# Patient Record
Sex: FEMALE | Race: OTHER | URBAN - METROPOLITAN AREA
[De-identification: names, ages, dates, MRNs, and addresses within clinical notes are randomized per-mention and may not be internally consistent; named-entity substitution may affect disease eponyms.]

---

## 2017-10-17 ENCOUNTER — OUTPATIENT (OUTPATIENT)
Dept: OUTPATIENT SERVICES | Facility: HOSPITAL | Age: 67
LOS: 1 days | Discharge: HOME | End: 2017-10-17

## 2017-10-17 DIAGNOSIS — E55.9 VITAMIN D DEFICIENCY, UNSPECIFIED: ICD-10-CM

## 2017-10-17 DIAGNOSIS — F41.9 ANXIETY DISORDER, UNSPECIFIED: ICD-10-CM

## 2017-10-17 DIAGNOSIS — I10 ESSENTIAL (PRIMARY) HYPERTENSION: ICD-10-CM

## 2017-10-17 DIAGNOSIS — R73.09 OTHER ABNORMAL GLUCOSE: ICD-10-CM

## 2018-08-07 PROBLEM — Z00.00 ENCOUNTER FOR PREVENTIVE HEALTH EXAMINATION: Status: ACTIVE | Noted: 2018-08-07

## 2018-08-22 ENCOUNTER — APPOINTMENT (OUTPATIENT)
Dept: OBGYN | Facility: CLINIC | Age: 68
End: 2018-08-22
Payer: MEDICARE

## 2018-08-22 ENCOUNTER — LABORATORY RESULT (OUTPATIENT)
Age: 68
End: 2018-08-22

## 2018-08-22 ENCOUNTER — OUTPATIENT (OUTPATIENT)
Dept: OUTPATIENT SERVICES | Facility: HOSPITAL | Age: 68
LOS: 1 days | Discharge: HOME | End: 2018-08-22

## 2018-08-22 VITALS — WEIGHT: 114 LBS | BODY MASS INDEX: 20.98 KG/M2 | HEIGHT: 62 IN

## 2018-08-22 DIAGNOSIS — Z01.419 ENCOUNTER FOR GYNECOLOGICAL EXAMINATION (GENERAL) (ROUTINE) W/OUT ABNORMAL FINDINGS: ICD-10-CM

## 2018-08-22 PROCEDURE — 81003 URINALYSIS AUTO W/O SCOPE: CPT | Mod: QW

## 2018-08-22 PROCEDURE — 99387 INIT PM E/M NEW PAT 65+ YRS: CPT

## 2018-08-23 DIAGNOSIS — Z01.419 ENCOUNTER FOR GYNECOLOGICAL EXAMINATION (GENERAL) (ROUTINE) WITHOUT ABNORMAL FINDINGS: ICD-10-CM

## 2018-08-24 LAB
BILIRUB UR QL STRIP: NORMAL
CLARITY UR: CLEAR
GLUCOSE UR-MCNC: NORMAL
HCG UR QL: NORMAL EU/DL
HGB UR QL STRIP.AUTO: NORMAL
KETONES UR-MCNC: NORMAL
LEUKOCYTE ESTERASE UR QL STRIP: 25
NITRITE UR QL STRIP: NORMAL
PH UR STRIP: 6.5
PROT UR STRIP-MCNC: NORMAL
SP GR UR STRIP: 1.01

## 2020-02-04 ENCOUNTER — APPOINTMENT (OUTPATIENT)
Dept: OBGYN | Facility: CLINIC | Age: 70
End: 2020-02-04
Payer: MEDICARE

## 2020-02-04 VITALS — BODY MASS INDEX: 19.69 KG/M2 | WEIGHT: 107 LBS | HEIGHT: 62 IN

## 2020-02-04 DIAGNOSIS — Z78.0 ASYMPTOMATIC MENOPAUSAL STATE: ICD-10-CM

## 2020-02-04 DIAGNOSIS — I10 ESSENTIAL (PRIMARY) HYPERTENSION: ICD-10-CM

## 2020-02-04 LAB
BILIRUB UR QL STRIP: NORMAL
CLARITY UR: CLEAR
GLUCOSE UR-MCNC: NORMAL
HCG UR QL: NORMAL EU/DL
HGB UR QL STRIP.AUTO: NORMAL
KETONES UR-MCNC: NORMAL
LEUKOCYTE ESTERASE UR QL STRIP: NORMAL
NITRITE UR QL STRIP: NORMAL
PH UR STRIP: 5
PROT UR STRIP-MCNC: NORMAL
SP GR UR STRIP: 1

## 2020-02-04 PROCEDURE — 99213 OFFICE O/P EST LOW 20 MIN: CPT

## 2020-02-04 PROCEDURE — 81003 URINALYSIS AUTO W/O SCOPE: CPT | Mod: QW

## 2020-02-07 PROBLEM — I10 HYPERTENSION: Status: ACTIVE | Noted: 2020-02-07

## 2020-02-07 PROBLEM — Z78.0 MENOPAUSE: Status: ACTIVE | Noted: 2018-08-23

## 2020-03-11 ENCOUNTER — TRANSCRIPTION ENCOUNTER (OUTPATIENT)
Age: 70
End: 2020-03-11

## 2020-10-13 ENCOUNTER — APPOINTMENT (OUTPATIENT)
Dept: OBGYN | Facility: CLINIC | Age: 70
End: 2020-10-13

## 2024-01-01 ENCOUNTER — INPATIENT (INPATIENT)
Facility: HOSPITAL | Age: 74
LOS: 23 days | Discharge: NURSING, REHAB OR LONG-TERM CARE | DRG: 192 | End: 2024-05-30
Attending: INTERNAL MEDICINE | Admitting: INTERNAL MEDICINE
Payer: MEDICARE

## 2024-01-01 ENCOUNTER — INPATIENT (INPATIENT)
Facility: HOSPITAL | Age: 74
LOS: 3 days | DRG: 189 | End: 2024-06-06
Attending: HOSPITALIST | Admitting: STUDENT IN AN ORGANIZED HEALTH CARE EDUCATION/TRAINING PROGRAM
Payer: MEDICARE

## 2024-01-01 ENCOUNTER — TRANSCRIPTION ENCOUNTER (OUTPATIENT)
Age: 74
End: 2024-01-01

## 2024-01-01 ENCOUNTER — RESULT REVIEW (OUTPATIENT)
Age: 74
End: 2024-01-01

## 2024-01-01 VITALS
DIASTOLIC BLOOD PRESSURE: 73 MMHG | SYSTOLIC BLOOD PRESSURE: 149 MMHG | OXYGEN SATURATION: 84 % | RESPIRATION RATE: 20 BRPM | TEMPERATURE: 98 F | HEART RATE: 89 BPM

## 2024-01-01 VITALS
HEART RATE: 103 BPM | SYSTOLIC BLOOD PRESSURE: 143 MMHG | RESPIRATION RATE: 28 BRPM | TEMPERATURE: 99 F | OXYGEN SATURATION: 98 % | WEIGHT: 130.07 LBS | DIASTOLIC BLOOD PRESSURE: 80 MMHG

## 2024-01-01 VITALS
TEMPERATURE: 98 F | OXYGEN SATURATION: 95 % | SYSTOLIC BLOOD PRESSURE: 121 MMHG | DIASTOLIC BLOOD PRESSURE: 63 MMHG | RESPIRATION RATE: 18 BRPM | HEART RATE: 97 BPM

## 2024-01-01 VITALS
DIASTOLIC BLOOD PRESSURE: 72 MMHG | HEART RATE: 97 BPM | OXYGEN SATURATION: 97 % | WEIGHT: 119.93 LBS | TEMPERATURE: 99 F | SYSTOLIC BLOOD PRESSURE: 134 MMHG | HEIGHT: 61 IN

## 2024-01-01 DIAGNOSIS — R91.8 OTHER NONSPECIFIC ABNORMAL FINDING OF LUNG FIELD: ICD-10-CM

## 2024-01-01 DIAGNOSIS — R04.2 HEMOPTYSIS: ICD-10-CM

## 2024-01-01 DIAGNOSIS — G92.8 OTHER TOXIC ENCEPHALOPATHY: ICD-10-CM

## 2024-01-01 DIAGNOSIS — J96.21 ACUTE AND CHRONIC RESPIRATORY FAILURE WITH HYPOXIA: ICD-10-CM

## 2024-01-01 DIAGNOSIS — R06.00 DYSPNEA, UNSPECIFIED: ICD-10-CM

## 2024-01-01 DIAGNOSIS — J44.9 CHRONIC OBSTRUCTIVE PULMONARY DISEASE, UNSPECIFIED: ICD-10-CM

## 2024-01-01 DIAGNOSIS — J98.11 ATELECTASIS: ICD-10-CM

## 2024-01-01 DIAGNOSIS — J94.2 HEMOTHORAX: ICD-10-CM

## 2024-01-01 DIAGNOSIS — K64.9 UNSPECIFIED HEMORRHOIDS: ICD-10-CM

## 2024-01-01 DIAGNOSIS — F17.210 NICOTINE DEPENDENCE, CIGARETTES, UNCOMPLICATED: ICD-10-CM

## 2024-01-01 DIAGNOSIS — Z53.29 PROCEDURE AND TREATMENT NOT CARRIED OUT BECAUSE OF PATIENT'S DECISION FOR OTHER REASONS: ICD-10-CM

## 2024-01-01 DIAGNOSIS — Z66 DO NOT RESUSCITATE: ICD-10-CM

## 2024-01-01 DIAGNOSIS — J96.02 ACUTE RESPIRATORY FAILURE WITH HYPERCAPNIA: ICD-10-CM

## 2024-01-01 DIAGNOSIS — J44.0 CHRONIC OBSTRUCTIVE PULMONARY DISEASE WITH (ACUTE) LOWER RESPIRATORY INFECTION: ICD-10-CM

## 2024-01-01 DIAGNOSIS — K59.00 CONSTIPATION, UNSPECIFIED: ICD-10-CM

## 2024-01-01 DIAGNOSIS — Z51.5 ENCOUNTER FOR PALLIATIVE CARE: ICD-10-CM

## 2024-01-01 DIAGNOSIS — J44.1 CHRONIC OBSTRUCTIVE PULMONARY DISEASE WITH (ACUTE) EXACERBATION: ICD-10-CM

## 2024-01-01 DIAGNOSIS — H61.20 IMPACTED CERUMEN, UNSPECIFIED EAR: ICD-10-CM

## 2024-01-01 DIAGNOSIS — J98.09 OTHER DISEASES OF BRONCHUS, NOT ELSEWHERE CLASSIFIED: ICD-10-CM

## 2024-01-01 DIAGNOSIS — H61.21 IMPACTED CERUMEN, RIGHT EAR: ICD-10-CM

## 2024-01-01 DIAGNOSIS — F41.9 ANXIETY DISORDER, UNSPECIFIED: ICD-10-CM

## 2024-01-01 DIAGNOSIS — J90 PLEURAL EFFUSION, NOT ELSEWHERE CLASSIFIED: ICD-10-CM

## 2024-01-01 DIAGNOSIS — J96.22 ACUTE AND CHRONIC RESPIRATORY FAILURE WITH HYPERCAPNIA: ICD-10-CM

## 2024-01-01 DIAGNOSIS — D64.9 ANEMIA, UNSPECIFIED: ICD-10-CM

## 2024-01-01 DIAGNOSIS — J18.8 OTHER PNEUMONIA, UNSPECIFIED ORGANISM: ICD-10-CM

## 2024-01-01 DIAGNOSIS — J96.01 ACUTE RESPIRATORY FAILURE WITH HYPOXIA: ICD-10-CM

## 2024-01-01 DIAGNOSIS — I10 ESSENTIAL (PRIMARY) HYPERTENSION: ICD-10-CM

## 2024-01-01 DIAGNOSIS — H65.91 UNSPECIFIED NONSUPPURATIVE OTITIS MEDIA, RIGHT EAR: ICD-10-CM

## 2024-01-01 DIAGNOSIS — C34.92 MALIGNANT NEOPLASM OF UNSPECIFIED PART OF LEFT BRONCHUS OR LUNG: ICD-10-CM

## 2024-01-01 DIAGNOSIS — E83.42 HYPOMAGNESEMIA: ICD-10-CM

## 2024-01-01 DIAGNOSIS — E43 UNSPECIFIED SEVERE PROTEIN-CALORIE MALNUTRITION: ICD-10-CM

## 2024-01-01 LAB
ALBUMIN SERPL ELPH-MCNC: 2.9 G/DL — LOW (ref 3.5–5.2)
ALBUMIN SERPL ELPH-MCNC: 3 G/DL — LOW (ref 3.5–5.2)
ALBUMIN SERPL ELPH-MCNC: 3.1 G/DL — LOW (ref 3.5–5.2)
ALBUMIN SERPL ELPH-MCNC: 3.2 G/DL — LOW (ref 3.5–5.2)
ALBUMIN SERPL ELPH-MCNC: 3.2 G/DL — LOW (ref 3.5–5.2)
ALBUMIN SERPL ELPH-MCNC: 3.3 G/DL — LOW (ref 3.5–5.2)
ALBUMIN SERPL ELPH-MCNC: 3.4 G/DL — LOW (ref 3.5–5.2)
ALBUMIN SERPL ELPH-MCNC: 3.5 G/DL — SIGNIFICANT CHANGE UP (ref 3.5–5.2)
ALBUMIN SERPL ELPH-MCNC: 3.6 G/DL — SIGNIFICANT CHANGE UP (ref 3.5–5.2)
ALBUMIN SERPL ELPH-MCNC: 3.7 G/DL — SIGNIFICANT CHANGE UP (ref 3.5–5.2)
ALP SERPL-CCNC: 100 U/L — SIGNIFICANT CHANGE UP (ref 30–115)
ALP SERPL-CCNC: 103 U/L — SIGNIFICANT CHANGE UP (ref 30–115)
ALP SERPL-CCNC: 58 U/L — SIGNIFICANT CHANGE UP (ref 30–115)
ALP SERPL-CCNC: 61 U/L — SIGNIFICANT CHANGE UP (ref 30–115)
ALP SERPL-CCNC: 61 U/L — SIGNIFICANT CHANGE UP (ref 30–115)
ALP SERPL-CCNC: 64 U/L — SIGNIFICANT CHANGE UP (ref 30–115)
ALP SERPL-CCNC: 65 U/L — SIGNIFICANT CHANGE UP (ref 30–115)
ALP SERPL-CCNC: 72 U/L — SIGNIFICANT CHANGE UP (ref 30–115)
ALP SERPL-CCNC: 73 U/L — SIGNIFICANT CHANGE UP (ref 30–115)
ALP SERPL-CCNC: 78 U/L — SIGNIFICANT CHANGE UP (ref 30–115)
ALP SERPL-CCNC: 79 U/L — SIGNIFICANT CHANGE UP (ref 30–115)
ALP SERPL-CCNC: 80 U/L — SIGNIFICANT CHANGE UP (ref 30–115)
ALP SERPL-CCNC: 80 U/L — SIGNIFICANT CHANGE UP (ref 30–115)
ALP SERPL-CCNC: 81 U/L — SIGNIFICANT CHANGE UP (ref 30–115)
ALP SERPL-CCNC: 81 U/L — SIGNIFICANT CHANGE UP (ref 30–115)
ALP SERPL-CCNC: 82 U/L — SIGNIFICANT CHANGE UP (ref 30–115)
ALP SERPL-CCNC: 84 U/L — SIGNIFICANT CHANGE UP (ref 30–115)
ALP SERPL-CCNC: 84 U/L — SIGNIFICANT CHANGE UP (ref 30–115)
ALP SERPL-CCNC: 86 U/L — SIGNIFICANT CHANGE UP (ref 30–115)
ALP SERPL-CCNC: 87 U/L — SIGNIFICANT CHANGE UP (ref 30–115)
ALP SERPL-CCNC: 93 U/L — SIGNIFICANT CHANGE UP (ref 30–115)
ALP SERPL-CCNC: 94 U/L — SIGNIFICANT CHANGE UP (ref 30–115)
ALP SERPL-CCNC: 94 U/L — SIGNIFICANT CHANGE UP (ref 30–115)
ALP SERPL-CCNC: 95 U/L — SIGNIFICANT CHANGE UP (ref 30–115)
ALT FLD-CCNC: 109 U/L — HIGH (ref 0–41)
ALT FLD-CCNC: 160 U/L — HIGH (ref 0–41)
ALT FLD-CCNC: 18 U/L — SIGNIFICANT CHANGE UP (ref 0–41)
ALT FLD-CCNC: 19 U/L — SIGNIFICANT CHANGE UP (ref 0–41)
ALT FLD-CCNC: 21 U/L — SIGNIFICANT CHANGE UP (ref 0–41)
ALT FLD-CCNC: 22 U/L — SIGNIFICANT CHANGE UP (ref 0–41)
ALT FLD-CCNC: 25 U/L — SIGNIFICANT CHANGE UP (ref 0–41)
ALT FLD-CCNC: 25 U/L — SIGNIFICANT CHANGE UP (ref 0–41)
ALT FLD-CCNC: 27 U/L — SIGNIFICANT CHANGE UP (ref 0–41)
ALT FLD-CCNC: 29 U/L — SIGNIFICANT CHANGE UP (ref 0–41)
ALT FLD-CCNC: 30 U/L — SIGNIFICANT CHANGE UP (ref 0–41)
ALT FLD-CCNC: 31 U/L — SIGNIFICANT CHANGE UP (ref 0–41)
ALT FLD-CCNC: 35 U/L — SIGNIFICANT CHANGE UP (ref 0–41)
ALT FLD-CCNC: 37 U/L — SIGNIFICANT CHANGE UP (ref 0–41)
ALT FLD-CCNC: 37 U/L — SIGNIFICANT CHANGE UP (ref 0–41)
ALT FLD-CCNC: 38 U/L — SIGNIFICANT CHANGE UP (ref 0–41)
ALT FLD-CCNC: 46 U/L — HIGH (ref 0–41)
ALT FLD-CCNC: 47 U/L — HIGH (ref 0–41)
ALT FLD-CCNC: 48 U/L — HIGH (ref 0–41)
ALT FLD-CCNC: 49 U/L — HIGH (ref 0–41)
ALT FLD-CCNC: 55 U/L — HIGH (ref 0–41)
ALT FLD-CCNC: 65 U/L — HIGH (ref 0–41)
ALT FLD-CCNC: 75 U/L — HIGH (ref 0–41)
ALT FLD-CCNC: 84 U/L — HIGH (ref 0–41)
ANION GAP SERPL CALC-SCNC: -3 MMOL/L — LOW (ref 7–14)
ANION GAP SERPL CALC-SCNC: 0 MMOL/L — LOW (ref 7–14)
ANION GAP SERPL CALC-SCNC: 1 MMOL/L — LOW (ref 7–14)
ANION GAP SERPL CALC-SCNC: 1 MMOL/L — LOW (ref 7–14)
ANION GAP SERPL CALC-SCNC: 2 MMOL/L — LOW (ref 7–14)
ANION GAP SERPL CALC-SCNC: 3 MMOL/L — LOW (ref 7–14)
ANION GAP SERPL CALC-SCNC: 4 MMOL/L — LOW (ref 7–14)
ANION GAP SERPL CALC-SCNC: 5 MMOL/L — LOW (ref 7–14)
ANION GAP SERPL CALC-SCNC: 6 MMOL/L — LOW (ref 7–14)
ANION GAP SERPL CALC-SCNC: 7 MMOL/L — SIGNIFICANT CHANGE UP (ref 7–14)
ANION GAP SERPL CALC-SCNC: 8 MMOL/L — SIGNIFICANT CHANGE UP (ref 7–14)
ANION GAP SERPL CALC-SCNC: 9 MMOL/L — SIGNIFICANT CHANGE UP (ref 7–14)
APTT BLD: 25.3 SEC — LOW (ref 27–39.2)
APTT BLD: 28.5 SEC — SIGNIFICANT CHANGE UP (ref 27–39.2)
AST SERPL-CCNC: 12 U/L — SIGNIFICANT CHANGE UP (ref 0–41)
AST SERPL-CCNC: 13 U/L — SIGNIFICANT CHANGE UP (ref 0–41)
AST SERPL-CCNC: 15 U/L — SIGNIFICANT CHANGE UP (ref 0–41)
AST SERPL-CCNC: 15 U/L — SIGNIFICANT CHANGE UP (ref 0–41)
AST SERPL-CCNC: 16 U/L — SIGNIFICANT CHANGE UP (ref 0–41)
AST SERPL-CCNC: 18 U/L — SIGNIFICANT CHANGE UP (ref 0–41)
AST SERPL-CCNC: 19 U/L — SIGNIFICANT CHANGE UP (ref 0–41)
AST SERPL-CCNC: 23 U/L — SIGNIFICANT CHANGE UP (ref 0–41)
AST SERPL-CCNC: 24 U/L — SIGNIFICANT CHANGE UP (ref 0–41)
AST SERPL-CCNC: 42 U/L — HIGH (ref 0–41)
AST SERPL-CCNC: 5 U/L — SIGNIFICANT CHANGE UP (ref 0–41)
AST SERPL-CCNC: 6 U/L — SIGNIFICANT CHANGE UP (ref 0–41)
AST SERPL-CCNC: 6 U/L — SIGNIFICANT CHANGE UP (ref 0–41)
AST SERPL-CCNC: 7 U/L — SIGNIFICANT CHANGE UP (ref 0–41)
AST SERPL-CCNC: 8 U/L — SIGNIFICANT CHANGE UP (ref 0–41)
AST SERPL-CCNC: 9 U/L — SIGNIFICANT CHANGE UP (ref 0–41)
BASE EXCESS BLDA CALC-SCNC: 16.2 MMOL/L — HIGH (ref -2–3)
BASE EXCESS BLDA CALC-SCNC: 18.5 MMOL/L — HIGH (ref -2–3)
BASE EXCESS BLDV CALC-SCNC: 22 MMOL/L — HIGH (ref -2–3)
BASE EXCESS BLDV CALC-SCNC: 25.3 MMOL/L — HIGH (ref -2–3)
BASE EXCESS BLDV CALC-SCNC: 6.6 MMOL/L — HIGH (ref -2–3)
BASOPHILS # BLD AUTO: 0.01 K/UL — SIGNIFICANT CHANGE UP (ref 0–0.2)
BASOPHILS # BLD AUTO: 0.02 K/UL — SIGNIFICANT CHANGE UP (ref 0–0.2)
BASOPHILS # BLD AUTO: 0.03 K/UL — SIGNIFICANT CHANGE UP (ref 0–0.2)
BASOPHILS # BLD AUTO: 0.03 K/UL — SIGNIFICANT CHANGE UP (ref 0–0.2)
BASOPHILS # BLD AUTO: 0.04 K/UL — SIGNIFICANT CHANGE UP (ref 0–0.2)
BASOPHILS # BLD AUTO: 0.05 K/UL — SIGNIFICANT CHANGE UP (ref 0–0.2)
BASOPHILS # BLD AUTO: 0.06 K/UL — SIGNIFICANT CHANGE UP (ref 0–0.2)
BASOPHILS # BLD AUTO: 0.07 K/UL — SIGNIFICANT CHANGE UP (ref 0–0.2)
BASOPHILS # BLD AUTO: 0.07 K/UL — SIGNIFICANT CHANGE UP (ref 0–0.2)
BASOPHILS NFR BLD AUTO: 0.1 % — SIGNIFICANT CHANGE UP (ref 0–1)
BASOPHILS NFR BLD AUTO: 0.2 % — SIGNIFICANT CHANGE UP (ref 0–1)
BASOPHILS NFR BLD AUTO: 0.3 % — SIGNIFICANT CHANGE UP (ref 0–1)
BASOPHILS NFR BLD AUTO: 0.3 % — SIGNIFICANT CHANGE UP (ref 0–1)
BASOPHILS NFR BLD AUTO: 0.4 % — SIGNIFICANT CHANGE UP (ref 0–1)
BASOPHILS NFR BLD AUTO: 0.4 % — SIGNIFICANT CHANGE UP (ref 0–1)
BASOPHILS NFR BLD AUTO: 0.5 % — SIGNIFICANT CHANGE UP (ref 0–1)
BASOPHILS NFR BLD AUTO: 0.6 % — SIGNIFICANT CHANGE UP (ref 0–1)
BASOPHILS NFR BLD AUTO: 0.6 % — SIGNIFICANT CHANGE UP (ref 0–1)
BASOPHILS NFR BLD AUTO: 0.7 % — SIGNIFICANT CHANGE UP (ref 0–1)
BASOPHILS NFR BLD AUTO: 0.8 % — SIGNIFICANT CHANGE UP (ref 0–1)
BILIRUB SERPL-MCNC: 0.2 MG/DL — SIGNIFICANT CHANGE UP (ref 0.2–1.2)
BILIRUB SERPL-MCNC: 0.3 MG/DL — SIGNIFICANT CHANGE UP (ref 0.2–1.2)
BILIRUB SERPL-MCNC: 0.4 MG/DL — SIGNIFICANT CHANGE UP (ref 0.2–1.2)
BILIRUB SERPL-MCNC: 0.5 MG/DL — SIGNIFICANT CHANGE UP (ref 0.2–1.2)
BILIRUB SERPL-MCNC: 0.5 MG/DL — SIGNIFICANT CHANGE UP (ref 0.2–1.2)
BILIRUB SERPL-MCNC: <0.2 MG/DL — SIGNIFICANT CHANGE UP (ref 0.2–1.2)
BLD GP AB SCN SERPL QL: SIGNIFICANT CHANGE UP
BUN SERPL-MCNC: 10 MG/DL — SIGNIFICANT CHANGE UP (ref 10–20)
BUN SERPL-MCNC: 12 MG/DL — SIGNIFICANT CHANGE UP (ref 10–20)
BUN SERPL-MCNC: 13 MG/DL — SIGNIFICANT CHANGE UP (ref 10–20)
BUN SERPL-MCNC: 14 MG/DL — SIGNIFICANT CHANGE UP (ref 10–20)
BUN SERPL-MCNC: 16 MG/DL — SIGNIFICANT CHANGE UP (ref 10–20)
BUN SERPL-MCNC: 16 MG/DL — SIGNIFICANT CHANGE UP (ref 10–20)
BUN SERPL-MCNC: 17 MG/DL — SIGNIFICANT CHANGE UP (ref 10–20)
BUN SERPL-MCNC: 17 MG/DL — SIGNIFICANT CHANGE UP (ref 10–20)
BUN SERPL-MCNC: 18 MG/DL — SIGNIFICANT CHANGE UP (ref 10–20)
BUN SERPL-MCNC: 19 MG/DL — SIGNIFICANT CHANGE UP (ref 10–20)
BUN SERPL-MCNC: 20 MG/DL — SIGNIFICANT CHANGE UP (ref 10–20)
BUN SERPL-MCNC: 21 MG/DL — HIGH (ref 10–20)
BUN SERPL-MCNC: 21 MG/DL — HIGH (ref 10–20)
BUN SERPL-MCNC: 22 MG/DL — HIGH (ref 10–20)
BUN SERPL-MCNC: 24 MG/DL — HIGH (ref 10–20)
BUN SERPL-MCNC: 25 MG/DL — HIGH (ref 10–20)
BUN SERPL-MCNC: 27 MG/DL — HIGH (ref 10–20)
BUN SERPL-MCNC: 27 MG/DL — HIGH (ref 10–20)
CA-I SERPL-SCNC: 1.16 MMOL/L — SIGNIFICANT CHANGE UP (ref 1.15–1.33)
CA-I SERPL-SCNC: 1.21 MMOL/L — SIGNIFICANT CHANGE UP (ref 1.15–1.33)
CA-I SERPL-SCNC: 1.25 MMOL/L — SIGNIFICANT CHANGE UP (ref 1.15–1.33)
CALCIUM SERPL-MCNC: 8.1 MG/DL — LOW (ref 8.4–10.4)
CALCIUM SERPL-MCNC: 8.2 MG/DL — LOW (ref 8.4–10.4)
CALCIUM SERPL-MCNC: 8.2 MG/DL — LOW (ref 8.4–10.5)
CALCIUM SERPL-MCNC: 8.2 MG/DL — LOW (ref 8.4–10.5)
CALCIUM SERPL-MCNC: 8.3 MG/DL — LOW (ref 8.4–10.4)
CALCIUM SERPL-MCNC: 8.3 MG/DL — LOW (ref 8.4–10.5)
CALCIUM SERPL-MCNC: 8.4 MG/DL — SIGNIFICANT CHANGE UP (ref 8.4–10.4)
CALCIUM SERPL-MCNC: 8.4 MG/DL — SIGNIFICANT CHANGE UP (ref 8.4–10.5)
CALCIUM SERPL-MCNC: 8.5 MG/DL — SIGNIFICANT CHANGE UP (ref 8.4–10.4)
CALCIUM SERPL-MCNC: 8.5 MG/DL — SIGNIFICANT CHANGE UP (ref 8.4–10.5)
CALCIUM SERPL-MCNC: 8.6 MG/DL — SIGNIFICANT CHANGE UP (ref 8.4–10.5)
CALCIUM SERPL-MCNC: 8.7 MG/DL — SIGNIFICANT CHANGE UP (ref 8.4–10.5)
CALCIUM SERPL-MCNC: 8.8 MG/DL — SIGNIFICANT CHANGE UP (ref 8.4–10.5)
CALCIUM SERPL-MCNC: 8.9 MG/DL — SIGNIFICANT CHANGE UP (ref 8.4–10.5)
CALCIUM SERPL-MCNC: 9 MG/DL — SIGNIFICANT CHANGE UP (ref 8.4–10.5)
CALCIUM SERPL-MCNC: 9.1 MG/DL — SIGNIFICANT CHANGE UP (ref 8.4–10.4)
CALCIUM SERPL-MCNC: 9.1 MG/DL — SIGNIFICANT CHANGE UP (ref 8.4–10.4)
CALCIUM SERPL-MCNC: 9.2 MG/DL — SIGNIFICANT CHANGE UP (ref 8.4–10.4)
CALCIUM SERPL-MCNC: 9.2 MG/DL — SIGNIFICANT CHANGE UP (ref 8.4–10.5)
CHLORIDE SERPL-SCNC: 100 MMOL/L — SIGNIFICANT CHANGE UP (ref 98–110)
CHLORIDE SERPL-SCNC: 100 MMOL/L — SIGNIFICANT CHANGE UP (ref 98–110)
CHLORIDE SERPL-SCNC: 101 MMOL/L — SIGNIFICANT CHANGE UP (ref 98–110)
CHLORIDE SERPL-SCNC: 90 MMOL/L — LOW (ref 98–110)
CHLORIDE SERPL-SCNC: 91 MMOL/L — LOW (ref 98–110)
CHLORIDE SERPL-SCNC: 92 MMOL/L — LOW (ref 98–110)
CHLORIDE SERPL-SCNC: 93 MMOL/L — LOW (ref 98–110)
CHLORIDE SERPL-SCNC: 94 MMOL/L — LOW (ref 98–110)
CHLORIDE SERPL-SCNC: 95 MMOL/L — LOW (ref 98–110)
CHLORIDE SERPL-SCNC: 96 MMOL/L — LOW (ref 98–110)
CHLORIDE SERPL-SCNC: 97 MMOL/L — LOW (ref 98–110)
CHLORIDE SERPL-SCNC: 97 MMOL/L — LOW (ref 98–110)
CHLORIDE SERPL-SCNC: 98 MMOL/L — SIGNIFICANT CHANGE UP (ref 98–110)
CHLORIDE SERPL-SCNC: 99 MMOL/L — SIGNIFICANT CHANGE UP (ref 98–110)
CO2 SERPL-SCNC: 32 MMOL/L — SIGNIFICANT CHANGE UP (ref 17–32)
CO2 SERPL-SCNC: 33 MMOL/L — HIGH (ref 17–32)
CO2 SERPL-SCNC: 34 MMOL/L — HIGH (ref 17–32)
CO2 SERPL-SCNC: 34 MMOL/L — HIGH (ref 17–32)
CO2 SERPL-SCNC: 35 MMOL/L — HIGH (ref 17–32)
CO2 SERPL-SCNC: 36 MMOL/L — HIGH (ref 17–32)
CO2 SERPL-SCNC: 37 MMOL/L — HIGH (ref 17–32)
CO2 SERPL-SCNC: 39 MMOL/L — HIGH (ref 17–32)
CO2 SERPL-SCNC: 40 MMOL/L — HIGH (ref 17–32)
CO2 SERPL-SCNC: 42 MMOL/L — CRITICAL HIGH (ref 17–32)
CO2 SERPL-SCNC: 43 MMOL/L — CRITICAL HIGH (ref 17–32)
CO2 SERPL-SCNC: 43 MMOL/L — CRITICAL HIGH (ref 17–32)
CO2 SERPL-SCNC: 44 MMOL/L — CRITICAL HIGH (ref 17–32)
CO2 SERPL-SCNC: 45 MMOL/L — CRITICAL HIGH (ref 17–32)
CO2 SERPL-SCNC: 46 MMOL/L — CRITICAL HIGH (ref 17–32)
CO2 SERPL-SCNC: 46 MMOL/L — CRITICAL HIGH (ref 17–32)
CO2 SERPL-SCNC: 49 MMOL/L — CRITICAL HIGH (ref 17–32)
CREAT SERPL-MCNC: 0.5 MG/DL — LOW (ref 0.7–1.5)
CREAT SERPL-MCNC: 0.6 MG/DL — LOW (ref 0.7–1.5)
CREAT SERPL-MCNC: <0.5 MG/DL — LOW (ref 0.7–1.5)
CULTURE RESULTS: SIGNIFICANT CHANGE UP
D DIMER BLD IA.RAPID-MCNC: 1459 NG/ML DDU — HIGH
D DIMER BLD IA.RAPID-MCNC: 402 NG/ML DDU — HIGH
D DIMER BLD IA.RAPID-MCNC: 765 NG/ML DDU — HIGH
EGFR: 100 ML/MIN/1.73M2 — SIGNIFICANT CHANGE UP
EGFR: 105 ML/MIN/1.73M2 — SIGNIFICANT CHANGE UP
EGFR: 113 ML/MIN/1.73M2 — SIGNIFICANT CHANGE UP
EGFR: 124 ML/MIN/1.73M2 — SIGNIFICANT CHANGE UP
EGFR: 95 ML/MIN/1.73M2 — SIGNIFICANT CHANGE UP
EOSINOPHIL # BLD AUTO: 0 K/UL — SIGNIFICANT CHANGE UP (ref 0–0.7)
EOSINOPHIL # BLD AUTO: 0.02 K/UL — SIGNIFICANT CHANGE UP (ref 0–0.7)
EOSINOPHIL # BLD AUTO: 0.03 K/UL — SIGNIFICANT CHANGE UP (ref 0–0.7)
EOSINOPHIL # BLD AUTO: 0.05 K/UL — SIGNIFICANT CHANGE UP (ref 0–0.7)
EOSINOPHIL # BLD AUTO: 0.1 K/UL — SIGNIFICANT CHANGE UP (ref 0–0.7)
EOSINOPHIL # BLD AUTO: 0.17 K/UL — SIGNIFICANT CHANGE UP (ref 0–0.7)
EOSINOPHIL # BLD AUTO: 0.18 K/UL — SIGNIFICANT CHANGE UP (ref 0–0.7)
EOSINOPHIL # BLD AUTO: 0.19 K/UL — SIGNIFICANT CHANGE UP (ref 0–0.7)
EOSINOPHIL # BLD AUTO: 0.19 K/UL — SIGNIFICANT CHANGE UP (ref 0–0.7)
EOSINOPHIL # BLD AUTO: 0.2 K/UL — SIGNIFICANT CHANGE UP (ref 0–0.7)
EOSINOPHIL # BLD AUTO: 0.2 K/UL — SIGNIFICANT CHANGE UP (ref 0–0.7)
EOSINOPHIL # BLD AUTO: 0.21 K/UL — SIGNIFICANT CHANGE UP (ref 0–0.7)
EOSINOPHIL # BLD AUTO: 0.21 K/UL — SIGNIFICANT CHANGE UP (ref 0–0.7)
EOSINOPHIL # BLD AUTO: 0.24 K/UL — SIGNIFICANT CHANGE UP (ref 0–0.7)
EOSINOPHIL # BLD AUTO: 0.27 K/UL — SIGNIFICANT CHANGE UP (ref 0–0.7)
EOSINOPHIL # BLD AUTO: 0.28 K/UL — SIGNIFICANT CHANGE UP (ref 0–0.7)
EOSINOPHIL # BLD AUTO: 0.31 K/UL — SIGNIFICANT CHANGE UP (ref 0–0.7)
EOSINOPHIL # BLD AUTO: 0.33 K/UL — SIGNIFICANT CHANGE UP (ref 0–0.7)
EOSINOPHIL # BLD AUTO: 0.33 K/UL — SIGNIFICANT CHANGE UP (ref 0–0.7)
EOSINOPHIL # BLD AUTO: 0.35 K/UL — SIGNIFICANT CHANGE UP (ref 0–0.7)
EOSINOPHIL # BLD AUTO: 0.35 K/UL — SIGNIFICANT CHANGE UP (ref 0–0.7)
EOSINOPHIL # BLD AUTO: 0.4 K/UL — SIGNIFICANT CHANGE UP (ref 0–0.7)
EOSINOPHIL # BLD AUTO: 0.42 K/UL — SIGNIFICANT CHANGE UP (ref 0–0.7)
EOSINOPHIL # BLD AUTO: 0.43 K/UL — SIGNIFICANT CHANGE UP (ref 0–0.7)
EOSINOPHIL # BLD AUTO: 0.49 K/UL — SIGNIFICANT CHANGE UP (ref 0–0.7)
EOSINOPHIL # BLD AUTO: 0.5 K/UL — SIGNIFICANT CHANGE UP (ref 0–0.7)
EOSINOPHIL # BLD AUTO: 0.57 K/UL — SIGNIFICANT CHANGE UP (ref 0–0.7)
EOSINOPHIL NFR BLD AUTO: 0 % — SIGNIFICANT CHANGE UP (ref 0–8)
EOSINOPHIL NFR BLD AUTO: 0.1 % — SIGNIFICANT CHANGE UP (ref 0–8)
EOSINOPHIL NFR BLD AUTO: 0.2 % — SIGNIFICANT CHANGE UP (ref 0–8)
EOSINOPHIL NFR BLD AUTO: 0.3 % — SIGNIFICANT CHANGE UP (ref 0–8)
EOSINOPHIL NFR BLD AUTO: 1.3 % — SIGNIFICANT CHANGE UP (ref 0–8)
EOSINOPHIL NFR BLD AUTO: 1.3 % — SIGNIFICANT CHANGE UP (ref 0–8)
EOSINOPHIL NFR BLD AUTO: 1.4 % — SIGNIFICANT CHANGE UP (ref 0–8)
EOSINOPHIL NFR BLD AUTO: 1.5 % — SIGNIFICANT CHANGE UP (ref 0–8)
EOSINOPHIL NFR BLD AUTO: 1.5 % — SIGNIFICANT CHANGE UP (ref 0–8)
EOSINOPHIL NFR BLD AUTO: 1.6 % — SIGNIFICANT CHANGE UP (ref 0–8)
EOSINOPHIL NFR BLD AUTO: 1.7 % — SIGNIFICANT CHANGE UP (ref 0–8)
EOSINOPHIL NFR BLD AUTO: 1.7 % — SIGNIFICANT CHANGE UP (ref 0–8)
EOSINOPHIL NFR BLD AUTO: 2.1 % — SIGNIFICANT CHANGE UP (ref 0–8)
EOSINOPHIL NFR BLD AUTO: 2.1 % — SIGNIFICANT CHANGE UP (ref 0–8)
EOSINOPHIL NFR BLD AUTO: 2.3 % — SIGNIFICANT CHANGE UP (ref 0–8)
EOSINOPHIL NFR BLD AUTO: 2.5 % — SIGNIFICANT CHANGE UP (ref 0–8)
EOSINOPHIL NFR BLD AUTO: 2.5 % — SIGNIFICANT CHANGE UP (ref 0–8)
EOSINOPHIL NFR BLD AUTO: 2.7 % — SIGNIFICANT CHANGE UP (ref 0–8)
EOSINOPHIL NFR BLD AUTO: 2.7 % — SIGNIFICANT CHANGE UP (ref 0–8)
EOSINOPHIL NFR BLD AUTO: 2.8 % — SIGNIFICANT CHANGE UP (ref 0–8)
EOSINOPHIL NFR BLD AUTO: 2.8 % — SIGNIFICANT CHANGE UP (ref 0–8)
EOSINOPHIL NFR BLD AUTO: 2.9 % — SIGNIFICANT CHANGE UP (ref 0–8)
EOSINOPHIL NFR BLD AUTO: 3.1 % — SIGNIFICANT CHANGE UP (ref 0–8)
EOSINOPHIL NFR BLD AUTO: 3.1 % — SIGNIFICANT CHANGE UP (ref 0–8)
EOSINOPHIL NFR BLD AUTO: 3.3 % — SIGNIFICANT CHANGE UP (ref 0–8)
EOSINOPHIL NFR BLD AUTO: 3.3 % — SIGNIFICANT CHANGE UP (ref 0–8)
EOSINOPHIL NFR BLD AUTO: 3.4 % — SIGNIFICANT CHANGE UP (ref 0–8)
FERRITIN SERPL-MCNC: 216 NG/ML — SIGNIFICANT CHANGE UP (ref 13–330)
FLUAV AG NPH QL: SIGNIFICANT CHANGE UP
FLUBV AG NPH QL: SIGNIFICANT CHANGE UP
FUNGITELL: 34 PG/ML — SIGNIFICANT CHANGE UP
GAS PNL BLDA: SIGNIFICANT CHANGE UP
GAS PNL BLDV: 136 MMOL/L — SIGNIFICANT CHANGE UP (ref 136–145)
GAS PNL BLDV: 138 MMOL/L — SIGNIFICANT CHANGE UP (ref 136–145)
GAS PNL BLDV: 138 MMOL/L — SIGNIFICANT CHANGE UP (ref 136–145)
GAS PNL BLDV: SIGNIFICANT CHANGE UP
GLUCOSE BLDC GLUCOMTR-MCNC: 101 MG/DL — HIGH (ref 70–99)
GLUCOSE BLDC GLUCOMTR-MCNC: 102 MG/DL — HIGH (ref 70–99)
GLUCOSE BLDC GLUCOMTR-MCNC: 103 MG/DL — HIGH (ref 70–99)
GLUCOSE BLDC GLUCOMTR-MCNC: 106 MG/DL — HIGH (ref 70–99)
GLUCOSE BLDC GLUCOMTR-MCNC: 113 MG/DL — HIGH (ref 70–99)
GLUCOSE BLDC GLUCOMTR-MCNC: 118 MG/DL — HIGH (ref 70–99)
GLUCOSE BLDC GLUCOMTR-MCNC: 118 MG/DL — HIGH (ref 70–99)
GLUCOSE BLDC GLUCOMTR-MCNC: 126 MG/DL — HIGH (ref 70–99)
GLUCOSE BLDC GLUCOMTR-MCNC: 129 MG/DL — HIGH (ref 70–99)
GLUCOSE BLDC GLUCOMTR-MCNC: 132 MG/DL — HIGH (ref 70–99)
GLUCOSE BLDC GLUCOMTR-MCNC: 144 MG/DL — HIGH (ref 70–99)
GLUCOSE BLDC GLUCOMTR-MCNC: 167 MG/DL — HIGH (ref 70–99)
GLUCOSE BLDC GLUCOMTR-MCNC: 169 MG/DL — HIGH (ref 70–99)
GLUCOSE SERPL-MCNC: 100 MG/DL — HIGH (ref 70–99)
GLUCOSE SERPL-MCNC: 105 MG/DL — HIGH (ref 70–99)
GLUCOSE SERPL-MCNC: 107 MG/DL — HIGH (ref 70–99)
GLUCOSE SERPL-MCNC: 108 MG/DL — HIGH (ref 70–99)
GLUCOSE SERPL-MCNC: 109 MG/DL — HIGH (ref 70–99)
GLUCOSE SERPL-MCNC: 111 MG/DL — HIGH (ref 70–99)
GLUCOSE SERPL-MCNC: 111 MG/DL — HIGH (ref 70–99)
GLUCOSE SERPL-MCNC: 112 MG/DL — HIGH (ref 70–99)
GLUCOSE SERPL-MCNC: 114 MG/DL — HIGH (ref 70–99)
GLUCOSE SERPL-MCNC: 116 MG/DL — HIGH (ref 70–99)
GLUCOSE SERPL-MCNC: 124 MG/DL — HIGH (ref 70–99)
GLUCOSE SERPL-MCNC: 125 MG/DL — HIGH (ref 70–99)
GLUCOSE SERPL-MCNC: 125 MG/DL — HIGH (ref 70–99)
GLUCOSE SERPL-MCNC: 126 MG/DL — HIGH (ref 70–99)
GLUCOSE SERPL-MCNC: 136 MG/DL — HIGH (ref 70–99)
GLUCOSE SERPL-MCNC: 151 MG/DL — HIGH (ref 70–99)
GLUCOSE SERPL-MCNC: 87 MG/DL — SIGNIFICANT CHANGE UP (ref 70–99)
GLUCOSE SERPL-MCNC: 88 MG/DL — SIGNIFICANT CHANGE UP (ref 70–99)
GLUCOSE SERPL-MCNC: 91 MG/DL — SIGNIFICANT CHANGE UP (ref 70–99)
GLUCOSE SERPL-MCNC: 94 MG/DL — SIGNIFICANT CHANGE UP (ref 70–99)
GLUCOSE SERPL-MCNC: 98 MG/DL — SIGNIFICANT CHANGE UP (ref 70–99)
GLUCOSE SERPL-MCNC: 99 MG/DL — SIGNIFICANT CHANGE UP (ref 70–99)
GRAM STN FLD: SIGNIFICANT CHANGE UP
HCO3 BLDA-SCNC: 43 MMOL/L — HIGH (ref 21–28)
HCO3 BLDA-SCNC: 48 MMOL/L — CRITICAL HIGH (ref 21–28)
HCO3 BLDV-SCNC: 38 MMOL/L — HIGH (ref 22–29)
HCO3 BLDV-SCNC: 52 MMOL/L — CRITICAL HIGH (ref 22–29)
HCO3 BLDV-SCNC: 55 MMOL/L — CRITICAL HIGH (ref 22–29)
HCT VFR BLD CALC: 26.1 % — LOW (ref 37–47)
HCT VFR BLD CALC: 26.7 % — LOW (ref 37–47)
HCT VFR BLD CALC: 26.9 % — LOW (ref 37–47)
HCT VFR BLD CALC: 27 % — LOW (ref 37–47)
HCT VFR BLD CALC: 27.3 % — LOW (ref 37–47)
HCT VFR BLD CALC: 27.4 % — LOW (ref 37–47)
HCT VFR BLD CALC: 27.5 % — LOW (ref 37–47)
HCT VFR BLD CALC: 28.2 % — LOW (ref 37–47)
HCT VFR BLD CALC: 28.8 % — LOW (ref 37–47)
HCT VFR BLD CALC: 29.1 % — LOW (ref 37–47)
HCT VFR BLD CALC: 30.6 % — LOW (ref 37–47)
HCT VFR BLD CALC: 31 % — LOW (ref 37–47)
HCT VFR BLD CALC: 32 % — LOW (ref 37–47)
HCT VFR BLD CALC: 32 % — LOW (ref 37–47)
HCT VFR BLD CALC: 33.4 % — LOW (ref 37–47)
HCT VFR BLD CALC: 33.9 % — LOW (ref 37–47)
HCT VFR BLD CALC: 34.9 % — LOW (ref 37–47)
HCT VFR BLD CALC: 35.1 % — LOW (ref 37–47)
HCT VFR BLD CALC: 35.4 % — LOW (ref 37–47)
HCT VFR BLD CALC: 35.6 % — LOW (ref 37–47)
HCT VFR BLD CALC: 35.8 % — LOW (ref 37–47)
HCT VFR BLD CALC: 36.7 % — LOW (ref 37–47)
HCT VFR BLD CALC: 36.9 % — LOW (ref 37–47)
HCT VFR BLD CALC: 37.5 % — SIGNIFICANT CHANGE UP (ref 37–47)
HCT VFR BLD CALC: 39.3 % — SIGNIFICANT CHANGE UP (ref 37–47)
HCT VFR BLDA CALC: 26 % — LOW (ref 34.5–46.5)
HCT VFR BLDA CALC: 28 % — LOW (ref 34.5–46.5)
HCT VFR BLDA CALC: 35 % — SIGNIFICANT CHANGE UP (ref 34.5–46.5)
HGB BLD CALC-MCNC: 11.7 G/DL — SIGNIFICANT CHANGE UP (ref 11.7–16.1)
HGB BLD CALC-MCNC: 8.8 G/DL — LOW (ref 11.7–16.1)
HGB BLD CALC-MCNC: 9.4 G/DL — LOW (ref 11.7–16.1)
HGB BLD-MCNC: 10.2 G/DL — LOW (ref 12–16)
HGB BLD-MCNC: 10.2 G/DL — LOW (ref 12–16)
HGB BLD-MCNC: 10.6 G/DL — LOW (ref 12–16)
HGB BLD-MCNC: 10.7 G/DL — LOW (ref 12–16)
HGB BLD-MCNC: 10.8 G/DL — LOW (ref 12–16)
HGB BLD-MCNC: 10.9 G/DL — LOW (ref 12–16)
HGB BLD-MCNC: 11 G/DL — LOW (ref 12–16)
HGB BLD-MCNC: 11.1 G/DL — LOW (ref 12–16)
HGB BLD-MCNC: 11.5 G/DL — LOW (ref 12–16)
HGB BLD-MCNC: 7.5 G/DL — LOW (ref 12–16)
HGB BLD-MCNC: 7.6 G/DL — LOW (ref 12–16)
HGB BLD-MCNC: 7.7 G/DL — LOW (ref 12–16)
HGB BLD-MCNC: 7.8 G/DL — LOW (ref 12–16)
HGB BLD-MCNC: 7.9 G/DL — LOW (ref 12–16)
HGB BLD-MCNC: 8.2 G/DL — LOW (ref 12–16)
HGB BLD-MCNC: 8.3 G/DL — LOW (ref 12–16)
HGB BLD-MCNC: 8.5 G/DL — LOW (ref 12–16)
HGB BLD-MCNC: 8.5 G/DL — LOW (ref 12–16)
HGB BLD-MCNC: 8.6 G/DL — LOW (ref 12–16)
HGB BLD-MCNC: 8.6 G/DL — LOW (ref 12–16)
HGB BLD-MCNC: 8.7 G/DL — LOW (ref 12–16)
HGB BLD-MCNC: 8.8 G/DL — LOW (ref 12–16)
HGB BLD-MCNC: 8.8 G/DL — LOW (ref 12–16)
HGB BLD-MCNC: 9.1 G/DL — LOW (ref 12–16)
HGB BLD-MCNC: 9.5 G/DL — LOW (ref 12–16)
HGB BLD-MCNC: 9.5 G/DL — LOW (ref 12–16)
HGB BLD-MCNC: 9.7 G/DL — LOW (ref 12–16)
HGB BLD-MCNC: 9.8 G/DL — LOW (ref 12–16)
HOROWITZ INDEX BLDA+IHG-RTO: 30 — SIGNIFICANT CHANGE UP
IMM GRANULOCYTES NFR BLD AUTO: 0.3 % — SIGNIFICANT CHANGE UP (ref 0.1–0.3)
IMM GRANULOCYTES NFR BLD AUTO: 0.4 % — HIGH (ref 0.1–0.3)
IMM GRANULOCYTES NFR BLD AUTO: 0.4 % — HIGH (ref 0.1–0.3)
IMM GRANULOCYTES NFR BLD AUTO: 0.5 % — HIGH (ref 0.1–0.3)
IMM GRANULOCYTES NFR BLD AUTO: 0.6 % — HIGH (ref 0.1–0.3)
IMM GRANULOCYTES NFR BLD AUTO: 0.7 % — HIGH (ref 0.1–0.3)
IMM GRANULOCYTES NFR BLD AUTO: 0.8 % — HIGH (ref 0.1–0.3)
IMM GRANULOCYTES NFR BLD AUTO: 0.9 % — HIGH (ref 0.1–0.3)
IMM GRANULOCYTES NFR BLD AUTO: 1.1 % — HIGH (ref 0.1–0.3)
INR BLD: 0.96 RATIO — SIGNIFICANT CHANGE UP (ref 0.65–1.3)
INR BLD: 0.99 RATIO — SIGNIFICANT CHANGE UP (ref 0.65–1.3)
IRON SATN MFR SERPL: 16 UG/DL — LOW (ref 35–150)
IRON SATN MFR SERPL: 7 % — LOW (ref 15–50)
LACTATE BLDV-MCNC: 0.8 MMOL/L — SIGNIFICANT CHANGE UP (ref 0.5–2)
LACTATE BLDV-MCNC: 1.6 MMOL/L — SIGNIFICANT CHANGE UP (ref 0.5–2)
LACTATE BLDV-MCNC: <0.4 MMOL/L — LOW (ref 0.5–2)
LACTATE SERPL-SCNC: 2.3 MMOL/L — HIGH (ref 0.7–2)
LYMPHOCYTES # BLD AUTO: 0.37 K/UL — LOW (ref 1.2–3.4)
LYMPHOCYTES # BLD AUTO: 0.5 K/UL — LOW (ref 1.2–3.4)
LYMPHOCYTES # BLD AUTO: 0.54 K/UL — LOW (ref 1.2–3.4)
LYMPHOCYTES # BLD AUTO: 0.6 K/UL — LOW (ref 1.2–3.4)
LYMPHOCYTES # BLD AUTO: 0.66 K/UL — LOW (ref 1.2–3.4)
LYMPHOCYTES # BLD AUTO: 0.68 K/UL — LOW (ref 1.2–3.4)
LYMPHOCYTES # BLD AUTO: 0.71 K/UL — LOW (ref 1.2–3.4)
LYMPHOCYTES # BLD AUTO: 0.72 K/UL — LOW (ref 1.2–3.4)
LYMPHOCYTES # BLD AUTO: 0.72 K/UL — LOW (ref 1.2–3.4)
LYMPHOCYTES # BLD AUTO: 0.74 K/UL — LOW (ref 1.2–3.4)
LYMPHOCYTES # BLD AUTO: 0.77 K/UL — LOW (ref 1.2–3.4)
LYMPHOCYTES # BLD AUTO: 0.77 K/UL — LOW (ref 1.2–3.4)
LYMPHOCYTES # BLD AUTO: 0.82 K/UL — LOW (ref 1.2–3.4)
LYMPHOCYTES # BLD AUTO: 0.82 K/UL — LOW (ref 1.2–3.4)
LYMPHOCYTES # BLD AUTO: 0.83 K/UL — LOW (ref 1.2–3.4)
LYMPHOCYTES # BLD AUTO: 0.83 K/UL — LOW (ref 1.2–3.4)
LYMPHOCYTES # BLD AUTO: 0.86 K/UL — LOW (ref 1.2–3.4)
LYMPHOCYTES # BLD AUTO: 0.87 K/UL — LOW (ref 1.2–3.4)
LYMPHOCYTES # BLD AUTO: 0.88 K/UL — LOW (ref 1.2–3.4)
LYMPHOCYTES # BLD AUTO: 0.88 K/UL — LOW (ref 1.2–3.4)
LYMPHOCYTES # BLD AUTO: 0.92 K/UL — LOW (ref 1.2–3.4)
LYMPHOCYTES # BLD AUTO: 0.98 K/UL — LOW (ref 1.2–3.4)
LYMPHOCYTES # BLD AUTO: 1.1 K/UL — LOW (ref 1.2–3.4)
LYMPHOCYTES # BLD AUTO: 1.13 K/UL — LOW (ref 1.2–3.4)
LYMPHOCYTES # BLD AUTO: 1.13 K/UL — LOW (ref 1.2–3.4)
LYMPHOCYTES # BLD AUTO: 1.16 K/UL — LOW (ref 1.2–3.4)
LYMPHOCYTES # BLD AUTO: 1.24 K/UL — SIGNIFICANT CHANGE UP (ref 1.2–3.4)
LYMPHOCYTES # BLD AUTO: 1.29 K/UL — SIGNIFICANT CHANGE UP (ref 1.2–3.4)
LYMPHOCYTES # BLD AUTO: 1.32 K/UL — SIGNIFICANT CHANGE UP (ref 1.2–3.4)
LYMPHOCYTES # BLD AUTO: 1.58 K/UL — SIGNIFICANT CHANGE UP (ref 1.2–3.4)
LYMPHOCYTES # BLD AUTO: 10 % — LOW (ref 20.5–51.1)
LYMPHOCYTES # BLD AUTO: 10.1 % — LOW (ref 20.5–51.1)
LYMPHOCYTES # BLD AUTO: 10.2 % — LOW (ref 20.5–51.1)
LYMPHOCYTES # BLD AUTO: 2.6 % — LOW (ref 20.5–51.1)
LYMPHOCYTES # BLD AUTO: 2.9 % — LOW (ref 20.5–51.1)
LYMPHOCYTES # BLD AUTO: 3.1 % — LOW (ref 20.5–51.1)
LYMPHOCYTES # BLD AUTO: 3.8 % — LOW (ref 20.5–51.1)
LYMPHOCYTES # BLD AUTO: 4.2 % — LOW (ref 20.5–51.1)
LYMPHOCYTES # BLD AUTO: 4.4 % — LOW (ref 20.5–51.1)
LYMPHOCYTES # BLD AUTO: 4.5 % — LOW (ref 20.5–51.1)
LYMPHOCYTES # BLD AUTO: 4.9 % — LOW (ref 20.5–51.1)
LYMPHOCYTES # BLD AUTO: 5.4 % — LOW (ref 20.5–51.1)
LYMPHOCYTES # BLD AUTO: 6.3 % — LOW (ref 20.5–51.1)
LYMPHOCYTES # BLD AUTO: 6.4 % — LOW (ref 20.5–51.1)
LYMPHOCYTES # BLD AUTO: 6.5 % — LOW (ref 20.5–51.1)
LYMPHOCYTES # BLD AUTO: 6.6 % — LOW (ref 20.5–51.1)
LYMPHOCYTES # BLD AUTO: 6.8 % — LOW (ref 20.5–51.1)
LYMPHOCYTES # BLD AUTO: 6.9 % — LOW (ref 20.5–51.1)
LYMPHOCYTES # BLD AUTO: 7.2 % — LOW (ref 20.5–51.1)
LYMPHOCYTES # BLD AUTO: 7.2 % — LOW (ref 20.5–51.1)
LYMPHOCYTES # BLD AUTO: 7.7 % — LOW (ref 20.5–51.1)
LYMPHOCYTES # BLD AUTO: 7.9 % — LOW (ref 20.5–51.1)
LYMPHOCYTES # BLD AUTO: 7.9 % — LOW (ref 20.5–51.1)
LYMPHOCYTES # BLD AUTO: 8.2 % — LOW (ref 20.5–51.1)
LYMPHOCYTES # BLD AUTO: 8.7 % — LOW (ref 20.5–51.1)
LYMPHOCYTES # BLD AUTO: 8.8 % — LOW (ref 20.5–51.1)
LYMPHOCYTES # BLD AUTO: 8.8 % — LOW (ref 20.5–51.1)
LYMPHOCYTES # BLD AUTO: 9 % — LOW (ref 20.5–51.1)
LYMPHOCYTES # BLD AUTO: 9.5 % — LOW (ref 20.5–51.1)
LYMPHOCYTES # BLD AUTO: 9.6 % — LOW (ref 20.5–51.1)
MAGNESIUM SERPL-MCNC: 1.7 MG/DL — LOW (ref 1.8–2.4)
MAGNESIUM SERPL-MCNC: 1.8 MG/DL — SIGNIFICANT CHANGE UP (ref 1.8–2.4)
MAGNESIUM SERPL-MCNC: 1.9 MG/DL — SIGNIFICANT CHANGE UP (ref 1.8–2.4)
MAGNESIUM SERPL-MCNC: 2 MG/DL — SIGNIFICANT CHANGE UP (ref 1.8–2.4)
MAGNESIUM SERPL-MCNC: 2.1 MG/DL — SIGNIFICANT CHANGE UP (ref 1.8–2.4)
MAGNESIUM SERPL-MCNC: 2.1 MG/DL — SIGNIFICANT CHANGE UP (ref 1.8–2.4)
MAGNESIUM SERPL-MCNC: 2.2 MG/DL — SIGNIFICANT CHANGE UP (ref 1.8–2.4)
MAGNESIUM SERPL-MCNC: 2.5 MG/DL — HIGH (ref 1.8–2.4)
MCHC RBC-ENTMCNC: 27.2 PG — SIGNIFICANT CHANGE UP (ref 27–31)
MCHC RBC-ENTMCNC: 27.5 PG — SIGNIFICANT CHANGE UP (ref 27–31)
MCHC RBC-ENTMCNC: 27.6 PG — SIGNIFICANT CHANGE UP (ref 27–31)
MCHC RBC-ENTMCNC: 27.7 PG — SIGNIFICANT CHANGE UP (ref 27–31)
MCHC RBC-ENTMCNC: 27.7 PG — SIGNIFICANT CHANGE UP (ref 27–31)
MCHC RBC-ENTMCNC: 27.8 PG — SIGNIFICANT CHANGE UP (ref 27–31)
MCHC RBC-ENTMCNC: 27.9 PG — SIGNIFICANT CHANGE UP (ref 27–31)
MCHC RBC-ENTMCNC: 28 PG — SIGNIFICANT CHANGE UP (ref 27–31)
MCHC RBC-ENTMCNC: 28.1 G/DL — LOW (ref 32–37)
MCHC RBC-ENTMCNC: 28.1 PG — SIGNIFICANT CHANGE UP (ref 27–31)
MCHC RBC-ENTMCNC: 28.2 PG — SIGNIFICANT CHANGE UP (ref 27–31)
MCHC RBC-ENTMCNC: 28.2 PG — SIGNIFICANT CHANGE UP (ref 27–31)
MCHC RBC-ENTMCNC: 28.3 PG — SIGNIFICANT CHANGE UP (ref 27–31)
MCHC RBC-ENTMCNC: 28.4 G/DL — LOW (ref 32–37)
MCHC RBC-ENTMCNC: 28.4 PG — SIGNIFICANT CHANGE UP (ref 27–31)
MCHC RBC-ENTMCNC: 28.5 PG — SIGNIFICANT CHANGE UP (ref 27–31)
MCHC RBC-ENTMCNC: 28.7 G/DL — LOW (ref 32–37)
MCHC RBC-ENTMCNC: 28.8 G/DL — LOW (ref 32–37)
MCHC RBC-ENTMCNC: 29.1 G/DL — LOW (ref 32–37)
MCHC RBC-ENTMCNC: 29.1 G/DL — LOW (ref 32–37)
MCHC RBC-ENTMCNC: 29.2 G/DL — LOW (ref 32–37)
MCHC RBC-ENTMCNC: 29.3 G/DL — LOW (ref 32–37)
MCHC RBC-ENTMCNC: 29.3 G/DL — LOW (ref 32–37)
MCHC RBC-ENTMCNC: 29.4 G/DL — LOW (ref 32–37)
MCHC RBC-ENTMCNC: 29.5 G/DL — LOW (ref 32–37)
MCHC RBC-ENTMCNC: 29.6 G/DL — LOW (ref 32–37)
MCHC RBC-ENTMCNC: 29.7 G/DL — LOW (ref 32–37)
MCHC RBC-ENTMCNC: 29.8 G/DL — LOW (ref 32–37)
MCHC RBC-ENTMCNC: 29.9 G/DL — LOW (ref 32–37)
MCHC RBC-ENTMCNC: 30.1 G/DL — LOW (ref 32–37)
MCHC RBC-ENTMCNC: 30.1 G/DL — LOW (ref 32–37)
MCHC RBC-ENTMCNC: 30.2 G/DL — LOW (ref 32–37)
MCHC RBC-ENTMCNC: 30.2 G/DL — LOW (ref 32–37)
MCHC RBC-ENTMCNC: 30.3 G/DL — LOW (ref 32–37)
MCHC RBC-ENTMCNC: 30.3 G/DL — LOW (ref 32–37)
MCHC RBC-ENTMCNC: 30.4 G/DL — LOW (ref 32–37)
MCHC RBC-ENTMCNC: 30.9 G/DL — LOW (ref 32–37)
MCHC RBC-ENTMCNC: 31.1 G/DL — LOW (ref 32–37)
MCHC RBC-ENTMCNC: 31.4 G/DL — LOW (ref 32–37)
MCHC RBC-ENTMCNC: 32.1 G/DL — SIGNIFICANT CHANGE UP (ref 32–37)
MCHC RBC-ENTMCNC: 32.2 G/DL — SIGNIFICANT CHANGE UP (ref 32–37)
MCHC RBC-ENTMCNC: 32.3 G/DL — SIGNIFICANT CHANGE UP (ref 32–37)
MCV RBC AUTO: 87 FL — SIGNIFICANT CHANGE UP (ref 81–99)
MCV RBC AUTO: 87.3 FL — SIGNIFICANT CHANGE UP (ref 81–99)
MCV RBC AUTO: 87.9 FL — SIGNIFICANT CHANGE UP (ref 81–99)
MCV RBC AUTO: 88.1 FL — SIGNIFICANT CHANGE UP (ref 81–99)
MCV RBC AUTO: 89 FL — SIGNIFICANT CHANGE UP (ref 81–99)
MCV RBC AUTO: 89.2 FL — SIGNIFICANT CHANGE UP (ref 81–99)
MCV RBC AUTO: 91.4 FL — SIGNIFICANT CHANGE UP (ref 81–99)
MCV RBC AUTO: 92 FL — SIGNIFICANT CHANGE UP (ref 81–99)
MCV RBC AUTO: 92 FL — SIGNIFICANT CHANGE UP (ref 81–99)
MCV RBC AUTO: 92.2 FL — SIGNIFICANT CHANGE UP (ref 81–99)
MCV RBC AUTO: 92.6 FL — SIGNIFICANT CHANGE UP (ref 81–99)
MCV RBC AUTO: 92.9 FL — SIGNIFICANT CHANGE UP (ref 81–99)
MCV RBC AUTO: 93.7 FL — SIGNIFICANT CHANGE UP (ref 81–99)
MCV RBC AUTO: 94.2 FL — SIGNIFICANT CHANGE UP (ref 81–99)
MCV RBC AUTO: 94.4 FL — SIGNIFICANT CHANGE UP (ref 81–99)
MCV RBC AUTO: 94.4 FL — SIGNIFICANT CHANGE UP (ref 81–99)
MCV RBC AUTO: 94.6 FL — SIGNIFICANT CHANGE UP (ref 81–99)
MCV RBC AUTO: 94.6 FL — SIGNIFICANT CHANGE UP (ref 81–99)
MCV RBC AUTO: 95 FL — SIGNIFICANT CHANGE UP (ref 81–99)
MCV RBC AUTO: 95.4 FL — SIGNIFICANT CHANGE UP (ref 81–99)
MCV RBC AUTO: 95.4 FL — SIGNIFICANT CHANGE UP (ref 81–99)
MCV RBC AUTO: 95.7 FL — SIGNIFICANT CHANGE UP (ref 81–99)
MCV RBC AUTO: 95.8 FL — SIGNIFICANT CHANGE UP (ref 81–99)
MCV RBC AUTO: 96 FL — SIGNIFICANT CHANGE UP (ref 81–99)
MCV RBC AUTO: 96.1 FL — SIGNIFICANT CHANGE UP (ref 81–99)
MCV RBC AUTO: 96.7 FL — SIGNIFICANT CHANGE UP (ref 81–99)
MCV RBC AUTO: 96.7 FL — SIGNIFICANT CHANGE UP (ref 81–99)
MCV RBC AUTO: 96.8 FL — SIGNIFICANT CHANGE UP (ref 81–99)
MCV RBC AUTO: 97.9 FL — SIGNIFICANT CHANGE UP (ref 81–99)
MCV RBC AUTO: 98.9 FL — SIGNIFICANT CHANGE UP (ref 81–99)
MONOCYTES # BLD AUTO: 0.27 K/UL — SIGNIFICANT CHANGE UP (ref 0.1–0.6)
MONOCYTES # BLD AUTO: 0.42 K/UL — SIGNIFICANT CHANGE UP (ref 0.1–0.6)
MONOCYTES # BLD AUTO: 0.51 K/UL — SIGNIFICANT CHANGE UP (ref 0.1–0.6)
MONOCYTES # BLD AUTO: 0.59 K/UL — SIGNIFICANT CHANGE UP (ref 0.1–0.6)
MONOCYTES # BLD AUTO: 0.59 K/UL — SIGNIFICANT CHANGE UP (ref 0.1–0.6)
MONOCYTES # BLD AUTO: 0.67 K/UL — HIGH (ref 0.1–0.6)
MONOCYTES # BLD AUTO: 0.83 K/UL — HIGH (ref 0.1–0.6)
MONOCYTES # BLD AUTO: 0.87 K/UL — HIGH (ref 0.1–0.6)
MONOCYTES # BLD AUTO: 0.88 K/UL — HIGH (ref 0.1–0.6)
MONOCYTES # BLD AUTO: 0.9 K/UL — HIGH (ref 0.1–0.6)
MONOCYTES # BLD AUTO: 0.91 K/UL — HIGH (ref 0.1–0.6)
MONOCYTES # BLD AUTO: 0.94 K/UL — HIGH (ref 0.1–0.6)
MONOCYTES # BLD AUTO: 1.12 K/UL — HIGH (ref 0.1–0.6)
MONOCYTES # BLD AUTO: 1.14 K/UL — HIGH (ref 0.1–0.6)
MONOCYTES # BLD AUTO: 1.14 K/UL — HIGH (ref 0.1–0.6)
MONOCYTES # BLD AUTO: 1.18 K/UL — HIGH (ref 0.1–0.6)
MONOCYTES # BLD AUTO: 1.21 K/UL — HIGH (ref 0.1–0.6)
MONOCYTES # BLD AUTO: 1.24 K/UL — HIGH (ref 0.1–0.6)
MONOCYTES # BLD AUTO: 1.26 K/UL — HIGH (ref 0.1–0.6)
MONOCYTES # BLD AUTO: 1.26 K/UL — HIGH (ref 0.1–0.6)
MONOCYTES # BLD AUTO: 1.38 K/UL — HIGH (ref 0.1–0.6)
MONOCYTES # BLD AUTO: 1.43 K/UL — HIGH (ref 0.1–0.6)
MONOCYTES # BLD AUTO: 1.5 K/UL — HIGH (ref 0.1–0.6)
MONOCYTES # BLD AUTO: 1.53 K/UL — HIGH (ref 0.1–0.6)
MONOCYTES # BLD AUTO: 1.53 K/UL — HIGH (ref 0.1–0.6)
MONOCYTES # BLD AUTO: 1.66 K/UL — HIGH (ref 0.1–0.6)
MONOCYTES # BLD AUTO: 1.93 K/UL — HIGH (ref 0.1–0.6)
MONOCYTES # BLD AUTO: 1.95 K/UL — HIGH (ref 0.1–0.6)
MONOCYTES # BLD AUTO: 1.96 K/UL — HIGH (ref 0.1–0.6)
MONOCYTES # BLD AUTO: 2.19 K/UL — HIGH (ref 0.1–0.6)
MONOCYTES NFR BLD AUTO: 10 % — HIGH (ref 1.7–9.3)
MONOCYTES NFR BLD AUTO: 10.8 % — HIGH (ref 1.7–9.3)
MONOCYTES NFR BLD AUTO: 11.2 % — HIGH (ref 1.7–9.3)
MONOCYTES NFR BLD AUTO: 11.3 % — HIGH (ref 1.7–9.3)
MONOCYTES NFR BLD AUTO: 11.3 % — HIGH (ref 1.7–9.3)
MONOCYTES NFR BLD AUTO: 11.7 % — HIGH (ref 1.7–9.3)
MONOCYTES NFR BLD AUTO: 12.4 % — HIGH (ref 1.7–9.3)
MONOCYTES NFR BLD AUTO: 12.7 % — HIGH (ref 1.7–9.3)
MONOCYTES NFR BLD AUTO: 14.9 % — HIGH (ref 1.7–9.3)
MONOCYTES NFR BLD AUTO: 2.8 % — SIGNIFICANT CHANGE UP (ref 1.7–9.3)
MONOCYTES NFR BLD AUTO: 2.9 % — SIGNIFICANT CHANGE UP (ref 1.7–9.3)
MONOCYTES NFR BLD AUTO: 3.4 % — SIGNIFICANT CHANGE UP (ref 1.7–9.3)
MONOCYTES NFR BLD AUTO: 5.1 % — SIGNIFICANT CHANGE UP (ref 1.7–9.3)
MONOCYTES NFR BLD AUTO: 5.6 % — SIGNIFICANT CHANGE UP (ref 1.7–9.3)
MONOCYTES NFR BLD AUTO: 6 % — SIGNIFICANT CHANGE UP (ref 1.7–9.3)
MONOCYTES NFR BLD AUTO: 6.7 % — SIGNIFICANT CHANGE UP (ref 1.7–9.3)
MONOCYTES NFR BLD AUTO: 6.9 % — SIGNIFICANT CHANGE UP (ref 1.7–9.3)
MONOCYTES NFR BLD AUTO: 7.5 % — SIGNIFICANT CHANGE UP (ref 1.7–9.3)
MONOCYTES NFR BLD AUTO: 7.5 % — SIGNIFICANT CHANGE UP (ref 1.7–9.3)
MONOCYTES NFR BLD AUTO: 7.9 % — SIGNIFICANT CHANGE UP (ref 1.7–9.3)
MONOCYTES NFR BLD AUTO: 8.3 % — SIGNIFICANT CHANGE UP (ref 1.7–9.3)
MONOCYTES NFR BLD AUTO: 8.7 % — SIGNIFICANT CHANGE UP (ref 1.7–9.3)
MONOCYTES NFR BLD AUTO: 8.7 % — SIGNIFICANT CHANGE UP (ref 1.7–9.3)
MONOCYTES NFR BLD AUTO: 8.8 % — SIGNIFICANT CHANGE UP (ref 1.7–9.3)
MONOCYTES NFR BLD AUTO: 9 % — SIGNIFICANT CHANGE UP (ref 1.7–9.3)
MONOCYTES NFR BLD AUTO: 9.1 % — SIGNIFICANT CHANGE UP (ref 1.7–9.3)
MONOCYTES NFR BLD AUTO: 9.2 % — SIGNIFICANT CHANGE UP (ref 1.7–9.3)
MONOCYTES NFR BLD AUTO: 9.4 % — HIGH (ref 1.7–9.3)
MONOCYTES NFR BLD AUTO: 9.7 % — HIGH (ref 1.7–9.3)
MONOCYTES NFR BLD AUTO: 9.9 % — HIGH (ref 1.7–9.3)
MRSA PCR RESULT.: NEGATIVE — SIGNIFICANT CHANGE UP
NEUTROPHILS # BLD AUTO: 10.02 K/UL — HIGH (ref 1.4–6.5)
NEUTROPHILS # BLD AUTO: 10.1 K/UL — HIGH (ref 1.4–6.5)
NEUTROPHILS # BLD AUTO: 10.27 K/UL — HIGH (ref 1.4–6.5)
NEUTROPHILS # BLD AUTO: 10.3 K/UL — HIGH (ref 1.4–6.5)
NEUTROPHILS # BLD AUTO: 10.43 K/UL — HIGH (ref 1.4–6.5)
NEUTROPHILS # BLD AUTO: 10.79 K/UL — HIGH (ref 1.4–6.5)
NEUTROPHILS # BLD AUTO: 10.96 K/UL — HIGH (ref 1.4–6.5)
NEUTROPHILS # BLD AUTO: 11.49 K/UL — HIGH (ref 1.4–6.5)
NEUTROPHILS # BLD AUTO: 11.56 K/UL — HIGH (ref 1.4–6.5)
NEUTROPHILS # BLD AUTO: 11.59 K/UL — HIGH (ref 1.4–6.5)
NEUTROPHILS # BLD AUTO: 13.18 K/UL — HIGH (ref 1.4–6.5)
NEUTROPHILS # BLD AUTO: 13.45 K/UL — HIGH (ref 1.4–6.5)
NEUTROPHILS # BLD AUTO: 13.67 K/UL — HIGH (ref 1.4–6.5)
NEUTROPHILS # BLD AUTO: 14.21 K/UL — HIGH (ref 1.4–6.5)
NEUTROPHILS # BLD AUTO: 16 K/UL — HIGH (ref 1.4–6.5)
NEUTROPHILS # BLD AUTO: 16.87 K/UL — HIGH (ref 1.4–6.5)
NEUTROPHILS # BLD AUTO: 17.68 K/UL — HIGH (ref 1.4–6.5)
NEUTROPHILS # BLD AUTO: 19.72 K/UL — HIGH (ref 1.4–6.5)
NEUTROPHILS # BLD AUTO: 6.22 K/UL — SIGNIFICANT CHANGE UP (ref 1.4–6.5)
NEUTROPHILS # BLD AUTO: 6.69 K/UL — HIGH (ref 1.4–6.5)
NEUTROPHILS # BLD AUTO: 7.45 K/UL — HIGH (ref 1.4–6.5)
NEUTROPHILS # BLD AUTO: 8 K/UL — HIGH (ref 1.4–6.5)
NEUTROPHILS # BLD AUTO: 8.24 K/UL — HIGH (ref 1.4–6.5)
NEUTROPHILS # BLD AUTO: 8.25 K/UL — HIGH (ref 1.4–6.5)
NEUTROPHILS # BLD AUTO: 8.4 K/UL — HIGH (ref 1.4–6.5)
NEUTROPHILS # BLD AUTO: 8.49 K/UL — HIGH (ref 1.4–6.5)
NEUTROPHILS # BLD AUTO: 9.65 K/UL — HIGH (ref 1.4–6.5)
NEUTROPHILS # BLD AUTO: 9.74 K/UL — HIGH (ref 1.4–6.5)
NEUTROPHILS # BLD AUTO: 9.8 K/UL — HIGH (ref 1.4–6.5)
NEUTROPHILS # BLD AUTO: 9.82 K/UL — HIGH (ref 1.4–6.5)
NEUTROPHILS NFR BLD AUTO: 73.7 % — SIGNIFICANT CHANGE UP (ref 42.2–75.2)
NEUTROPHILS NFR BLD AUTO: 73.9 % — SIGNIFICANT CHANGE UP (ref 42.2–75.2)
NEUTROPHILS NFR BLD AUTO: 74.5 % — SIGNIFICANT CHANGE UP (ref 42.2–75.2)
NEUTROPHILS NFR BLD AUTO: 76.4 % — HIGH (ref 42.2–75.2)
NEUTROPHILS NFR BLD AUTO: 76.9 % — HIGH (ref 42.2–75.2)
NEUTROPHILS NFR BLD AUTO: 77.3 % — HIGH (ref 42.2–75.2)
NEUTROPHILS NFR BLD AUTO: 77.6 % — HIGH (ref 42.2–75.2)
NEUTROPHILS NFR BLD AUTO: 78.3 % — HIGH (ref 42.2–75.2)
NEUTROPHILS NFR BLD AUTO: 78.4 % — HIGH (ref 42.2–75.2)
NEUTROPHILS NFR BLD AUTO: 78.8 % — HIGH (ref 42.2–75.2)
NEUTROPHILS NFR BLD AUTO: 79.4 % — HIGH (ref 42.2–75.2)
NEUTROPHILS NFR BLD AUTO: 80.1 % — HIGH (ref 42.2–75.2)
NEUTROPHILS NFR BLD AUTO: 80.3 % — HIGH (ref 42.2–75.2)
NEUTROPHILS NFR BLD AUTO: 80.5 % — HIGH (ref 42.2–75.2)
NEUTROPHILS NFR BLD AUTO: 80.5 % — HIGH (ref 42.2–75.2)
NEUTROPHILS NFR BLD AUTO: 81.3 % — HIGH (ref 42.2–75.2)
NEUTROPHILS NFR BLD AUTO: 82.1 % — HIGH (ref 42.2–75.2)
NEUTROPHILS NFR BLD AUTO: 83 % — HIGH (ref 42.2–75.2)
NEUTROPHILS NFR BLD AUTO: 83.1 % — HIGH (ref 42.2–75.2)
NEUTROPHILS NFR BLD AUTO: 83.1 % — HIGH (ref 42.2–75.2)
NEUTROPHILS NFR BLD AUTO: 84.4 % — HIGH (ref 42.2–75.2)
NEUTROPHILS NFR BLD AUTO: 84.5 % — HIGH (ref 42.2–75.2)
NEUTROPHILS NFR BLD AUTO: 84.5 % — HIGH (ref 42.2–75.2)
NEUTROPHILS NFR BLD AUTO: 85.4 % — HIGH (ref 42.2–75.2)
NEUTROPHILS NFR BLD AUTO: 87 % — HIGH (ref 42.2–75.2)
NEUTROPHILS NFR BLD AUTO: 87.1 % — HIGH (ref 42.2–75.2)
NEUTROPHILS NFR BLD AUTO: 87.2 % — HIGH (ref 42.2–75.2)
NEUTROPHILS NFR BLD AUTO: 88 % — HIGH (ref 42.2–75.2)
NEUTROPHILS NFR BLD AUTO: 92.7 % — HIGH (ref 42.2–75.2)
NEUTROPHILS NFR BLD AUTO: 93.6 % — HIGH (ref 42.2–75.2)
NIGHT BLUE STAIN TISS: SIGNIFICANT CHANGE UP
NRBC # BLD: 0 /100 WBCS — SIGNIFICANT CHANGE UP (ref 0–0)
NT-PROBNP SERPL-SCNC: 2044 PG/ML — HIGH (ref 0–300)
NT-PROBNP SERPL-SCNC: 240 PG/ML — SIGNIFICANT CHANGE UP (ref 0–300)
NT-PROBNP SERPL-SCNC: 450 PG/ML — HIGH (ref 0–300)
PCO2 BLDA: 59 MMHG — HIGH (ref 32–45)
PCO2 BLDA: 77 MMHG — CRITICAL HIGH (ref 32–45)
PCO2 BLDV: 100 MMHG — CRITICAL HIGH (ref 39–42)
PCO2 BLDV: 102 MMHG — HIGH (ref 39–42)
PCO2 BLDV: 105 MMHG — CRITICAL HIGH (ref 39–42)
PH BLDA: 7.4 — SIGNIFICANT CHANGE UP (ref 7.35–7.45)
PH BLDA: 7.47 — HIGH (ref 7.35–7.45)
PH BLDV: 7.18 — CRITICAL LOW (ref 7.32–7.43)
PH BLDV: 7.32 — SIGNIFICANT CHANGE UP (ref 7.32–7.43)
PH BLDV: 7.33 — SIGNIFICANT CHANGE UP (ref 7.32–7.43)
PHOSPHATE SERPL-MCNC: 2.6 MG/DL — SIGNIFICANT CHANGE UP (ref 2.1–4.9)
PHOSPHATE SERPL-MCNC: 2.7 MG/DL — SIGNIFICANT CHANGE UP (ref 2.1–4.9)
PHOSPHATE SERPL-MCNC: 3.1 MG/DL — SIGNIFICANT CHANGE UP (ref 2.1–4.9)
PHOSPHATE SERPL-MCNC: 3.3 MG/DL — SIGNIFICANT CHANGE UP (ref 2.1–4.9)
PHOSPHATE SERPL-MCNC: 3.4 MG/DL — SIGNIFICANT CHANGE UP (ref 2.1–4.9)
PHOSPHATE SERPL-MCNC: 3.5 MG/DL — SIGNIFICANT CHANGE UP (ref 2.1–4.9)
PLATELET # BLD AUTO: 191 K/UL — SIGNIFICANT CHANGE UP (ref 130–400)
PLATELET # BLD AUTO: 195 K/UL — SIGNIFICANT CHANGE UP (ref 130–400)
PLATELET # BLD AUTO: 201 K/UL — SIGNIFICANT CHANGE UP (ref 130–400)
PLATELET # BLD AUTO: 221 K/UL — SIGNIFICANT CHANGE UP (ref 130–400)
PLATELET # BLD AUTO: 223 K/UL — SIGNIFICANT CHANGE UP (ref 130–400)
PLATELET # BLD AUTO: 230 K/UL — SIGNIFICANT CHANGE UP (ref 130–400)
PLATELET # BLD AUTO: 232 K/UL — SIGNIFICANT CHANGE UP (ref 130–400)
PLATELET # BLD AUTO: 237 K/UL — SIGNIFICANT CHANGE UP (ref 130–400)
PLATELET # BLD AUTO: 237 K/UL — SIGNIFICANT CHANGE UP (ref 130–400)
PLATELET # BLD AUTO: 244 K/UL — SIGNIFICANT CHANGE UP (ref 130–400)
PLATELET # BLD AUTO: 244 K/UL — SIGNIFICANT CHANGE UP (ref 130–400)
PLATELET # BLD AUTO: 247 K/UL — SIGNIFICANT CHANGE UP (ref 130–400)
PLATELET # BLD AUTO: 248 K/UL — SIGNIFICANT CHANGE UP (ref 130–400)
PLATELET # BLD AUTO: 253 K/UL — SIGNIFICANT CHANGE UP (ref 130–400)
PLATELET # BLD AUTO: 253 K/UL — SIGNIFICANT CHANGE UP (ref 130–400)
PLATELET # BLD AUTO: 255 K/UL — SIGNIFICANT CHANGE UP (ref 130–400)
PLATELET # BLD AUTO: 259 K/UL — SIGNIFICANT CHANGE UP (ref 130–400)
PLATELET # BLD AUTO: 287 K/UL — SIGNIFICANT CHANGE UP (ref 130–400)
PLATELET # BLD AUTO: 306 K/UL — SIGNIFICANT CHANGE UP (ref 130–400)
PLATELET # BLD AUTO: 355 K/UL — SIGNIFICANT CHANGE UP (ref 130–400)
PLATELET # BLD AUTO: 385 K/UL — SIGNIFICANT CHANGE UP (ref 130–400)
PLATELET # BLD AUTO: 394 K/UL — SIGNIFICANT CHANGE UP (ref 130–400)
PLATELET # BLD AUTO: 421 K/UL — HIGH (ref 130–400)
PLATELET # BLD AUTO: 426 K/UL — HIGH (ref 130–400)
PLATELET # BLD AUTO: 454 K/UL — HIGH (ref 130–400)
PLATELET # BLD AUTO: 465 K/UL — HIGH (ref 130–400)
PLATELET # BLD AUTO: 497 K/UL — HIGH (ref 130–400)
PLATELET # BLD AUTO: 498 K/UL — HIGH (ref 130–400)
PLATELET # BLD AUTO: 507 K/UL — HIGH (ref 130–400)
PLATELET # BLD AUTO: 524 K/UL — HIGH (ref 130–400)
PMV BLD: 10 FL — SIGNIFICANT CHANGE UP (ref 7.4–10.4)
PMV BLD: 10.1 FL — SIGNIFICANT CHANGE UP (ref 7.4–10.4)
PMV BLD: 10.2 FL — SIGNIFICANT CHANGE UP (ref 7.4–10.4)
PMV BLD: 10.3 FL — SIGNIFICANT CHANGE UP (ref 7.4–10.4)
PMV BLD: 10.4 FL — SIGNIFICANT CHANGE UP (ref 7.4–10.4)
PMV BLD: 10.5 FL — HIGH (ref 7.4–10.4)
PMV BLD: 10.5 FL — HIGH (ref 7.4–10.4)
PMV BLD: 10.6 FL — HIGH (ref 7.4–10.4)
PMV BLD: 8.8 FL — SIGNIFICANT CHANGE UP (ref 7.4–10.4)
PMV BLD: 9.1 FL — SIGNIFICANT CHANGE UP (ref 7.4–10.4)
PMV BLD: 9.1 FL — SIGNIFICANT CHANGE UP (ref 7.4–10.4)
PMV BLD: 9.3 FL — SIGNIFICANT CHANGE UP (ref 7.4–10.4)
PMV BLD: 9.3 FL — SIGNIFICANT CHANGE UP (ref 7.4–10.4)
PMV BLD: 9.5 FL — SIGNIFICANT CHANGE UP (ref 7.4–10.4)
PMV BLD: 9.5 FL — SIGNIFICANT CHANGE UP (ref 7.4–10.4)
PMV BLD: 9.6 FL — SIGNIFICANT CHANGE UP (ref 7.4–10.4)
PMV BLD: 9.7 FL — SIGNIFICANT CHANGE UP (ref 7.4–10.4)
PMV BLD: 9.8 FL — SIGNIFICANT CHANGE UP (ref 7.4–10.4)
PO2 BLDA: 51 MMHG — LOW (ref 83–108)
PO2 BLDA: 70 MMHG — LOW (ref 83–108)
PO2 BLDV: 31 MMHG — SIGNIFICANT CHANGE UP (ref 25–45)
PO2 BLDV: 35 MMHG — SIGNIFICANT CHANGE UP (ref 25–45)
PO2 BLDV: 66 MMHG — HIGH (ref 25–45)
POTASSIUM BLDV-SCNC: 4.3 MMOL/L — SIGNIFICANT CHANGE UP (ref 3.5–5.1)
POTASSIUM BLDV-SCNC: 4.8 MMOL/L — SIGNIFICANT CHANGE UP (ref 3.5–5.1)
POTASSIUM BLDV-SCNC: 5.2 MMOL/L — HIGH (ref 3.5–5.1)
POTASSIUM SERPL-MCNC: 3.4 MMOL/L — LOW (ref 3.5–5)
POTASSIUM SERPL-MCNC: 3.5 MMOL/L — SIGNIFICANT CHANGE UP (ref 3.5–5)
POTASSIUM SERPL-MCNC: 3.5 MMOL/L — SIGNIFICANT CHANGE UP (ref 3.5–5)
POTASSIUM SERPL-MCNC: 3.7 MMOL/L — SIGNIFICANT CHANGE UP (ref 3.5–5)
POTASSIUM SERPL-MCNC: 3.9 MMOL/L — SIGNIFICANT CHANGE UP (ref 3.5–5)
POTASSIUM SERPL-MCNC: 3.9 MMOL/L — SIGNIFICANT CHANGE UP (ref 3.5–5)
POTASSIUM SERPL-MCNC: 4 MMOL/L — SIGNIFICANT CHANGE UP (ref 3.5–5)
POTASSIUM SERPL-MCNC: 4.4 MMOL/L — SIGNIFICANT CHANGE UP (ref 3.5–5)
POTASSIUM SERPL-MCNC: 4.5 MMOL/L — SIGNIFICANT CHANGE UP (ref 3.5–5)
POTASSIUM SERPL-MCNC: 4.6 MMOL/L — SIGNIFICANT CHANGE UP (ref 3.5–5)
POTASSIUM SERPL-MCNC: 4.7 MMOL/L — SIGNIFICANT CHANGE UP (ref 3.5–5)
POTASSIUM SERPL-MCNC: 4.8 MMOL/L — SIGNIFICANT CHANGE UP (ref 3.5–5)
POTASSIUM SERPL-MCNC: 4.9 MMOL/L — SIGNIFICANT CHANGE UP (ref 3.5–5)
POTASSIUM SERPL-MCNC: 5 MMOL/L — SIGNIFICANT CHANGE UP (ref 3.5–5)
POTASSIUM SERPL-MCNC: 5.1 MMOL/L — HIGH (ref 3.5–5)
POTASSIUM SERPL-MCNC: 5.3 MMOL/L — HIGH (ref 3.5–5)
POTASSIUM SERPL-MCNC: 5.4 MMOL/L — HIGH (ref 3.5–5)
POTASSIUM SERPL-MCNC: 5.7 MMOL/L — HIGH (ref 3.5–5)
POTASSIUM SERPL-SCNC: 3.4 MMOL/L — LOW (ref 3.5–5)
POTASSIUM SERPL-SCNC: 3.5 MMOL/L — SIGNIFICANT CHANGE UP (ref 3.5–5)
POTASSIUM SERPL-SCNC: 3.5 MMOL/L — SIGNIFICANT CHANGE UP (ref 3.5–5)
POTASSIUM SERPL-SCNC: 3.7 MMOL/L — SIGNIFICANT CHANGE UP (ref 3.5–5)
POTASSIUM SERPL-SCNC: 3.9 MMOL/L — SIGNIFICANT CHANGE UP (ref 3.5–5)
POTASSIUM SERPL-SCNC: 3.9 MMOL/L — SIGNIFICANT CHANGE UP (ref 3.5–5)
POTASSIUM SERPL-SCNC: 4 MMOL/L — SIGNIFICANT CHANGE UP (ref 3.5–5)
POTASSIUM SERPL-SCNC: 4.4 MMOL/L — SIGNIFICANT CHANGE UP (ref 3.5–5)
POTASSIUM SERPL-SCNC: 4.5 MMOL/L — SIGNIFICANT CHANGE UP (ref 3.5–5)
POTASSIUM SERPL-SCNC: 4.6 MMOL/L — SIGNIFICANT CHANGE UP (ref 3.5–5)
POTASSIUM SERPL-SCNC: 4.7 MMOL/L — SIGNIFICANT CHANGE UP (ref 3.5–5)
POTASSIUM SERPL-SCNC: 4.8 MMOL/L — SIGNIFICANT CHANGE UP (ref 3.5–5)
POTASSIUM SERPL-SCNC: 4.9 MMOL/L — SIGNIFICANT CHANGE UP (ref 3.5–5)
POTASSIUM SERPL-SCNC: 5 MMOL/L — SIGNIFICANT CHANGE UP (ref 3.5–5)
POTASSIUM SERPL-SCNC: 5.1 MMOL/L — HIGH (ref 3.5–5)
POTASSIUM SERPL-SCNC: 5.3 MMOL/L — HIGH (ref 3.5–5)
POTASSIUM SERPL-SCNC: 5.4 MMOL/L — HIGH (ref 3.5–5)
POTASSIUM SERPL-SCNC: 5.7 MMOL/L — HIGH (ref 3.5–5)
PROCALCITONIN SERPL-MCNC: 0.08 NG/ML — SIGNIFICANT CHANGE UP (ref 0.02–0.1)
PROCALCITONIN SERPL-MCNC: 0.09 NG/ML — SIGNIFICANT CHANGE UP (ref 0.02–0.1)
PROCALCITONIN SERPL-MCNC: 0.09 NG/ML — SIGNIFICANT CHANGE UP (ref 0.02–0.1)
PROCALCITONIN SERPL-MCNC: 0.1 NG/ML — SIGNIFICANT CHANGE UP (ref 0.02–0.1)
PROCALCITONIN SERPL-MCNC: 0.1 NG/ML — SIGNIFICANT CHANGE UP (ref 0.02–0.1)
PROCALCITONIN SERPL-MCNC: 0.11 NG/ML — HIGH (ref 0.02–0.1)
PROT SERPL-MCNC: 4.5 G/DL — LOW (ref 6–8)
PROT SERPL-MCNC: 4.6 G/DL — LOW (ref 6–8)
PROT SERPL-MCNC: 4.7 G/DL — LOW (ref 6–8)
PROT SERPL-MCNC: 4.9 G/DL — LOW (ref 6–8)
PROT SERPL-MCNC: 5 G/DL — LOW (ref 6–8)
PROT SERPL-MCNC: 5.1 G/DL — LOW (ref 6–8)
PROT SERPL-MCNC: 5.2 G/DL — LOW (ref 6–8)
PROT SERPL-MCNC: 5.3 G/DL — LOW (ref 6–8)
PROT SERPL-MCNC: 5.5 G/DL — LOW (ref 6–8)
PROT SERPL-MCNC: 5.6 G/DL — LOW (ref 6–8)
PROT SERPL-MCNC: 5.6 G/DL — LOW (ref 6–8)
PROT SERPL-MCNC: 5.7 G/DL — LOW (ref 6–8)
PROT SERPL-MCNC: 6.1 G/DL — SIGNIFICANT CHANGE UP (ref 6–8)
PROTHROM AB SERPL-ACNC: 10.9 SEC — SIGNIFICANT CHANGE UP (ref 9.95–12.87)
PROTHROM AB SERPL-ACNC: 11.3 SEC — SIGNIFICANT CHANGE UP (ref 9.95–12.87)
RBC # BLD: 2.7 M/UL — LOW (ref 4.2–5.4)
RBC # BLD: 2.7 M/UL — LOW (ref 4.2–5.4)
RBC # BLD: 2.72 M/UL — LOW (ref 4.2–5.4)
RBC # BLD: 2.81 M/UL — LOW (ref 4.2–5.4)
RBC # BLD: 2.81 M/UL — LOW (ref 4.2–5.4)
RBC # BLD: 2.96 M/UL — LOW (ref 4.2–5.4)
RBC # BLD: 2.98 M/UL — LOW (ref 4.2–5.4)
RBC # BLD: 3.06 M/UL — LOW (ref 4.2–5.4)
RBC # BLD: 3.07 M/UL — LOW (ref 4.2–5.4)
RBC # BLD: 3.08 M/UL — LOW (ref 4.2–5.4)
RBC # BLD: 3.08 M/UL — LOW (ref 4.2–5.4)
RBC # BLD: 3.09 M/UL — LOW (ref 4.2–5.4)
RBC # BLD: 3.12 M/UL — LOW (ref 4.2–5.4)
RBC # BLD: 3.15 M/UL — LOW (ref 4.2–5.4)
RBC # BLD: 3.15 M/UL — LOW (ref 4.2–5.4)
RBC # BLD: 3.16 M/UL — LOW (ref 4.2–5.4)
RBC # BLD: 3.25 M/UL — LOW (ref 4.2–5.4)
RBC # BLD: 3.39 M/UL — LOW (ref 4.2–5.4)
RBC # BLD: 3.41 M/UL — LOW (ref 4.2–5.4)
RBC # BLD: 3.47 M/UL — LOW (ref 4.2–5.4)
RBC # BLD: 3.53 M/UL — LOW (ref 4.2–5.4)
RBC # BLD: 3.59 M/UL — LOW (ref 4.2–5.4)
RBC # BLD: 3.71 M/UL — LOW (ref 4.2–5.4)
RBC # BLD: 3.72 M/UL — LOW (ref 4.2–5.4)
RBC # BLD: 3.78 M/UL — LOW (ref 4.2–5.4)
RBC # BLD: 3.83 M/UL — LOW (ref 4.2–5.4)
RBC # BLD: 3.89 M/UL — LOW (ref 4.2–5.4)
RBC # BLD: 3.93 M/UL — LOW (ref 4.2–5.4)
RBC # BLD: 4.01 M/UL — LOW (ref 4.2–5.4)
RBC # BLD: 4.09 M/UL — LOW (ref 4.2–5.4)
RBC # FLD: 13.9 % — SIGNIFICANT CHANGE UP (ref 11.5–14.5)
RBC # FLD: 14 % — SIGNIFICANT CHANGE UP (ref 11.5–14.5)
RBC # FLD: 14.1 % — SIGNIFICANT CHANGE UP (ref 11.5–14.5)
RBC # FLD: 14.2 % — SIGNIFICANT CHANGE UP (ref 11.5–14.5)
RBC # FLD: 14.2 % — SIGNIFICANT CHANGE UP (ref 11.5–14.5)
RBC # FLD: 14.3 % — SIGNIFICANT CHANGE UP (ref 11.5–14.5)
RBC # FLD: 14.3 % — SIGNIFICANT CHANGE UP (ref 11.5–14.5)
RBC # FLD: 14.4 % — SIGNIFICANT CHANGE UP (ref 11.5–14.5)
RBC # FLD: 14.6 % — HIGH (ref 11.5–14.5)
RBC # FLD: 15 % — HIGH (ref 11.5–14.5)
RBC # FLD: 15.3 % — HIGH (ref 11.5–14.5)
RBC # FLD: 15.4 % — HIGH (ref 11.5–14.5)
RBC # FLD: 15.5 % — HIGH (ref 11.5–14.5)
RBC # FLD: 15.6 % — HIGH (ref 11.5–14.5)
RBC # FLD: 15.6 % — HIGH (ref 11.5–14.5)
RBC # FLD: 15.7 % — HIGH (ref 11.5–14.5)
RBC # FLD: 15.7 % — HIGH (ref 11.5–14.5)
RBC # FLD: 15.8 % — HIGH (ref 11.5–14.5)
RBC # FLD: 15.8 % — HIGH (ref 11.5–14.5)
RBC # FLD: 15.9 % — HIGH (ref 11.5–14.5)
RBC # FLD: 15.9 % — HIGH (ref 11.5–14.5)
RSV RNA NPH QL NAA+NON-PROBE: SIGNIFICANT CHANGE UP
SAO2 % BLDA: 85.4 % — LOW (ref 94–98)
SAO2 % BLDA: 94.9 % — SIGNIFICANT CHANGE UP (ref 94–98)
SAO2 % BLDV: 46.4 % — LOW (ref 67–88)
SAO2 % BLDV: 56.9 % — LOW (ref 67–88)
SAO2 % BLDV: 91.7 % — HIGH (ref 67–88)
SARS-COV-2 RNA SPEC QL NAA+PROBE: SIGNIFICANT CHANGE UP
SODIUM SERPL-SCNC: 130 MMOL/L — LOW (ref 135–146)
SODIUM SERPL-SCNC: 134 MMOL/L — LOW (ref 135–146)
SODIUM SERPL-SCNC: 134 MMOL/L — LOW (ref 135–146)
SODIUM SERPL-SCNC: 135 MMOL/L — SIGNIFICANT CHANGE UP (ref 135–146)
SODIUM SERPL-SCNC: 135 MMOL/L — SIGNIFICANT CHANGE UP (ref 135–146)
SODIUM SERPL-SCNC: 136 MMOL/L — SIGNIFICANT CHANGE UP (ref 135–146)
SODIUM SERPL-SCNC: 136 MMOL/L — SIGNIFICANT CHANGE UP (ref 135–146)
SODIUM SERPL-SCNC: 137 MMOL/L — SIGNIFICANT CHANGE UP (ref 135–146)
SODIUM SERPL-SCNC: 138 MMOL/L — SIGNIFICANT CHANGE UP (ref 135–146)
SODIUM SERPL-SCNC: 139 MMOL/L — SIGNIFICANT CHANGE UP (ref 135–146)
SODIUM SERPL-SCNC: 139 MMOL/L — SIGNIFICANT CHANGE UP (ref 135–146)
SODIUM SERPL-SCNC: 140 MMOL/L — SIGNIFICANT CHANGE UP (ref 135–146)
SODIUM SERPL-SCNC: 141 MMOL/L — SIGNIFICANT CHANGE UP (ref 135–146)
SODIUM SERPL-SCNC: 142 MMOL/L — SIGNIFICANT CHANGE UP (ref 135–146)
SODIUM SERPL-SCNC: 143 MMOL/L — SIGNIFICANT CHANGE UP (ref 135–146)
SODIUM SERPL-SCNC: 144 MMOL/L — SIGNIFICANT CHANGE UP (ref 135–146)
SODIUM SERPL-SCNC: 144 MMOL/L — SIGNIFICANT CHANGE UP (ref 135–146)
SPECIMEN SOURCE: SIGNIFICANT CHANGE UP
TIBC SERPL-MCNC: 218 UG/DL — LOW (ref 220–430)
TROPONIN T, HIGH SENSITIVITY RESULT: 11 NG/L — SIGNIFICANT CHANGE UP (ref 6–13)
TROPONIN T, HIGH SENSITIVITY RESULT: 13 NG/L — SIGNIFICANT CHANGE UP (ref 6–13)
TROPONIN T, HIGH SENSITIVITY RESULT: 26 NG/L — HIGH (ref 6–13)
TROPONIN T, HIGH SENSITIVITY RESULT: 31 NG/L — HIGH (ref 6–13)
TROPONIN T, HIGH SENSITIVITY RESULT: 32 NG/L — HIGH (ref 6–13)
UIBC SERPL-MCNC: 202 UG/DL — SIGNIFICANT CHANGE UP (ref 110–370)
WBC # BLD: 10.04 K/UL — SIGNIFICANT CHANGE UP (ref 4.8–10.8)
WBC # BLD: 10.46 K/UL — SIGNIFICANT CHANGE UP (ref 4.8–10.8)
WBC # BLD: 11.97 K/UL — HIGH (ref 4.8–10.8)
WBC # BLD: 12.17 K/UL — HIGH (ref 4.8–10.8)
WBC # BLD: 12.52 K/UL — HIGH (ref 4.8–10.8)
WBC # BLD: 12.59 K/UL — HIGH (ref 4.8–10.8)
WBC # BLD: 12.7 K/UL — HIGH (ref 4.8–10.8)
WBC # BLD: 12.82 K/UL — HIGH (ref 4.8–10.8)
WBC # BLD: 13.06 K/UL — HIGH (ref 4.8–10.8)
WBC # BLD: 13.12 K/UL — HIGH (ref 4.8–10.8)
WBC # BLD: 13.17 K/UL — HIGH (ref 4.8–10.8)
WBC # BLD: 13.41 K/UL — HIGH (ref 4.8–10.8)
WBC # BLD: 13.69 K/UL — HIGH (ref 4.8–10.8)
WBC # BLD: 14.28 K/UL — HIGH (ref 4.8–10.8)
WBC # BLD: 14.66 K/UL — HIGH (ref 4.8–10.8)
WBC # BLD: 15.15 K/UL — HIGH (ref 4.8–10.8)
WBC # BLD: 15.55 K/UL — HIGH (ref 4.8–10.8)
WBC # BLD: 16.16 K/UL — HIGH (ref 4.8–10.8)
WBC # BLD: 17.26 K/UL — HIGH (ref 4.8–10.8)
WBC # BLD: 17.33 K/UL — HIGH (ref 4.8–10.8)
WBC # BLD: 17.46 K/UL — HIGH (ref 4.8–10.8)
WBC # BLD: 19.35 K/UL — HIGH (ref 4.8–10.8)
WBC # BLD: 20.1 K/UL — HIGH (ref 4.8–10.8)
WBC # BLD: 21.07 K/UL — HIGH (ref 4.8–10.8)
WBC # BLD: 7.49 K/UL — SIGNIFICANT CHANGE UP (ref 4.8–10.8)
WBC # BLD: 8.76 K/UL — SIGNIFICANT CHANGE UP (ref 4.8–10.8)
WBC # BLD: 9.19 K/UL — SIGNIFICANT CHANGE UP (ref 4.8–10.8)
WBC # BLD: 9.53 K/UL — SIGNIFICANT CHANGE UP (ref 4.8–10.8)
WBC # BLD: 9.94 K/UL — SIGNIFICANT CHANGE UP (ref 4.8–10.8)
WBC # BLD: 9.95 K/UL — SIGNIFICANT CHANGE UP (ref 4.8–10.8)
WBC # FLD AUTO: 10.04 K/UL — SIGNIFICANT CHANGE UP (ref 4.8–10.8)
WBC # FLD AUTO: 10.46 K/UL — SIGNIFICANT CHANGE UP (ref 4.8–10.8)
WBC # FLD AUTO: 11.97 K/UL — HIGH (ref 4.8–10.8)
WBC # FLD AUTO: 12.17 K/UL — HIGH (ref 4.8–10.8)
WBC # FLD AUTO: 12.52 K/UL — HIGH (ref 4.8–10.8)
WBC # FLD AUTO: 12.59 K/UL — HIGH (ref 4.8–10.8)
WBC # FLD AUTO: 12.7 K/UL — HIGH (ref 4.8–10.8)
WBC # FLD AUTO: 12.82 K/UL — HIGH (ref 4.8–10.8)
WBC # FLD AUTO: 13.06 K/UL — HIGH (ref 4.8–10.8)
WBC # FLD AUTO: 13.12 K/UL — HIGH (ref 4.8–10.8)
WBC # FLD AUTO: 13.17 K/UL — HIGH (ref 4.8–10.8)
WBC # FLD AUTO: 13.41 K/UL — HIGH (ref 4.8–10.8)
WBC # FLD AUTO: 13.69 K/UL — HIGH (ref 4.8–10.8)
WBC # FLD AUTO: 14.28 K/UL — HIGH (ref 4.8–10.8)
WBC # FLD AUTO: 14.66 K/UL — HIGH (ref 4.8–10.8)
WBC # FLD AUTO: 15.15 K/UL — HIGH (ref 4.8–10.8)
WBC # FLD AUTO: 15.55 K/UL — HIGH (ref 4.8–10.8)
WBC # FLD AUTO: 16.16 K/UL — HIGH (ref 4.8–10.8)
WBC # FLD AUTO: 17.26 K/UL — HIGH (ref 4.8–10.8)
WBC # FLD AUTO: 17.33 K/UL — HIGH (ref 4.8–10.8)
WBC # FLD AUTO: 17.46 K/UL — HIGH (ref 4.8–10.8)
WBC # FLD AUTO: 19.35 K/UL — HIGH (ref 4.8–10.8)
WBC # FLD AUTO: 20.1 K/UL — HIGH (ref 4.8–10.8)
WBC # FLD AUTO: 21.07 K/UL — HIGH (ref 4.8–10.8)
WBC # FLD AUTO: 7.49 K/UL — SIGNIFICANT CHANGE UP (ref 4.8–10.8)
WBC # FLD AUTO: 8.76 K/UL — SIGNIFICANT CHANGE UP (ref 4.8–10.8)
WBC # FLD AUTO: 9.19 K/UL — SIGNIFICANT CHANGE UP (ref 4.8–10.8)
WBC # FLD AUTO: 9.53 K/UL — SIGNIFICANT CHANGE UP (ref 4.8–10.8)
WBC # FLD AUTO: 9.94 K/UL — SIGNIFICANT CHANGE UP (ref 4.8–10.8)
WBC # FLD AUTO: 9.95 K/UL — SIGNIFICANT CHANGE UP (ref 4.8–10.8)

## 2024-01-01 PROCEDURE — 74177 CT ABD & PELVIS W/CONTRAST: CPT | Mod: 26,MC

## 2024-01-01 PROCEDURE — 99233 SBSQ HOSP IP/OBS HIGH 50: CPT

## 2024-01-01 PROCEDURE — 88112 CYTOPATH CELL ENHANCE TECH: CPT | Mod: 26

## 2024-01-01 PROCEDURE — 93970 EXTREMITY STUDY: CPT

## 2024-01-01 PROCEDURE — 99291 CRITICAL CARE FIRST HOUR: CPT | Mod: GC

## 2024-01-01 PROCEDURE — 94660 CPAP INITIATION&MGMT: CPT

## 2024-01-01 PROCEDURE — 71045 X-RAY EXAM CHEST 1 VIEW: CPT | Mod: 26

## 2024-01-01 PROCEDURE — 82330 ASSAY OF CALCIUM: CPT

## 2024-01-01 PROCEDURE — 92610 EVALUATE SWALLOWING FUNCTION: CPT | Mod: GN

## 2024-01-01 PROCEDURE — 31624 DX BRONCHOSCOPE/LAVAGE: CPT

## 2024-01-01 PROCEDURE — 99291 CRITICAL CARE FIRST HOUR: CPT

## 2024-01-01 PROCEDURE — 74018 RADEX ABDOMEN 1 VIEW: CPT | Mod: 26

## 2024-01-01 PROCEDURE — 71275 CT ANGIOGRAPHY CHEST: CPT | Mod: MC

## 2024-01-01 PROCEDURE — 97535 SELF CARE MNGMENT TRAINING: CPT | Mod: GO

## 2024-01-01 PROCEDURE — 99233 SBSQ HOSP IP/OBS HIGH 50: CPT | Mod: GC

## 2024-01-01 PROCEDURE — 99233 SBSQ HOSP IP/OBS HIGH 50: CPT | Mod: 25

## 2024-01-01 PROCEDURE — 94003 VENT MGMT INPAT SUBQ DAY: CPT

## 2024-01-01 PROCEDURE — 74018 RADEX ABDOMEN 1 VIEW: CPT

## 2024-01-01 PROCEDURE — 88112 CYTOPATH CELL ENHANCE TECH: CPT

## 2024-01-01 PROCEDURE — 99222 1ST HOSP IP/OBS MODERATE 55: CPT

## 2024-01-01 PROCEDURE — 97162 PT EVAL MOD COMPLEX 30 MIN: CPT | Mod: GP

## 2024-01-01 PROCEDURE — 71045 X-RAY EXAM CHEST 1 VIEW: CPT | Mod: 26,77

## 2024-01-01 PROCEDURE — 85014 HEMATOCRIT: CPT

## 2024-01-01 PROCEDURE — 93970 EXTREMITY STUDY: CPT | Mod: 26

## 2024-01-01 PROCEDURE — 85610 PROTHROMBIN TIME: CPT

## 2024-01-01 PROCEDURE — 87641 MR-STAPH DNA AMP PROBE: CPT

## 2024-01-01 PROCEDURE — 99222 1ST HOSP IP/OBS MODERATE 55: CPT | Mod: 25

## 2024-01-01 PROCEDURE — 99232 SBSQ HOSP IP/OBS MODERATE 35: CPT

## 2024-01-01 PROCEDURE — 80053 COMPREHEN METABOLIC PANEL: CPT

## 2024-01-01 PROCEDURE — 99497 ADVNCD CARE PLAN 30 MIN: CPT | Mod: 25

## 2024-01-01 PROCEDURE — 80048 BASIC METABOLIC PNL TOTAL CA: CPT

## 2024-01-01 PROCEDURE — 93306 TTE W/DOPPLER COMPLETE: CPT

## 2024-01-01 PROCEDURE — 86901 BLOOD TYPING SEROLOGIC RH(D): CPT

## 2024-01-01 PROCEDURE — 84484 ASSAY OF TROPONIN QUANT: CPT

## 2024-01-01 PROCEDURE — 97110 THERAPEUTIC EXERCISES: CPT | Mod: GO

## 2024-01-01 PROCEDURE — 93005 ELECTROCARDIOGRAM TRACING: CPT

## 2024-01-01 PROCEDURE — 36415 COLL VENOUS BLD VENIPUNCTURE: CPT

## 2024-01-01 PROCEDURE — 84132 ASSAY OF SERUM POTASSIUM: CPT

## 2024-01-01 PROCEDURE — 86900 BLOOD TYPING SEROLOGIC ABO: CPT

## 2024-01-01 PROCEDURE — 87449 NOS EACH ORGANISM AG IA: CPT

## 2024-01-01 PROCEDURE — 85018 HEMOGLOBIN: CPT

## 2024-01-01 PROCEDURE — 85025 COMPLETE CBC W/AUTO DIFF WBC: CPT

## 2024-01-01 PROCEDURE — 87640 STAPH A DNA AMP PROBE: CPT

## 2024-01-01 PROCEDURE — 86850 RBC ANTIBODY SCREEN: CPT

## 2024-01-01 PROCEDURE — 85730 THROMBOPLASTIN TIME PARTIAL: CPT

## 2024-01-01 PROCEDURE — 99239 HOSP IP/OBS DSCHRG MGMT >30: CPT

## 2024-01-01 PROCEDURE — 71045 X-RAY EXAM CHEST 1 VIEW: CPT

## 2024-01-01 PROCEDURE — 99497 ADVNCD CARE PLAN 30 MIN: CPT

## 2024-01-01 PROCEDURE — 83735 ASSAY OF MAGNESIUM: CPT

## 2024-01-01 PROCEDURE — 85379 FIBRIN DEGRADATION QUANT: CPT

## 2024-01-01 PROCEDURE — 93010 ELECTROCARDIOGRAM REPORT: CPT

## 2024-01-01 PROCEDURE — 82962 GLUCOSE BLOOD TEST: CPT

## 2024-01-01 PROCEDURE — 82803 BLOOD GASES ANY COMBINATION: CPT

## 2024-01-01 PROCEDURE — 94640 AIRWAY INHALATION TREATMENT: CPT

## 2024-01-01 PROCEDURE — 70450 CT HEAD/BRAIN W/O DYE: CPT | Mod: 26,MC

## 2024-01-01 PROCEDURE — 87206 SMEAR FLUORESCENT/ACID STAI: CPT

## 2024-01-01 PROCEDURE — 82728 ASSAY OF FERRITIN: CPT

## 2024-01-01 PROCEDURE — 99223 1ST HOSP IP/OBS HIGH 75: CPT

## 2024-01-01 PROCEDURE — 84100 ASSAY OF PHOSPHORUS: CPT

## 2024-01-01 PROCEDURE — 87015 SPECIMEN INFECT AGNT CONCNTJ: CPT

## 2024-01-01 PROCEDURE — 93306 TTE W/DOPPLER COMPLETE: CPT | Mod: 26

## 2024-01-01 PROCEDURE — 83605 ASSAY OF LACTIC ACID: CPT

## 2024-01-01 PROCEDURE — 71275 CT ANGIOGRAPHY CHEST: CPT | Mod: 26,MC

## 2024-01-01 PROCEDURE — 84295 ASSAY OF SERUM SODIUM: CPT

## 2024-01-01 PROCEDURE — 83540 ASSAY OF IRON: CPT

## 2024-01-01 PROCEDURE — 83550 IRON BINDING TEST: CPT

## 2024-01-01 PROCEDURE — 94010 BREATHING CAPACITY TEST: CPT

## 2024-01-01 PROCEDURE — 97530 THERAPEUTIC ACTIVITIES: CPT | Mod: GO

## 2024-01-01 PROCEDURE — 83880 ASSAY OF NATRIURETIC PEPTIDE: CPT

## 2024-01-01 PROCEDURE — 84145 PROCALCITONIN (PCT): CPT

## 2024-01-01 PROCEDURE — 94002 VENT MGMT INPAT INIT DAY: CPT

## 2024-01-01 PROCEDURE — 99232 SBSQ HOSP IP/OBS MODERATE 35: CPT | Mod: GC

## 2024-01-01 PROCEDURE — 99291 CRITICAL CARE FIRST HOUR: CPT | Mod: 25

## 2024-01-01 PROCEDURE — 87070 CULTURE OTHR SPECIMN AEROBIC: CPT

## 2024-01-01 PROCEDURE — 71275 CT ANGIOGRAPHY CHEST: CPT | Mod: 26

## 2024-01-01 PROCEDURE — 87102 FUNGUS ISOLATION CULTURE: CPT

## 2024-01-01 PROCEDURE — ZZZZZ: CPT

## 2024-01-01 PROCEDURE — 97167 OT EVAL HIGH COMPLEX 60 MIN: CPT | Mod: GO

## 2024-01-01 PROCEDURE — 87116 MYCOBACTERIA CULTURE: CPT

## 2024-01-01 PROCEDURE — 97116 GAIT TRAINING THERAPY: CPT | Mod: GP

## 2024-01-01 PROCEDURE — 87040 BLOOD CULTURE FOR BACTERIA: CPT

## 2024-01-01 RX ORDER — IPRATROPIUM BROMIDE 0.2 MG/ML
1 SOLUTION, NON-ORAL INHALATION EVERY 6 HOURS
Refills: 0 | Status: DISCONTINUED | OUTPATIENT
Start: 2024-01-01 | End: 2024-01-01

## 2024-01-01 RX ORDER — HYDROMORPHONE HYDROCHLORIDE 2 MG/ML
2.5 INJECTION INTRAMUSCULAR; INTRAVENOUS; SUBCUTANEOUS EVERY 4 HOURS
Refills: 0 | Status: DISCONTINUED | OUTPATIENT
Start: 2024-01-01 | End: 2024-01-01

## 2024-01-01 RX ORDER — SIMETHICONE 80 MG/1
80 TABLET, CHEWABLE ORAL ONCE
Refills: 0 | Status: COMPLETED | OUTPATIENT
Start: 2024-01-01 | End: 2024-01-01

## 2024-01-01 RX ORDER — IPRATROPIUM/ALBUTEROL SULFATE 18-103MCG
3 AEROSOL WITH ADAPTER (GRAM) INHALATION EVERY 6 HOURS
Refills: 0 | Status: DISCONTINUED | OUTPATIENT
Start: 2024-01-01 | End: 2024-01-01

## 2024-01-01 RX ORDER — IPRATROPIUM/ALBUTEROL SULFATE 18-103MCG
3 AEROSOL WITH ADAPTER (GRAM) INHALATION EVERY 4 HOURS
Refills: 0 | Status: DISCONTINUED | OUTPATIENT
Start: 2024-01-01 | End: 2024-01-01

## 2024-01-01 RX ORDER — ENOXAPARIN SODIUM 100 MG/ML
40 INJECTION SUBCUTANEOUS EVERY 24 HOURS
Refills: 0 | Status: DISCONTINUED | OUTPATIENT
Start: 2024-01-01 | End: 2024-01-01

## 2024-01-01 RX ORDER — ACETAMINOPHEN 500 MG
650 TABLET ORAL ONCE
Refills: 0 | Status: COMPLETED | OUTPATIENT
Start: 2024-01-01 | End: 2024-01-01

## 2024-01-01 RX ORDER — ALBUTEROL 90 UG/1
2 AEROSOL, METERED ORAL EVERY 6 HOURS
Refills: 0 | Status: DISCONTINUED | OUTPATIENT
Start: 2024-01-01 | End: 2024-01-01

## 2024-01-01 RX ORDER — SACCHAROMYCES BOULARDII 250 MG
250 POWDER IN PACKET (EA) ORAL
Refills: 0 | Status: DISCONTINUED | OUTPATIENT
Start: 2024-01-01 | End: 2024-01-01

## 2024-01-01 RX ORDER — TIOTROPIUM BROMIDE 18 UG/1
2 CAPSULE ORAL; RESPIRATORY (INHALATION) DAILY
Refills: 0 | Status: DISCONTINUED | OUTPATIENT
Start: 2024-01-01 | End: 2024-01-01

## 2024-01-01 RX ORDER — NOREPINEPHRINE BITARTRATE/D5W 8 MG/250ML
0.05 PLASTIC BAG, INJECTION (ML) INTRAVENOUS
Qty: 8 | Refills: 0 | Status: DISCONTINUED | OUTPATIENT
Start: 2024-01-01 | End: 2024-01-01

## 2024-01-01 RX ORDER — CEFTRIAXONE 500 MG/1
1000 INJECTION, POWDER, FOR SOLUTION INTRAMUSCULAR; INTRAVENOUS ONCE
Refills: 0 | Status: COMPLETED | OUTPATIENT
Start: 2024-01-01 | End: 2024-01-01

## 2024-01-01 RX ORDER — PROPOFOL 10 MG/ML
10 INJECTION, EMULSION INTRAVENOUS
Qty: 1000 | Refills: 0 | Status: DISCONTINUED | OUTPATIENT
Start: 2024-01-01 | End: 2024-01-01

## 2024-01-01 RX ORDER — HYDROXYZINE HCL 10 MG
25 TABLET ORAL EVERY 12 HOURS
Refills: 0 | Status: DISCONTINUED | OUTPATIENT
Start: 2024-01-01 | End: 2024-01-01

## 2024-01-01 RX ORDER — FERROUS SULFATE 325(65) MG
325 TABLET ORAL DAILY
Refills: 0 | Status: DISCONTINUED | OUTPATIENT
Start: 2024-01-01 | End: 2024-01-01

## 2024-01-01 RX ORDER — MAGNESIUM SULFATE 500 MG/ML
2 VIAL (ML) INJECTION ONCE
Refills: 0 | Status: COMPLETED | OUTPATIENT
Start: 2024-01-01 | End: 2024-01-01

## 2024-01-01 RX ORDER — INSULIN HUMAN 100 [IU]/ML
5 INJECTION, SOLUTION SUBCUTANEOUS ONCE
Refills: 0 | Status: COMPLETED | OUTPATIENT
Start: 2024-01-01 | End: 2024-01-01

## 2024-01-01 RX ORDER — PIPERACILLIN AND TAZOBACTAM 4; .5 G/20ML; G/20ML
3.38 INJECTION, POWDER, LYOPHILIZED, FOR SOLUTION INTRAVENOUS ONCE
Refills: 0 | Status: COMPLETED | OUTPATIENT
Start: 2024-01-01 | End: 2024-01-01

## 2024-01-01 RX ORDER — ALPRAZOLAM 0.25 MG
0.5 TABLET ORAL
Refills: 0 | Status: DISCONTINUED | OUTPATIENT
Start: 2024-01-01 | End: 2024-01-01

## 2024-01-01 RX ORDER — POLYETHYLENE GLYCOL 3350 17 G/17G
17 POWDER, FOR SOLUTION ORAL EVERY 12 HOURS
Refills: 0 | Status: DISCONTINUED | OUTPATIENT
Start: 2024-01-01 | End: 2024-01-01

## 2024-01-01 RX ORDER — HYDROXYZINE HCL 10 MG
25 TABLET ORAL
Refills: 0 | Status: DISCONTINUED | OUTPATIENT
Start: 2024-01-01 | End: 2024-01-01

## 2024-01-01 RX ORDER — PIPERACILLIN AND TAZOBACTAM 4; .5 G/20ML; G/20ML
3.38 INJECTION, POWDER, LYOPHILIZED, FOR SOLUTION INTRAVENOUS EVERY 8 HOURS
Refills: 0 | Status: DISCONTINUED | OUTPATIENT
Start: 2024-01-01 | End: 2024-01-01

## 2024-01-01 RX ORDER — CHLORHEXIDINE GLUCONATE 213 G/1000ML
15 SOLUTION TOPICAL EVERY 12 HOURS
Refills: 0 | Status: DISCONTINUED | OUTPATIENT
Start: 2024-01-01 | End: 2024-01-01

## 2024-01-01 RX ORDER — CHLORHEXIDINE GLUCONATE 213 G/1000ML
1 SOLUTION TOPICAL
Refills: 0 | Status: DISCONTINUED | OUTPATIENT
Start: 2024-01-01 | End: 2024-01-01

## 2024-01-01 RX ORDER — CARBAMIDE PEROXIDE 81.86 MG/ML
1 SOLUTION/ DROPS AURICULAR (OTIC) EVERY 12 HOURS
Refills: 0 | Status: DISCONTINUED | OUTPATIENT
Start: 2024-01-01 | End: 2024-01-01

## 2024-01-01 RX ORDER — PANTOPRAZOLE SODIUM 20 MG/1
40 TABLET, DELAYED RELEASE ORAL
Refills: 0 | Status: DISCONTINUED | OUTPATIENT
Start: 2024-01-01 | End: 2024-01-01

## 2024-01-01 RX ORDER — POLYETHYLENE GLYCOL 3350 17 G/17G
17 POWDER, FOR SOLUTION ORAL
Qty: 0 | Refills: 0 | DISCHARGE
Start: 2024-01-01

## 2024-01-01 RX ORDER — ROBINUL 0.2 MG/ML
0.4 INJECTION INTRAMUSCULAR; INTRAVENOUS ONCE
Refills: 0 | Status: DISCONTINUED | OUTPATIENT
Start: 2024-01-01 | End: 2024-01-01

## 2024-01-01 RX ORDER — POTASSIUM CHLORIDE 20 MEQ
20 PACKET (EA) ORAL ONCE
Refills: 0 | Status: DISCONTINUED | OUTPATIENT
Start: 2024-01-01 | End: 2024-01-01

## 2024-01-01 RX ORDER — ALBUTEROL 90 UG/1
2 AEROSOL, METERED ORAL
Refills: 0 | DISCHARGE

## 2024-01-01 RX ORDER — FERROUS SULFATE 325(65) MG
1 TABLET ORAL
Qty: 0 | Refills: 0 | DISCHARGE
Start: 2024-01-01

## 2024-01-01 RX ORDER — SODIUM CHLORIDE 9 MG/ML
4 INJECTION INTRAMUSCULAR; INTRAVENOUS; SUBCUTANEOUS EVERY 4 HOURS
Refills: 0 | Status: DISCONTINUED | OUTPATIENT
Start: 2024-01-01 | End: 2024-01-01

## 2024-01-01 RX ORDER — MAGNESIUM SULFATE 500 MG/ML
1 VIAL (ML) INJECTION ONCE
Refills: 0 | Status: COMPLETED | OUTPATIENT
Start: 2024-01-01 | End: 2024-01-01

## 2024-01-01 RX ORDER — ONDANSETRON 8 MG/1
4 TABLET, FILM COATED ORAL ONCE
Refills: 0 | Status: DISCONTINUED | OUTPATIENT
Start: 2024-01-01 | End: 2024-01-01

## 2024-01-01 RX ORDER — SENNA PLUS 8.6 MG/1
2 TABLET ORAL AT BEDTIME
Refills: 0 | Status: DISCONTINUED | OUTPATIENT
Start: 2024-01-01 | End: 2024-01-01

## 2024-01-01 RX ORDER — DEXMEDETOMIDINE HYDROCHLORIDE IN 0.9% SODIUM CHLORIDE 4 UG/ML
0.2 INJECTION INTRAVENOUS
Qty: 200 | Refills: 0 | Status: DISCONTINUED | OUTPATIENT
Start: 2024-01-01 | End: 2024-01-01

## 2024-01-01 RX ORDER — ENOXAPARIN SODIUM 100 MG/ML
54 INJECTION SUBCUTANEOUS EVERY 12 HOURS
Refills: 0 | Status: DISCONTINUED | OUTPATIENT
Start: 2024-01-01 | End: 2024-01-01

## 2024-01-01 RX ORDER — NICOTINE POLACRILEX 2 MG
1 GUM BUCCAL DAILY
Refills: 0 | Status: DISCONTINUED | OUTPATIENT
Start: 2024-01-01 | End: 2024-01-01

## 2024-01-01 RX ORDER — CEFTRIAXONE 500 MG/1
1000 INJECTION, POWDER, FOR SOLUTION INTRAMUSCULAR; INTRAVENOUS EVERY 24 HOURS
Refills: 0 | Status: DISCONTINUED | OUTPATIENT
Start: 2024-01-01 | End: 2024-01-01

## 2024-01-01 RX ORDER — LACTULOSE 10 G/15ML
15 SOLUTION ORAL
Qty: 0 | Refills: 0 | DISCHARGE
Start: 2024-01-01

## 2024-01-01 RX ORDER — SODIUM CHLORIDE 9 MG/ML
500 INJECTION, SOLUTION INTRAVENOUS ONCE
Refills: 0 | Status: COMPLETED | OUTPATIENT
Start: 2024-01-01 | End: 2024-01-01

## 2024-01-01 RX ORDER — AMLODIPINE BESYLATE 2.5 MG/1
5 TABLET ORAL DAILY
Refills: 0 | Status: DISCONTINUED | OUTPATIENT
Start: 2024-01-01 | End: 2024-01-01

## 2024-01-01 RX ORDER — IPRATROPIUM/ALBUTEROL SULFATE 18-103MCG
3 AEROSOL WITH ADAPTER (GRAM) INHALATION
Refills: 0 | Status: COMPLETED | OUTPATIENT
Start: 2024-01-01 | End: 2024-01-01

## 2024-01-01 RX ORDER — CARBAMIDE PEROXIDE 81.86 MG/ML
1 SOLUTION/ DROPS AURICULAR (OTIC)
Refills: 0 | Status: COMPLETED | OUTPATIENT
Start: 2024-01-01 | End: 2024-01-01

## 2024-01-01 RX ORDER — SODIUM CHLORIDE 9 MG/ML
4 INJECTION INTRAMUSCULAR; INTRAVENOUS; SUBCUTANEOUS EVERY 12 HOURS
Refills: 0 | Status: DISCONTINUED | OUTPATIENT
Start: 2024-01-01 | End: 2024-01-01

## 2024-01-01 RX ORDER — POLYETHYLENE GLYCOL 3350 17 G/17G
17 POWDER, FOR SOLUTION ORAL
Refills: 0 | Status: DISCONTINUED | OUTPATIENT
Start: 2024-01-01 | End: 2024-01-01

## 2024-01-01 RX ORDER — LACTULOSE 10 G/15ML
10 SOLUTION ORAL EVERY 8 HOURS
Refills: 0 | Status: COMPLETED | OUTPATIENT
Start: 2024-01-01 | End: 2024-01-01

## 2024-01-01 RX ORDER — IPRATROPIUM/ALBUTEROL SULFATE 18-103MCG
0 AEROSOL WITH ADAPTER (GRAM) INHALATION
Refills: 0 | DISCHARGE

## 2024-01-01 RX ORDER — SODIUM CHLORIDE 0.65 %
1 AEROSOL, SPRAY (ML) NASAL THREE TIMES A DAY
Refills: 0 | Status: DISCONTINUED | OUTPATIENT
Start: 2024-01-01 | End: 2024-01-01

## 2024-01-01 RX ORDER — ACETYLCYSTEINE 200 MG/ML
4 VIAL (ML) MISCELLANEOUS DAILY
Refills: 0 | Status: DISCONTINUED | OUTPATIENT
Start: 2024-01-01 | End: 2024-01-01

## 2024-01-01 RX ORDER — POTASSIUM CHLORIDE 20 MEQ
20 PACKET (EA) ORAL ONCE
Refills: 0 | Status: COMPLETED | OUTPATIENT
Start: 2024-01-01 | End: 2024-01-01

## 2024-01-01 RX ORDER — HYDROXYZINE HCL 10 MG
1 TABLET ORAL
Qty: 0 | Refills: 0 | DISCHARGE
Start: 2024-01-01

## 2024-01-01 RX ORDER — AZITHROMYCIN 500 MG/1
500 TABLET, FILM COATED ORAL ONCE
Refills: 0 | Status: COMPLETED | OUTPATIENT
Start: 2024-01-01 | End: 2024-01-01

## 2024-01-01 RX ORDER — CEFEPIME 1 G/1
2000 INJECTION, POWDER, FOR SOLUTION INTRAMUSCULAR; INTRAVENOUS EVERY 8 HOURS
Refills: 0 | Status: DISCONTINUED | OUTPATIENT
Start: 2024-01-01 | End: 2024-01-01

## 2024-01-01 RX ORDER — SODIUM CHLORIDE 9 MG/ML
4 INJECTION INTRAMUSCULAR; INTRAVENOUS; SUBCUTANEOUS EVERY 6 HOURS
Refills: 0 | Status: DISCONTINUED | OUTPATIENT
Start: 2024-01-01 | End: 2024-01-01

## 2024-01-01 RX ORDER — LACTULOSE 10 G/15ML
10 SOLUTION ORAL EVERY 6 HOURS
Refills: 0 | Status: DISCONTINUED | OUTPATIENT
Start: 2024-01-01 | End: 2024-01-01

## 2024-01-01 RX ORDER — SODIUM ZIRCONIUM CYCLOSILICATE 10 G/10G
10 POWDER, FOR SUSPENSION ORAL ONCE
Refills: 0 | Status: COMPLETED | OUTPATIENT
Start: 2024-01-01 | End: 2024-01-01

## 2024-01-01 RX ORDER — HYDROXYZINE HCL 10 MG
25 TABLET ORAL DAILY
Refills: 0 | Status: DISCONTINUED | OUTPATIENT
Start: 2024-01-01 | End: 2024-01-01

## 2024-01-01 RX ORDER — CARBAMIDE PEROXIDE 81.86 MG/ML
5 SOLUTION/ DROPS AURICULAR (OTIC) EVERY 12 HOURS
Refills: 0 | Status: DISCONTINUED | OUTPATIENT
Start: 2024-01-01 | End: 2024-01-01

## 2024-01-01 RX ORDER — PIPERACILLIN AND TAZOBACTAM 4; .5 G/20ML; G/20ML
3.38 INJECTION, POWDER, LYOPHILIZED, FOR SOLUTION INTRAVENOUS EVERY 8 HOURS
Refills: 0 | Status: COMPLETED | OUTPATIENT
Start: 2024-01-01 | End: 2024-01-01

## 2024-01-01 RX ORDER — HYDROMORPHONE HYDROCHLORIDE 2 MG/ML
0.25 INJECTION INTRAMUSCULAR; INTRAVENOUS; SUBCUTANEOUS EVERY 4 HOURS
Refills: 0 | Status: DISCONTINUED | OUTPATIENT
Start: 2024-01-01 | End: 2024-01-01

## 2024-01-01 RX ORDER — SENNA PLUS 8.6 MG/1
2 TABLET ORAL
Qty: 0 | Refills: 0 | DISCHARGE
Start: 2024-01-01

## 2024-01-01 RX ORDER — FENTANYL CITRATE 50 UG/ML
0.5 INJECTION INTRAVENOUS
Qty: 2500 | Refills: 0 | Status: DISCONTINUED | OUTPATIENT
Start: 2024-01-01 | End: 2024-01-01

## 2024-01-01 RX ORDER — AMLODIPINE BESYLATE 2.5 MG/1
1 TABLET ORAL
Refills: 0 | DISCHARGE

## 2024-01-01 RX ORDER — ALBUTEROL 90 UG/1
2 AEROSOL, METERED ORAL EVERY 4 HOURS
Refills: 0 | Status: DISCONTINUED | OUTPATIENT
Start: 2024-01-01 | End: 2024-01-01

## 2024-01-01 RX ORDER — PANTOPRAZOLE SODIUM 20 MG/1
40 TABLET, DELAYED RELEASE ORAL EVERY 24 HOURS
Refills: 0 | Status: DISCONTINUED | OUTPATIENT
Start: 2024-01-01 | End: 2024-01-01

## 2024-01-01 RX ORDER — POLYETHYLENE GLYCOL 3350 17 G/17G
17 POWDER, FOR SOLUTION ORAL DAILY
Refills: 0 | Status: DISCONTINUED | OUTPATIENT
Start: 2024-01-01 | End: 2024-01-01

## 2024-01-01 RX ORDER — SACCHAROMYCES BOULARDII 250 MG
1 POWDER IN PACKET (EA) ORAL
Qty: 0 | Refills: 0 | DISCHARGE
Start: 2024-01-01

## 2024-01-01 RX ORDER — MORPHINE SULFATE 50 MG/1
2 CAPSULE, EXTENDED RELEASE ORAL
Refills: 0 | Status: DISCONTINUED | OUTPATIENT
Start: 2024-01-01 | End: 2024-01-01

## 2024-01-01 RX ORDER — NOREPINEPHRINE BITARTRATE/D5W 8 MG/250ML
0.05 PLASTIC BAG, INJECTION (ML) INTRAVENOUS
Qty: 16 | Refills: 0 | Status: DISCONTINUED | OUTPATIENT
Start: 2024-01-01 | End: 2024-01-01

## 2024-01-01 RX ORDER — FLUTICASONE PROPIONATE 50 MCG
1 SPRAY, SUSPENSION NASAL
Refills: 0 | Status: DISCONTINUED | OUTPATIENT
Start: 2024-01-01 | End: 2024-01-01

## 2024-01-01 RX ORDER — HYDROMORPHONE HYDROCHLORIDE 2 MG/ML
0.5 INJECTION INTRAMUSCULAR; INTRAVENOUS; SUBCUTANEOUS
Refills: 0 | Status: DISCONTINUED | OUTPATIENT
Start: 2024-01-01 | End: 2024-01-01

## 2024-01-01 RX ORDER — ONDANSETRON 8 MG/1
4 TABLET, FILM COATED ORAL ONCE
Refills: 0 | Status: COMPLETED | OUTPATIENT
Start: 2024-01-01 | End: 2024-01-01

## 2024-01-01 RX ORDER — DEXTROSE 50 % IN WATER 50 %
50 SYRINGE (ML) INTRAVENOUS ONCE
Refills: 0 | Status: COMPLETED | OUTPATIENT
Start: 2024-01-01 | End: 2024-01-01

## 2024-01-01 RX ADMIN — SODIUM CHLORIDE 500 MILLILITER(S): 9 INJECTION, SOLUTION INTRAVENOUS at 11:50

## 2024-01-01 RX ADMIN — Medication 3 MILLILITER(S): at 08:12

## 2024-01-01 RX ADMIN — SENNA PLUS 2 TABLET(S): 8.6 TABLET ORAL at 22:30

## 2024-01-01 RX ADMIN — CEFTRIAXONE 100 MILLIGRAM(S): 500 INJECTION, POWDER, FOR SOLUTION INTRAMUSCULAR; INTRAVENOUS at 21:34

## 2024-01-01 RX ADMIN — SODIUM CHLORIDE 4 MILLILITER(S): 9 INJECTION INTRAMUSCULAR; INTRAVENOUS; SUBCUTANEOUS at 08:04

## 2024-01-01 RX ADMIN — Medication 3 MILLILITER(S): at 19:53

## 2024-01-01 RX ADMIN — TIOTROPIUM BROMIDE 2 PUFF(S): 18 CAPSULE ORAL; RESPIRATORY (INHALATION) at 08:43

## 2024-01-01 RX ADMIN — Medication 60 MILLIGRAM(S): at 05:25

## 2024-01-01 RX ADMIN — Medication 25 MILLIGRAM(S): at 12:10

## 2024-01-01 RX ADMIN — ENOXAPARIN SODIUM 40 MILLIGRAM(S): 100 INJECTION SUBCUTANEOUS at 17:53

## 2024-01-01 RX ADMIN — DEXMEDETOMIDINE HYDROCHLORIDE IN 0.9% SODIUM CHLORIDE 2.53 MICROGRAM(S)/KG/HR: 4 INJECTION INTRAVENOUS at 10:00

## 2024-01-01 RX ADMIN — Medication 40 MILLIGRAM(S): at 06:28

## 2024-01-01 RX ADMIN — CEFEPIME 100 MILLIGRAM(S): 1 INJECTION, POWDER, FOR SOLUTION INTRAMUSCULAR; INTRAVENOUS at 14:32

## 2024-01-01 RX ADMIN — Medication 40 MILLIGRAM(S): at 05:23

## 2024-01-01 RX ADMIN — ALBUTEROL 2 PUFF(S): 90 AEROSOL, METERED ORAL at 08:44

## 2024-01-01 RX ADMIN — Medication 60 MILLIGRAM(S): at 17:30

## 2024-01-01 RX ADMIN — PIPERACILLIN AND TAZOBACTAM 25 GRAM(S): 4; .5 INJECTION, POWDER, LYOPHILIZED, FOR SOLUTION INTRAVENOUS at 05:20

## 2024-01-01 RX ADMIN — CHLORHEXIDINE GLUCONATE 1 APPLICATION(S): 213 SOLUTION TOPICAL at 05:06

## 2024-01-01 RX ADMIN — Medication 3 MILLILITER(S): at 19:35

## 2024-01-01 RX ADMIN — Medication 3 MILLILITER(S): at 15:09

## 2024-01-01 RX ADMIN — Medication 250 MILLIGRAM(S): at 05:31

## 2024-01-01 RX ADMIN — CEFEPIME 100 MILLIGRAM(S): 1 INJECTION, POWDER, FOR SOLUTION INTRAMUSCULAR; INTRAVENOUS at 05:31

## 2024-01-01 RX ADMIN — Medication 3 MILLILITER(S): at 16:22

## 2024-01-01 RX ADMIN — PIPERACILLIN AND TAZOBACTAM 25 GRAM(S): 4; .5 INJECTION, POWDER, LYOPHILIZED, FOR SOLUTION INTRAVENOUS at 16:12

## 2024-01-01 RX ADMIN — Medication 3 MILLILITER(S): at 09:00

## 2024-01-01 RX ADMIN — Medication 3 MILLILITER(S): at 16:32

## 2024-01-01 RX ADMIN — Medication 1 SPRAY(S): at 05:23

## 2024-01-01 RX ADMIN — SENNA PLUS 2 TABLET(S): 8.6 TABLET ORAL at 21:34

## 2024-01-01 RX ADMIN — ALBUTEROL 2 PUFF(S): 90 AEROSOL, METERED ORAL at 01:27

## 2024-01-01 RX ADMIN — CEFEPIME 100 MILLIGRAM(S): 1 INJECTION, POWDER, FOR SOLUTION INTRAMUSCULAR; INTRAVENOUS at 05:27

## 2024-01-01 RX ADMIN — Medication 3 MILLILITER(S): at 04:37

## 2024-01-01 RX ADMIN — Medication 10 MILLIGRAM(S): at 05:25

## 2024-01-01 RX ADMIN — CHLORHEXIDINE GLUCONATE 15 MILLILITER(S): 213 SOLUTION TOPICAL at 05:32

## 2024-01-01 RX ADMIN — SENNA PLUS 2 TABLET(S): 8.6 TABLET ORAL at 21:28

## 2024-01-01 RX ADMIN — PIPERACILLIN AND TAZOBACTAM 25 GRAM(S): 4; .5 INJECTION, POWDER, LYOPHILIZED, FOR SOLUTION INTRAVENOUS at 22:00

## 2024-01-01 RX ADMIN — ALBUTEROL 2 PUFF(S): 90 AEROSOL, METERED ORAL at 11:26

## 2024-01-01 RX ADMIN — CHLORHEXIDINE GLUCONATE 15 MILLILITER(S): 213 SOLUTION TOPICAL at 05:23

## 2024-01-01 RX ADMIN — Medication 325 MILLIGRAM(S): at 12:06

## 2024-01-01 RX ADMIN — POLYETHYLENE GLYCOL 3350 17 GRAM(S): 17 POWDER, FOR SOLUTION ORAL at 12:25

## 2024-01-01 RX ADMIN — CHLORHEXIDINE GLUCONATE 15 MILLILITER(S): 213 SOLUTION TOPICAL at 17:45

## 2024-01-01 RX ADMIN — PIPERACILLIN AND TAZOBACTAM 25 GRAM(S): 4; .5 INJECTION, POWDER, LYOPHILIZED, FOR SOLUTION INTRAVENOUS at 05:18

## 2024-01-01 RX ADMIN — Medication 25 MILLIGRAM(S): at 17:12

## 2024-01-01 RX ADMIN — PIPERACILLIN AND TAZOBACTAM 25 GRAM(S): 4; .5 INJECTION, POWDER, LYOPHILIZED, FOR SOLUTION INTRAVENOUS at 14:31

## 2024-01-01 RX ADMIN — Medication 1 PUFF(S): at 20:59

## 2024-01-01 RX ADMIN — CHLORHEXIDINE GLUCONATE 1 APPLICATION(S): 213 SOLUTION TOPICAL at 06:01

## 2024-01-01 RX ADMIN — CHLORHEXIDINE GLUCONATE 1 APPLICATION(S): 213 SOLUTION TOPICAL at 05:41

## 2024-01-01 RX ADMIN — CHLORHEXIDINE GLUCONATE 1 APPLICATION(S): 213 SOLUTION TOPICAL at 05:24

## 2024-01-01 RX ADMIN — Medication 40 MILLIGRAM(S): at 17:25

## 2024-01-01 RX ADMIN — CHLORHEXIDINE GLUCONATE 1 APPLICATION(S): 213 SOLUTION TOPICAL at 05:18

## 2024-01-01 RX ADMIN — Medication 250 MILLIGRAM(S): at 19:11

## 2024-01-01 RX ADMIN — ENOXAPARIN SODIUM 40 MILLIGRAM(S): 100 INJECTION SUBCUTANEOUS at 09:10

## 2024-01-01 RX ADMIN — Medication 3 MILLILITER(S): at 20:05

## 2024-01-01 RX ADMIN — Medication 250 MILLIGRAM(S): at 05:50

## 2024-01-01 RX ADMIN — Medication 1 PUFF(S): at 19:22

## 2024-01-01 RX ADMIN — CARBAMIDE PEROXIDE 5 DROP(S): 81.86 SOLUTION/ DROPS AURICULAR (OTIC) at 17:25

## 2024-01-01 RX ADMIN — Medication 10 MILLIGRAM(S): at 05:40

## 2024-01-01 RX ADMIN — Medication 3 MILLILITER(S): at 19:20

## 2024-01-01 RX ADMIN — ALBUTEROL 2 PUFF(S): 90 AEROSOL, METERED ORAL at 02:15

## 2024-01-01 RX ADMIN — ALBUTEROL 2 PUFF(S): 90 AEROSOL, METERED ORAL at 07:34

## 2024-01-01 RX ADMIN — Medication 125 MILLIGRAM(S): at 19:30

## 2024-01-01 RX ADMIN — TIOTROPIUM BROMIDE 2 PUFF(S): 18 CAPSULE ORAL; RESPIRATORY (INHALATION) at 08:20

## 2024-01-01 RX ADMIN — ENOXAPARIN SODIUM 40 MILLIGRAM(S): 100 INJECTION SUBCUTANEOUS at 13:05

## 2024-01-01 RX ADMIN — Medication 1 PATCH: at 08:31

## 2024-01-01 RX ADMIN — Medication 3 MILLILITER(S): at 19:40

## 2024-01-01 RX ADMIN — CHLORHEXIDINE GLUCONATE 1 APPLICATION(S): 213 SOLUTION TOPICAL at 05:15

## 2024-01-01 RX ADMIN — Medication 3 MILLILITER(S): at 14:45

## 2024-01-01 RX ADMIN — Medication 3 MILLILITER(S): at 20:16

## 2024-01-01 RX ADMIN — PIPERACILLIN AND TAZOBACTAM 25 GRAM(S): 4; .5 INJECTION, POWDER, LYOPHILIZED, FOR SOLUTION INTRAVENOUS at 22:25

## 2024-01-01 RX ADMIN — Medication 3 MILLILITER(S): at 04:08

## 2024-01-01 RX ADMIN — Medication 325 MILLIGRAM(S): at 12:47

## 2024-01-01 RX ADMIN — ENOXAPARIN SODIUM 40 MILLIGRAM(S): 100 INJECTION SUBCUTANEOUS at 09:02

## 2024-01-01 RX ADMIN — Medication 3 MILLILITER(S): at 13:39

## 2024-01-01 RX ADMIN — ALBUTEROL 2 PUFF(S): 90 AEROSOL, METERED ORAL at 19:44

## 2024-01-01 RX ADMIN — ENOXAPARIN SODIUM 40 MILLIGRAM(S): 100 INJECTION SUBCUTANEOUS at 17:52

## 2024-01-01 RX ADMIN — SENNA PLUS 2 TABLET(S): 8.6 TABLET ORAL at 21:40

## 2024-01-01 RX ADMIN — Medication 3 MILLILITER(S): at 14:30

## 2024-01-01 RX ADMIN — SODIUM CHLORIDE 4 MILLILITER(S): 9 INJECTION INTRAMUSCULAR; INTRAVENOUS; SUBCUTANEOUS at 20:06

## 2024-01-01 RX ADMIN — Medication 250 MILLIGRAM(S): at 17:44

## 2024-01-01 RX ADMIN — CARBAMIDE PEROXIDE 5 DROP(S): 81.86 SOLUTION/ DROPS AURICULAR (OTIC) at 17:24

## 2024-01-01 RX ADMIN — AMLODIPINE BESYLATE 5 MILLIGRAM(S): 2.5 TABLET ORAL at 05:16

## 2024-01-01 RX ADMIN — ENOXAPARIN SODIUM 40 MILLIGRAM(S): 100 INJECTION SUBCUTANEOUS at 17:20

## 2024-01-01 RX ADMIN — Medication 250 MILLIGRAM(S): at 05:54

## 2024-01-01 RX ADMIN — AZITHROMYCIN 255 MILLIGRAM(S): 500 TABLET, FILM COATED ORAL at 22:52

## 2024-01-01 RX ADMIN — Medication 1 PATCH: at 12:49

## 2024-01-01 RX ADMIN — Medication 60 MILLIGRAM(S): at 06:15

## 2024-01-01 RX ADMIN — POLYETHYLENE GLYCOL 3350 17 GRAM(S): 17 POWDER, FOR SOLUTION ORAL at 17:47

## 2024-01-01 RX ADMIN — Medication 250 MILLIGRAM(S): at 17:47

## 2024-01-01 RX ADMIN — HYDROMORPHONE HYDROCHLORIDE 0.25 MILLIGRAM(S): 2 INJECTION INTRAMUSCULAR; INTRAVENOUS; SUBCUTANEOUS at 09:36

## 2024-01-01 RX ADMIN — CEFTRIAXONE 100 MILLIGRAM(S): 500 INJECTION, POWDER, FOR SOLUTION INTRAMUSCULAR; INTRAVENOUS at 21:28

## 2024-01-01 RX ADMIN — ENOXAPARIN SODIUM 40 MILLIGRAM(S): 100 INJECTION SUBCUTANEOUS at 11:53

## 2024-01-01 RX ADMIN — AMLODIPINE BESYLATE 5 MILLIGRAM(S): 2.5 TABLET ORAL at 06:00

## 2024-01-01 RX ADMIN — ENOXAPARIN SODIUM 40 MILLIGRAM(S): 100 INJECTION SUBCUTANEOUS at 17:42

## 2024-01-01 RX ADMIN — AMLODIPINE BESYLATE 5 MILLIGRAM(S): 2.5 TABLET ORAL at 05:17

## 2024-01-01 RX ADMIN — Medication 3 MILLILITER(S): at 08:05

## 2024-01-01 RX ADMIN — Medication 3 MILLILITER(S): at 17:27

## 2024-01-01 RX ADMIN — SODIUM CHLORIDE 4 MILLILITER(S): 9 INJECTION INTRAMUSCULAR; INTRAVENOUS; SUBCUTANEOUS at 13:26

## 2024-01-01 RX ADMIN — Medication 3 MILLILITER(S): at 09:20

## 2024-01-01 RX ADMIN — Medication 250 MILLIGRAM(S): at 05:21

## 2024-01-01 RX ADMIN — POLYETHYLENE GLYCOL 3350 17 GRAM(S): 17 POWDER, FOR SOLUTION ORAL at 06:28

## 2024-01-01 RX ADMIN — Medication 60 MILLIGRAM(S): at 21:05

## 2024-01-01 RX ADMIN — SODIUM CHLORIDE 4 MILLILITER(S): 9 INJECTION INTRAMUSCULAR; INTRAVENOUS; SUBCUTANEOUS at 19:55

## 2024-01-01 RX ADMIN — Medication 3 MILLILITER(S): at 08:30

## 2024-01-01 RX ADMIN — Medication 2.37 MICROGRAM(S)/KG/MIN: at 11:30

## 2024-01-01 RX ADMIN — POLYETHYLENE GLYCOL 3350 17 GRAM(S): 17 POWDER, FOR SOLUTION ORAL at 12:10

## 2024-01-01 RX ADMIN — SENNA PLUS 2 TABLET(S): 8.6 TABLET ORAL at 21:25

## 2024-01-01 RX ADMIN — Medication 3 MILLILITER(S): at 08:13

## 2024-01-01 RX ADMIN — POLYETHYLENE GLYCOL 3350 17 GRAM(S): 17 POWDER, FOR SOLUTION ORAL at 18:08

## 2024-01-01 RX ADMIN — Medication 25 MILLIGRAM(S): at 12:33

## 2024-01-01 RX ADMIN — Medication 60 MILLIGRAM(S): at 05:27

## 2024-01-01 RX ADMIN — PANTOPRAZOLE SODIUM 40 MILLIGRAM(S): 20 TABLET, DELAYED RELEASE ORAL at 05:18

## 2024-01-01 RX ADMIN — SENNA PLUS 2 TABLET(S): 8.6 TABLET ORAL at 21:27

## 2024-01-01 RX ADMIN — ALBUTEROL 2 PUFF(S): 90 AEROSOL, METERED ORAL at 19:40

## 2024-01-01 RX ADMIN — Medication 40 MILLIGRAM(S): at 05:21

## 2024-01-01 RX ADMIN — CHLORHEXIDINE GLUCONATE 1 APPLICATION(S): 213 SOLUTION TOPICAL at 06:15

## 2024-01-01 RX ADMIN — Medication 3 MILLILITER(S): at 15:37

## 2024-01-01 RX ADMIN — Medication 250 MILLIGRAM(S): at 05:16

## 2024-01-01 RX ADMIN — Medication 3 MILLILITER(S): at 08:34

## 2024-01-01 RX ADMIN — Medication 60 MILLIGRAM(S): at 13:29

## 2024-01-01 RX ADMIN — POLYETHYLENE GLYCOL 3350 17 GRAM(S): 17 POWDER, FOR SOLUTION ORAL at 12:42

## 2024-01-01 RX ADMIN — SODIUM CHLORIDE 4 MILLILITER(S): 9 INJECTION INTRAMUSCULAR; INTRAVENOUS; SUBCUTANEOUS at 19:53

## 2024-01-01 RX ADMIN — CHLORHEXIDINE GLUCONATE 1 APPLICATION(S): 213 SOLUTION TOPICAL at 05:33

## 2024-01-01 RX ADMIN — PIPERACILLIN AND TAZOBACTAM 25 GRAM(S): 4; .5 INJECTION, POWDER, LYOPHILIZED, FOR SOLUTION INTRAVENOUS at 14:35

## 2024-01-01 RX ADMIN — Medication 3 MILLILITER(S): at 08:11

## 2024-01-01 RX ADMIN — AMLODIPINE BESYLATE 5 MILLIGRAM(S): 2.5 TABLET ORAL at 05:30

## 2024-01-01 RX ADMIN — POLYETHYLENE GLYCOL 3350 17 GRAM(S): 17 POWDER, FOR SOLUTION ORAL at 06:01

## 2024-01-01 RX ADMIN — POLYETHYLENE GLYCOL 3350 17 GRAM(S): 17 POWDER, FOR SOLUTION ORAL at 17:24

## 2024-01-01 RX ADMIN — PIPERACILLIN AND TAZOBACTAM 25 GRAM(S): 4; .5 INJECTION, POWDER, LYOPHILIZED, FOR SOLUTION INTRAVENOUS at 21:27

## 2024-01-01 RX ADMIN — CEFEPIME 100 MILLIGRAM(S): 1 INJECTION, POWDER, FOR SOLUTION INTRAMUSCULAR; INTRAVENOUS at 06:03

## 2024-01-01 RX ADMIN — CEFEPIME 100 MILLIGRAM(S): 1 INJECTION, POWDER, FOR SOLUTION INTRAMUSCULAR; INTRAVENOUS at 21:20

## 2024-01-01 RX ADMIN — CHLORHEXIDINE GLUCONATE 1 APPLICATION(S): 213 SOLUTION TOPICAL at 07:02

## 2024-01-01 RX ADMIN — Medication 250 MILLIGRAM(S): at 17:11

## 2024-01-01 RX ADMIN — Medication 50 MILLILITER(S): at 08:59

## 2024-01-01 RX ADMIN — ENOXAPARIN SODIUM 40 MILLIGRAM(S): 100 INJECTION SUBCUTANEOUS at 09:25

## 2024-01-01 RX ADMIN — Medication 60 MILLIGRAM(S): at 17:52

## 2024-01-01 RX ADMIN — PANTOPRAZOLE SODIUM 40 MILLIGRAM(S): 20 TABLET, DELAYED RELEASE ORAL at 05:30

## 2024-01-01 RX ADMIN — Medication 650 MILLIGRAM(S): at 05:17

## 2024-01-01 RX ADMIN — Medication 3 MILLILITER(S): at 20:30

## 2024-01-01 RX ADMIN — SODIUM CHLORIDE 500 MILLILITER(S): 9 INJECTION, SOLUTION INTRAVENOUS at 13:12

## 2024-01-01 RX ADMIN — AMLODIPINE BESYLATE 5 MILLIGRAM(S): 2.5 TABLET ORAL at 05:27

## 2024-01-01 RX ADMIN — SODIUM CHLORIDE 4 MILLILITER(S): 9 INJECTION INTRAMUSCULAR; INTRAVENOUS; SUBCUTANEOUS at 14:02

## 2024-01-01 RX ADMIN — Medication 20 MILLIGRAM(S): at 06:01

## 2024-01-01 RX ADMIN — POLYETHYLENE GLYCOL 3350 17 GRAM(S): 17 POWDER, FOR SOLUTION ORAL at 18:50

## 2024-01-01 RX ADMIN — Medication 650 MILLIGRAM(S): at 06:00

## 2024-01-01 RX ADMIN — Medication 1 PATCH: at 09:38

## 2024-01-01 RX ADMIN — ENOXAPARIN SODIUM 40 MILLIGRAM(S): 100 INJECTION SUBCUTANEOUS at 17:12

## 2024-01-01 RX ADMIN — ENOXAPARIN SODIUM 40 MILLIGRAM(S): 100 INJECTION SUBCUTANEOUS at 12:12

## 2024-01-01 RX ADMIN — SODIUM CHLORIDE 4 MILLILITER(S): 9 INJECTION INTRAMUSCULAR; INTRAVENOUS; SUBCUTANEOUS at 07:35

## 2024-01-01 RX ADMIN — Medication 250 MILLIGRAM(S): at 18:02

## 2024-01-01 RX ADMIN — ENOXAPARIN SODIUM 40 MILLIGRAM(S): 100 INJECTION SUBCUTANEOUS at 18:23

## 2024-01-01 RX ADMIN — Medication 1 PUFF(S): at 07:37

## 2024-01-01 RX ADMIN — SODIUM CHLORIDE 4 MILLILITER(S): 9 INJECTION INTRAMUSCULAR; INTRAVENOUS; SUBCUTANEOUS at 09:49

## 2024-01-01 RX ADMIN — Medication 40 MILLIGRAM(S): at 05:20

## 2024-01-01 RX ADMIN — FENTANYL CITRATE 2.53 MICROGRAM(S)/KG/HR: 50 INJECTION INTRAVENOUS at 09:25

## 2024-01-01 RX ADMIN — Medication 1 PATCH: at 12:42

## 2024-01-01 RX ADMIN — AMLODIPINE BESYLATE 5 MILLIGRAM(S): 2.5 TABLET ORAL at 05:25

## 2024-01-01 RX ADMIN — Medication 250 MILLIGRAM(S): at 18:14

## 2024-01-01 RX ADMIN — CEFEPIME 100 MILLIGRAM(S): 1 INJECTION, POWDER, FOR SOLUTION INTRAMUSCULAR; INTRAVENOUS at 22:03

## 2024-01-01 RX ADMIN — PIPERACILLIN AND TAZOBACTAM 25 GRAM(S): 4; .5 INJECTION, POWDER, LYOPHILIZED, FOR SOLUTION INTRAVENOUS at 22:28

## 2024-01-01 RX ADMIN — SIMETHICONE 80 MILLIGRAM(S): 80 TABLET, CHEWABLE ORAL at 11:54

## 2024-01-01 RX ADMIN — PIPERACILLIN AND TAZOBACTAM 25 GRAM(S): 4; .5 INJECTION, POWDER, LYOPHILIZED, FOR SOLUTION INTRAVENOUS at 13:49

## 2024-01-01 RX ADMIN — CHLORHEXIDINE GLUCONATE 15 MILLILITER(S): 213 SOLUTION TOPICAL at 17:26

## 2024-01-01 RX ADMIN — Medication 10 MILLIGRAM(S): at 12:50

## 2024-01-01 RX ADMIN — Medication 3 MILLILITER(S): at 19:30

## 2024-01-01 RX ADMIN — CHLORHEXIDINE GLUCONATE 15 MILLILITER(S): 213 SOLUTION TOPICAL at 22:26

## 2024-01-01 RX ADMIN — Medication 3 MILLILITER(S): at 17:24

## 2024-01-01 RX ADMIN — CHLORHEXIDINE GLUCONATE 1 APPLICATION(S): 213 SOLUTION TOPICAL at 11:22

## 2024-01-01 RX ADMIN — AMLODIPINE BESYLATE 5 MILLIGRAM(S): 2.5 TABLET ORAL at 06:02

## 2024-01-01 RX ADMIN — CEFEPIME 100 MILLIGRAM(S): 1 INJECTION, POWDER, FOR SOLUTION INTRAMUSCULAR; INTRAVENOUS at 05:17

## 2024-01-01 RX ADMIN — ALBUTEROL 2 PUFF(S): 90 AEROSOL, METERED ORAL at 15:07

## 2024-01-01 RX ADMIN — Medication 1 PATCH: at 12:15

## 2024-01-01 RX ADMIN — CARBAMIDE PEROXIDE 5 DROP(S): 81.86 SOLUTION/ DROPS AURICULAR (OTIC) at 06:28

## 2024-01-01 RX ADMIN — Medication 3 MILLILITER(S): at 12:59

## 2024-01-01 RX ADMIN — Medication 40 MILLIGRAM(S): at 05:32

## 2024-01-01 RX ADMIN — POLYETHYLENE GLYCOL 3350 17 GRAM(S): 17 POWDER, FOR SOLUTION ORAL at 12:37

## 2024-01-01 RX ADMIN — PIPERACILLIN AND TAZOBACTAM 25 GRAM(S): 4; .5 INJECTION, POWDER, LYOPHILIZED, FOR SOLUTION INTRAVENOUS at 14:13

## 2024-01-01 RX ADMIN — CHLORHEXIDINE GLUCONATE 1 APPLICATION(S): 213 SOLUTION TOPICAL at 05:34

## 2024-01-01 RX ADMIN — Medication 4 MILLILITER(S): at 11:44

## 2024-01-01 RX ADMIN — AMLODIPINE BESYLATE 5 MILLIGRAM(S): 2.5 TABLET ORAL at 06:15

## 2024-01-01 RX ADMIN — PANTOPRAZOLE SODIUM 40 MILLIGRAM(S): 20 TABLET, DELAYED RELEASE ORAL at 05:21

## 2024-01-01 RX ADMIN — Medication 250 MILLIGRAM(S): at 05:18

## 2024-01-01 RX ADMIN — PIPERACILLIN AND TAZOBACTAM 25 GRAM(S): 4; .5 INJECTION, POWDER, LYOPHILIZED, FOR SOLUTION INTRAVENOUS at 14:21

## 2024-01-01 RX ADMIN — PIPERACILLIN AND TAZOBACTAM 25 GRAM(S): 4; .5 INJECTION, POWDER, LYOPHILIZED, FOR SOLUTION INTRAVENOUS at 06:01

## 2024-01-01 RX ADMIN — Medication 10 MILLIGRAM(S): at 05:21

## 2024-01-01 RX ADMIN — ENOXAPARIN SODIUM 40 MILLIGRAM(S): 100 INJECTION SUBCUTANEOUS at 17:30

## 2024-01-01 RX ADMIN — ENOXAPARIN SODIUM 40 MILLIGRAM(S): 100 INJECTION SUBCUTANEOUS at 17:09

## 2024-01-01 RX ADMIN — Medication 3 MILLILITER(S): at 07:59

## 2024-01-01 RX ADMIN — Medication 1 PUFF(S): at 08:38

## 2024-01-01 RX ADMIN — POLYETHYLENE GLYCOL 3350 17 GRAM(S): 17 POWDER, FOR SOLUTION ORAL at 05:23

## 2024-01-01 RX ADMIN — LACTULOSE 10 GRAM(S): 10 SOLUTION ORAL at 21:25

## 2024-01-01 RX ADMIN — PANTOPRAZOLE SODIUM 40 MILLIGRAM(S): 20 TABLET, DELAYED RELEASE ORAL at 06:01

## 2024-01-01 RX ADMIN — TIOTROPIUM BROMIDE 2 PUFF(S): 18 CAPSULE ORAL; RESPIRATORY (INHALATION) at 09:08

## 2024-01-01 RX ADMIN — PIPERACILLIN AND TAZOBACTAM 25 GRAM(S): 4; .5 INJECTION, POWDER, LYOPHILIZED, FOR SOLUTION INTRAVENOUS at 05:17

## 2024-01-01 RX ADMIN — HYDROMORPHONE HYDROCHLORIDE 0.5 MILLIGRAM(S): 2 INJECTION INTRAMUSCULAR; INTRAVENOUS; SUBCUTANEOUS at 11:29

## 2024-01-01 RX ADMIN — PIPERACILLIN AND TAZOBACTAM 25 GRAM(S): 4; .5 INJECTION, POWDER, LYOPHILIZED, FOR SOLUTION INTRAVENOUS at 06:28

## 2024-01-01 RX ADMIN — PIPERACILLIN AND TAZOBACTAM 25 GRAM(S): 4; .5 INJECTION, POWDER, LYOPHILIZED, FOR SOLUTION INTRAVENOUS at 05:23

## 2024-01-01 RX ADMIN — AMLODIPINE BESYLATE 5 MILLIGRAM(S): 2.5 TABLET ORAL at 06:28

## 2024-01-01 RX ADMIN — POLYETHYLENE GLYCOL 3350 17 GRAM(S): 17 POWDER, FOR SOLUTION ORAL at 05:30

## 2024-01-01 RX ADMIN — PIPERACILLIN AND TAZOBACTAM 25 GRAM(S): 4; .5 INJECTION, POWDER, LYOPHILIZED, FOR SOLUTION INTRAVENOUS at 21:35

## 2024-01-01 RX ADMIN — PIPERACILLIN AND TAZOBACTAM 25 GRAM(S): 4; .5 INJECTION, POWDER, LYOPHILIZED, FOR SOLUTION INTRAVENOUS at 14:55

## 2024-01-01 RX ADMIN — Medication 3 MILLILITER(S): at 00:23

## 2024-01-01 RX ADMIN — Medication 250 MILLIGRAM(S): at 17:30

## 2024-01-01 RX ADMIN — POLYETHYLENE GLYCOL 3350 17 GRAM(S): 17 POWDER, FOR SOLUTION ORAL at 05:50

## 2024-01-01 RX ADMIN — AMLODIPINE BESYLATE 5 MILLIGRAM(S): 2.5 TABLET ORAL at 08:28

## 2024-01-01 RX ADMIN — CEFEPIME 100 MILLIGRAM(S): 1 INJECTION, POWDER, FOR SOLUTION INTRAMUSCULAR; INTRAVENOUS at 13:22

## 2024-01-01 RX ADMIN — Medication 3 MILLILITER(S): at 08:06

## 2024-01-01 RX ADMIN — Medication 1 PATCH: at 21:00

## 2024-01-01 RX ADMIN — Medication 60 MILLIGRAM(S): at 05:29

## 2024-01-01 RX ADMIN — Medication 1 PUFF(S): at 02:31

## 2024-01-01 RX ADMIN — FENTANYL CITRATE 2.53 MICROGRAM(S)/KG/HR: 50 INJECTION INTRAVENOUS at 10:00

## 2024-01-01 RX ADMIN — AMLODIPINE BESYLATE 5 MILLIGRAM(S): 2.5 TABLET ORAL at 05:18

## 2024-01-01 RX ADMIN — Medication 250 MILLIGRAM(S): at 18:24

## 2024-01-01 RX ADMIN — POLYETHYLENE GLYCOL 3350 17 GRAM(S): 17 POWDER, FOR SOLUTION ORAL at 05:20

## 2024-01-01 RX ADMIN — Medication 3 MILLILITER(S): at 14:49

## 2024-01-01 RX ADMIN — CARBAMIDE PEROXIDE 5 DROP(S): 81.86 SOLUTION/ DROPS AURICULAR (OTIC) at 05:19

## 2024-01-01 RX ADMIN — Medication 3 MILLILITER(S): at 20:06

## 2024-01-01 RX ADMIN — AMLODIPINE BESYLATE 5 MILLIGRAM(S): 2.5 TABLET ORAL at 05:50

## 2024-01-01 RX ADMIN — Medication 20 MILLIEQUIVALENT(S): at 07:04

## 2024-01-01 RX ADMIN — CEFEPIME 100 MILLIGRAM(S): 1 INJECTION, POWDER, FOR SOLUTION INTRAMUSCULAR; INTRAVENOUS at 14:44

## 2024-01-01 RX ADMIN — CHLORHEXIDINE GLUCONATE 1 APPLICATION(S): 213 SOLUTION TOPICAL at 05:32

## 2024-01-01 RX ADMIN — SENNA PLUS 2 TABLET(S): 8.6 TABLET ORAL at 21:19

## 2024-01-01 RX ADMIN — PANTOPRAZOLE SODIUM 40 MILLIGRAM(S): 20 TABLET, DELAYED RELEASE ORAL at 08:31

## 2024-01-01 RX ADMIN — SODIUM CHLORIDE 4 MILLILITER(S): 9 INJECTION INTRAMUSCULAR; INTRAVENOUS; SUBCUTANEOUS at 21:14

## 2024-01-01 RX ADMIN — Medication 1 PATCH: at 06:14

## 2024-01-01 RX ADMIN — SENNA PLUS 2 TABLET(S): 8.6 TABLET ORAL at 21:36

## 2024-01-01 RX ADMIN — Medication 10 MILLIGRAM(S): at 05:35

## 2024-01-01 RX ADMIN — ALBUTEROL 2 PUFF(S): 90 AEROSOL, METERED ORAL at 02:31

## 2024-01-01 RX ADMIN — PIPERACILLIN AND TAZOBACTAM 25 GRAM(S): 4; .5 INJECTION, POWDER, LYOPHILIZED, FOR SOLUTION INTRAVENOUS at 15:47

## 2024-01-01 RX ADMIN — Medication 3 MILLILITER(S): at 11:45

## 2024-01-01 RX ADMIN — Medication 60 MILLIGRAM(S): at 05:17

## 2024-01-01 RX ADMIN — Medication 1 PUFF(S): at 13:18

## 2024-01-01 RX ADMIN — ALBUTEROL 2 PUFF(S): 90 AEROSOL, METERED ORAL at 07:37

## 2024-01-01 RX ADMIN — Medication 40 MILLIGRAM(S): at 05:17

## 2024-01-01 RX ADMIN — CHLORHEXIDINE GLUCONATE 1 APPLICATION(S): 213 SOLUTION TOPICAL at 05:25

## 2024-01-01 RX ADMIN — Medication 3 MILLILITER(S): at 09:12

## 2024-01-01 RX ADMIN — SENNA PLUS 2 TABLET(S): 8.6 TABLET ORAL at 21:38

## 2024-01-01 RX ADMIN — Medication 40 MILLIGRAM(S): at 06:41

## 2024-01-01 RX ADMIN — Medication 3 MILLILITER(S): at 09:45

## 2024-01-01 RX ADMIN — SENNA PLUS 2 TABLET(S): 8.6 TABLET ORAL at 21:14

## 2024-01-01 RX ADMIN — ENOXAPARIN SODIUM 40 MILLIGRAM(S): 100 INJECTION SUBCUTANEOUS at 12:53

## 2024-01-01 RX ADMIN — CHLORHEXIDINE GLUCONATE 15 MILLILITER(S): 213 SOLUTION TOPICAL at 05:18

## 2024-01-01 RX ADMIN — Medication 3 MILLILITER(S): at 23:09

## 2024-01-01 RX ADMIN — Medication 3 MILLILITER(S): at 23:34

## 2024-01-01 RX ADMIN — Medication 60 MILLIGRAM(S): at 14:22

## 2024-01-01 RX ADMIN — Medication 250 MILLIGRAM(S): at 17:26

## 2024-01-01 RX ADMIN — Medication 60 MILLIGRAM(S): at 17:11

## 2024-01-01 RX ADMIN — CEFEPIME 100 MILLIGRAM(S): 1 INJECTION, POWDER, FOR SOLUTION INTRAMUSCULAR; INTRAVENOUS at 22:28

## 2024-01-01 RX ADMIN — ENOXAPARIN SODIUM 40 MILLIGRAM(S): 100 INJECTION SUBCUTANEOUS at 13:26

## 2024-01-01 RX ADMIN — LACTULOSE 10 GRAM(S): 10 SOLUTION ORAL at 11:54

## 2024-01-01 RX ADMIN — Medication 40 MILLIGRAM(S): at 19:15

## 2024-01-01 RX ADMIN — Medication 250 MILLIGRAM(S): at 18:08

## 2024-01-01 RX ADMIN — PIPERACILLIN AND TAZOBACTAM 200 GRAM(S): 4; .5 INJECTION, POWDER, LYOPHILIZED, FOR SOLUTION INTRAVENOUS at 09:10

## 2024-01-01 RX ADMIN — ENOXAPARIN SODIUM 40 MILLIGRAM(S): 100 INJECTION SUBCUTANEOUS at 12:14

## 2024-01-01 RX ADMIN — ENOXAPARIN SODIUM 54 MILLIGRAM(S): 100 INJECTION SUBCUTANEOUS at 13:49

## 2024-01-01 RX ADMIN — Medication 1 PATCH: at 19:55

## 2024-01-01 RX ADMIN — Medication 250 MILLIGRAM(S): at 17:27

## 2024-01-01 RX ADMIN — Medication 1 PUFF(S): at 02:15

## 2024-01-01 RX ADMIN — Medication 1 PATCH: at 12:43

## 2024-01-01 RX ADMIN — SIMETHICONE 80 MILLIGRAM(S): 80 TABLET, CHEWABLE ORAL at 02:43

## 2024-01-01 RX ADMIN — Medication 250 MILLIGRAM(S): at 05:22

## 2024-01-01 RX ADMIN — Medication 250 MILLIGRAM(S): at 06:00

## 2024-01-01 RX ADMIN — ENOXAPARIN SODIUM 40 MILLIGRAM(S): 100 INJECTION SUBCUTANEOUS at 17:24

## 2024-01-01 RX ADMIN — SODIUM ZIRCONIUM CYCLOSILICATE 10 GRAM(S): 10 POWDER, FOR SUSPENSION ORAL at 18:28

## 2024-01-01 RX ADMIN — PANTOPRAZOLE SODIUM 40 MILLIGRAM(S): 20 TABLET, DELAYED RELEASE ORAL at 06:02

## 2024-01-01 RX ADMIN — Medication 1 SPRAY(S): at 05:19

## 2024-01-01 RX ADMIN — CEFTRIAXONE 100 MILLIGRAM(S): 500 INJECTION, POWDER, FOR SOLUTION INTRAMUSCULAR; INTRAVENOUS at 22:52

## 2024-01-01 RX ADMIN — Medication 40 MILLIGRAM(S): at 05:15

## 2024-01-01 RX ADMIN — Medication 3 MILLILITER(S): at 07:29

## 2024-01-01 RX ADMIN — ALBUTEROL 2 PUFF(S): 90 AEROSOL, METERED ORAL at 08:38

## 2024-01-01 RX ADMIN — PANTOPRAZOLE SODIUM 40 MILLIGRAM(S): 20 TABLET, DELAYED RELEASE ORAL at 06:12

## 2024-01-01 RX ADMIN — POLYETHYLENE GLYCOL 3350 17 GRAM(S): 17 POWDER, FOR SOLUTION ORAL at 13:11

## 2024-01-01 RX ADMIN — SODIUM CHLORIDE 4 MILLILITER(S): 9 INJECTION INTRAMUSCULAR; INTRAVENOUS; SUBCUTANEOUS at 19:54

## 2024-01-01 RX ADMIN — Medication 3 MILLILITER(S): at 08:53

## 2024-01-01 RX ADMIN — Medication 40 MILLIGRAM(S): at 05:30

## 2024-01-01 RX ADMIN — ALBUTEROL 2 PUFF(S): 90 AEROSOL, METERED ORAL at 13:18

## 2024-01-01 RX ADMIN — SODIUM ZIRCONIUM CYCLOSILICATE 10 GRAM(S): 10 POWDER, FOR SUSPENSION ORAL at 12:58

## 2024-01-01 RX ADMIN — ENOXAPARIN SODIUM 40 MILLIGRAM(S): 100 INJECTION SUBCUTANEOUS at 12:47

## 2024-01-01 RX ADMIN — Medication 250 MILLIGRAM(S): at 06:03

## 2024-01-01 RX ADMIN — Medication 1 PUFF(S): at 08:43

## 2024-01-01 RX ADMIN — Medication 250 MILLIGRAM(S): at 05:05

## 2024-01-01 RX ADMIN — Medication 1 MILLIGRAM(S): at 15:29

## 2024-01-01 RX ADMIN — CHLORHEXIDINE GLUCONATE 1 APPLICATION(S): 213 SOLUTION TOPICAL at 05:30

## 2024-01-01 RX ADMIN — Medication 1 SPRAY(S): at 17:25

## 2024-01-01 RX ADMIN — Medication 60 MILLIGRAM(S): at 06:00

## 2024-01-01 RX ADMIN — ALBUTEROL 2 PUFF(S): 90 AEROSOL, METERED ORAL at 13:27

## 2024-01-01 RX ADMIN — ENOXAPARIN SODIUM 40 MILLIGRAM(S): 100 INJECTION SUBCUTANEOUS at 13:11

## 2024-01-01 RX ADMIN — ALBUTEROL 2 PUFF(S): 90 AEROSOL, METERED ORAL at 08:43

## 2024-01-01 RX ADMIN — SODIUM CHLORIDE 4 MILLILITER(S): 9 INJECTION INTRAMUSCULAR; INTRAVENOUS; SUBCUTANEOUS at 04:37

## 2024-01-01 RX ADMIN — Medication 20 MILLIGRAM(S): at 05:18

## 2024-01-01 RX ADMIN — Medication 3 MILLILITER(S): at 12:39

## 2024-01-01 RX ADMIN — Medication 3 MILLILITER(S): at 22:11

## 2024-01-01 RX ADMIN — CEFEPIME 100 MILLIGRAM(S): 1 INJECTION, POWDER, FOR SOLUTION INTRAMUSCULAR; INTRAVENOUS at 05:15

## 2024-01-01 RX ADMIN — POLYETHYLENE GLYCOL 3350 17 GRAM(S): 17 POWDER, FOR SOLUTION ORAL at 14:54

## 2024-01-01 RX ADMIN — ALBUTEROL 2 PUFF(S): 90 AEROSOL, METERED ORAL at 19:22

## 2024-01-01 RX ADMIN — Medication 3 MILLILITER(S): at 07:34

## 2024-01-01 RX ADMIN — Medication 100 GRAM(S): at 09:05

## 2024-01-01 RX ADMIN — Medication 3 MILLILITER(S): at 20:02

## 2024-01-01 RX ADMIN — CEFEPIME 100 MILLIGRAM(S): 1 INJECTION, POWDER, FOR SOLUTION INTRAMUSCULAR; INTRAVENOUS at 21:32

## 2024-01-01 RX ADMIN — POLYETHYLENE GLYCOL 3350 17 GRAM(S): 17 POWDER, FOR SOLUTION ORAL at 05:19

## 2024-01-01 RX ADMIN — Medication 60 MILLIGRAM(S): at 06:02

## 2024-01-01 RX ADMIN — SODIUM CHLORIDE 4 MILLILITER(S): 9 INJECTION INTRAMUSCULAR; INTRAVENOUS; SUBCUTANEOUS at 09:45

## 2024-01-01 RX ADMIN — Medication 3 MILLILITER(S): at 19:52

## 2024-01-01 RX ADMIN — Medication 10 MILLIGRAM(S): at 05:34

## 2024-01-01 RX ADMIN — Medication 60 MILLIGRAM(S): at 21:28

## 2024-01-01 RX ADMIN — SENNA PLUS 2 TABLET(S): 8.6 TABLET ORAL at 22:13

## 2024-01-01 RX ADMIN — Medication 250 MILLIGRAM(S): at 05:41

## 2024-01-01 RX ADMIN — Medication 250 MILLIGRAM(S): at 18:55

## 2024-01-01 RX ADMIN — PANTOPRAZOLE SODIUM 40 MILLIGRAM(S): 20 TABLET, DELAYED RELEASE ORAL at 05:05

## 2024-01-01 RX ADMIN — Medication 250 MILLIGRAM(S): at 18:51

## 2024-01-01 RX ADMIN — SODIUM CHLORIDE 4 MILLILITER(S): 9 INJECTION INTRAMUSCULAR; INTRAVENOUS; SUBCUTANEOUS at 14:48

## 2024-01-01 RX ADMIN — Medication 3 MILLILITER(S): at 14:38

## 2024-01-01 RX ADMIN — CHLORHEXIDINE GLUCONATE 1 APPLICATION(S): 213 SOLUTION TOPICAL at 05:38

## 2024-01-01 RX ADMIN — Medication 1 PATCH: at 18:51

## 2024-01-01 RX ADMIN — CHLORHEXIDINE GLUCONATE 1 APPLICATION(S): 213 SOLUTION TOPICAL at 05:27

## 2024-01-01 RX ADMIN — Medication 250 MILLIGRAM(S): at 05:35

## 2024-01-01 RX ADMIN — Medication 1 PUFF(S): at 15:08

## 2024-01-01 RX ADMIN — Medication 3 MILLILITER(S): at 07:27

## 2024-01-01 RX ADMIN — Medication 3 MILLILITER(S): at 20:20

## 2024-01-01 RX ADMIN — Medication 1 PUFF(S): at 19:46

## 2024-01-01 RX ADMIN — Medication 40 MILLIGRAM(S): at 06:00

## 2024-01-01 RX ADMIN — CEFEPIME 100 MILLIGRAM(S): 1 INJECTION, POWDER, FOR SOLUTION INTRAMUSCULAR; INTRAVENOUS at 16:18

## 2024-01-01 RX ADMIN — PANTOPRAZOLE SODIUM 40 MILLIGRAM(S): 20 TABLET, DELAYED RELEASE ORAL at 06:03

## 2024-01-01 RX ADMIN — PANTOPRAZOLE SODIUM 40 MILLIGRAM(S): 20 TABLET, DELAYED RELEASE ORAL at 05:32

## 2024-01-01 RX ADMIN — Medication 3 MILLILITER(S): at 11:57

## 2024-01-01 RX ADMIN — Medication 325 MILLIGRAM(S): at 11:22

## 2024-01-01 RX ADMIN — PANTOPRAZOLE SODIUM 40 MILLIGRAM(S): 20 TABLET, DELAYED RELEASE ORAL at 06:25

## 2024-01-01 RX ADMIN — ENOXAPARIN SODIUM 40 MILLIGRAM(S): 100 INJECTION SUBCUTANEOUS at 12:54

## 2024-01-01 RX ADMIN — Medication 250 MILLIGRAM(S): at 05:30

## 2024-01-01 RX ADMIN — Medication 3 MILLILITER(S): at 19:44

## 2024-01-01 RX ADMIN — PANTOPRAZOLE SODIUM 40 MILLIGRAM(S): 20 TABLET, DELAYED RELEASE ORAL at 05:16

## 2024-01-01 RX ADMIN — POLYETHYLENE GLYCOL 3350 17 GRAM(S): 17 POWDER, FOR SOLUTION ORAL at 18:40

## 2024-01-01 RX ADMIN — CEFEPIME 100 MILLIGRAM(S): 1 INJECTION, POWDER, FOR SOLUTION INTRAMUSCULAR; INTRAVENOUS at 21:30

## 2024-01-01 RX ADMIN — CHLORHEXIDINE GLUCONATE 1 APPLICATION(S): 213 SOLUTION TOPICAL at 06:31

## 2024-01-01 RX ADMIN — Medication 250 MILLIGRAM(S): at 20:18

## 2024-01-01 RX ADMIN — Medication 10 MILLIGRAM(S): at 12:29

## 2024-01-01 RX ADMIN — ALBUTEROL 2 PUFF(S): 90 AEROSOL, METERED ORAL at 19:42

## 2024-01-01 RX ADMIN — Medication 60 MILLIGRAM(S): at 17:08

## 2024-01-01 RX ADMIN — FENTANYL CITRATE 2.53 MICROGRAM(S)/KG/HR: 50 INJECTION INTRAVENOUS at 17:55

## 2024-01-01 RX ADMIN — Medication 1 PATCH: at 12:00

## 2024-01-01 RX ADMIN — Medication 3 MILLILITER(S): at 19:55

## 2024-01-01 RX ADMIN — Medication 0.5 MILLIGRAM(S): at 16:52

## 2024-01-01 RX ADMIN — PROPOFOL 3.03 MICROGRAM(S)/KG/MIN: 10 INJECTION, EMULSION INTRAVENOUS at 09:25

## 2024-01-01 RX ADMIN — PIPERACILLIN AND TAZOBACTAM 25 GRAM(S): 4; .5 INJECTION, POWDER, LYOPHILIZED, FOR SOLUTION INTRAVENOUS at 21:34

## 2024-01-01 RX ADMIN — POLYETHYLENE GLYCOL 3350 17 GRAM(S): 17 POWDER, FOR SOLUTION ORAL at 05:40

## 2024-01-01 RX ADMIN — CHLORHEXIDINE GLUCONATE 1 APPLICATION(S): 213 SOLUTION TOPICAL at 06:03

## 2024-01-01 RX ADMIN — ALBUTEROL 2 PUFF(S): 90 AEROSOL, METERED ORAL at 07:32

## 2024-01-01 RX ADMIN — CHLORHEXIDINE GLUCONATE 1 APPLICATION(S): 213 SOLUTION TOPICAL at 05:20

## 2024-01-01 RX ADMIN — ALBUTEROL 2 PUFF(S): 90 AEROSOL, METERED ORAL at 20:59

## 2024-01-01 RX ADMIN — Medication 1 SPRAY(S): at 06:28

## 2024-01-01 RX ADMIN — Medication 325 MILLIGRAM(S): at 12:14

## 2024-01-01 RX ADMIN — Medication 40 MILLIGRAM(S): at 05:18

## 2024-01-01 RX ADMIN — CHLORHEXIDINE GLUCONATE 1 APPLICATION(S): 213 SOLUTION TOPICAL at 05:58

## 2024-01-01 RX ADMIN — Medication 250 MILLIGRAM(S): at 17:12

## 2024-01-01 RX ADMIN — PIPERACILLIN AND TAZOBACTAM 25 GRAM(S): 4; .5 INJECTION, POWDER, LYOPHILIZED, FOR SOLUTION INTRAVENOUS at 05:32

## 2024-01-01 RX ADMIN — Medication 250 MILLIGRAM(S): at 17:53

## 2024-01-01 RX ADMIN — Medication 250 MILLIGRAM(S): at 17:21

## 2024-01-01 RX ADMIN — HYDROMORPHONE HYDROCHLORIDE 0.5 MILLIGRAM(S): 2 INJECTION INTRAMUSCULAR; INTRAVENOUS; SUBCUTANEOUS at 10:41

## 2024-01-01 RX ADMIN — SODIUM CHLORIDE 4 MILLILITER(S): 9 INJECTION INTRAMUSCULAR; INTRAVENOUS; SUBCUTANEOUS at 08:12

## 2024-01-01 RX ADMIN — Medication 20 MILLIGRAM(S): at 05:05

## 2024-01-01 RX ADMIN — ENOXAPARIN SODIUM 40 MILLIGRAM(S): 100 INJECTION SUBCUTANEOUS at 08:41

## 2024-01-01 RX ADMIN — CARBAMIDE PEROXIDE 5 DROP(S): 81.86 SOLUTION/ DROPS AURICULAR (OTIC) at 05:23

## 2024-01-01 RX ADMIN — SENNA PLUS 2 TABLET(S): 8.6 TABLET ORAL at 21:32

## 2024-01-01 RX ADMIN — Medication 3 MILLILITER(S): at 14:35

## 2024-01-01 RX ADMIN — AMLODIPINE BESYLATE 5 MILLIGRAM(S): 2.5 TABLET ORAL at 05:21

## 2024-01-01 RX ADMIN — Medication 3 MILLILITER(S): at 16:48

## 2024-01-01 RX ADMIN — LACTULOSE 10 GRAM(S): 10 SOLUTION ORAL at 15:47

## 2024-01-01 RX ADMIN — Medication 150 GRAM(S): at 19:30

## 2024-01-01 RX ADMIN — PIPERACILLIN AND TAZOBACTAM 25 GRAM(S): 4; .5 INJECTION, POWDER, LYOPHILIZED, FOR SOLUTION INTRAVENOUS at 22:13

## 2024-01-01 RX ADMIN — Medication 3 MILLILITER(S): at 08:01

## 2024-01-01 RX ADMIN — Medication 3 MILLILITER(S): at 01:31

## 2024-01-01 RX ADMIN — CHLORHEXIDINE GLUCONATE 1 APPLICATION(S): 213 SOLUTION TOPICAL at 06:19

## 2024-01-01 RX ADMIN — FENTANYL CITRATE 2.53 MICROGRAM(S)/KG/HR: 50 INJECTION INTRAVENOUS at 03:57

## 2024-01-01 RX ADMIN — AMLODIPINE BESYLATE 5 MILLIGRAM(S): 2.5 TABLET ORAL at 05:29

## 2024-01-01 RX ADMIN — Medication 250 MILLIGRAM(S): at 05:32

## 2024-01-01 RX ADMIN — ENOXAPARIN SODIUM 54 MILLIGRAM(S): 100 INJECTION SUBCUTANEOUS at 05:19

## 2024-01-01 RX ADMIN — Medication 250 MILLIGRAM(S): at 17:35

## 2024-01-01 RX ADMIN — CEFTRIAXONE 100 MILLIGRAM(S): 500 INJECTION, POWDER, FOR SOLUTION INTRAMUSCULAR; INTRAVENOUS at 21:05

## 2024-01-01 RX ADMIN — CHLORHEXIDINE GLUCONATE 1 APPLICATION(S): 213 SOLUTION TOPICAL at 05:16

## 2024-01-01 RX ADMIN — SENNA PLUS 2 TABLET(S): 8.6 TABLET ORAL at 21:20

## 2024-01-01 RX ADMIN — Medication 250 MILLIGRAM(S): at 06:28

## 2024-01-01 RX ADMIN — POLYETHYLENE GLYCOL 3350 17 GRAM(S): 17 POWDER, FOR SOLUTION ORAL at 05:35

## 2024-01-01 RX ADMIN — PIPERACILLIN AND TAZOBACTAM 25 GRAM(S): 4; .5 INJECTION, POWDER, LYOPHILIZED, FOR SOLUTION INTRAVENOUS at 21:25

## 2024-01-01 RX ADMIN — Medication 250 MILLIGRAM(S): at 05:26

## 2024-01-01 RX ADMIN — ENOXAPARIN SODIUM 40 MILLIGRAM(S): 100 INJECTION SUBCUTANEOUS at 13:13

## 2024-01-01 RX ADMIN — CHLORHEXIDINE GLUCONATE 1 APPLICATION(S): 213 SOLUTION TOPICAL at 05:22

## 2024-05-06 NOTE — ED PROVIDER NOTE - NSICDXPASTMEDICALHX_GEN_ALL_CORE_FT
PAST MEDICAL HISTORY:  Chronic obstructive pulmonary disease, unspecified COPD type     Hypertension

## 2024-05-06 NOTE — ED PROVIDER NOTE - CLINICAL SUMMARY MEDICAL DECISION MAKING FREE TEXT BOX
Patient presented with acute onset of cough and dyspnea and wheezing x 1 week.  Patient with history of COPD and states that this feels the same as prior.  No baseline O2 requirement.  Per EMS, patient was found to be saturating in the 70s and tachypneic which improved with CPAP in the field.  On arrival to ED, patient in acute respiratory distress requiring my immediate attention.  Seen on arrival at which point wheezing was noted bilaterally and therefore patient placed on BiPAP with significant improvement of respiratory status.  Patient was also given nebulizers after the BiPAP and IV steroids.  EKG obtained and nonspecific, but no evidence of STEMI.  Labs obtained and remarkable for profound respiratory acidosis which improved on subsequent VBG as well as an elevated troponin 26 and a proBNP of around 2000.  No leukocytosis or any other significant abnormalities.  Chest x-ray showed a left pleural effusion versus possible opacity which is also likely contributing to patient's hypoxic respiratory failure.  Patient remained stable on BiPAP and status posttreatment, however given the above, patient will require admission to ICU for further management.  Patient agreeable with plan.  Hemodynamically stable at time of admission.

## 2024-05-06 NOTE — ED PROVIDER NOTE - INPATIENT RESIDENT/ACP NOTIFIED
Previous colostomy revision. Dr Maya Lobo in to see patient referred patient back to his surgeon in [place] for a recheck of the area. Detail Level: [facility] [place]

## 2024-05-06 NOTE — H&P ADULT - HISTORY OF PRESENT ILLNESS
73-year-old female with past medical history of COPD not on home oxygen, chronic smoker >60ppy, hypertension who presents for shortness of breath x few weeks.  Pt reports worsening productive cough and shortness of breath with wheezing for the past 1 week.  Increased work of breathing today, was satting 70% on room air.  Was placed on nonrebreather by EMS.  As per son at the bedside pt was confused in the morning, wasn't aware of the surrounding.  She also admits to hemoptysis  along with weight loss over the past few months.  Admits to fever and chills, and denies chest pain, palpitations, nausea, vomiting, diarrhea, abdominal pain, urinary symptoms, weakness, numbness, falls, recent travel, recent surgeries.  Not on anticoagulation.  Denies substance use and alcohol use.    In ED  VT bp 143/80, , RR 28, T 99.4 F, SpO2 98% on NRB 15 L  Labs showed Lac 2.3, trop 26, BNP 2044, VBG  pH 7.18 PCo2 102 HCO3 38, O2 sat 46%    s/p IV solumedrol, IV Mag, Nebs and Albuterol in ED.  Pt was placed on BIPAP and admitted to MICU for further managements.

## 2024-05-06 NOTE — ED PROVIDER NOTE - CARE PLAN
Principal Discharge DX:	COPD exacerbation  Secondary Diagnosis:	Acute hypoxic respiratory failure  Secondary Diagnosis:	Pleural effusion   1

## 2024-05-06 NOTE — ED PROVIDER NOTE - OBJECTIVE STATEMENT
73-year-old female with past medical history of COPD not on home oxygen, 60 years tobacco use, hypertension who presents for shortness of breath x 1 week.  Reports worsening productive cough and shortness of breath with wheezing for the past 1 week.  Increased work of breathing today, was satting 70% on room air.  Was placed on nonrebreather by EMS.  Family at bedside, patient has been confused all morning.  Did not recall that her dog ran away and did not know what was happening.  He states she has had bouts of hemoptysis over the past few months, along with 20 pound weight loss.  Patient states she noticed left ankle swelling earlier this week.  Denies fevers, chills, chest pain, palpitations, nausea, vomiting, diarrhea, abdominal pain, urinary symptoms, weakness, numbness, falls, recent travel, recent surgeries.  Not on anticoagulation.  Denies substance use and alcohol use.

## 2024-05-06 NOTE — ED PROVIDER NOTE - CONSIDERATION OF ADMISSION OBSERVATION
Consideration of Admission/Observation Patient with acute respiratory failure 2/2 COPD exacerbation requiring bipap - will require admission

## 2024-05-06 NOTE — H&P ADULT - NSHPPHYSICALEXAM_GEN_ALL_CORE
GENERAL: NAD, Resting in bed  HEENT: NCAT  CHEST/LUNG: decreased BS b/l, +wheezing  HEART: Regular rate and rhythm; No murmurs, rubs, or gallops  ABDOMEN: Bowel sounds present; Soft, Nontender, Nondistended.   EXTREMITIES:  No clubbing, cyanosis, or edema  NERVOUS SYSTEM:  Alert & Oriented X3  MSK: FROM all 4 extremities, full and equal strength  SKIN: No rashes or lesions

## 2024-05-06 NOTE — ED PROVIDER NOTE - PHYSICAL EXAMINATION
VITAL SIGNS: I have reviewed nursing notes and confirm.  CONSTITUTIONAL: ill-appearing, non-toxic, tachypneic  SKIN: Warm dry, normal skin turgor, no acute rash, no bruising  HEAD: NCAT  EYES: EOMI, PERRLA, no scleral icterus, normal conjunctiva  ENT: Dry mucous membranes, OR clear. Normal pharynx.  NECK: Supple; non tender. Full ROM. No cervical LAD  CARD: tachycardic, RR, no murmurs, rubs or gallops  RESP: Tachypneic, decreased lung movement bilaterally with end expiratory wheezing present.   ABD: soft, + BS, non-tender, non-distended, no rebound or guarding. No CVA tenderness  EXT: Full ROM, no bony tenderness, no pedal edema, no calf tenderness  NEURO: normal motor. normal sensory.   PSYCH: Cooperative, appropriate. AxOx3.

## 2024-05-06 NOTE — H&P ADULT - NSHPLABSRESULTS_GEN_ALL_CORE
11.5   9.95  )-----------( 287      ( 06 May 2024 19:05 )             39.3       05-06    140  |  99  |  19  ----------------------------<  136<H>  5.0   |  33<H>  |  0.6<L>    Ca    8.2<L>      06 May 2024 19:05    TPro  6.1  /  Alb  3.6  /  TBili  <0.2  /  DBili  x   /  AST  18  /  ALT  25  /  AlkPhos  95  05-06          ABG - ( 06 May 2024 21:39 )  pH, Arterial: 7.23  pH, Blood: x     /  pCO2: 89    /  pO2: 80    / HCO3: 37    / Base Excess: 6.9   /  SaO2: 95.0                Urinalysis Basic - ( 06 May 2024 19:05 )    Color: x / Appearance: x / SG: x / pH: x  Gluc: 136 mg/dL / Ketone: x  / Bili: x / Urobili: x   Blood: x / Protein: x / Nitrite: x   Leuk Esterase: x / RBC: x / WBC x   Sq Epi: x / Non Sq Epi: x / Bacteria: x            Lactate Trend  05-06 @ 19:05 Lactate:2.3             CAPILLARY BLOOD GLUCOSE

## 2024-05-06 NOTE — H&P ADULT - ASSESSMENT
IMPRESSION:    Acute on Chronic Hypercapnic and Hypoxemic Respiratory Failure   Toxic Metabolic Encephalopathy likely secondary to CO2 Narcosis-Improved  Non-life threatening Hemoptysis  Suspected Left Lung Malignancy  COPD in active exacerbation  Active Smoker, 60 pack years  HO HTN       PLAN:    CNS: avoid sedation    HEENT: Oral care    PULMONARY:  HOB @ 45 degrees.  Aspiration precautions. continue BIPAP for now repeat ABG in an hour, Nebs Q6H and PRN, CT Chest with IV contrast, Solumedrol 60mg BID    CARDIOVASCULAR: pro-BNP noted, 2D-Echo, repeat CE    GI: GI prophylaxis.  NPO for now .  Bowel regimen     RENAL:  Follow up lytes.  Correct as needed    INFECTIOUS DISEASE: Follow up cultures, Ceftraxone and Levaquin for now, check procalcitonin, full RVP, urine legionella and strep Ag    HEMATOLOGICAL:  DVT prophylaxis. maintain active type and screen, check D-dimer    ENDOCRINE:  Follow up FS.  Insulin protocol if needed.    MUSCULOSKELETAL: bedrest for now      FULL CODE-  Discussed with patient and daughter different GoC options and their impact on QoL and duration of life. Discussed possible need for central line, CPR, and intubation. Discussed treatments' impact on QoL and life duration. Patient wished to continue to be FULL Code.     MICU monitoring    MED rec completed as per patient at the bedside.

## 2024-05-06 NOTE — CHART NOTE - NSCHARTNOTEFT_GEN_A_CORE
IMPRESSION:    Acute on Chronic Hypercapnic and Hypoxemic Respiratory Failure   Toxic Metabolic Encephalopathy likely secondary to CO2 Narcosis   Non-life threatening Hemoptysis  Suspected Left Lung Malignancy  COPD in active exacerbation  Active Smoker, 60 pack years  HO HTN       PLAN:    CNS: avoid sedation    HEENT: Oral care    PULMONARY:  HOB @ 45 degrees.  Aspiration precautions. continue BIPAP for now repeat ABG in an hour, Nebs Q6H and PRN, CT Chest with IV contrast     CARDIOVASCULAR: pro-BNP noted, 2D-Echo, repeat CE    GI: GI prophylaxis.  NPO for now .  Bowel regimen     RENAL:  Follow up lytes.  Correct as needed    INFECTIOUS DISEASE: Follow up cultures, Ceftraxone and Levaquin for now, check procalcitonin, full RVP, urine legionella and strep Ag    HEMATOLOGICAL:  DVT prophylaxis. maintain active type and screen, check D-dimer    ENDOCRINE:  Follow up FS.  Insulin protocol if needed.    MUSCULOSKELETAL: bedrest for now      Goals of care    MICU monitoring IMPRESSION:    Acute on Chronic Hypercapnic and Hypoxemic Respiratory Failure   Toxic Metabolic Encephalopathy likely secondary to CO2 Narcosis   Non-life threatening Hemoptysis  Suspected Left Lung Malignancy  COPD in active exacerbation  Active Smoker, 60 pack years  HO HTN       PLAN:    CNS: avoid sedation    HEENT: Oral care    PULMONARY:  HOB @ 45 degrees.  Aspiration precautions. continue BIPAP for now repeat ABG in an hour, Nebs Q6H and PRN, CT Chest with IV contrast, Solumedrol 60mg BID    CARDIOVASCULAR: pro-BNP noted, 2D-Echo, repeat CE    GI: GI prophylaxis.  NPO for now .  Bowel regimen     RENAL:  Follow up lytes.  Correct as needed    INFECTIOUS DISEASE: Follow up cultures, Ceftraxone and Levaquin for now, check procalcitonin, full RVP, urine legionella and strep Ag    HEMATOLOGICAL:  DVT prophylaxis. maintain active type and screen, check D-dimer    ENDOCRINE:  Follow up FS.  Insulin protocol if needed.    MUSCULOSKELETAL: bedrest for now      Goals of care    MICU monitoring

## 2024-05-07 NOTE — PROGRESS NOTE ADULT - SUBJECTIVE AND OBJECTIVE BOX
24H events:    Patient is a 73y old Female who presents with a chief complaint of COPD exacerbation (07 May 2024 07:41)    Primary diagnosis of COPD exacerbation      Today is hospital day 1d. This morning patient was seen and examined at bedside, resting comfortably in bed.    No acute or major events overnight.    Code Status: Full Code    PAST MEDICAL & SURGICAL HISTORY  COPD, HTN, CHronic Smoker    SOCIAL HISTORY:  Social History: Chronic Smoker    ALLERGIES:  No Known Allergies    MEDICATIONS:  STANDING MEDICATIONS  albuterol/ipratropium for Nebulization 3 milliLiter(s) Nebulizer every 4 hours  amLODIPine   Tablet 5 milliGRAM(s) Oral daily  cefTRIAXone   IVPB 1000 milliGRAM(s) IV Intermittent every 24 hours  chlorhexidine 2% Cloths 1 Application(s) Topical <User Schedule>  enoxaparin Injectable 40 milliGRAM(s) SubCutaneous every 24 hours  levoFLOXacin IVPB      levoFLOXacin IVPB 750 milliGRAM(s) IV Intermittent every 24 hours  levoFLOXacin IVPB 750 milliGRAM(s) IV Intermittent once  methylPREDNISolone sodium succinate Injectable 60 milliGRAM(s) IV Push every 8 hours  pantoprazole  Injectable 40 milliGRAM(s) IV Push every 24 hours    PRN MEDICATIONS  albuterol    90 MICROgram(s) HFA Inhaler 2 Puff(s) Inhalation every 6 hours PRN      ROS:   no headaches, no dizziness, no fevers, no chills, no CP/SOB, no abdominal pain, no n/v/d, no hematochezia, no burning with urination, no hematuria, no weakness or tingling    I&O:  I&O's Summary    06 May 2024 07:01  -  07 May 2024 07:00  --------------------------------------------------------  IN: 0 mL / OUT: 0 mL / NET: 0 mL    VITALS:   ICU Vital Signs Last 24 Hrs  T(C): 37 (07 May 2024 08:00), Max: 37.4 (06 May 2024 18:32)  T(F): 98.6 (07 May 2024 08:00), Max: 99.4 (06 May 2024 18:32)  HR: 89 (07 May 2024 08:00) (77 - 103)  BP: 134/78 (07 May 2024 08:00) (123/74 - 143/80)  BP(mean): 102 (07 May 2024 08:00) (93 - 102)  ABP: --  ABP(mean): --  RR: 40 (07 May 2024 08:00) (20 - 40)  SpO2: 94% (07 May 2024 08:00) (88% - 98%)    O2 Parameters below as of 07 May 2024 08:00  Patient On (Oxygen Delivery Method): nasal cannula  O2 Flow (L/min): 5    PHYSICAL EXAM:  GENERAL: lying in bed comfortably  HEAD: normal  HEART: RRR  LUNGS: CTA b/l, slight wheeze  ABDOMEN: soft nontender nondistended  EXTREMITIES: 1+ edema  NERVOUS SYSTEM:  A&OX3    Lines: none    LABS:                        11.0   9.19  )-----------( 247      ( 07 May 2024 04:19 )             37.5     05-07    134<L>  |  95<L>  |  20  ----------------------------<  109<H>  5.0   |  32  |  0.5<L>    Ca    8.4      07 May 2024 04:19  Mg     2.5     05-07    TPro  6.1  /  Alb  3.6  /  TBili  <0.2  /  DBili  x   /  AST  18  /  ALT  25  /  AlkPhos  95  05-06      Urinalysis Basic - ( 07 May 2024 04:19 )    Color: x / Appearance: x / SG: x / pH: x  Gluc: 109 mg/dL / Ketone: x  / Bili: x / Urobili: x   Blood: x / Protein: x / Nitrite: x   Leuk Esterase: x / RBC: x / WBC x   Sq Epi: x / Non Sq Epi: x / Bacteria: x      ABG - ( 07 May 2024 07:40 )  pH, Arterial: 7.34  pH, Blood: x     /  pCO2: 70    /  pO2: 66    / HCO3: 38    / Base Excess: 9.2   /  SaO2: 95.3              Lactate, Blood: 2.3 mmol/L *H* (05-06-24 @ 19:05)          RADIOLOGY:    < from: CT Angio Chest PE Protocol w/ IV Cont (05.07.24 @ 00:27) >  IMPRESSION:    No pulmonary embolism.    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 Findings highly suspicious for malignancy. Further   oncological care is recommended.    Likely superimposed consolidative and groundglass opacities in the left   lung is well probably reflecting superimposed pneumonia. Correlate with   clinical symptoms.    Approximately 2 cm right lower lobe spiculated nodule concerning for   malignancy as well.    Additional 8 mm solid nodule in the right apex. Indeterminate   approximately 4 cm groundglass density in the right upper lobe. Findings   are indeterminate however neoplastic processes on the differential.   Attention on follow-up is recommended.    Partially imaged 1.6 cm right hepatic lobe hypodense lesion may reflect a   cyst.    < end of copied text >

## 2024-05-07 NOTE — PROGRESS NOTE ADULT - ASSESSMENT
72 y/o female with PMHx COPD, HTN, Chronic Smoker P/W SOB x weeks, cough, wheezing, O2 sat 70% at home placed on NRB by EMS, confused. Admitted to MICU.     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 Solumedrol 60mg Q8H. Duoneb Q4/PRN. BiPAP 2 on 2 off and QHS. RVP negative. Send Ur Strep/Legionella & Nasal MRSA. Procal. TTE. Pt will require mass biopsy, likely bronchoscopic, for further management once stable.  - Pending: TTE, Procal, MRSA, Strep/Legionella  - C/w Ceftriaxone and Levaquin  - C/w Solumedrol 60mg Q8H, Duoneb Q4/PRN, BiPAP 2/2 & QHS    #HTN  - C/w Amlodipine 5mg QD    #MISC  - DVT: Lovenox  - GI: Protonix  - Diet: DASH when off BiPAP

## 2024-05-07 NOTE — PATIENT PROFILE ADULT - FALL HARM RISK - HARM RISK INTERVENTIONS

## 2024-05-08 NOTE — CHART NOTE - NSCHARTNOTEFT_GEN_A_CORE
TRANSFER NOTE    Transfer From: CCU  Transfer To: SDU    HPI:   72 y/o female PMHx COPD, HTN, CHronic Smoker P/W SOB x weeks, cough, wheezing, O2 sat 70% at home placed on NRB by EMS, confused.     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 Solumedrol 60mg Q8H. Duoneb Q4/PRN. BiPAP 2 on 2 off and QHS. RVP negative. Send Ur Strep/Legionella & Nasal MRSA. Procal. TTE. Pt will require mass biopsy, likely bronchoscopic, for further management once stable.  - 5/8: TTE with EF 70-75%, Mild MR, Trivial pericardial effusion. Procal 0.09. Pt refused BiPAP overnight. AM ABG with pH 7.25, CO2 88. Plan to continue ABx, BiPAP 4/4 & QHS. Stable for DG to SDU. Spoke with family, pt has been having fevers, weight loss and hemoptysis for ~1y but refused to visit doctor. They are aware of masses in lungs and ultimate need for biopsy if patient is agreeable.    Pending:   - MRA, Strep, Legionella  - Monitor Resp Status  - Will need further discussion regarding biopsy once stable      Subjective/ROS: Denies HA, CP, SOB, n/v, changes in bowel/urinary habits.  12pt ROS otherwise negative.    VITALS  Vital Signs Last 24 Hrs  T(C): 36.6 (08 May 2024 08:00), Max: 37.8 (07 May 2024 16:00)  T(F): 97.9 (08 May 2024 08:00), Max: 100 (07 May 2024 16:00)  HR: 84 (08 May 2024 08:00) (81 - 102)  BP: 120/76 (08 May 2024 08:00) (101/59 - 141/61)  BP(mean): 93 (08 May 2024 08:00) (74 - 95)  RR: 20 (08 May 2024 08:00) (19 - 51)  SpO2: 96% (08 May 2024 08:00) (86% - 97%)    Parameters below as of 08 May 2024 08:00  Patient On (Oxygen Delivery Method): BiPAP/CPAP        CAPILLARY BLOOD GLUCOSE    PHYSICAL EXAM  GENERAL: lying in bed comfortably  HEAD: normal  HEART: RRR  LUNGS: CTA b/l, slight wheeze  ABDOMEN: soft nontender nondistended  EXTREMITIES: 1+ edema  NERVOUS SYSTEM:  A&OX3    MEDICATIONS  (STANDING):  albuterol/ipratropium for Nebulization 3 milliLiter(s) Nebulizer every 4 hours  amLODIPine   Tablet 5 milliGRAM(s) Oral daily  cefTRIAXone   IVPB 1000 milliGRAM(s) IV Intermittent every 24 hours  chlorhexidine 2% Cloths 1 Application(s) Topical <User Schedule>  enoxaparin Injectable 40 milliGRAM(s) SubCutaneous every 24 hours  levoFLOXacin IVPB 750 milliGRAM(s) IV Intermittent every 24 hours  levoFLOXacin IVPB 750 milliGRAM(s) IV Intermittent once  levoFLOXacin IVPB      methylPREDNISolone sodium succinate Injectable 60 milliGRAM(s) IV Push every 8 hours  pantoprazole  Injectable 40 milliGRAM(s) IV Push every 24 hours    MEDICATIONS  (PRN):  albuterol    90 MICROgram(s) HFA Inhaler 2 Puff(s) Inhalation every 6 hours PRN for shortness of breath and/or wheezing      No Known Allergies      LABS                        10.2   17.26 )-----------( 244      ( 08 May 2024 04:30 )             33.9     05-08    138  |  99  |  27<H>  ----------------------------<  151<H>  5.1<H>   |  35<H>  |  0.5<L>    Ca    8.7      08 May 2024 04:30  Mg     2.2     05-08    TPro  5.6<L>  /  Alb  3.5  /  TBili  <0.2  /  DBili  x   /  AST  9   /  ALT  18  /  AlkPhos  80  05-08      Urinalysis Basic - ( 08 May 2024 04:30 )    Color: x / Appearance: x / SG: x / pH: x  Gluc: 151 mg/dL / Ketone: x  / Bili: x / Urobili: x   Blood: x / Protein: x / Nitrite: x   Leuk Esterase: x / RBC: x / WBC x   Sq Epi: x / Non Sq Epi: x / Bacteria: x      ASSESSMENT AND PLAN:    72 y/o female with PMHx COPD, HTN, Chronic Smoker P/W SOB x weeks, cough, wheezing, O2 sat 70% at home placed on NRB by EMS, confused. Admitted to MICU.     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 Solumedrol 60mg Q8H. Duoneb Q4/PRN. BiPAP 2 on 2 off and QHS. RVP negative. Send Ur Strep/Legionella & Nasal MRSA. Procal. TTE. Pt will require mass biopsy, likely bronchoscopic, for further management once stable.  - 5/8: TTE with EF 70-75%, Mild MR, Trivial pericardial effusion. Procal 0.09. Pt refused BiPAP overnight. AM ABG with pH 7.25, CO2 88. Plan to continue ABx, BiPAP 4/4 & QHS. Stable for DG to SDU. Spoke with family, pt has been having fevers, weight loss and hemoptysis for ~1y but refused to visit doctor. They are aware of masses in lungs and ultimate need for biopsy if patient is agreeable.  - Pending: Procal, MRSA, Strep/Legionella  - C/w Ceftriaxone and Levaquin  - C/w Solumedrol 60mg Q8H, Duoneb Q4/PRN, BiPAP 2/2 & QHS    #HTN  - C/w Amlodipine 5mg QD    #MISC  - DVT: Lovenox  - GI: Protonix  - Diet: DASH when off BiPAP

## 2024-05-08 NOTE — PROGRESS NOTE ADULT - SUBJECTIVE AND OBJECTIVE BOX
Patient is a 73y old  Female who presents with a chief complaint of COPD exacerbation (07 May 2024 09:06)      Over Night Events:  Patient seen and examined.   refused NIV over night   co2 increase now agree for NIV     ROS:  See HPI    PHYSICAL EXAM    ICU Vital Signs Last 24 Hrs  T(C): 37.2 (08 May 2024 04:00), Max: 37.8 (07 May 2024 16:00)  T(F): 99 (08 May 2024 04:00), Max: 100 (07 May 2024 16:00)  HR: 91 (08 May 2024 06:15) (81 - 102)  BP: 126/70 (08 May 2024 07:00) (101/59 - 141/61)  BP(mean): 93 (08 May 2024 07:00) (74 - 102)  ABP: --  ABP(mean): --  RR: 20 (08 May 2024 07:00) (19 - 51)  SpO2: 93% (08 May 2024 07:00) (86% - 97%)    O2 Parameters below as of 08 May 2024 07:00  Patient On (Oxygen Delivery Method): nasal cannula  O2 Flow (L/min): 4          General:awake   HEENT:    decrease left lower             Lymph Nodes: NO cervical LN   Lungs: Bilateral BS  Cardiovascular: Regular   Abdomen: Soft, Positive BS  Extremities: No clubbing   Skin: warm   Neurological: no focal   Musculoskeletal: move all ext     I&O's Detail    07 May 2024 07:01  -  08 May 2024 07:00  --------------------------------------------------------  IN:    IV PiggyBack: 200 mL    Oral Fluid: 510 mL  Total IN: 710 mL    OUT:    Voided (mL): 750 mL  Total OUT: 750 mL    Total NET: -40 mL          LABS:                          10.2   17.26 )-----------( 244      ( 08 May 2024 04:30 )             33.9         08 May 2024 04:30    138    |  99     |  27     ----------------------------<  151    5.1     |  35     |  0.5      Ca    8.7        08 May 2024 04:30  Mg     2.2       08 May 2024 04:30    TPro  5.6    /  Alb  3.5    /  TBili  <0.2   /  DBili  x      /  AST  9      /  ALT  18     /  AlkPhos  80     08 May 2024 04:30  Amylase x     lipase x                                                                                        Urinalysis Basic - ( 08 May 2024 04:30 )    Color: x / Appearance: x / SG: x / pH: x  Gluc: 151 mg/dL / Ketone: x  / Bili: x / Urobili: x   Blood: x / Protein: x / Nitrite: x   Leuk Esterase: x / RBC: x / WBC x   Sq Epi: x / Non Sq Epi: x / Bacteria: x        Lactate, Blood: 2.3 mmol/L (05-06-24 @ 19:05)                                                                                                                                           ABG - ( 08 May 2024 04:30 )  pH, Arterial: 7.25  pH, Blood: x     /  pCO2: 88    /  pO2: 83    / HCO3: 39    / Base Excess: 8.5   /  SaO2: 96.2                MEDICATIONS  (STANDING):  albuterol/ipratropium for Nebulization 3 milliLiter(s) Nebulizer every 4 hours  amLODIPine   Tablet 5 milliGRAM(s) Oral daily  cefTRIAXone   IVPB 1000 milliGRAM(s) IV Intermittent every 24 hours  chlorhexidine 2% Cloths 1 Application(s) Topical <User Schedule>  enoxaparin Injectable 40 milliGRAM(s) SubCutaneous every 24 hours  levoFLOXacin IVPB 750 milliGRAM(s) IV Intermittent once  levoFLOXacin IVPB 750 milliGRAM(s) IV Intermittent every 24 hours  levoFLOXacin IVPB      methylPREDNISolone sodium succinate Injectable 60 milliGRAM(s) IV Push every 8 hours  pantoprazole  Injectable 40 milliGRAM(s) IV Push every 24 hours    MEDICATIONS  (PRN):  albuterol    90 MICROgram(s) HFA Inhaler 2 Puff(s) Inhalation every 6 hours PRN for shortness of breath and/or wheezing          Xrays:  TLC:  OG:  ET tube:                                                                                    left lower opacity    ECHO:  [facility] ICU:

## 2024-05-08 NOTE — PROGRESS NOTE ADULT - ASSESSMENT
IMPRESSION:  acute resp failure hypercapnia and hypoxia   copd exacerbation   lung mass r/o malignancy   possible postobstructive PNA       PLAN:    CNS: no sedation     HEENT: oral care     PULMONARY: keep pox > 92%   NIv 4 hrs on and 4  hrs   at night and as needed if patient agree   albuterol Q4 hrs and prn   solumedrol 60 mg Q 8 hrs   will need biopsy / bronch when stable   bed side US has left effusion   patient refusing to hear or get any information from doctor   want us to talk to the family   refusing NIV most of the time   will discus with family regarding the plan for thoracentesis or bronch if they agree and patient agree   as per now the patient refusing to listen scruggs snot want to know about her diagnosis and the images finding     CARDIOVASCULAR: echo   keep is = os     GI: GI prophylaxis.  Feeding speech and swallow     RENAL: follow lytes K     INFECTIOUS DISEASE: continue abx   nasal mrsa   procal   urine legionella strept    HEMATOLOGICAL:  DVT prophylaxis.    ENDOCRINE:  Follow up FS.  Insulin protocol if needed.  downgrade to SDU    patient awake follow command not on distress       CRITICAL CARE TIME SPENT: ***

## 2024-05-09 NOTE — PROGRESS NOTE ADULT - SUBJECTIVE AND OBJECTIVE BOX
72 y/o female PMHx COPD, HTN, CHronic Smoker P/W SOB x weeks, cough, wheezing, O2 sat 70% at home placed on NRB by EMS, confused.  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 Solumedrol 60mg Q8H. Duoneb Q4/PRN. BiPAP 2 on 2 off and QHS. RVP negative. Send Ur Strep/Legionella & Nasal MRSA. Procal. TTE. Pt will require mass biopsy, likely bronchoscopic, for further management once stable.  5/8: TTE with EF 70-75%, Mild MR, Trivial pericardial effusion. Procal 0.09. Pt refused BiPAP overnight. AM ABG with pH 7.25, CO2 88. Plan to continue ABx, BiPAP 4/4 & QHS. Stable for DG to SDU. Spoke with family, pt has been having fevers, weight loss and hemoptysis for ~1y but refused to visit doctor. They are aware of masses in lungs and ultimate need for biopsy if patient is agreeable.  Pt was downgraded to SDU, on 5/9 underwent diagnostic thoracentesis at the bedside, will follow up cytology.   Today pt is very anxious, family at the bedside, she c/w significant weight loss and hemoptysis ( one and off for a year), actively smoking.       Vital Signs Last 24 Hrs  T(C): 36.8 (09 May 2024 16:00), Max: 37.7 (08 May 2024 20:00)  T(F): 98.2 (09 May 2024 16:00), Max: 99.8 (08 May 2024 20:00)  HR: 97 (09 May 2024 16:00) (84 - 106)  BP: 125/69 (09 May 2024 16:00) (116/65 - 137/72)  BP(mean): 97 (09 May 2024 11:47) (85 - 97)  RR: 20 (09 May 2024 16:00) (20 - 39)  SpO2: 90% (09 May 2024 16:00) (88% - 94%)    Parameters below as of 09 May 2024 11:47  Patient On (Oxygen Delivery Method): nasal cannula      PHYSICAL EXAM:  GENERAL: anxious, cachectic female with temporal muscle waisting   HEAD:  Atraumatic, Normocephalic  NECK: Supple, No JVD, Normal thyroid  NERVOUS SYSTEM:  Alert & Oriented X3, Good concentration; Motor Strength 5/5 B/L upper and lower extremities; DTRs 2+ intact and symmetric  CHEST/LUNG: decreased BS b/l, prolonged inspiratory phase   HEART: S1, S2   ABDOMEN: Soft, Nontender, Nondistended; Bowel sounds present  EXTREMITIES:  2+ Peripheral Pulses, No clubbing, cyanosis, or edema  LYMPH: No lymphadenopathy noted  SKIN: No rashes or lesions      LABS:                        10.6   21.07 )-----------( 255      ( 09 May 2024 05:41 )             35.6     05-09    140  |  100  |  25<H>  ----------------------------<  126<H>  5.3<H>   |  34<H>  |  0.5<L>    Ca    8.8      09 May 2024 05:41  Mg     2.1     05-09    TPro  5.7<L>  /  Alb  3.5  /  TBili  <0.2  /  DBili  x   /  AST  18  /  ALT  30  /  AlkPhos  81  05-09      Urinalysis Basic - ( 09 May 2024 05:41 )    Color: x / Appearance: x / SG: x / pH: x  Gluc: 126 mg/dL / Ketone: x  / Bili: x / Urobili: x   Blood: x / Protein: x / Nitrite: x   Leuk Esterase: x / RBC: x / WBC x   Sq Epi: x / Non Sq Epi: x / Bacteria: x        Culture - Blood (collected 08 May 2024 00:23)  Source: .Blood None  Preliminary Report (09 May 2024 09:02):    No growth at 24 hours      RADIOLOGY & ADDITIONAL TESTS:    < from: Xray Chest 1 View- PORTABLE-Urgent (Xray Chest 1 View- PORTABLE-Urgent .) (05.09.24 @ 12:23) >  Impression:    Left lung masslike consolidation. Decreased left effusion. No   pneumothorax.    Improving right basilar nodular opacities.    < from: CT Angio Chest PE Protocol w/ IV Cont (05.07.24 @ 00:27) >  IMPRESSION:    No pulmonary embolism.    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 Findings highly suspicious for malignancy. Further   oncological care is recommended.    Likely superimposed consolidative and groundglass opacities in the left   lung is well probably reflecting superimposed pneumonia. Correlate with   clinical symptoms.    Approximately 2 cm right lower lobe spiculated nodule concerning for   malignancy as well.    Additional 8 mm solid nodule in the right apex. Indeterminate   approximately 4 cm groundglass density in the right upper lobe. Findings   are indeterminate however neoplastic processes on the differential.   Attention on follow-up is recommended.    Partially imaged 1.6 cm right hepatic lobe hypodense lesion may reflect a   cyst.    < end of copied text >  < from: TTE Echo Complete w/o Contrast w/ Doppler (05.07.24 @ 09:58) >  Summary:   1. Hyperdynamic global left ventricular systolic function with a   visually estimated EF of 70-75%. No regional wall motion abnormalities.   2. Normal right ventricular size with mildly reduced RV systolic   function.   3. Normal left atrial size.   4. Sclerotic aortic valve with normal opening.   5. Mild tricuspid regurgitation.   6. No echocardiographic evidence of pulmonary hypertension. The IVC is   normal in size with <50% respiratory variability indicating mildly   increased right atrial pressure.   7. Trivial pericardial effusion.   8. No prior TTE is available for comparison.    MEDICATIONS  (STANDING):  albuterol/ipratropium for Nebulization 3 milliLiter(s) Nebulizer every 4 hours  amLODIPine   Tablet 5 milliGRAM(s) Oral daily  cefTRIAXone   IVPB 1000 milliGRAM(s) IV Intermittent every 24 hours  chlorhexidine 2% Cloths 1 Application(s) Topical <User Schedule>  enoxaparin Injectable 40 milliGRAM(s) SubCutaneous every 24 hours  levoFLOXacin IVPB 750 milliGRAM(s) IV Intermittent every 24 hours  levoFLOXacin IVPB      levoFLOXacin IVPB 750 milliGRAM(s) IV Intermittent once  methylPREDNISolone sodium succinate Injectable 60 milliGRAM(s) IV Push every 12 hours  pantoprazole    Tablet 40 milliGRAM(s) Oral before breakfast  polyethylene glycol 3350 17 Gram(s) Oral daily  senna 2 Tablet(s) Oral at bedtime    MEDICATIONS  (PRN):  albuterol    90 MICROgram(s) HFA Inhaler 2 Puff(s) Inhalation every 6 hours PRN for shortness of breath and/or wheezing

## 2024-05-09 NOTE — PROGRESS NOTE ADULT - ASSESSMENT
74 y/o female with PMHx COPD, HTN, Chronic Smoker P/W SOB x weeks, cough, wheezing, O2 sat 70% at home placed on NRB by EMS, confused. Admitted to MICU.     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 Solumedrol 60mg Q8H. Duoneb Q4/PRN. BiPAP 2 on 2 off and QHS. RVP negative. Send Ur Strep/Legionella & Nasal MRSA. Procal. TTE. Pt will require mass biopsy, likely bronchoscopic, for further management once stable.  - 5/8: TTE with EF 70-75%, Mild MR, Trivial pericardial effusion. Procal 0.09. Pt refused BiPAP overnight. AM ABG with pH 7.25, CO2 88. Plan to continue ABx, BiPAP 4/4 & QHS. Stable for DG to SDU. Spoke with family, pt has been having fevers, weight loss and hemoptysis for ~1y but refused to visit doctor. They are aware of masses in lungs and ultimate need for biopsy if patient is agreeable.  - C/w Ceftriaxone and Levaquin - ID consulted, f/u recs  - C/w Solumedrol 60mg Q8H, Duoneb Q4/PRN, BiPAP 2/2 & QHS  - S/p diagnostic thoracentesis 5/9, f/u cytopathology  - currently on 4L NC - wean as tolerated   - PT   - bipap at night     #HTN  - C/w Amlodipine 5mg QD    #MISC  - DVT: Lovenox  - GI: Protonix  - Diet: DASH when off BiPAP  - Dispo: SDU    72 y/o female with PMHx COPD, HTN, Chronic Smoker P/W SOB x weeks, cough, wheezing, O2 sat 70% at home placed on NRB by EMS, confused. Placed on BIPAP for hypercapnia and admitted to MICU, now downgraded to SDU.     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 Solumedrol 60mg Q8H. Duoneb Q4/PRN. BiPAP 2 on 2 off and QHS. RVP negative. Send Ur Strep/Legionella & Nasal MRSA. Procal. TTE. Pt will require mass biopsy, likely bronchoscopic, for further management once stable.  - 5/8: TTE with EF 70-75%, Mild MR, Trivial pericardial effusion. Procal 0.09. Pt refused BiPAP overnight. AM ABG with pH 7.25, CO2 88. Plan to continue ABx, BiPAP 4/4 & QHS. Stable for DG to SDU. Spoke with family, pt has been having fevers, weight loss and hemoptysis for ~1y but refused to visit doctor. They are aware of masses in lungs and ultimate need for biopsy if patient is agreeable.  - C/w Ceftriaxone and Levaquin - ID consulted, f/u recs  - C/w Solumedrol 60mg Q8H, Duoneb Q4/PRN, BiPAP 2/2 & QHS  - S/p diagnostic thoracentesis 5/9, f/u cytopathology  - currently on 4L NC - wean as tolerated   - PT   - bipap at night     #HTN  - C/w Amlodipine 5mg QD    #MISC  - DVT: Lovenox  - GI: Protonix  - Diet: DASH when off BiPAP  - Dispo: SDU    74 y/o female with PMHx COPD, HTN, Chronic Smoker P/W SOB x weeks, cough, wheezing, O2 sat 70% at home placed on NRB by EMS, confused. Placed on BIPAP for hypercapnia and admitted to MICU, now downgraded to SDU.     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 Solumedrol 60mg Q8H. Duoneb Q4/PRN. BiPAP 2 on 2 off and QHS. RVP negative. Send Ur Strep/Legionella & Nasal MRSA. Procal. TTE. Pt will require mass biopsy, likely bronchoscopic, for further management once stable.  - 5/8: TTE with EF 70-75%, Mild MR, Trivial pericardial effusion. Procal 0.09. Pt refused BiPAP overnight. AM ABG with pH 7.25, CO2 88. Plan to continue ABx, BiPAP 4/4 & QHS. Stable for DG to SDU. Spoke with family, pt has been having fevers, weight loss and hemoptysis for ~1y but refused to visit doctor. They are aware of masses in lungs and ultimate need for biopsy if patient is agreeable.  - C/w Ceftriaxone and Levaquin - ID consulted, f/u recs  - C/w Solumedrol 60mg Q8H, Duoneb Q4/PRN, BiPAP 2/2 & QHS  - S/p diagnostic thoracentesis 5/9, f/u cytology  - currently on 4L NC - wean as tolerated   - PT   - bipap at night     #HTN  - C/w Amlodipine 5mg QD    #MISC  - DVT: Lovenox  - GI: Protonix  - Diet: DASH when off BiPAP  - Dispo: SDU

## 2024-05-09 NOTE — PROGRESS NOTE ADULT - ASSESSMENT
74 y/o female with PMHx COPD, HTN, Chronic Smoker P/W SOB x weeks, cough, wheezing, O2 sat 70% at home placed on NRB by EMS, confused. Admitted to MICU for ARF, was found to have lungs mass and postobstructive PNA.   Pt clinically improved and was downgraded to SDU.     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  - c/w Solumedrol 60 mg Q 12 hours  - on Levofloxacin and Ceftriaxone   - nebs Q 6 hours standing  - pulmonary toilet   - pt underwent diagnostic  thoracentesis today, will follow up fluid cytology ( mass is highly suspicious for advance malignancy)   - pulmonary is following, recommendations noted    - aspiration precautions, chest PT  - Mucinex standing    - BIPAP PRN and QHS   - start Nicotine patch     #HTN   - DASH diet   - C/w Amlodipine 5mg QD    # constipation   - high fiber diet   - c/w bowel regimen     # Weight loss/ mild malnutrition  - consult dietitian   - high calories diet     #MISC  - DVT: Lovenox  - GI: Protonix  - Diet: DASH     #Progress Note Handoff  Pending (specify): supportive care,  start Nicotine patch , c/w same dose of steroids, Abx, f/u fluid cytology, consult dietitian,  PT/rehab eval, discharge planning with close medical oncology follow up ( will try to make an appointment with oncology team prior discharge)   Family discussion: I spoke with pt and multiple family members at the bedside today, they agreed with a plan of care   Disposition: SDU

## 2024-05-09 NOTE — SWALLOW BEDSIDE ASSESSMENT ADULT - SLP PERTINENT HISTORY OF CURRENT PROBLEM
72 y/o female with PMHx COPD, HTN, Chronic Smoker P/W SOB x weeks, cough, wheezing, O2 sat 70% at home placed on NRB by EMS, confused. Admitted to MICU.

## 2024-05-09 NOTE — SWALLOW BEDSIDE ASSESSMENT ADULT - COMMENTS
#Acute Hypercapnic/Hypoxemic Respiratory Failure 2/2 COPD Exacerbation  #Possible Post-Obstructive PNA  #Lung Mass R/O Malignancy

## 2024-05-09 NOTE — PROGRESS NOTE ADULT - SUBJECTIVE AND OBJECTIVE BOX
Over Night Events: events noted, on NC, transferred from CCU    PHYSICAL EXAM    ICU Vital Signs Last 24 Hrs  T(C): 37 (09 May 2024 04:00), Max: 37.7 (08 May 2024 20:00)  T(F): 98.6 (09 May 2024 04:00), Max: 99.8 (08 May 2024 20:00)  HR: 84 (09 May 2024 04:00) (82 - 106)  BP: 124/74 (09 May 2024 04:00) (119/67 - 169/73)  BP(mean): 95 (09 May 2024 04:00) (85 - 105)  RR: 20 (09 May 2024 04:00) (20 - 47)  SpO2: 92% (09 May 2024 04:00) (88% - 96%)    O2 Parameters below as of 09 May 2024 00:00  Patient On (Oxygen Delivery Method): nasal cannula  O2 Flow (L/min): 5          General: ILL looking  Lungs: l side rhonchi  Cardiovascular: Regular   Abdomen: Soft, Positive BS  Extremities: No clubbing   Skin: Warm  Neurological: Non focal       05-07-24 @ 07:01  -  05-08-24 @ 07:00  --------------------------------------------------------  IN:    IV PiggyBack: 200 mL    Oral Fluid: 510 mL  Total IN: 710 mL    OUT:    Voided (mL): 750 mL  Total OUT: 750 mL    Total NET: -40 mL      05-08-24 @ 07:01  -  05-09-24 @ 06:59  --------------------------------------------------------  IN:    Oral Fluid: 360 mL  Total IN: 360 mL    OUT:    Voided (mL): 650 mL  Total OUT: 650 mL    Total NET: -290 mL          LABS:                          10.6   21.07 )-----------( 255      ( 09 May 2024 05:41 )             35.6                                               05-08    138  |  99  |  27<H>  ----------------------------<  151<H>  5.1<H>   |  35<H>  |  0.5<L>    Ca    8.7      08 May 2024 04:30  Mg     2.2     05-08    TPro  5.6<L>  /  Alb  3.5  /  TBili  <0.2  /  DBili  x   /  AST  9   /  ALT  18  /  AlkPhos  80  05-08                                             Urinalysis Basic - ( 08 May 2024 04:30 )    Color: x / Appearance: x / SG: x / pH: x  Gluc: 151 mg/dL / Ketone: x  / Bili: x / Urobili: x   Blood: x / Protein: x / Nitrite: x   Leuk Esterase: x / RBC: x / WBC x   Sq Epi: x / Non Sq Epi: x / Bacteria: x                                                  LIVER FUNCTIONS - ( 08 May 2024 04:30 )  Alb: 3.5 g/dL / Pro: 5.6 g/dL / ALK PHOS: 80 U/L / ALT: 18 U/L / AST: 9 U/L / GGT: x                                                                                                                                   ABG - ( 08 May 2024 04:30 )  pH, Arterial: 7.25  pH, Blood: x     /  pCO2: 88    /  pO2: 83    / HCO3: 39    / Base Excess: 8.5   /  SaO2: 96.2                MEDICATIONS  (STANDING):  albuterol/ipratropium for Nebulization 3 milliLiter(s) Nebulizer every 4 hours  amLODIPine   Tablet 5 milliGRAM(s) Oral daily  cefTRIAXone   IVPB 1000 milliGRAM(s) IV Intermittent every 24 hours  chlorhexidine 2% Cloths 1 Application(s) Topical <User Schedule>  enoxaparin Injectable 40 milliGRAM(s) SubCutaneous every 24 hours  levoFLOXacin IVPB 750 milliGRAM(s) IV Intermittent every 24 hours  levoFLOXacin IVPB 750 milliGRAM(s) IV Intermittent once  levoFLOXacin IVPB      methylPREDNISolone sodium succinate Injectable 60 milliGRAM(s) IV Push every 8 hours  pantoprazole  Injectable 40 milliGRAM(s) IV Push every 24 hours  polyethylene glycol 3350 17 Gram(s) Oral daily  senna 2 Tablet(s) Oral at bedtime    MEDICATIONS  (PRN):  albuterol    90 MICROgram(s) HFA Inhaler 2 Puff(s) Inhalation every 6 hours PRN for shortness of breath and/or wheezing    CXR noted

## 2024-05-09 NOTE — SWALLOW BEDSIDE ASSESSMENT ADULT - SWALLOW EVAL: FUNCTIONAL LEVEL AT TIME OF EVAL
Pt received awake, alert. Family at bedside. Pt on 4L NC. Saturating in the high 80s before PO intake. RN is aware.

## 2024-05-09 NOTE — PROGRESS NOTE ADULT - ASSESSMENT
IMPRESSION:    Acute hypoxemic resp failute  Acute on chronic hypercapnic resp failure  COPD exacerbation   lung mass r/o malignancy   Superimposed pneumonia      PLAN:    CNS: Avoid CNS depressant    HEENT: oral care     PULMONARY: keep pox 88 to 92%M, NIv 4 hrs on and 4  hrs , albuterol Q4 hrs and prn , solumedrol 60 mg Q 12 hrs   will need biopsy / bronch when stable   bed side US has left effusion     CARDIOVASCULAR: echo   keep is = os     GI: GI prophylaxis.  Feeding speech and swallow     RENAL: follow lytes     INFECTIOUS DISEASE: continue abx Procal noted    HEMATOLOGICAL:  DVT prophylaxis.    ENDOCRINE:  Follow up FS.  Insulin protocol if needed.    SDU  GOC

## 2024-05-09 NOTE — PROGRESS NOTE ADULT - SUBJECTIVE AND OBJECTIVE BOX
24H events:    Patient is a 73y old Female who presents with a chief complaint of COPD exacerbation (09 May 2024 06:59)    Primary diagnosis of COPD exacerbation      Day 1:  Day 2:  Day 3:     Today is hospital day 3d. This morning patient was seen and examined at bedside, resting comfortably in bed.    Patient downgraded from CCU. Did not wear bipap overnight. Now on 4L NC .     Code Status:    Family communication:  Contact date:  Name of person contacted:  Relationship to patient:  Communication details:  What matters most:    PAST MEDICAL & SURGICAL HISTORY    SOCIAL HISTORY:  Social History:      ALLERGIES:  No Known Allergies    MEDICATIONS:  STANDING MEDICATIONS  albuterol/ipratropium for Nebulization 3 milliLiter(s) Nebulizer every 4 hours  amLODIPine   Tablet 5 milliGRAM(s) Oral daily  cefTRIAXone   IVPB 1000 milliGRAM(s) IV Intermittent every 24 hours  chlorhexidine 2% Cloths 1 Application(s) Topical <User Schedule>  enoxaparin Injectable 40 milliGRAM(s) SubCutaneous every 24 hours  levoFLOXacin IVPB 750 milliGRAM(s) IV Intermittent every 24 hours  levoFLOXacin IVPB      levoFLOXacin IVPB 750 milliGRAM(s) IV Intermittent once  methylPREDNISolone sodium succinate Injectable 60 milliGRAM(s) IV Push every 12 hours  pantoprazole  Injectable 40 milliGRAM(s) IV Push every 24 hours  polyethylene glycol 3350 17 Gram(s) Oral daily  senna 2 Tablet(s) Oral at bedtime    PRN MEDICATIONS  albuterol    90 MICROgram(s) HFA Inhaler 2 Puff(s) Inhalation every 6 hours PRN    VITALS:   T(F): 98.1  HR: 105  BP: 137/72  RR: 20  SpO2: 89%    PHYSICAL EXAM:  GENERAL:   ( x ) NAD although anxious     (  ) obtunded     (  ) lethargic     (  ) somnolent    HEAD:   (x  ) Atraumatic     (  ) hematoma     (  ) laceration (specify location:       )     NECK:  ( x ) Supple     (  ) neck stiffness     (  ) nuchal rigidity     (  )  no JVD     (  ) JVD present ( -- cm)    HEART:  Rate -->     (x  ) normal rate     (  ) bradycardic     (  ) tachycardic  Rhythm -->     (x  ) regular     (  ) regularly irregular     (  ) irregularly irregular  Murmurs -->     (  ) normal s1s2     (  ) systolic murmur     (  ) diastolic murmur     (  ) continuous murmur      (  ) S3 present     (  ) S4 present    LUNGS:   (x  )Unlabored respirations     (  ) tachypnea  ( x ) B/L air entry     (  ) decreased breath sounds in:  (location     )    (  ) no adventitious sound     (  ) crackles     (  ) wheezing      (  ) rhonchi      (specify location:       )  (  ) chest wall tenderness (specify location:       )    ABDOMEN:   ( x ) Soft     (  ) tense   |   ( x ) nondistended     (  ) distended   |   (  ) +BS     (  ) hypoactive bowel sounds     (  ) hyperactive bowel sounds  ( x ) nontender     (  ) RUQ tenderness     (  ) RLQ tenderness     (  ) LLQ tenderness     (  ) epigastric tenderness     (  ) diffuse tenderness  (  ) Splenomegaly      (  ) Hepatomegaly      (  ) Jaundice     (  ) ecchymosis     EXTREMITIES:  (x  ) Normal     (  ) Rash     (  ) ecchymosis     (  ) varicose veins      (  ) pitting edema     (  ) non-pitting edema   (  ) ulceration     (  ) gangrene:     (location:     )    NERVOUS SYSTEM:    ( x ) A&Ox3     (  ) confused     (  ) lethargic  CN II-XII:     (  ) Intact     (  ) deficits found     (Specify:     )   Upper extremities:     (  ) no sensorimotor deficits     (  ) weakness     (  ) loss of proprioception/vibration     (  ) loss of touch/temperature (specify:    )  Lower extremities:     (  ) no sensorimotor deficits     (  ) weakness     (  ) loss of proprioception/vibration     (  ) loss of touch/temperature (specify:    )    SKIN:   ( x ) No rashes or lesions     (  ) maculopapular rash     (  ) pustules     (  ) vesicles     (  ) ulcer     (  ) ecchymosis     (specify location:     )    AMPAC score:    (  ) Indwelling Paulino Catheter:   Date insterted:    Reason (  ) Critical illness     (  ) urinary retention    (  ) Accurate Ins/Outs Monitoring     (  ) CMO patient    (  ) Central Line:   Date inserted:  Location: (  ) Right IJ     (  ) Left IJ     (  ) Right Fem     (  ) Left Fem    (  ) SPC        (  ) pigtail       (  ) PEG tube       (  ) colostomy       (  ) jejunostomy  (  ) U-Dall    LABS:                        10.6   21.07 )-----------( 255      ( 09 May 2024 05:41 )             35.6     05-09    140  |  100  |  25<H>  ----------------------------<  126<H>  5.3<H>   |  34<H>  |  0.5<L>    Ca    8.8      09 May 2024 05:41  Mg     2.1     05-09    TPro  5.7<L>  /  Alb  3.5  /  TBili  <0.2  /  DBili  x   /  AST  18  /  ALT  30  /  AlkPhos  81  05-09      Urinalysis Basic - ( 09 May 2024 05:41 )    Color: x / Appearance: x / SG: x / pH: x  Gluc: 126 mg/dL / Ketone: x  / Bili: x / Urobili: x   Blood: x / Protein: x / Nitrite: x   Leuk Esterase: x / RBC: x / WBC x   Sq Epi: x / Non Sq Epi: x / Bacteria: x      ABG - ( 08 May 2024 04:30 )  pH, Arterial: 7.25  pH, Blood: x     /  pCO2: 88    /  pO2: 83    / HCO3: 39    / Base Excess: 8.5   /  SaO2: 96.2                  Culture - Blood (collected 08 May 2024 00:23)  Source: .Blood None  Preliminary Report (09 May 2024 09:02):    No growth at 24 hours          RADIOLOGY:

## 2024-05-09 NOTE — PROCEDURE NOTE - NSPATIENTPOSTION_GEN_A_CORE
For information on Fall & Injury Prevention, visit: [email or web address] OR  [email or web address] OR  https://www.cdc.gov/steadi/patient.html
(semi) fowlers

## 2024-05-10 NOTE — PROGRESS NOTE ADULT - SUBJECTIVE AND OBJECTIVE BOX
74 y/o female PMHx COPD, HTN, CHronic Smoker P/W SOB x weeks, cough, wheezing, O2 sat 70% at home placed on NRB by EMS, confused.  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 Solumedrol 60mg Q8H. Duoneb Q4/PRN. BiPAP 2 on 2 off and QHS. RVP negative. Send Ur Strep/Legionella & Nasal MRSA. Procal. TTE. Pt will require mass biopsy, likely bronchoscopic, for further management once stable.  5/8: TTE with EF 70-75%, Mild MR, Trivial pericardial effusion. Procal 0.09. Pt refused BiPAP overnight. AM ABG with pH 7.25, CO2 88. Plan to continue ABx, BiPAP 4/4 & QHS. Stable for DG to SDU. Spoke with family, pt has been having fevers, weight loss and hemoptysis for ~1y but refused to visit doctor. They are aware of masses in lungs and ultimate need for biopsy if patient is agreeable.  Pt was downgraded to SDU, on 5/9 underwent diagnostic thoracentesis at the bedside,  cytology is pending, family requested palliative care consult. ID is following, Abx were changed on 5/10 ( pt developed worsening leukocytosis, has nonobstructive PNA)   Today pt gets anxious at times, denies chest pain, SOB at rest, asking for her family to participate in conversation.       Vital Signs Last 24 Hrs  T(C): 36.6 (10 May 2024 11:25), Max: 37 (10 May 2024 07:12)  T(F): 97.9 (10 May 2024 11:25), Max: 98.6 (10 May 2024 07:12)  HR: 97 (10 May 2024 11:25) (81 - 98)  BP: 129/64 (10 May 2024 11:25) (111/62 - 132/74)  BP(mean): 90 (10 May 2024 11:25) (87 - 90)  RR: 20 (10 May 2024 11:25) (18 - 20)  SpO2: 85% (10 May 2024 11:25) (85% - 99%)    Parameters below as of 10 May 2024 11:25  Patient On (Oxygen Delivery Method): nasal cannula      PHYSICAL EXAM:  GENERAL: anxious, cachectic female with temporal muscle waisting   HEAD:  Atraumatic, Normocephalic  NECK: Supple, No JVD, Normal thyroid  NERVOUS SYSTEM:  Alert & Oriented X3, Good concentration; Motor Strength 5/5 B/L upper and lower extremities; DTRs 2+ intact and symmetric  CHEST/LUNG: decreased BS b/l, prolonged inspiratory phase   HEART: S1, S2   ABDOMEN: Soft, Nontender, Nondistended; Bowel sounds present  EXTREMITIES:  2+ Peripheral Pulses, No clubbing, cyanosis, or edema  LYMPH: No lymphadenopathy noted  SKIN: No rashes or lesions      LABS:                                   10.6   21.07 )-----------( 255      ( 09 May 2024 05:41 )             35.6   05-09    140  |  100  |  25<H>  ----------------------------<  126<H>  5.3<H>   |  34<H>  |  0.5<L>    Ca    8.8      09 May 2024 05:41  Mg     2.1     05-09    TPro  5.7<L>  /  Alb  3.5  /  TBili  <0.2  /  DBili  x   /  AST  18  /  ALT  30  /  AlkPhos  81  05-09    c    Urinalysis Basic - ( 09 May 2024 05:41 )    Color: x / Appearance: x / SG: x / pH: x  Gluc: 126 mg/dL / Ketone: x  / Bili: x / Urobili: x   Blood: x / Protein: x / Nitrite: x   Leuk Esterase: x / RBC: x / WBC x   Sq Epi: x / Non Sq Epi: x / Bacteria: x        Culture - Blood (collected 08 May 2024 00:23)  Source: .Blood None  Preliminary Report (09 May 2024 09:02):    No growth at 24 hours      RADIOLOGY & ADDITIONAL TESTS:    < from: Xray Chest 1 View- PORTABLE-Urgent (Xray Chest 1 View- PORTABLE-Urgent .) (05.09.24 @ 12:23) >  Impression:    Left lung masslike consolidation. Decreased left effusion. No   pneumothorax.    Improving right basilar nodular opacities.    < from: CT Angio Chest PE Protocol w/ IV Cont (05.07.24 @ 00:27) >  IMPRESSION:    No pulmonary embolism.    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 Findings highly suspicious for malignancy. Further   oncological care is recommended.    Likely superimposed consolidative and groundglass opacities in the left   lung is well probably reflecting superimposed pneumonia. Correlate with   clinical symptoms.    Approximately 2 cm right lower lobe spiculated nodule concerning for   malignancy as well.    Additional 8 mm solid nodule in the right apex. Indeterminate   approximately 4 cm groundglass density in the right upper lobe. Findings   are indeterminate however neoplastic processes on the differential.   Attention on follow-up is recommended.    Partially imaged 1.6 cm right hepatic lobe hypodense lesion may reflect a   cyst.    < end of copied text >  < from: TTE Echo Complete w/o Contrast w/ Doppler (05.07.24 @ 09:58) >  Summary:   1. Hyperdynamic global left ventricular systolic function with a   visually estimated EF of 70-75%. No regional wall motion abnormalities.   2. Normal right ventricular size with mildly reduced RV systolic   function.   3. Normal left atrial size.   4. Sclerotic aortic valve with normal opening.   5. Mild tricuspid regurgitation.   6. No echocardiographic evidence of pulmonary hypertension. The IVC is   normal in size with <50% respiratory variability indicating mildly   increased right atrial pressure.   7. Trivial pericardial effusion.   8. No prior TTE is available for comparison.    MEDICATIONS  (STANDING):  albuterol/ipratropium for Nebulization 3 milliLiter(s) Nebulizer every 4 hours  amLODIPine   Tablet 5 milliGRAM(s) Oral daily  bisacodyl Suppository 10 milliGRAM(s) Rectal daily  cefepime   IVPB 2000 milliGRAM(s) IV Intermittent every 8 hours  chlorhexidine 2% Cloths 1 Application(s) Topical <User Schedule>  enoxaparin Injectable 40 milliGRAM(s) SubCutaneous every 24 hours  levoFLOXacin IVPB 750 milliGRAM(s) IV Intermittent once  levoFLOXacin IVPB 750 milliGRAM(s) IV Intermittent every 24 hours  levoFLOXacin IVPB      methylPREDNISolone sodium succinate Injectable 60 milliGRAM(s) IV Push every 12 hours  nicotine -   7 mG/24Hr(s) Patch 1 Patch Transdermal daily  pantoprazole    Tablet 40 milliGRAM(s) Oral before breakfast  polyethylene glycol 3350 17 Gram(s) Oral daily  senna 2 Tablet(s) Oral at bedtime    MEDICATIONS  (PRN):  albuterol    90 MICROgram(s) HFA Inhaler 2 Puff(s) Inhalation every 6 hours PRN for shortness of breath and/or wheezing

## 2024-05-10 NOTE — PHYSICAL THERAPY INITIAL EVALUATION ADULT - PERTINENT HX OF CURRENT PROBLEM, REHAB EVAL
73-year-old female with past medical history of COPD not on home oxygen, chronic smoker >60ppy, hypertension who presents for shortness of breath x few weeks.  Pt reports worsening productive cough and shortness of breath with wheezing for the past 1 week.  Increased work of breathing today, was satting 70% on room air.  Was placed on nonrebreather by EMS.  As per son at the bedside pt was confused in the morning, wasn't aware of the surrounding.  She also admits to hemoptysis  along with weight loss over the past few months.

## 2024-05-10 NOTE — CHART NOTE - NSCHARTNOTEFT_GEN_A_CORE
I was notified by the patient's RN that the patient is refusing BIPAP despite counseling. I presented to bedside and spoke with the patient as well. Explained to her the necessity of it, explaining that her XO2 level is too high. I explained to her the risks of not taking BIPAP including worsening respiratory function, possibly leading to organ dysfunction including the risk of intubation and death. She was very apprehensive and finally said "come back and ask me later".  I presented after 30 minutes and asked the patient, she said I am not yet decided. She told me that she understands what I am telling her and that she does not want to fall sick but does not want BIPAP either.  I have requested RN to attempt to encourage patient to take BIPAP in some time again.

## 2024-05-10 NOTE — CHART NOTE - NSCHARTNOTEFT_GEN_A_CORE
I was notified by the patient's RN that the patient has removed her BIPAP and is desaturating to 80% oxygen. I presented to bedside. 2 RNs were also present. Despite extensive counseling, the patient would not allow proper placement, despite seeming to understand the situation. She was explained the high risk of her heart stopping and needed CPR and intubation if this continues. She said if her heart stops she wants CPR and intubation but hopes that it won't happen. She is explained that if her oxygen remains this low, it will almost certainly happen.    Patient's son August was called and given an update about this and was explained the severity of the situation. He spoke with patient over speakerphone and the patient said she will try. For now BIPAP is on and patient's saturation is ~94%. The patient does not wish to receive any sedative, including Benadryl or Atarax. The patient's son, August, however, agrees that if the patient is unable to tolerate it, it is better to give her mild sedation so she can tolerate it. For now no sedatives have been administered.

## 2024-05-10 NOTE — PROGRESS NOTE ADULT - ASSESSMENT
74 y/o female with PMHx COPD, HTN, Chronic Smoker P/W SOB x weeks, cough, wheezing, O2 sat 70% at home placed on NRB by EMS, confused. Placed on BIPAP for hypercapnia and admitted to MICU, now downgraded to SDU.     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 Solumedrol 60mg Q8H. Duoneb Q4/PRN. BiPAP 2 on 2 off and QHS. RVP negative. Send Ur Strep/Legionella & Nasal MRSA. Procal. TTE. Pt will require mass biopsy, likely bronchoscopic, for further management once stable.  - 5/8: TTE with EF 70-75%, Mild MR, Trivial pericardial effusion. Procal 0.09. Pt refused BiPAP overnight. AM ABG with pH 7.25, CO2 88. Plan to continue ABx, BiPAP 4/4 & QHS. Stable for DG to SDU. Spoke with family, pt has been having fevers, weight loss and hemoptysis for ~1y but refused to visit doctor. They are aware of masses in lungs and ultimate need for biopsy if patient is agreeable.  - C/w Ceftriaxone and Levaquin - ID consulted, f/u recs  - C/w Solumedrol 60mg Q8H, Duoneb Q4/PRN, BiPAP 2/2 & QHS  - S/p diagnostic thoracentesis 5/9, f/u cytology  - currently on 4L NC - wean as tolerated   - PT   - encourage bipap at night     #HTN  - C/w Amlodipine 5mg QD    #MISC  - DVT: Lovenox  - GI: Protonix  - Diet: DASH when off BiPAP  - Dispo: SDU        74 y/o female with PMHx COPD, HTN, Chronic Smoker P/W SOB x weeks, cough, wheezing, O2 sat 70% at home placed on NRB by EMS, confused. Placed on BIPAP for hypercapnia and admitted to MICU, now downgraded to SDU.     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 Solumedrol 60mg Q8H. Duoneb Q4/PRN. BiPAP 2 on 2 off and QHS. RVP negative. Send Ur Strep/Legionella & Nasal MRSA. Procal. TTE. Pt will require mass biopsy, likely bronchoscopic, for further management once stable.  - 5/8: TTE with EF 70-75%, Mild MR, Trivial pericardial effusion. Procal 0.09. Pt refused BiPAP overnight. AM ABG with pH 7.25, CO2 88. Plan to continue ABx, BiPAP 4/4 & QHS. Stable for DG to SDU. Spoke with family, pt has been having fevers, weight loss and hemoptysis for ~1y but refused to visit doctor. They are aware of masses in lungs and ultimate need for biopsy if patient is agreeable.  - C/w Ceftriaxone and Levaquin - ID consulted, f/u recs  - C/w Solumedrol 60mg Q8H, Duoneb Q4/PRN, BiPAP 2/2 & QHS  - S/p diagnostic thoracentesis 5/9, f/u cytology  - currently on 4L NC - wean as tolerated   - PT   - encourage bipap at night     #HTN  - C/w Amlodipine 5mg QD    #rectal bleeding  #hemorrhoids   -5/9 nurse reported few drops of blood on floor after patient had BM  -pt has rectal hemorrhoid  -Hb 11.0 > 10.2 > 10.6  -monitor CBC     #MISC  - DVT: Lovenox  - GI: Protonix  - Diet: DASH when off BiPAP  - Dispo: SDU        72 y/o female with PMHx COPD, HTN, Chronic Smoker P/W SOB x weeks, cough, wheezing, O2 sat 70% at home placed on NRB by EMS, confused. Placed on BIPAP for hypercapnia and admitted to MICU, now downgraded to SDU.     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 Solumedrol 60mg Q8H. Duoneb Q4/PRN. BiPAP 2 on 2 off and QHS. RVP negative. Send Ur Strep/Legionella & Nasal MRSA. Procal. TTE. Pt will require mass biopsy, likely bronchoscopic, for further management once stable.  - 5/8: TTE with EF 70-75%, Mild MR, Trivial pericardial effusion. Procal 0.09. Pt refused BiPAP overnight. AM ABG with pH 7.25, CO2 88. Plan to continue ABx, BiPAP 4/4 & QHS. Stable for DG to SDU. Spoke with family, pt has been having fevers, weight loss and hemoptysis for ~1y but refused to visit doctor. They are aware of masses in lungs and ultimate need for biopsy if patient is agreeable.  - C/w Ceftriaxone and Levaquin - ID consulted, f/u recs  - C/w Solumedrol 60mg Q8H, Duoneb Q4/PRN, BiPAP 2/2 & QHS  - S/p diagnostic thoracentesis 5/9, f/u cytology  - currently on 4L NC - wean as tolerated   - PT   - encourage bipap at night     #HTN  - C/w Amlodipine 5mg QD    #rectal bleeding  #hemorrhoids   -5/9 nurse reported few drops of blood on floor after patient had BM  -pt has rectal hemorrhoid  -Hb 11.0 > 10.2 > 10.6  -bowel regimen  -monitor CBC     #MISC  - DVT: Lovenox  - GI: Protonix  - Diet: DASH when off BiPAP  - Dispo: SDU

## 2024-05-10 NOTE — CONSULT NOTE ADULT - NS ATTEST RISK PROBLEM GEN_ALL_CORE FT
Acute illness posing a threat to life or organ function if not treated: PNA.   Intensive monitoring of current therapeutic drugs for toxicity: MATI with potential nephrotoxic agent  I Independently reviewed prior hospitalization records and I Independently interpreted available microbiological testing and CXR.  I discussed the details of management of this case with the primary team and pertinent specialists:

## 2024-05-10 NOTE — CONSULT NOTE ADULT - PROBLEM SELECTOR RECOMMENDATION 2
HIgh concern for malignancy  -follow fluid cytology from thoracentesis done 5/9  -[facility]

## 2024-05-10 NOTE — CONSULT NOTE ADULT - CONVERSATION DETAILS
Spoke with patient at bedside. She asked that I not speak to her about her health and only speak to her children. Spoke to daughter Brenda outside. Palliative care introduced.  SHe was able to provide a medical history and hospital course. She noted the patient had not been going to see doctors and had been losing weight with hemoptysis for some time.  She noted it will be difficult to make decisions for the patient because she is alert and oriented, but does not want make decisions for herself.  We discussed that the team is awaiting cytology, so it is unknown if it is cancer, what type of cancer it is (if cancer at all), and what options would be. We discussed that the heme onc team would provide information about prognosis and treatment options, and we could discuss further about GOC and options depending on the prognosis. Discussed DNR/DNI and hospice, and noted that we could discuss more after diagnosis established.

## 2024-05-10 NOTE — PROGRESS NOTE ADULT - SUBJECTIVE AND OBJECTIVE BOX
Over Night Events: events noted, sp diagnostic thora, declined NIV, CXR improving afebrile    PHYSICAL EXAM    ICU Vital Signs Last 24 Hrs  T(C): 36.8 (10 May 2024 04:00), Max: 36.9 (09 May 2024 08:03)  T(F): 98.3 (10 May 2024 04:00), Max: 98.4 (09 May 2024 08:03)  HR: 81 (10 May 2024 04:00) (81 - 105)  BP: 121/63 (10 May 2024 04:00) (111/62 - 137/72)  BP(mean): 87 (10 May 2024 04:00) (85 - 97)  RR: 20 (10 May 2024 04:00) (18 - 20)  SpO2: 99% (10 May 2024 04:00) (88% - 99%)    O2 Parameters below as of 10 May 2024 04:00  Patient On (Oxygen Delivery Method): nasal cannula  O2 Flow (L/min): 6          General: ill looking  ETT  Lungs: dec bs both bases  Cardiovascular: QUINTIN 2.6  Abdomen: Soft, Positive BS  Extremities: No clubbing   Neurological: Non focal         LABS:                          10.6   21.07 )-----------( 255      ( 09 May 2024 05:41 )             35.6                                               05-09    140  |  100  |  25<H>  ----------------------------<  126<H>  5.3<H>   |  34<H>  |  0.5<L>    Ca    8.8      09 May 2024 05:41  Mg     2.1     05-09    TPro  5.7<L>  /  Alb  3.5  /  TBili  <0.2  /  DBili  x   /  AST  18  /  ALT  30  /  AlkPhos  81  05-09                                             Urinalysis Basic - ( 09 May 2024 05:41 )    Color: x / Appearance: x / SG: x / pH: x  Gluc: 126 mg/dL / Ketone: x  / Bili: x / Urobili: x   Blood: x / Protein: x / Nitrite: x   Leuk Esterase: x / RBC: x / WBC x   Sq Epi: x / Non Sq Epi: x / Bacteria: x                                                  LIVER FUNCTIONS - ( 09 May 2024 05:41 )  Alb: 3.5 g/dL / Pro: 5.7 g/dL / ALK PHOS: 81 U/L / ALT: 30 U/L / AST: 18 U/L / GGT: x                                                  Culture - Blood (collected 08 May 2024 00:23)  Source: .Blood None  Preliminary Report (09 May 2024 09:02):    No growth at 24 hours                                                                                           MEDICATIONS  (STANDING):  albuterol/ipratropium for Nebulization 3 milliLiter(s) Nebulizer every 4 hours  amLODIPine   Tablet 5 milliGRAM(s) Oral daily  cefTRIAXone   IVPB 1000 milliGRAM(s) IV Intermittent every 24 hours  chlorhexidine 2% Cloths 1 Application(s) Topical <User Schedule>  enoxaparin Injectable 40 milliGRAM(s) SubCutaneous every 24 hours  levoFLOXacin IVPB 750 milliGRAM(s) IV Intermittent once  levoFLOXacin IVPB 750 milliGRAM(s) IV Intermittent every 24 hours  levoFLOXacin IVPB      methylPREDNISolone sodium succinate Injectable 60 milliGRAM(s) IV Push every 12 hours  pantoprazole    Tablet 40 milliGRAM(s) Oral before breakfast  polyethylene glycol 3350 17 Gram(s) Oral daily  senna 2 Tablet(s) Oral at bedtime    MEDICATIONS  (PRN):  albuterol    90 MICROgram(s) HFA Inhaler 2 Puff(s) Inhalation every 6 hours PRN for shortness of breath and/or wheezing

## 2024-05-10 NOTE — PROGRESS NOTE ADULT - SUBJECTIVE AND OBJECTIVE BOX
24H events:    Patient is a 73y old Female who presents with a chief complaint of COPD exacerbation (10 May 2024 07:13)    Primary diagnosis of COPD exacerbation      Day 1:  Day 2:  Day 3:     Today is hospital day 4d. This morning patient was seen and examined at bedside, resting comfortably in bed.    No acute or major events overnight.  Patient did not wear bipap overnight despite extensive counseling. She states that the pressure from the mask is uncomfortable. Discussed again this morning with patient and family - she will try to wear it for a few hours today.     Code Status:    Family communication:  Contact date:  Name of person contacted:  Relationship to patient:  Communication details:  What matters most:    PAST MEDICAL & SURGICAL HISTORY    SOCIAL HISTORY:  Social History:      ALLERGIES:  No Known Allergies    MEDICATIONS:  STANDING MEDICATIONS  albuterol/ipratropium for Nebulization 3 milliLiter(s) Nebulizer every 4 hours  amLODIPine   Tablet 5 milliGRAM(s) Oral daily  cefTRIAXone   IVPB 1000 milliGRAM(s) IV Intermittent every 24 hours  chlorhexidine 2% Cloths 1 Application(s) Topical <User Schedule>  enoxaparin Injectable 40 milliGRAM(s) SubCutaneous every 24 hours  levoFLOXacin IVPB 750 milliGRAM(s) IV Intermittent once  levoFLOXacin IVPB 750 milliGRAM(s) IV Intermittent every 24 hours  levoFLOXacin IVPB      methylPREDNISolone sodium succinate Injectable 60 milliGRAM(s) IV Push every 12 hours  pantoprazole    Tablet 40 milliGRAM(s) Oral before breakfast  polyethylene glycol 3350 17 Gram(s) Oral daily  senna 2 Tablet(s) Oral at bedtime    PRN MEDICATIONS  albuterol    90 MICROgram(s) HFA Inhaler 2 Puff(s) Inhalation every 6 hours PRN    VITALS:   T(F): 98.6  HR: 94  BP: 118/66  RR: 20  SpO2: 98%    PHYSICAL EXAM:  GENERAL:   ( x ) NAD, lying in bed comfortably     (  ) obtunded     (  ) lethargic     (  ) somnolent    HEAD:   ( x ) Atraumatic     (  ) hematoma     (  ) laceration (specify location:       )     NECK:  ( x ) Supple     (  ) neck stiffness     (  ) nuchal rigidity     (  )  no JVD     (  ) JVD present ( -- cm)    HEART:  Rate -->     ( x ) normal rate     (  ) bradycardic     (  ) tachycardic  Rhythm -->     (x  ) regular     (  ) regularly irregular     (  ) irregularly irregular  Murmurs -->     (  ) normal s1s2     (  ) systolic murmur     (  ) diastolic murmur     (  ) continuous murmur      (  ) S3 present     (  ) S4 present    LUNGS:   ( x )Unlabored respirations     (  ) tachypnea  ( x ) B/L air entry     (  ) decreased breath sounds in:  (location     )    (  ) no adventitious sound     (  ) crackles     (  ) wheezing      (  ) rhonchi      (specify location:       )  (  ) chest wall tenderness (specify location:       )    ABDOMEN:   ( x ) Soft     (  ) tense   |   ( x ) nondistended     (  ) distended   |   (  ) +BS     (  ) hypoactive bowel sounds     (  ) hyperactive bowel sounds  (x  ) nontender     (  ) RUQ tenderness     (  ) RLQ tenderness     (  ) LLQ tenderness     (  ) epigastric tenderness     (  ) diffuse tenderness  (  ) Splenomegaly      (  ) Hepatomegaly      (  ) Jaundice     (  ) ecchymosis     EXTREMITIES:  ( x ) Normal     (  ) Rash     (  ) ecchymosis     (  ) varicose veins      (  ) pitting edema     (  ) non-pitting edema   (  ) ulceration     (  ) gangrene:     (location:     )    NERVOUS SYSTEM:    ( x ) A&Ox3     (  ) confused     (  ) lethargic  CN II-XII:     (  ) Intact     (  ) deficits found     (Specify:     )   Upper extremities:     (  ) no sensorimotor deficits     (  ) weakness     (  ) loss of proprioception/vibration     (  ) loss of touch/temperature (specify:    )  Lower extremities:     (  ) no sensorimotor deficits     (  ) weakness     (  ) loss of proprioception/vibration     (  ) loss of touch/temperature (specify:    )    SKIN:   ( x ) No rashes or lesions     (  ) maculopapular rash     (  ) pustules     (  ) vesicles     (  ) ulcer     (  ) ecchymosis     (specify location:     )    AMPAC score:    (  ) Indwelling Paulino Catheter:   Date insterted:    Reason (  ) Critical illness     (  ) urinary retention    (  ) Accurate Ins/Outs Monitoring     (  ) CMO patient    (  ) Central Line:   Date inserted:  Location: (  ) Right IJ     (  ) Left IJ     (  ) Right Fem     (  ) Left Fem    (  ) SPC        (  ) pigtail       (  ) PEG tube       (  ) colostomy       (  ) jejunostomy  (  ) U-Dall    LABS:                        10.6   21.07 )-----------( 255      ( 09 May 2024 05:41 )             35.6     05-09    140  |  100  |  25<H>  ----------------------------<  126<H>  5.3<H>   |  34<H>  |  0.5<L>    Ca    8.8      09 May 2024 05:41  Mg     2.1     05-09    TPro  5.7<L>  /  Alb  3.5  /  TBili  <0.2  /  DBili  x   /  AST  18  /  ALT  30  /  AlkPhos  81  05-09      Urinalysis Basic - ( 09 May 2024 05:41 )    Color: x / Appearance: x / SG: x / pH: x  Gluc: 126 mg/dL / Ketone: x  / Bili: x / Urobili: x   Blood: x / Protein: x / Nitrite: x   Leuk Esterase: x / RBC: x / WBC x   Sq Epi: x / Non Sq Epi: x / Bacteria: x            Culture - Blood (collected 08 May 2024 00:23)  Source: .Blood None  Preliminary Report (10 May 2024 09:01):    No growth at 48 Hours          RADIOLOGY:

## 2024-05-10 NOTE — PROGRESS NOTE ADULT - ASSESSMENT
72 y/o female with PMHx COPD, HTN, Chronic Smoker P/W SOB x weeks, cough, wheezing, O2 sat 70% at home placed on NRB by EMS, confused. Admitted to MICU for ARF, was found to have lungs mass and postobstructive PNA.   Pt clinically improved and was downgraded to SDU.     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  - c/w Solumedrol 60 mg Q 12 hours  - pt was consulted by ID today, recommendations noted:  - f/u pleural fluid cultures  -nares ORSA  -Cefepime 2 gm iv q8 and Levaquin 750 mg iv q24h  - d/c  Ceftriaxone   - nebs Q 6 hours standing  - pulmonary toilet   - pt underwent diagnostic  thoracentesis on 5/9, will follow up fluid cytology ( mass is highly suspicious for advance malignancy)   - pulmonary is following, recommendations noted    - aspiration precautions, chest PT  - Mucinex standing    - BIPAP PRN and QHS   - start Nicotine patch   - consult palliative care, possibility of hospice discussed with family in case if pt'll decline cancer directed therapy     #HTN   - DASH diet   - C/w Amlodipine 5mg QD    # constipation   - high fiber diet   - c/w bowel regimen     # Weight loss/ mild malnutrition  - consult dietitian   - high calories diet     #MISC  - DVT: Lovenox  - GI: Protonix  - Diet: DASH     Overall prognosis is poor.    #Progress Note Handoff  Pending (specify): supportive care,  start Nicotine patch , c/w same dose of steroids, d/c Ceftriaxone, start cefepime, c/w Levofloxacin, , f/u fluid cytology, consult dietitian,  PT/rehab eval, consult palliative care   Family discussion: I spoke with pt and multiple family members at the bedside today, they agreed with a plan of care   Disposition: SDU

## 2024-05-10 NOTE — PHYSICAL THERAPY INITIAL EVALUATION ADULT - ADDITIONAL COMMENTS
Pt resides alone in 1st floor studio. no stairs to negotiate. Pt used to be independent with overall BADLs, able to ambulate using no Ad at baseline, + home attendant 10 hrs X 2 days

## 2024-05-10 NOTE — PROGRESS NOTE ADULT - ASSESSMENT
IMPRESSION:    Acute hypoxemic resp failure on NC slowly improving  Acute on chronic hypercapnic resp failure  COPD exacerbation   lung mass r/o malignancy   Superimposed pneumonia      PLAN:    CNS: Avoid CNS depressant    HEENT: oral care     PULMONARY: keep pox 88 to 92%M, NIv 4 hrs on and 4  hrs , albuterol Q4 hrs and prn , solumedrol 40 mg Q 12 hrs     will need biopsy / bronch when stable   SP thora    CARDIOVASCULAR: echo   keep is = os     GI: GI prophylaxis.  Feeding speech and swallow     RENAL: follow lytes     INFECTIOUS DISEASE: continue abx Procal noted    HEMATOLOGICAL:  DVT prophylaxis.    ENDOCRINE:  Follow up FS.  Insulin protocol if needed.    SDU  GOC

## 2024-05-11 NOTE — PROGRESS NOTE ADULT - ASSESSMENT
74 y/o female with PMHx COPD, HTN, Chronic Smoker P/W SOB x weeks, cough, wheezing, O2 sat 70% at home placed on NRB by EMS, confused. Admitted to MICU for ARF, was found to have lungs mass and postobstructive PNA.   Pt clinically improved and was downgraded to SDU.     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  - c/w Solumedrol 60 mg Q 12 hours  - pt was consulted by ID , recommendations noted:  - f/u pleural fluid cultures  - nares ORSA  - Cefepime 2 gm iv q8 and Levaquin 750 mg iv q24h  - nebs Q 6 hours standing  - pulmonary toilet   - pt underwent diagnostic  thoracentesis on 5/9, will follow up fluid cytology ( mass is highly suspicious for advance malignancy)   - pulmonary is following, recommendations noted    - aspiration precautions, chest PT  - Mucinex standing    - BIPAP PRN and QHS   - start Nicotine patch   - consult palliative care, possibility of hospice discussed with family in case if pt'll decline cancer directed therapy     #HTN   - DASH diet   - C/w Amlodipine 5mg QD    # constipation   - high fiber diet   - c/w bowel regimen     # Weight loss/ mild malnutrition  - consult dietitian   - high calories diet     #MISC  - DVT: Lovenox  - GI: Protonix  - Diet: DASH     Overall prognosis is poor.    #Progress Note Handoff  Pending (specify): supportive care,  repeat blood work today,  c/w same dose of steroids,  cefepime, c/w Levofloxacin, , f/u fluid cytology, consult dietitian,  PT/rehab eval, consult palliative care   Family discussion: I spoke with pt and multiple family members at the bedside today, they agreed with a plan of care   Disposition: SDU

## 2024-05-11 NOTE — DIETITIAN INITIAL EVALUATION ADULT - NUTRITIONGOAL OUTCOME1
Pt to demonstrate tolerance to diet order, with >50% po intake maintained over next 3 days.    Pt at high nutrition risk; RD to follow-up in 3 days.    Monitor: Skin, labs, BM, diet order, po tolerance.

## 2024-05-11 NOTE — PROGRESS NOTE ADULT - SUBJECTIVE AND OBJECTIVE BOX
Patient is a 73y old  Female who presents with a chief complaint of Chronic obstructive pulmonary disease     (11 May 2024 01:59)        Over Night Events:  On 2L nasal cannula       ROS:     All ROS are negative except HPI         PHYSICAL EXAM    ICU Vital Signs Last 24 Hrs  T(C): 36.3 (11 May 2024 08:05), Max: 36.6 (10 May 2024 11:25)  T(F): 97.4 (11 May 2024 08:05), Max: 97.9 (10 May 2024 11:25)  HR: 94 (11 May 2024 08:05) (91 - 97)  BP: 133/69 (11 May 2024 08:05) (128/60 - 155/77)  BP(mean): 92 (11 May 2024 08:05) (90 - 108)  ABP: --  ABP(mean): --  RR: 18 (11 May 2024 08:05) (18 - 20)  SpO2: 98% (11 May 2024 08:05) (85% - 98%)    O2 Parameters below as of 11 May 2024 08:05  Patient On (Oxygen Delivery Method): nasal cannula            CONSTITUTIONAL:  frail female in NAD    ENT:   Airway patent,   Mouth with normal mucosa.     EYES:   Pupils equal,   Round and reactive to light.    CARDIAC:   Normal rate,   Regular rhythm.    No edema    RESPIRATORY:   Diffuse wheezing  Bilateral BS  Normal chest expansion  Not tachypneic,  No use of accessory muscles    GASTROINTESTINAL:  Abdomen soft,   Non-tender,   No guarding,   + BS    MUSCULOSKELETAL:   Range of motion is not limited,  No clubbing, cyanosis    NEUROLOGICAL:   Alert and oriented   No motor  deficits.    SKIN:   Skin normal color for race,   Warm and dry and intact.   No evidence of rash.      05-10-24 @ 07:01  -  05-11-24 @ 07:00  --------------------------------------------------------  IN:  Total IN: 0 mL    OUT:    Voided (mL): 950 mL  Total OUT: 950 mL    Total NET: -950 mL          LABS:                                                                                                                                                                                                                                                                             MEDICATIONS  (STANDING):  albuterol/ipratropium for Nebulization 3 milliLiter(s) Nebulizer every 4 hours  amLODIPine   Tablet 5 milliGRAM(s) Oral daily  bisacodyl Suppository 10 milliGRAM(s) Rectal daily  cefepime   IVPB 2000 milliGRAM(s) IV Intermittent every 8 hours  chlorhexidine 2% Cloths 1 Application(s) Topical <User Schedule>  enoxaparin Injectable 40 milliGRAM(s) SubCutaneous every 24 hours  levoFLOXacin IVPB 750 milliGRAM(s) IV Intermittent once  levoFLOXacin IVPB 750 milliGRAM(s) IV Intermittent every 24 hours  levoFLOXacin IVPB      methylPREDNISolone sodium succinate Injectable 60 milliGRAM(s) IV Push every 12 hours  nicotine -   7 mG/24Hr(s) Patch 1 Patch Transdermal daily  pantoprazole    Tablet 40 milliGRAM(s) Oral before breakfast  polyethylene glycol 3350 17 Gram(s) Oral daily  senna 2 Tablet(s) Oral at bedtime    MEDICATIONS  (PRN):  albuterol    90 MICROgram(s) HFA Inhaler 2 Puff(s) Inhalation every 6 hours PRN for shortness of breath and/or wheezing      New X-rays reviewed:                                                                                  ECHO

## 2024-05-11 NOTE — PROGRESS NOTE ADULT - ATTENDING COMMENTS
Ms. temple was seen and examined at bedside today, the patient has a history of COPD and presented with an acute exacerbation, and early this morning is continuing to have wheezing.  On my review of the chest CT, there is a very dense, almost masslike consolidation throughout the left lower lobe.  I am concerned that this may represent a postobstructive pneumonia.  Continue with broad-spectrum antibiotics and steroids for the exacerbation.  A biopsy of this lesion will likely be required once the patient is more stable.  I agree with the fellow note, with the exceptions listed in my attestation above.  The remainder of impression and plan per fellow note.        SDU Ms. Yang was seen and examined at bedside today, the patient has a history of COPD and presented with an acute exacerbation, and early this morning is continuing to have wheezing.  On my review of the chest CT, there is a very dense, almost masslike consolidation throughout the left lower lobe.  I am concerned that this may represent a postobstructive pneumonia.  Continue with broad-spectrum antibiotics and steroids for the exacerbation.  A biopsy of this lesion will likely be required once the patient is more stable.  I agree with the fellow note, with the exceptions listed in my attestation above.  The remainder of impression and plan per fellow note.        SDU

## 2024-05-11 NOTE — DIETITIAN INITIAL EVALUATION ADULT - OTHER INFO
Pertinent Medical Information: Presented w/ SOB x weeks, cough, wheezing, O2 sat 70% at home placed on NRB by EMS, confused. Placed on BIPAP for hypercapnia and admitted to MICU, now downgraded to SDU. Acute Hypercapnic/Hypoxemic Respiratory Failure 2/2 COPD Exacerbation. Post obstructive PNA noted.    PMH includes COPD, HTN, Chronic Smoker.

## 2024-05-11 NOTE — PROGRESS NOTE ADULT - SUBJECTIVE AND OBJECTIVE BOX
74 y/o female PMHx COPD, HTN, CHronic Smoker P/W SOB x weeks, cough, wheezing, O2 sat 70% at home placed on NRB by EMS, confused.  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 Solumedrol 60mg Q8H. Duoneb Q4/PRN. BiPAP 2 on 2 off and QHS. RVP negative. Send Ur Strep/Legionella & Nasal MRSA. Procal. TTE. Pt will require mass biopsy, likely bronchoscopic, for further management once stable.  5/8: TTE with EF 70-75%, Mild MR, Trivial pericardial effusion. Procal 0.09. Pt refused BiPAP overnight. AM ABG with pH 7.25, CO2 88. Plan to continue ABx, BiPAP 4/4 & QHS. Stable for DG to SDU. Spoke with family, pt has been having fevers, weight loss and hemoptysis for ~1y but refused to visit doctor. They are aware of masses in lungs and ultimate need for biopsy if patient is agreeable.  Pt was downgraded to SDU, on 5/9 underwent diagnostic thoracentesis at the bedside,  cytology is pending, family requested palliative care consult. ID is following, Abx were changed on 5/10 ( pt developed worsening leukocytosis, has nonobstructive PNA)   Today pt was anxious, weak, denies any specific complaints, wants to go home when stable, not agreeable for SNF, refusing blood work.       Vital Signs Last 24 Hrs  T(C): 36.2 (11 May 2024 12:04), Max: 36.5 (10 May 2024 16:25)  T(F): 97.2 (11 May 2024 12:04), Max: 97.7 (10 May 2024 16:25)  HR: 81 (11 May 2024 12:04) (81 - 94)  BP: 115/55 (11 May 2024 12:04) (115/55 - 155/77)  BP(mean): 79 (11 May 2024 12:04) (79 - 108)  RR: 18 (11 May 2024 12:04) (18 - 18)  SpO2: 90% (11 May 2024 12:04) (89% - 98%)    Parameters below as of 11 May 2024 12:04  Patient On (Oxygen Delivery Method): nasal cannula          PHYSICAL EXAM:  GENERAL: anxious, cachectic female with temporal muscle waisting   HEAD:  Atraumatic, Normocephalic  NECK: Supple, No JVD, Normal thyroid  NERVOUS SYSTEM:  Alert & Oriented X3, Good concentration; Motor Strength 5/5 B/L upper and lower extremities; DTRs 2+ intact and symmetric  CHEST/LUNG: decreased BS b/l, prolonged inspiratory phase   HEART: S1, S2   ABDOMEN: Soft, Nontender, Nondistended; Bowel sounds present  EXTREMITIES:  2+ Peripheral Pulses, No clubbing, cyanosis, or edema  LYMPH: No lymphadenopathy noted  SKIN: No rashes or lesions      LABS:                     no new labs               10.6   21.07 )-----------( 255      ( 09 May 2024 05:41 )             35.6   05-09    140  |  100  |  25<H>  ----------------------------<  126<H>  5.3<H>   |  34<H>  |  0.5<L>    Ca    8.8      09 May 2024 05:41  Mg     2.1     05-09    TPro  5.7<L>  /  Alb  3.5  /  TBili  <0.2  /  DBili  x   /  AST  18  /  ALT  30  /  AlkPhos  81  05-09    c    Urinalysis Basic - ( 09 May 2024 05:41 )    Color: x / Appearance: x / SG: x / pH: x  Gluc: 126 mg/dL / Ketone: x  / Bili: x / Urobili: x   Blood: x / Protein: x / Nitrite: x   Leuk Esterase: x / RBC: x / WBC x   Sq Epi: x / Non Sq Epi: x / Bacteria: x        Culture - Blood (collected 08 May 2024 00:23)  Source: .Blood None  Preliminary Report (09 May 2024 09:02):    No growth at 24 hours      RADIOLOGY & ADDITIONAL TESTS:    < from: Xray Chest 1 View- PORTABLE-Urgent (Xray Chest 1 View- PORTABLE-Urgent .) (05.09.24 @ 12:23) >  Impression:    Left lung masslike consolidation. Decreased left effusion. No   pneumothorax.    Improving right basilar nodular opacities.    < from: CT Angio Chest PE Protocol w/ IV Cont (05.07.24 @ 00:27) >  IMPRESSION:    No pulmonary embolism.    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 Findings highly suspicious for malignancy. Further   oncological care is recommended.    Likely superimposed consolidative and groundglass opacities in the left   lung is well probably reflecting superimposed pneumonia. Correlate with   clinical symptoms.    Approximately 2 cm right lower lobe spiculated nodule concerning for   malignancy as well.    Additional 8 mm solid nodule in the right apex. Indeterminate   approximately 4 cm groundglass density in the right upper lobe. Findings   are indeterminate however neoplastic processes on the differential.   Attention on follow-up is recommended.    Partially imaged 1.6 cm right hepatic lobe hypodense lesion may reflect a   cyst.    < end of copied text >  < from: TTE Echo Complete w/o Contrast w/ Doppler (05.07.24 @ 09:58) >  Summary:   1. Hyperdynamic global left ventricular systolic function with a   visually estimated EF of 70-75%. No regional wall motion abnormalities.   2. Normal right ventricular size with mildly reduced RV systolic   function.   3. Normal left atrial size.   4. Sclerotic aortic valve with normal opening.   5. Mild tricuspid regurgitation.   6. No echocardiographic evidence of pulmonary hypertension. The IVC is   normal in size with <50% respiratory variability indicating mildly   increased right atrial pressure.   7. Trivial pericardial effusion.   8. No prior TTE is available for comparison.        MEDICATIONS  (STANDING):  albuterol/ipratropium for Nebulization 3 milliLiter(s) Nebulizer every 4 hours  amLODIPine   Tablet 5 milliGRAM(s) Oral daily  bisacodyl Suppository 10 milliGRAM(s) Rectal daily  cefepime   IVPB 2000 milliGRAM(s) IV Intermittent every 8 hours  chlorhexidine 2% Cloths 1 Application(s) Topical <User Schedule>  enoxaparin Injectable 40 milliGRAM(s) SubCutaneous every 24 hours  levoFLOXacin IVPB 750 milliGRAM(s) IV Intermittent once  levoFLOXacin IVPB 750 milliGRAM(s) IV Intermittent every 24 hours  levoFLOXacin IVPB      methylPREDNISolone sodium succinate Injectable 60 milliGRAM(s) IV Push every 12 hours  nicotine -   7 mG/24Hr(s) Patch 1 Patch Transdermal daily  pantoprazole    Tablet 40 milliGRAM(s) Oral before breakfast  polyethylene glycol 3350 17 Gram(s) Oral daily  saccharomyces boulardii 250 milliGRAM(s) Oral two times a day  senna 2 Tablet(s) Oral at bedtime  sodium zirconium cyclosilicate 10 Gram(s) Oral once    MEDICATIONS  (PRN):  albuterol    90 MICROgram(s) HFA Inhaler 2 Puff(s) Inhalation every 6 hours PRN for shortness of breath and/or wheezing

## 2024-05-11 NOTE — PROGRESS NOTE ADULT - ASSESSMENT
IMPRESSION:    Acute hypoxemic resp failure on NC slowly improving  Acute on chronic hypercapnic resp failure  COPD exacerbation   lung mass r/o malignancy   Superimposed pneumonia      PLAN:    CNS: Avoid CNS depressant    HEENT: oral care     PULMONARY: keep pox 88 to 92%M, NIv 4 hrs on and 4 hrs and QHS. Now using NIV QHS. albuterol Q4 hrs and prn , solumedrol 40 mg Q 12 hrs   will need biopsy / bronch when stable.  SP junea. Follow up cytology.     CARDIOVASCULAR: TTE noted with 70-75%. Keep is = os     GI: GI prophylaxis.  Feeding per speech and swallow     RENAL: follow lytes     INFECTIOUS DISEASE: continue abx Procal noted    HEMATOLOGICAL:  DVT prophylaxis.    ENDOCRINE:  Follow up FS.  Insulin protocol if needed.    SDU  GOC

## 2024-05-11 NOTE — DIETITIAN INITIAL EVALUATION ADULT - NS FNS DIET ORDER
DASH/TLC diet. Followed by SLP services this admit; 5/9 SLP eval notes "+ toleration observed without overt symptoms of penetration/aspiration for Regular/thins" Recommends Regular/thins. Reportedly w/ poor appetite initially this admit in setting of SOB; reportedly improving at this time. Consuming 50-75% of meals at this time.

## 2024-05-11 NOTE — DIETITIAN INITIAL EVALUATION ADULT - ORAL INTAKE PTA/DIET HISTORY
Reportedly w/ reduced appetite & po intake over past few months in setting of worsening SOB. Reportedly consuming <75% of estimated energy needs >3 months reported. Consumes regular diet with no therapeutic diet restrictions reported. NKFA. No supplements reported. UBW unknown however suspects unintentional wt loss over past few months. No food preferences reported. No dietary restrictions related to culture/Baptism per report.

## 2024-05-11 NOTE — DIETITIAN INITIAL EVALUATION ADULT - PERTINENT MEDS FT
MEDICATIONS  (STANDING):  albuterol/ipratropium for Nebulization 3 milliLiter(s) Nebulizer every 4 hours  amLODIPine   Tablet 5 milliGRAM(s) Oral daily  bisacodyl Suppository 10 milliGRAM(s) Rectal daily  cefepime   IVPB 2000 milliGRAM(s) IV Intermittent every 8 hours  chlorhexidine 2% Cloths 1 Application(s) Topical <User Schedule>  enoxaparin Injectable 40 milliGRAM(s) SubCutaneous every 24 hours  levoFLOXacin IVPB 750 milliGRAM(s) IV Intermittent once  levoFLOXacin IVPB 750 milliGRAM(s) IV Intermittent every 24 hours  levoFLOXacin IVPB      methylPREDNISolone sodium succinate Injectable 60 milliGRAM(s) IV Push every 12 hours  nicotine -   7 mG/24Hr(s) Patch 1 Patch Transdermal daily  pantoprazole    Tablet 40 milliGRAM(s) Oral before breakfast  polyethylene glycol 3350 17 Gram(s) Oral daily  senna 2 Tablet(s) Oral at bedtime    MEDICATIONS  (PRN):  albuterol    90 MICROgram(s) HFA Inhaler 2 Puff(s) Inhalation every 6 hours PRN for shortness of breath and/or wheezing

## 2024-05-11 NOTE — DIETITIAN INITIAL EVALUATION ADULT - PERTINENT LABORATORY DATA
05-09    140  |  100  |  25<H>  ----------------------------<  126<H>  5.3<H>   |  34<H>  |  0.5<L>    Ca    8.8      09 May 2024 05:41  Mg     2.1     05-09    TPro  5.7<L>  /  Alb  3.5  /  TBili  <0.2  /  DBili  x   /  AST  18  /  ALT  30  /  AlkPhos  81  05-09

## 2024-05-12 NOTE — CHART NOTE - NSCHARTNOTEFT_GEN_A_CORE
Transfer from: [facility]  Transfer to: [facility]    HPI:   74 y/o female PMHx COPD, HTN, CHronic Smoker P/W SOB x weeks, cough, wheezing, O2 sat 70% at home placed on NRB by EMS, confused.     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 Solumedrol 60mg Q8H. Duoneb Q4/PRN. BiPAP 2 on 2 off and QHS. RVP negative. Send Ur Strep/Legionella & Nasal MRSA. Procal. TTE. Pt will require mass biopsy, likely bronchoscopic, for further management once stable.  - 5/8: TTE with EF 70-75%, Mild MR, Trivial pericardial effusion. Procal 0.09. Pt refused BiPAP overnight. AM ABG with pH 7.25, CO2 88. Plan to continue ABx, BiPAP 4/4 & QHS. Stable for DG to SDU. Spoke with family, pt has been having fevers, weight loss and hemoptysis for ~1y but refused to visit doctor. They are aware of masses in lungs and ultimate need for biopsy if patient is agreeable.    CEU Course:  -Patient stable on NC. BiPAP 4 on 4 off and QHS. Patient often refuses BiPAP despite extensive counseling and encouragement (gets anxious, refuses antianxiety or pain medication), however no changes in mental status. Solumedrol 60mg q12h.   -Diagnostic thoracentesis done on 5/10, f/u cytology. Once cytology results have heme/onc see her.   -ID following, on cefepime and levaquin for post-obstructive pneumonia.   -Patient defers conversations regarding possible cancer diagnosis to her children. They state there is a possibility she may decline cancer treatment and opt for hospice. F/u palliative.     Patient is stable for downgrade to floor.     A/P:  74 y/o female with PMHx COPD, HTN, Chronic Smoker P/W SOB x weeks, cough, wheezing, O2 sat 70% at home placed on NRB by EMS, confused. Placed on BIPAP for hypercapnia and admitted to MICU, now downgraded to SDU.     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 Solumedrol 60mg Q8H. Duoneb Q4/PRN. BiPAP 2 on 2 off and QHS. RVP negative. Send Ur Strep/Legionella & Nasal MRSA. Procal. TTE. Pt will require mass biopsy, likely bronchoscopic, for further management once stable.  - 5/8: TTE with EF 70-75%, Mild MR, Trivial pericardial effusion. Procal 0.09. Pt refused BiPAP overnight. AM ABG with pH 7.25, CO2 88. Plan to continue ABx, BiPAP 4/4 & QHS. Stable for DG to SDU. Spoke with family, pt has been having fevers, weight loss and hemoptysis for ~1y but refused to visit doctor. They are aware of masses in lungs and ultimate need for biopsy if patient is agreeable.  - C/w Ceftriaxone and Levaquin - ID consulted, f/u recs  - C/w Solumedrol 60mg q12h, Duoneb Q4/PRN, BiPAP 2/2 & QHS  - S/p diagnostic thoracentesis 5/9, f/u cytology  - currently on 2-3L NC - wean as tolerated   - PT   - consult heme/onc once cytology results     #HTN  - C/w Amlodipine 5mg QD    #rectal bleeding  #hemorrhoids   -5/9 nurse reported few drops of blood on floor after patient had BM  -pt has rectal hemorrhoid  -Hb 11.0 > 10.2 > 10.6  -bowel regimen  -monitor CBC     #MISC  - DVT: Lovenox  - GI: Protonix  - Diet: DASH when off BiPAP  - Dispo: DG to F

## 2024-05-12 NOTE — PROGRESS NOTE ADULT - SUBJECTIVE AND OBJECTIVE BOX
Patient is a 73y old  Female who presents with a chief complaint of COPD exacerbation (11 May 2024 15:54)      overnight events: used NIV overnight. currently on 3 liter NC. off pressor. no complaints                ROS: as in HPI; All other systems reviewed are negative        PHYSICAL EXAM  Vital Signs Last 24 Hrs  T(C): 36.8 (12 May 2024 08:41), Max: 37.4 (11 May 2024 16:00)  T(F): 98.3 (12 May 2024 08:41), Max: 99.4 (11 May 2024 16:00)  HR: 119 (12 May 2024 08:41) (81 - 119)  BP: 147/69 (12 May 2024 08:41) (115/55 - 162/78)  BP(mean): 98 (12 May 2024 08:41) (79 - 112)  RR: 20 (12 May 2024 08:41) (18 - 20)  SpO2: 93% (12 May 2024 08:41) (89% - 97%)    Parameters below as of 12 May 2024 08:41  Patient On (Oxygen Delivery Method): nasal cannula, high flow          CONSTITUTIONAL:  NAD    ENT:   Airway patent,     EYES:   Clear bilaterally,   pupils equal,   round and reactive to light.    CARDIAC:   Normal rate,   regular rhythm.    no edema    RESPIRATORY:   mild wheezing   Normal chest expansion  Not tachypneic,    GASTROINTESTINAL:  Abdomen soft, non-tender,   No guarding,   Positive BS    MUSCULOSKELETAL:   Range of motion is not limited,    NEUROLOGICAL:   Alert and oriented   No motor deficits.    SKIN:   Skin normal color for race,   No evidence of rash.                I&O's Detail    11 May 2024 07:01  -  12 May 2024 07:00  --------------------------------------------------------  IN:    IV PiggyBack: 100 mL    Oral Fluid: 240 mL  Total IN: 340 mL    OUT:  Total OUT: 0 mL    Total NET: 340 mL            LABS:                        11.1   20.10 )-----------( 259      ( 12 May 2024 05:38 )             36.9     12 May 2024 05:38    140    |  94     |  20     ----------------------------<  87     5.1     |  39     |  <0.5     Ca    8.9        12 May 2024 05:38  Mg     2.0       12 May 2024 05:38    TPro  5.5    /  Alb  3.7    /  TBili  0.3    /  DBili  x      /  AST  15     /  ALT  46     /  AlkPhos  65     12 May 2024 05:38  Amylase x     lipase x              CAPILLARY BLOOD GLUCOSE          Urinalysis Basic - ( 12 May 2024 05:38 )    Color: x / Appearance: x / SG: x / pH: x  Gluc: 87 mg/dL / Ketone: x  / Bili: x / Urobili: x   Blood: x / Protein: x / Nitrite: x   Leuk Esterase: x / RBC: x / WBC x   Sq Epi: x / Non Sq Epi: x / Bacteria: x      Culture        MEDICATIONS  (STANDING):  albuterol/ipratropium for Nebulization 3 milliLiter(s) Nebulizer every 4 hours  amLODIPine   Tablet 5 milliGRAM(s) Oral daily  bisacodyl Suppository 10 milliGRAM(s) Rectal daily  cefepime   IVPB 2000 milliGRAM(s) IV Intermittent every 8 hours  chlorhexidine 2% Cloths 1 Application(s) Topical <User Schedule>  enoxaparin Injectable 40 milliGRAM(s) SubCutaneous every 24 hours  levoFLOXacin IVPB 750 milliGRAM(s) IV Intermittent once  levoFLOXacin IVPB 750 milliGRAM(s) IV Intermittent every 24 hours  levoFLOXacin IVPB      methylPREDNISolone sodium succinate Injectable 60 milliGRAM(s) IV Push every 12 hours  nicotine -   7 mG/24Hr(s) Patch 1 Patch Transdermal daily  pantoprazole    Tablet 40 milliGRAM(s) Oral before breakfast  polyethylene glycol 3350 17 Gram(s) Oral daily  saccharomyces boulardii 250 milliGRAM(s) Oral two times a day  senna 2 Tablet(s) Oral at bedtime    MEDICATIONS  (PRN):  albuterol    90 MICROgram(s) HFA Inhaler 2 Puff(s) Inhalation every 6 hours PRN for shortness of breath and/or wheezing  sodium chloride 0.65% Nasal 1 Spray(s) Both Nostrils three times a day PRN Nasal Congestion

## 2024-05-12 NOTE — PROGRESS NOTE ADULT - ATTENDING COMMENTS
Ms. Yang was seen and examined at bedside today, patient was admitted with pneumonia/acute exacerbation of COPD with CT scan findings concerning for possible bronchogenic carcinoma.  Patient's respiratory status continues to improve on antibiotics, and she is stable for transfer to the general medical floor.  She should be reassessed prior to discharge for possible endobronchial biopsy, and follow-up with pulmonary.  I agree with the fellow note, with the exceptions listed in my attestation above.  The remainder of impression and plan per fellow note.        GMF

## 2024-05-12 NOTE — PROGRESS NOTE ADULT - ASSESSMENT
IMPRESSION:    Acute hypoxemic resp failure on NC slowly improving  Acute on chronic hypercapnic resp failure  COPD exacerbation   lung mass r/o malignancy   Superimposed pneumonia      PLAN:    CNS: Avoid CNS depressant    HEENT: oral care     PULMONARY: keep pox 88 to 92%M, NIV 4 hrs on and 4 hrs and QHS. Now using NIV QHS. albuterol Q4 hrs and prn , solumedrol 40 mg Q 12 hrs. will taper in am if stable.  will need biopsy / bronch when stable.  SP thora. Follow up cytology.     CARDIOVASCULAR: TTE noted with 70-75%. Keep is = os     GI: GI prophylaxis.  Feeding per speech and swallow     RENAL: follow lytes     INFECTIOUS DISEASE: continue abx Procal noted    HEMATOLOGICAL:  DVT prophylaxis.    ENDOCRINE:  Follow up FS.  Insulin protocol if needed.    DGTF    [facility]

## 2024-05-12 NOTE — PROGRESS NOTE ADULT - ASSESSMENT
74 y/o female with PMHx COPD, HTN, Chronic Smoker P/W SOB x weeks, cough, wheezing, O2 sat 70% at home placed on NRB by EMS, confused. Admitted to MICU for ARF, was found to have lungs mass and postobstructive PNA.   Pt clinically improved and was downgraded to SDU.     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  - decrease  Solumedrol  to 40 mg Q 12 hours  - pt was consulted by ID , recommendations noted:  - f/u pleural fluid cultures and cytology   - nares ORSA  - Cefepime 2 gm iv q8 and Levaquin 750 mg iv q24h  - nebs Q 6 hours standing  - pulmonary toilet   - pt underwent diagnostic  thoracentesis on 5/9, will follow up fluid cytology ( mass is highly suspicious for advance malignancy)   - pulmonary is following, recommendations noted    - aspiration precautions, chest PT  - Mucinex standing    - BIPAP PRN and QHS   - start Nicotine patch   - consult palliative care, possibility of hospice discussed with family in case if pt'll decline cancer directed therapy     #HTN   - DASH diet   - C/w Amlodipine 5mg QD    # constipation   - high fiber diet   - c/w bowel regimen     # Weight loss/ mild malnutrition  - consult dietitian   - high calories diet     #MISC  - DVT: Lovenox  - GI: Protonix  - Diet: DASH     Overall prognosis is poor.    #Progress Note Handoff  Pending (specify): supportive care, taper steroids, c/w   cefepime, c/w Levofloxacin, , f/u fluid cytology, consult dietitian,  PT/rehab eval, consult palliative care on Monday   Family discussion: I spoke with pt and multiple family members at the bedside today, they agreed with a plan of care   Disposition: SDU

## 2024-05-12 NOTE — PROGRESS NOTE ADULT - SUBJECTIVE AND OBJECTIVE BOX
74 y/o female PMHx COPD, HTN, CHronic Smoker P/W SOB x weeks, cough, wheezing, O2 sat 70% at home placed on NRB by EMS, confused.  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 Solumedrol 60mg Q8H. Duoneb Q4/PRN. BiPAP 2 on 2 off and QHS. RVP negative. Send Ur Strep/Legionella & Nasal MRSA. Procal. TTE. Pt will require mass biopsy, likely bronchoscopic, for further management once stable.  5/8: TTE with EF 70-75%, Mild MR, Trivial pericardial effusion. Procal 0.09. Pt refused BiPAP overnight. AM ABG with pH 7.25, CO2 88. Plan to continue ABx, BiPAP 4/4 & QHS. Stable for DG to SDU. Spoke with family, pt has been having fevers, weight loss and hemoptysis for ~1y but refused to visit doctor. They are aware of masses in lungs and ultimate need for biopsy if patient is agreeable.  Pt was downgraded to SDU, on 5/9 underwent diagnostic thoracentesis at the bedside,  cytology is pending, family requested palliative care consult. ID is following, Abx were changed on 5/10 ( pt developed worsening leukocytosis, has nonobstructive PNA)   Today pt was anxious at times, denies any specific complaints, weak.      Vital Signs Last 24 Hrs  T(C): 36.6 (12 May 2024 12:45), Max: 37.4 (11 May 2024 16:00)  T(F): 97.9 (12 May 2024 12:45), Max: 99.4 (11 May 2024 16:00)  HR: 93 (12 May 2024 12:45) (84 - 119)  BP: 152/72 (12 May 2024 12:45) (131/63 - 162/78)  BP(mean): 105 (12 May 2024 12:45) (90 - 112)  RR: 20 (12 May 2024 12:45) (18 - 20)  SpO2: 88% (12 May 2024 12:45) (88% - 97%)    Parameters below as of 12 May 2024 12:45  Patient On (Oxygen Delivery Method): nasal cannula        PHYSICAL EXAM:  GENERAL: anxious, cachectic female with temporal muscle waisting   HEAD:  Atraumatic, Normocephalic  NECK: Supple, No JVD, Normal thyroid  NERVOUS SYSTEM:  Alert & Oriented X3, Good concentration; Motor Strength 5/5 B/L upper and lower extremities; DTRs 2+ intact and symmetric  CHEST/LUNG: decreased BS b/l, prolonged inspiratory phase   HEART: S1, S2   ABDOMEN: Soft, Nontender, Nondistended; Bowel sounds present  EXTREMITIES:  2+ Peripheral Pulses, No clubbing, cyanosis, or edema  LYMPH: No lymphadenopathy noted  SKIN: No rashes or lesions      LABS:                          11.1   20.10 )-----------( 259      ( 12 May 2024 05:38 )             36.9   05-12    140  |  94<L>  |  20  ----------------------------<  87  5.1<H>   |  39<H>  |  <0.5<L>    Ca    8.9      12 May 2024 05:38  Mg     2.0     05-12    TPro  5.5<L>  /  Alb  3.7  /  TBili  0.3  /  DBili  x   /  AST  15  /  ALT  46<H>  /  AlkPhos  65  05-12      Urinalysis Basic - ( 09 May 2024 05:41 )    Color: x / Appearance: x / SG: x / pH: x  Gluc: 126 mg/dL / Ketone: x  / Bili: x / Urobili: x   Blood: x / Protein: x / Nitrite: x   Leuk Esterase: x / RBC: x / WBC x   Sq Epi: x / Non Sq Epi: x / Bacteria: x    Culture - Blood (collected 08 May 2024 00:23)  Source: .Blood None  Preliminary Report (09 May 2024 09:02):    No growth at 24 hours      RADIOLOGY & ADDITIONAL TESTS:    < from: Xray Chest 1 View- PORTABLE-Urgent (Xray Chest 1 View- PORTABLE-Urgent .) (05.09.24 @ 12:23) >  Impression:    Left lung masslike consolidation. Decreased left effusion. No   pneumothorax.    Improving right basilar nodular opacities.    < from: CT Angio Chest PE Protocol w/ IV Cont (05.07.24 @ 00:27) >  IMPRESSION:    No pulmonary embolism.    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 Findings highly suspicious for malignancy. Further   oncological care is recommended.    Likely superimposed consolidative and groundglass opacities in the left   lung is well probably reflecting superimposed pneumonia. Correlate with   clinical symptoms.    Approximately 2 cm right lower lobe spiculated nodule concerning for   malignancy as well.    Additional 8 mm solid nodule in the right apex. Indeterminate   approximately 4 cm groundglass density in the right upper lobe. Findings   are indeterminate however neoplastic processes on the differential.   Attention on follow-up is recommended.    Partially imaged 1.6 cm right hepatic lobe hypodense lesion may reflect a   cyst.    < end of copied text >  < from: TTE Echo Complete w/o Contrast w/ Doppler (05.07.24 @ 09:58) >  Summary:   1. Hyperdynamic global left ventricular systolic function with a   visually estimated EF of 70-75%. No regional wall motion abnormalities.   2. Normal right ventricular size with mildly reduced RV systolic   function.   3. Normal left atrial size.   4. Sclerotic aortic valve with normal opening.   5. Mild tricuspid regurgitation.   6. No echocardiographic evidence of pulmonary hypertension. The IVC is   normal in size with <50% respiratory variability indicating mildly   increased right atrial pressure.   7. Trivial pericardial effusion.   8. No prior TTE is available for comparison.        MEDICATIONS  (STANDING):  albuterol/ipratropium for Nebulization 3 milliLiter(s) Nebulizer every 4 hours  amLODIPine   Tablet 5 milliGRAM(s) Oral daily  bisacodyl Suppository 10 milliGRAM(s) Rectal daily  cefepime   IVPB 2000 milliGRAM(s) IV Intermittent every 8 hours  chlorhexidine 2% Cloths 1 Application(s) Topical <User Schedule>  enoxaparin Injectable 40 milliGRAM(s) SubCutaneous every 24 hours  levoFLOXacin IVPB 750 milliGRAM(s) IV Intermittent once  levoFLOXacin IVPB 750 milliGRAM(s) IV Intermittent every 24 hours  levoFLOXacin IVPB      methylPREDNISolone sodium succinate Injectable 60 milliGRAM(s) IV Push every 12 hours  nicotine -   7 mG/24Hr(s) Patch 1 Patch Transdermal daily  pantoprazole    Tablet 40 milliGRAM(s) Oral before breakfast  polyethylene glycol 3350 17 Gram(s) Oral daily  saccharomyces boulardii 250 milliGRAM(s) Oral two times a day  senna 2 Tablet(s) Oral at bedtime    MEDICATIONS  (PRN):  albuterol    90 MICROgram(s) HFA Inhaler 2 Puff(s) Inhalation every 6 hours PRN for shortness of breath and/or wheezing  sodium chloride 0.65% Nasal 1 Spray(s) Both Nostrils three times a day PRN Nasal Congestion

## 2024-05-13 NOTE — CHART NOTE - NSCHARTNOTEFT_GEN_A_CORE
Desatting to 81%. Not tolerating bipap. Tachypneic. Lung sounds clear. Appears euvolemic on exam. Ordered HFNC and ABG and CXR. NRB while waiting for HFNC has improved O2 to 94%. Pt is currently full code per previous conversations, however family at bedside is uncertain what they would prefer. Reached out to pulm for upgrade and to palliative for help with clarifying GOC. Desatting to 81%. Not tolerating bipap. Tachypneic. Lung sounds clear. Appears euvolemic on exam. Ordered HFNC and ABG and CXR. NRB while waiting for HFNC has improved O2 to 92%. Pt is currently full code per previous conversations, however family at bedside is uncertain what they would prefer. Reached out to pulm for upgrade and to palliative for help with clarifying GOC. At the moment, family wants full code including intubation.    CXR with worsening left lower lobe pneumonia. Called RT again about the ABG.

## 2024-05-13 NOTE — GOALS OF CARE CONVERSATION - ADVANCED CARE PLANNING - CONVERSATION DETAILS
Discussed goals of care with patient's children Brenda and Grayson as patient is having oxygen desaturations.  The children state that at the moment they would like the patient to be full code and for the patient to be intubated if she continues to desaturate.  They state that they are waiting for cytology results and once those results come back they open to further goals of care conversations.  Patient is currently FULL CODE.

## 2024-05-13 NOTE — PROGRESS NOTE ADULT - SUBJECTIVE AND OBJECTIVE BOX
24H events:    Patient is a 73y old Female who presents with a chief complaint of COPD exacerbation (12 May 2024 15:27)    Primary diagnosis of COPD exacerbation    Today is 7d of hospitalization. This morning patient was seen and examined at bedside, resting comfortably in bed.    No acute or major events overnight.   hasn't used bipap overnight. No dyspnea but anxious that she may have dyspnea when sleeping. Encouraged to use bipap when sleeping- hasn't used overnight.    Code Status: full    PAST MEDICAL & SURGICAL HISTORY    SOCIAL HISTORY:  Social History:      ALLERGIES:  No Known Allergies    MEDICATIONS:  STANDING MEDICATIONS  albuterol/ipratropium for Nebulization 3 milliLiter(s) Nebulizer every 4 hours  amLODIPine   Tablet 5 milliGRAM(s) Oral daily  bisacodyl Suppository 10 milliGRAM(s) Rectal daily  cefepime   IVPB 2000 milliGRAM(s) IV Intermittent every 8 hours  chlorhexidine 2% Cloths 1 Application(s) Topical <User Schedule>  enoxaparin Injectable 40 milliGRAM(s) SubCutaneous every 24 hours  hydrOXYzine hydrochloride 25 milliGRAM(s) Oral daily  levoFLOXacin IVPB 750 milliGRAM(s) IV Intermittent once  levoFLOXacin IVPB 750 milliGRAM(s) IV Intermittent every 24 hours  levoFLOXacin IVPB      methylPREDNISolone sodium succinate Injectable 60 milliGRAM(s) IV Push every 24 hours  nicotine -   7 mG/24Hr(s) Patch 1 Patch Transdermal daily  pantoprazole    Tablet 40 milliGRAM(s) Oral before breakfast  polyethylene glycol 3350 17 Gram(s) Oral daily  saccharomyces boulardii 250 milliGRAM(s) Oral two times a day  senna 2 Tablet(s) Oral at bedtime    PRN MEDICATIONS  albuterol    90 MICROgram(s) HFA Inhaler 2 Puff(s) Inhalation every 6 hours PRN  sodium chloride 0.65% Nasal 1 Spray(s) Both Nostrils three times a day PRN    VITALS:   T(F): 99.2  HR: 82  BP: 152/75  RR: 18  SpO2: 91%    PHYSICAL EXAM:  GENERAL:   ( x) NAD, lying in bed comfortably     (  ) obtunded     (  ) lethargic     (  ) somnolent    HEAD:   ( x) Atraumatic     (  ) hematoma     (  ) laceration (specify location:       )     NECK:  (x) Supple     (  ) neck stiffness     (  ) nuchal rigidity     (  )  no JVD     (  ) JVD present ( -- cm)    HEART:  Rate -->     (x) normal rate     (  ) bradycardic     (  ) tachycardic  Rhythm -->     (x) regular     (  ) regularly irregular     (  ) irregularly irregular  Murmurs -->     (x) normal s1s2     (  ) systolic murmur     (  ) diastolic murmur     (  ) continuous murmur      (  ) S3 present     (  ) S4 present    LUNGS: lungs clear on 5 L NC- auscultated after duonebs  ( x)Unlabored respirations     (  ) tachypnea  ( x) B/L air entry     (  ) decreased breath sounds in:  (location     )    ( x) no adventitious sound     (  ) crackles     (  ) wheezing      (  ) rhonchi      (specify location:       )  (  ) chest wall tenderness (specify location:       )    ABDOMEN:   ( x) Soft     (  ) tense   |   (X  ) nondistended     (  ) distended   |   ( X ) +BS     (  ) hypoactive bowel sounds     (  ) hyperactive bowel sounds  ( x) nontender     (  ) RUQ tenderness     (  ) RLQ tenderness     (  ) LLQ tenderness     (  ) epigastric tenderness     (  ) diffuse tenderness  (  ) Splenomegaly      (  ) Hepatomegaly      (  ) Jaundice     (  ) ecchymosis     EXTREMITIES: euvolemic  ( x) Normal     (  ) Rash     (  ) ecchymosis     (  ) varicose veins      (  ) pitting edema     (  ) non-pitting edema   (  ) ulceration     (  ) gangrene:     (location:     )    NERVOUS SYSTEM:    ( x) A&Ox3     (  ) confused     (  ) lethargic    LABS:                        10.9   19.35 )-----------( 253      ( 13 May 2024 06:55 )             35.8     05-13    136  |  91<L>  |  20  ----------------------------<  87  4.8   |  x   |  <0.5<L>    Ca    8.7      13 May 2024 06:55  Phos  3.5     05-13  Mg     2.1     05-13    TPro  5.1<L>  /  Alb  3.5  /  TBili  0.4  /  DBili  x   /  AST  15  /  ALT  47<H>  /  AlkPhos  64  05-13      Urinalysis Basic - ( 13 May 2024 06:55 )    Color: x / Appearance: x / SG: x / pH: x  Gluc: 87 mg/dL / Ketone: x  / Bili: x / Urobili: x   Blood: x / Protein: x / Nitrite: x   Leuk Esterase: x / RBC: x / WBC x   Sq Epi: x / Non Sq Epi: x / Bacteria: x                RADIOLOGY:

## 2024-05-13 NOTE — CHART NOTE - NSCHARTNOTEFT_GEN_A_CORE
UPGRADE TO STEPDOWN    72 y/o female PMHx COPD, HTN, CHronic Smoker P/W SOB x weeks, cough, wheezing, O2 sat 70% at home placed on NRB by EMS and was confused, brought in to the hospital.     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 Solumedrol 60mg Q8H. Duoneb Q4/PRN. BiPAP 2 on 2 off and QHS. RVP negative.  Pt will require mass biopsy, likely bronchoscopic, for further management once stable.  - 5/8: TTE with EF 70-75%, Mild MR, Trivial pericardial effusion. Procal 0.09. Pt refused BiPAP. AM ABG with pH 7.25, CO2 88. Plan to continue ABx, BiPAP 4/4 & QHS. Stable for DG to SDU. Per family, pt has been having fevers, weight loss and hemoptysis for ~1y but refused to visit doctor. They are aware of masses in lungs and ultimate need for biopsy if patient is agreeable.    CEU Course:  -Patient stable on NC. BiPAP 4 on 4 off and QHS. Patient often refuses BiPAP despite extensive counseling and encouragement (gets anxious, refuses antianxiety or pain medication), however no changes in mental status. Solumedrol 60mg q12h.   -Diagnostic thoracentesis done on 5/10, f/u cytology. Once cytology results have heme/onc see her.   -ID following, on cefepime and levaquin for post-obstructive pneumonia.   -Patient defers conversations regarding possible cancer diagnosis to her children. They state there is a possibility she may decline cancer treatment and opt for hospice.     4A course:   -the morning of 5/13 patient was tachypneic, anxious appearing.  O2 sat ranged from 78%-82% on 6L nasal cannula, patient was placed on non rebreather mask 15L and was still satting in the mid 80's.  Patient declines BIPAP as she feels she cannot breath with the mask on.  High Flow nasal cannula placed with improvement in oxygenation to 90%.  Chest x-ray demonstrated worsening left sided pneumonia and ABG pH 7.4, CO2 77, lactate 1.2.  Discussed goals of care with patient's children who stated that this time patient is full code. Discussed with crit fellow who will see patient and recommended upgrade to stepdown.     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  - currently on solumedrol 60 daily   - pt was consulted by ID , recommendations noted: c/w levaquin and cefepime, today is day 7  - nares ORSA  - nebs Q 6 hours standing  - pt underwent diagnostic  thoracentesis on 5/9, will follow up fluid cytology ( mass is highly suspicious for advance malignancy) - reach out to heme-onc when cytology comes back   - aspiration precautions, chest PT  - Mucinex standing    - BIPAP PRN and QHS ordered although patient has not been using   - palliative care on board, possibility of hospice discussed with family in case if pt'll decline cancer directed therapy   -Hydroxyzine 25 for anxiety    #HTN   - DASH diet   - C/w Amlodipine 5mg QD    # constipation   - high fiber diet   - c/w bowel regimen     # Weight loss/ mild malnutrition  - consult dietitian   - high calories diet     #MISC  - DVT: Lovenox  - GI: Protonix  - Diet: DASH  -Code status: Full       MEDICATIONS:  albuterol    90 MICROgram(s) HFA Inhaler 2 Puff(s) Inhalation every 6 hours PRN  albuterol/ipratropium for Nebulization 3 milliLiter(s) Nebulizer every 4 hours  amLODIPine   Tablet 5 milliGRAM(s) Oral daily  bisacodyl Suppository 10 milliGRAM(s) Rectal daily  cefepime   IVPB 2000 milliGRAM(s) IV Intermittent every 8 hours  chlorhexidine 2% Cloths 1 Application(s) Topical <User Schedule>  enoxaparin Injectable 40 milliGRAM(s) SubCutaneous every 24 hours  hydrOXYzine hydrochloride 25 milliGRAM(s) Oral daily  levoFLOXacin IVPB 750 milliGRAM(s) IV Intermittent once  levoFLOXacin IVPB 750 milliGRAM(s) IV Intermittent every 24 hours  levoFLOXacin IVPB      nicotine -   7 mG/24Hr(s) Patch 1 Patch Transdermal daily  pantoprazole    Tablet 40 milliGRAM(s) Oral before breakfast  polyethylene glycol 3350 17 Gram(s) Oral daily  saccharomyces boulardii 250 milliGRAM(s) Oral two times a day  senna 2 Tablet(s) Oral at bedtime  sodium chloride 0.65% Nasal 1 Spray(s) Both Nostrils three times a day PRN      T(C): 36.9 (05-13-24 @ 11:57), Max: 37.3 (05-12-24 @ 19:58)  HR: 88 (05-13-24 @ 11:57) (82 - 97)  BP: 130/72 (05-13-24 @ 11:57) (130/72 - 152/75)  RR: 18 (05-13-24 @ 11:57) (18 - 21)  SpO2: 94% (05-13-24 @ 13:00) (89% - 94%)  Wt(kg): --Vital Signs Last 24 Hrs  T(C): 36.9 (13 May 2024 11:57), Max: 37.3 (12 May 2024 19:58)  T(F): 98.5 (13 May 2024 11:57), Max: 99.2 (13 May 2024 05:06)  HR: 88 (13 May 2024 11:57) (82 - 97)  BP: 130/72 (13 May 2024 11:57) (130/72 - 152/75)  BP(mean): 97 (12 May 2024 19:58) (97 - 97)  RR: 18 (13 May 2024 11:57) (18 - 21)  SpO2: 94% (13 May 2024 13:00) (89% - 94%)    Parameters below as of 13 May 2024 14:05  Patient On (Oxygen Delivery Method): nasal cannula, high flow  O2 Flow (L/min): 30  O2 Concentration (%): 50    PHYSICAL EXAM:  GENERAL: sitting in bed with high flow nasal cannula, not in distress although desats after extended period of speaking   HEAD:  Atraumatic, Normocephalic  EYES: EOMI, PERRLA, conjunctiva and sclera clear  ENMT:  Moist mucous membranes  NECK: Supple, No JVD,  CHEST/LUNG: Clear to auscultation bilaterally  HEART: Regular rate and rhythm; No murmurs, rubs, or gallops  ABDOMEN: Soft, Nontender, Nondistended; Bowel sounds present  NEURO: Alert & Oriented X3  EXTREMITIES: No LE edema, no calf tenderness  LYMPH: No lymphadenopathy noted  SKIN: No rashes or lesions    Consultant(s) Notes Reviewed:  [x ] YES  [ ] NO  Care Discussed with Consultants/Other Providers [ x] YES  [ ] NO    LABS:                        10.9   19.35 )-----------( 253      ( 13 May 2024 06:55 )             35.8     05-13    136  |  91<L>  |  20  ----------------------------<  87  4.8   |  44<HH>  |  <0.5<L>    Ca    8.7      13 May 2024 06:55  Phos  3.5     05-13  Mg     2.1     05-13    TPro  5.1<L>  /  Alb  3.5  /  TBili  0.4  /  DBili  x   /  AST  15  /  ALT  47<H>  /  AlkPhos  64  05-13      Urinalysis Basic - ( 13 May 2024 06:55 )    Color: x / Appearance: x / SG: x / pH: x  Gluc: 87 mg/dL / Ketone: x  / Bili: x / Urobili: x   Blood: x / Protein: x / Nitrite: x   Leuk Esterase: x / RBC: x / WBC x   Sq Epi: x / Non Sq Epi: x / Bacteria: x      CAPILLARY BLOOD GLUCOSE          ABG - ( 13 May 2024 14:03 )  pH, Arterial: 7.40  pH, Blood: x     /  pCO2: 77    /  pO2: 51    / HCO3: 48    / Base Excess: 18.5  /  SaO2: 85.4              Urinalysis Basic - ( 13 May 2024 06:55 )    Color: x / Appearance: x / SG: x / pH: x  Gluc: 87 mg/dL / Ketone: x  / Bili: x / Urobili: x   Blood: x / Protein: x / Nitrite: x   Leuk Esterase: x / RBC: x / WBC x   Sq Epi: x / Non Sq Epi: x / Bacteria: x

## 2024-05-13 NOTE — PROGRESS NOTE ADULT - SUBJECTIVE AND OBJECTIVE BOX
RITESH THOMAS  73y, Female    All available historical data reviewed    OVERNIGHT EVENTS:  no fevers  no cough  NC 4 LIt    ROS:  General: Denies rigors, nightsweats  HEENT: Denies headache, rhinorrhea, sore throat, eye pain  CV: Denies CP, palpitations  PULM: Denies wheezing, hemoptysis  GI: Denies hematemesis, hematochezia, melena  : Denies discharge, hematuria  MSK: Denies arthralgias, myalgias  SKIN: Denies rash, lesions  NEURO: weakness  PSYCH: Denies depression, anxiety    VITALS:  T(F): 99.2, Max: 99.2 (05-13-24 @ 05:06)  HR: 82  BP: 152/75  RR: 18Vital Signs Last 24 Hrs  T(C): 37.3 (13 May 2024 05:06), Max: 37.3 (12 May 2024 19:58)  T(F): 99.2 (13 May 2024 05:06), Max: 99.2 (13 May 2024 05:06)  HR: 82 (13 May 2024 05:06) (82 - 97)  BP: 152/75 (13 May 2024 05:06) (144/73 - 152/75)  BP(mean): 97 (12 May 2024 19:58) (97 - 105)  RR: 18 (13 May 2024 08:00) (18 - 21)  SpO2: 91% (13 May 2024 08:00) (88% - 91%)    Parameters below as of 13 May 2024 08:00  Patient On (Oxygen Delivery Method): nasal cannula  O2 Flow (L/min): 4      TESTS & MEASUREMENTS:                        10.9   19.35 )-----------( 253      ( 13 May 2024 06:55 )             35.8     05-13    136  |  91<L>  |  20  ----------------------------<  87  4.8   |  44<HH>  |  <0.5<L>    Ca    8.7      13 May 2024 06:55  Phos  3.5     05-13  Mg     2.1     05-13    TPro  5.1<L>  /  Alb  3.5  /  TBili  0.4  /  DBili  x   /  AST  15  /  ALT  47<H>  /  AlkPhos  64  05-13    LIVER FUNCTIONS - ( 13 May 2024 06:55 )  Alb: 3.5 g/dL / Pro: 5.1 g/dL / ALK PHOS: 64 U/L / ALT: 47 U/L / AST: 15 U/L / GGT: x             Culture - Blood (collected 05-08-24 @ 00:23)  Source: .Blood None  Final Report (05-13-24 @ 09:01):    No growth at 5 days      Urinalysis Basic - ( 13 May 2024 06:55 )    Color: x / Appearance: x / SG: x / pH: x  Gluc: 87 mg/dL / Ketone: x  / Bili: x / Urobili: x   Blood: x / Protein: x / Nitrite: x   Leuk Esterase: x / RBC: x / WBC x   Sq Epi: x / Non Sq Epi: x / Bacteria: x          RADIOLOGY & ADDITIONAL TESTS:  Personal review of radiological diagnostics performed  Echo and EKG results noted when applicable.     MEDICATIONS:  albuterol    90 MICROgram(s) HFA Inhaler 2 Puff(s) Inhalation every 6 hours PRN  albuterol/ipratropium for Nebulization 3 milliLiter(s) Nebulizer every 4 hours  amLODIPine   Tablet 5 milliGRAM(s) Oral daily  bisacodyl Suppository 10 milliGRAM(s) Rectal daily  cefepime   IVPB 2000 milliGRAM(s) IV Intermittent every 8 hours  chlorhexidine 2% Cloths 1 Application(s) Topical <User Schedule>  enoxaparin Injectable 40 milliGRAM(s) SubCutaneous every 24 hours  hydrOXYzine hydrochloride 25 milliGRAM(s) Oral daily  levoFLOXacin IVPB      levoFLOXacin IVPB 750 milliGRAM(s) IV Intermittent once  levoFLOXacin IVPB 750 milliGRAM(s) IV Intermittent every 24 hours  nicotine -   7 mG/24Hr(s) Patch 1 Patch Transdermal daily  pantoprazole    Tablet 40 milliGRAM(s) Oral before breakfast  polyethylene glycol 3350 17 Gram(s) Oral daily  saccharomyces boulardii 250 milliGRAM(s) Oral two times a day  senna 2 Tablet(s) Oral at bedtime  sodium chloride 0.65% Nasal 1 Spray(s) Both Nostrils three times a day PRN      ANTIBIOTICS:  cefepime   IVPB 2000 milliGRAM(s) IV Intermittent every 8 hours  levoFLOXacin IVPB 750 milliGRAM(s) IV Intermittent once  levoFLOXacin IVPB 750 milliGRAM(s) IV Intermittent every 24 hours  levoFLOXacin IVPB

## 2024-05-13 NOTE — PROGRESS NOTE ADULT - SUBJECTIVE AND OBJECTIVE BOX
74 y/o female PMHx COPD, HTN, CHronic Smoker P/W SOB x weeks, cough, wheezing, O2 sat 70% at home placed on NRB by EMS, confused.  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 Solumedrol 60mg Q8H. Duoneb Q4/PRN. BiPAP 2 on 2 off and QHS. RVP negative. Send Ur Strep/Legionella & Nasal MRSA. Procal. TTE. Pt will require mass biopsy, likely bronchoscopic, for further management once stable.  5/8: TTE with EF 70-75%, Mild MR, Trivial pericardial effusion. Procal 0.09. Pt refused BiPAP overnight. AM ABG with pH 7.25, CO2 88. Plan to continue ABx, BiPAP 4/4 & QHS. Stable for DG to SDU. Spoke with family, pt has been having fevers, weight loss and hemoptysis for ~1y but refused to visit doctor. They are aware of masses in lungs and ultimate need for biopsy if patient is agreeable.  Pt was downgraded to SDU, on 5/9 underwent diagnostic thoracentesis at the bedside,  cytology is pending, family requested palliative care consult. ID is following, Abx were changed on 5/10 ( pt developed worsening leukocytosis, has nonobstructive PNA)     -pt downgraded from SDU - my first encounter with her today   -appears in impending resp failure - anxious - unable to tolerate bipap and declining it - place patient on high flow O2 and inform pulm/critical care team to evaluate for upgrade - patient is full code and this point lethargic / tachypneic and work of breathing worse with minimal conversation (still alert and comprehending her medical care)      Vital Signs Last 24 Hrs  T(C): 36.9 (13 May 2024 11:57), Max: 37.3 (12 May 2024 19:58)  T(F): 98.5 (13 May 2024 11:57), Max: 99.2 (13 May 2024 05:06)  HR: 88 (13 May 2024 11:57) (82 - 97)  BP: 130/72 (13 May 2024 11:57) (130/72 - 152/75)  BP(mean): 97 (12 May 2024 19:58) (97 - 105)  RR: 18 (13 May 2024 11:57) (18 - 21)  SpO2: 91% (13 May 2024 08:00) (88% - 91%)    Parameters below as of 13 May 2024 08:00  Patient On (Oxygen Delivery Method): nasal cannula  O2 Flow (L/min): 4        PHYSICAL EXAM:  GENERAL: in resp distress - still able to hold conversation but with severe anxiety component as well   NERVOUS SYSTEM:  Alert & Oriented X 3, fair concentration   CHEST/LUNG: tachypenic- increased work of breathing - decreased BS at bases  HEART: regular   ABDOMEN: Soft abdomen   EXTREMITIES:  no edema       LABS:                        10.9   19.35 )-----------( 253      ( 13 May 2024 06:55 )             35.8             05-13    136  |  91<L>  |  20  ----------------------------<  87  4.8   |  44<HH>  |  <0.5<L>    Ca    8.7      13 May 2024 06:55  Phos  3.5     05-13  Mg     2.1     05-13    TPro  5.1<L>  /  Alb  3.5  /  TBili  0.4  /  DBili  x   /  AST  15  /  ALT  47<H>  /  AlkPhos  64  05-13      Urinalysis Basic - ( 09 May 2024 05:41 )    Color: x / Appearance: x / SG: x / pH: x  Gluc: 126 mg/dL / Ketone: x  / Bili: x / Urobili: x   Blood: x / Protein: x / Nitrite: x   Leuk Esterase: x / RBC: x / WBC x   Sq Epi: x / Non Sq Epi: x / Bacteria: x    Culture - Blood (collected 08 May 2024 00:23)  Source: .Blood None  Preliminary Report (09 May 2024 09:02):    No growth at 24 hours      RADIOLOGY & ADDITIONAL TESTS:    < from: Xray Chest 1 View- PORTABLE-Urgent (Xray Chest 1 View- PORTABLE-Urgent .) (05.09.24 @ 12:23) >  Impression:    Left lung masslike consolidation. Decreased left effusion. No   pneumothorax.    Improving right basilar nodular opacities.    < from: CT Angio Chest PE Protocol w/ IV Cont (05.07.24 @ 00:27) >  IMPRESSION:    No pulmonary embolism.    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 Findings highly suspicious for malignancy. Further   oncological care is recommended.    Likely superimposed consolidative and groundglass opacities in the left   lung is well probably reflecting superimposed pneumonia. Correlate with   clinical symptoms.    Approximately 2 cm right lower lobe spiculated nodule concerning for   malignancy as well.    Additional 8 mm solid nodule in the right apex. Indeterminate   approximately 4 cm groundglass density in the right upper lobe. Findings   are indeterminate however neoplastic processes on the differential.   Attention on follow-up is recommended.    Partially imaged 1.6 cm right hepatic lobe hypodense lesion may reflect a   cyst.    < end of copied text >  < from: TTE Echo Complete w/o Contrast w/ Doppler (05.07.24 @ 09:58) >  Summary:   1. Hyperdynamic global left ventricular systolic function with a   visually estimated EF of 70-75%. No regional wall motion abnormalities.   2. Normal right ventricular size with mildly reduced RV systolic   function.   3. Normal left atrial size.   4. Sclerotic aortic valve with normal opening.   5. Mild tricuspid regurgitation.   6. No echocardiographic evidence of pulmonary hypertension. The IVC is   normal in size with <50% respiratory variability indicating mildly   increased right atrial pressure.   7. Trivial pericardial effusion.   8. No prior TTE is available for comparison.      MEDICATIONS  (STANDING):  albuterol/ipratropium for Nebulization 3 milliLiter(s) Nebulizer every 4 hours  amLODIPine   Tablet 5 milliGRAM(s) Oral daily  bisacodyl Suppository 10 milliGRAM(s) Rectal daily  cefepime   IVPB 2000 milliGRAM(s) IV Intermittent every 8 hours  chlorhexidine 2% Cloths 1 Application(s) Topical <User Schedule>  enoxaparin Injectable 40 milliGRAM(s) SubCutaneous every 24 hours  hydrOXYzine hydrochloride 25 milliGRAM(s) Oral daily  levoFLOXacin IVPB 750 milliGRAM(s) IV Intermittent once  levoFLOXacin IVPB 750 milliGRAM(s) IV Intermittent every 24 hours  levoFLOXacin IVPB      nicotine -   7 mG/24Hr(s) Patch 1 Patch Transdermal daily  pantoprazole    Tablet 40 milliGRAM(s) Oral before breakfast  polyethylene glycol 3350 17 Gram(s) Oral daily  saccharomyces boulardii 250 milliGRAM(s) Oral two times a day  senna 2 Tablet(s) Oral at bedtime    MEDICATIONS  (PRN):  albuterol    90 MICROgram(s) HFA Inhaler 2 Puff(s) Inhalation every 6 hours PRN for shortness of breath and/or wheezing  sodium chloride 0.65% Nasal 1 Spray(s) Both Nostrils three times a day PRN Nasal Congestion

## 2024-05-13 NOTE — PROGRESS NOTE ADULT - ASSESSMENT
74 y/o female with PMHx COPD, HTN, Chronic Smoker P/W SOB x weeks, cough, wheezing, O2 sat 70% at home placed on NRB by EMS, confused. Admitted to MICU for ARF, was found to have lungs mass and postobstructive PNA.   Pt clinically improved and was downgraded to SDU.     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  - Taper Solumedrol- 60 IV BID>> 60 IV 5/13>> 40 IV 5/14  - pt was consulted by ID , recommendations noted:  - f/u pleural fluid cultures and cytology   - nares ORSA  - Cefepime 2 gm iv q8 and Levaquin 750 mg iv q24h- will c/w abx pending cytology- for now today is day 7  - nebs Q 6 hours standing  - pulmonary toilet   - pt underwent diagnostic  thoracentesis on 5/9, will follow up fluid cytology ( mass is highly suspicious for advance malignancy) - reach out to heme-onc if positive  - pulmonary is following, recommendations noted    - aspiration precautions, chest PT  - Mucinex standing    - BIPAP PRN and QHS - hasn't used O/N, encouraged to use  - start Nicotine patch - pt declining  - palliative care on board, possibility of hospice discussed with family in case if pt'll decline cancer directed therapy   -Hydroxyzine 25 for anxiety    #HTN   - DASH diet   - C/w Amlodipine 5mg QD    # constipation   - high fiber diet   - c/w bowel regimen     # Weight loss/ mild malnutrition  - consult dietitian   - high calories diet     #MISC  - DVT: Lovenox  - GI: Protonix  - Diet: DASH  -Code status: Full   -Dispo- pending cytology,  taper steroids, c/w   cefepime, c/w Levofloxacin, dietitian,  PT/rehab eval, OOBTC

## 2024-05-13 NOTE — PROGRESS NOTE ADULT - ASSESSMENT
73yFemale with history of COPD, HTN, active smoker presents with SOB and hypoxia. Patient found to have likely COPD exacerbation on arrival with new lung mass concerned for malignancy. Palliative care consulted for GOC.    Spoke with patient and family at bedside. She asked that I not speak to her about her health and only speak to her children. Spoke with sons outside. Palliative care discussed. He was able to provide a medical history and hospital course. He noted the patient had not been going to see doctors and had been losing weight with hemoptysis for some time. We discussed that the team is awaiting cytology, so it is unknown if it is cancer, what type of cancer it is (if cancer at all), and what options would be. We discussed that the heme onc team would provide information about prognosis and treatment options, and we could discuss further about GOC and options depending on the prognosis. Discussed DNR/DNI and hospice, and noted that we could discuss more after diagnosis established. Family was worried that her breathing was getting worse and that she was very anxious to keep the bipap on at this time. Spoke with primary team and would consider low dose anxiolytic to help with anxiety to keep bipap on for some symptom relief.       Education about palliative care provided to patient/family.  See Recs below.    Please call [phone] with questions or concerns 24/7.   We will continue to follow.

## 2024-05-13 NOTE — PROGRESS NOTE ADULT - PROBLEM SELECTOR PLAN 3
-Full code  -ongoing medical management  -Family wanted to know cytology results to help GOC discussion  -will follow.

## 2024-05-13 NOTE — PROGRESS NOTE ADULT - ASSESSMENT
· Assessment	  73-year-old female with past medical history of COPD not on home oxygen, chronic smoker >60ppy, hypertension who presents for shortness of breath x few weeks.  Pt reports worsening productive cough and shortness of breath with wheezing for the past 1 week.  Increased work of breathing today, was satting 70% on room air.  Was placed on nonrebreather by EMS.  As per son at the bedside pt was confused in the morning, wasn't aware of the surrounding.  She also admits to hemoptysis  along with weight loss over the past few months.  < from: CT Angio Chest PE Protocol w/ IV Cont (05.07.24 @ 00:27) >  No pulmonary embolism.    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 Findings highly suspicious for malignancy. Further   oncological care is recommended.    Likely superimposed consolidative and groundglass opacities in the left   lung is well probably reflecting superimposed pneumonia. Correlate with   clinical symptoms.    Approximately 2 cm right lower lobe spiculated nodule concerning for   malignancy as well.    Additional 8 mm solid nodule in the right apex. Indeterminate   approximately 4 cm groundglass density in the right upper lobe. Findings   are indeterminate however neoplastic processes on the differential.     < end of copied text >    IMPRESSION/RECOMMENDATIONS  Immunosuppression/Immunosenescence ( above age 60 yrs there is a exponential decline in immunity which could result in poor clinical outcomes.   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   Suspected GNR post obstructive PNA left. CXR with increased opacity LLL  WBC 19.3  5/11 Nares ORSA NG  5/9 s/p thoracocentesis  5/8 BCx NG  COVID 19/ Influenza/ RSV NG.     -f/u with Pulmonary  -Cefepime 2 gm iv q8  -levoquin 750 mg iv q24h

## 2024-05-13 NOTE — PROGRESS NOTE ADULT - SUBJECTIVE AND OBJECTIVE BOX
CC: SOB    HPI:  73-year-old female with past medical history of COPD not on home oxygen, chronic smoker >60ppy, hypertension who presents for shortness of breath x few weeks.  Pt reports worsening productive cough and shortness of breath with wheezing for the past 1 week.  Increased work of breathing today, was satting 70% on room air.  Was placed on nonrebreather by EMS.  As per son at the bedside pt was confused in the morning, wasn't aware of the surrounding.  She also admits to hemoptysis  along with weight loss over the past few months.  Admits to fever and chills, and denies chest pain, palpitations, nausea, vomiting, diarrhea, abdominal pain, urinary symptoms, weakness, numbness, falls, recent travel, recent surgeries.  Not on anticoagulation.  Denies substance use and alcohol use.    In ED  VT bp 143/80, , RR 28, T 99.4 F, SpO2 98% on NRB 15 L  Labs showed Lac 2.3, trop 26, BNP 2044, VBG  pH 7.18 PCo2 102 HCO3 38, O2 sat 46%    s/p IV solumedrol, IV Mag, Nebs and Albuterol in ED.  Pt was placed on BIPAP and admitted to MICU for further managements.   (06 May 2024 23:30)    PERTINENT PM/SXH:   COPD not on home oxygen, chronic smoker >60ppy, hypertension     FAMILY HISTORY:    None pertinent    ITEMS NOT CHECKED ARE NOT PRESENT    SOCIAL HISTORY:   Significant other/partner[ ]  Children[ ]  Islam/Spirituality:  Substance hx:  [ ]   Tobacco hx:  [ ]   Alcohol hx: [ ]   Living Situation: [x ]Home  [ ]Long term care  [ ]Rehab [ ]Other  Home Services: [ ] HHA [ ] Visting RN [ ] Hospice  Occupation:  Home Opioid hx:  [ ] Y [ ] N [x ] I-Stop Reference No:    Reference #: [id number] - no meds     ADVANCE DIRECTIVES:     [x ] Full Code [ ] DNR  MOLST  [ ]  Living Will  [ ]   DECISION MAKER(s):  [ ] Health Care Proxy(s)  [x ] Surrogate(s)  [ ] Guardian           Name(s): Phone Number(s):  Children      BASELINE (I)ADL(s) (prior to admission):    [place]: [ ]Total  [ ] Moderate [ ]Dependent  Palliative Performance Status Version 2:         %    http://npcrc.org/files/news/palliative_performance_scale_ppsv2.pdf    Allergies    No Known Allergies    Intolerances    MEDICATIONS  (STANDING):  albuterol/ipratropium for Nebulization 3 milliLiter(s) Nebulizer every 4 hours  amLODIPine   Tablet 5 milliGRAM(s) Oral daily  bisacodyl Suppository 10 milliGRAM(s) Rectal daily  cefepime   IVPB 2000 milliGRAM(s) IV Intermittent every 8 hours  chlorhexidine 2% Cloths 1 Application(s) Topical <User Schedule>  enoxaparin Injectable 40 milliGRAM(s) SubCutaneous every 24 hours  hydrOXYzine hydrochloride 25 milliGRAM(s) Oral daily  levoFLOXacin IVPB 750 milliGRAM(s) IV Intermittent once  levoFLOXacin IVPB 750 milliGRAM(s) IV Intermittent every 24 hours  levoFLOXacin IVPB      nicotine -   7 mG/24Hr(s) Patch 1 Patch Transdermal daily  pantoprazole    Tablet 40 milliGRAM(s) Oral before breakfast  polyethylene glycol 3350 17 Gram(s) Oral daily  saccharomyces boulardii 250 milliGRAM(s) Oral two times a day  senna 2 Tablet(s) Oral at bedtime    MEDICATIONS  (PRN):  albuterol    90 MICROgram(s) HFA Inhaler 2 Puff(s) Inhalation every 6 hours PRN for shortness of breath and/or wheezing  sodium chloride 0.65% Nasal 1 Spray(s) Both Nostrils three times a day PRN Nasal Congestion  wheezing    PRESENT SYMPTOMS: [ ]Unable to obtain due to poor mentation   Source if other than patient:  [ ]Family   [ ]Team     Pain: [ ]yes [ x]no  ALL PAIN ASSESSMENT COMPONENTS WERE ASKED ABOUT UNLESS OTHERWISE NOTED  QOL impact -   Location -                    Aggravating factors -  Quality -  Radiation -  Timing-  Severity (0-10 scale):  Minimal acceptable level (0-10 scale):     CPOT:    https://www.sccm.org/getattachment/ohe30u03-4q1x-4o1l-4d9v-2996m3902s4x/Critical-Care-Pain-Observation-Tool-(CPOT)    PAIN AD Score:   http://[email or web address]/painad.pdf (press ctrl +  left click to view)    Dyspnea:                           [ ]None[ ]Mild [x ]Moderate [ ]Severe     Respiratory Distress Observation Scale (RDOS):   A score of 0 to 2 signifies little or no respiratory distress, 3 signifies mild distress, scores 4 to 6 indicate moderate distress, and scores greater than 7 signify severe distress  [email or web address]://[email or web address].ca/sites/default/files/PDFS/492875-fjgglrwoypf-aunblvdo-uqkeprqyhcr-uwmin.pdf    Anxiety:                             [ ]None[ ]Mild [ x]Moderate [ ]Severe   Fatigue:                             [ ]None[ x]Mild [ ]Moderate [ ]Severe   Nausea:                             [ x]None[ ]Mild [ ]Moderate [ ]Severe   Loss of appetite:              [x ]None[ ]Mild [ ]Moderate [ ]Severe   Constipation:                    [x ]None[ ]Mild [ ]Moderate [ ]Severe    Other Symptoms:  [x ]All other review of systems negative     Palliative Performance Status Version 2:         30%    http://npcrc.org/files/news/palliative_performance_scale_ppsv2.pdf    PHYSICAL EXAM:    Vital Signs Last 24 Hrs  T(C): 36.9 (13 May 2024 11:57), Max: 37.3 (12 May 2024 19:58)  T(F): 98.5 (13 May 2024 11:57), Max: 99.2 (13 May 2024 05:06)  HR: 88 (13 May 2024 11:57) (82 - 97)  BP: 130/72 (13 May 2024 11:57) (130/72 - 152/75)  BP(mean): 97 (12 May 2024 19:58) (97 - 97)  RR: 18 (13 May 2024 11:57) (18 - 21)  SpO2: 94% (13 May 2024 13:00) (89% - 94%)    Parameters below as of 13 May 2024 13:00  Patient On (Oxygen Delivery Method): nasal cannula, high flow  O2 Flow (L/min): 30  O2 Concentration (%): 30        GENERAL:  [ ] No acute distress [ ]Lethargic  [ ]Unarousable  [ x]Verbal  [ ]Non-Verbal [ ]Cachexia    BEHAVIORAL/PSYCH:  [ x]Alert and Oriented x3  [ x] Anxiety [ ] Delirium [ ] Agitation [ ] Calm   EYES: [x ] No scleral icterus [ ] Scleral icterus [ ] Closed  ENMT:  [ ]Dry mouth  [ x]No external oral lesions [ ] No external ear or nose lesions  CARDIOVASCULAR:  [ ]Regular [ ]Irregular [ ]Tachy [ x]Not Tachy  [ ]Kalpesh [ ] Edema [ ] No edema  PULMONARY:  [x ]Tachypnea  [ ]Audible excessive secretions [ ] No labored breathing [ x] labored breathing  GASTROINTESTINAL: [ ]Soft  [ ]Distended  [ ]Not distended [ ]Non tender [ ]Tender  MUSCULOSKELETAL: [ ]No clubbing [ ] clubbing  [ ] No cyanosis [ ] cyanosis  NEUROLOGIC: [ ]No focal deficits  [ x]Follows commands  [ ]Does not follow commands  [ ]Cognitive impairment  [ ]Dysphagia  [ ]Dysarthria  [ ]Paresis   SKIN: [ ] Jaundiced [x ] Non-jaundiced [ ]Rash [ ]No Rash [ ] Warm [ ] Dry  MISC/LINES: [ ] ET tube [ ] Trach [ ]NGT/OGT [ ]PEG [ ]Paulino    LABS: reviewed by me                                   10.9   19.35 )-----------( 253      ( 13 May 2024 06:55 )             35.8     05-13    136  |  91<L>  |  20  ----------------------------<  87  4.8   |  44<HH>  |  <0.5<L>    Ca    8.7      13 May 2024 06:55  Phos  3.5     05-13  Mg     2.1     05-13    TPro  5.1<L>  /  Alb  3.5  /  TBili  0.4  /  DBili  x   /  AST  15  /  ALT  47<H>  /  AlkPhos  64  05-13      Urinalysis Basic - ( 13 May 2024 06:55 )    Color: x / Appearance: x / SG: x / pH: x  Gluc: 87 mg/dL / Ketone: x  / Bili: x / Urobili: x   Blood: x / Protein: x / Nitrite: x   Leuk Esterase: x / RBC: x / WBC x   Sq Epi: x / Non Sq Epi: x / Bacteria: x              RADIOLOGY & ADDITIONAL STUDIES: reviewed by me    < from: Xray Chest 1 View- PORTABLE-Urgent (Xray Chest 1 View- PORTABLE-Urgent .) (05.09.24 @ 12:23) >  Impression:    Left lung masslike consolidation. Decreased left effusion. No   pneumothorax.    Improving right basilar nodular opacities.    < end of copied text >      EKG: reviewed by me    < from: 12 Lead ECG (05.06.24 @ 19:52) >  Ventricular Rate 90 BPM    Atrial Rate 90 BPM    P-R Interval 170 ms    QRS Duration 80 ms    Q-T Interval 360 ms    QTC Calculation(Bazett) 440 ms    P Axis 84 degrees    R Axis -70 degrees    T Axis 77 degrees    Diagnosis Line Normal sinus rhythm  Left anterior fascicular block  Septal infarct , age undetermined  Abnormal ECG    < end of copied text >      PROTEIN CALORIE MALNUTRITION PRESENT: [ ]mild [ ]moderate [ ]severe [ ]underweight [ ]morbid obesity  https://www.andeal.org/vault/2440/web/files/ONC/Table_Clinical%20Characteristics%20to%20Document%20Malnutrition-White%20JV%20et%20al%202012.pdf    Height (cm): 154.9 (05-07-24 @ 00:20)  Weight (kg): 47 (05-07-24 @ 00:20)  BMI (kg/m2): 19.6 (05-07-24 @ 00:20)  [ ]PPSV2 < or = to 30% [ ]significant weight loss  [ ]poor nutritional intake  [ ]anasarca      [ ]Artificial Nutrition      Palliative Care Spiritual/Emotional Screening Tool Question  Severity (0-4):                    OR                    [ x] Unable to determine. Will assess at later time if appropriate.  Score of 2 or greater indicates recommendation of Chaplaincy and/or SW referral  Chaplaincy Referral: [ ] Yes [ ] Refused [ ] Following     Caregiver [place]:  [ ] Yes [ ] No    OR    [x ] Unable to determine. Will assess at later time if appropriate.  Social Work Referral [ ]  Patient and Family Centered Care Referral [ ]    Anticipatory Grief Present: [ ] Yes [ ] No    OR     [ x] Unable to determine. Will assess at later time if appropriate.  Social Work Referral [ ]  Patient and Family Centered Care Referral [ ]    Patient discussed with primary medical team MD  Palliative care education provided to patient and/or family

## 2024-05-13 NOTE — PROGRESS NOTE ADULT - ASSESSMENT
74 y/o female with PMHx COPD, HTN, Chronic Smoker P/W SOB x weeks, cough, wheezing, O2 sat 70% at home placed on NRB by EMS, confused. Admitted to MICU for ARF, was found to have lungs mass and postobstructive PNA.   Pt clinically improved and was downgraded to SDU.     A/P   #Acute Hypercapnic/Hypoxemic Respiratory Failure 2/2 COPD Exacerbation/ Lungs mass, highly suspicious for advanced malignancy/ Possible Post-Obstructive PNA / h/o hemoptysis in current smoker   - declined bipap - hypoxic to low 80s - place patient on high flow - high risk of intubation and patient is full code   - Pulm/crit care follow up for re-upgrade to SDU   - continue with Steroids, abx as per ID (follow up appreciated - on cefepime and levaquin)  - pending pleural fluid cultures and cytology - suspected malignancy - Oncology follow up once path report resulted     #HTN   - DASH diet   - C/w Amlodipine 5mg QD    # constipation   - high fiber diet   - c/w bowel regimen     # Weight loss/ mild malnutrition  - consult dietitian   - high calories diet     #MISC  - DVT: Lovenox  - GI: Protonix  - Diet: DASH     Overall prognosis is poor.    DISPO: upgrade to SDU   Pending: Critical care follow up on medical floor   -Palliative care follow up ? hospice - currently full code with extremely poor prognosis   -staff updated the family   -spoke to patient early this morning - she comprehends her medical care     Attending Physician Dr. Catalina Oden # [id number]

## 2024-05-14 NOTE — PROGRESS NOTE ADULT - ASSESSMENT
72 y/o female with PMHx COPD, HTN, Chronic Smoker P/W SOB x weeks, cough, wheezing, O2 sat 70% at home placed on NRB by EMS, confused. Admitted to MICU for ARF, was found to have lungs mass and postobstructive PNA.   Pt clinically improved and was downgraded to SDU.     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  - on  Solumedrol  to 40 mg Q 24 hours now   - pt was consulted by ID , recommendations noted:  -  pleural fluid cultures and cytology is negative for malignant cells, pt needs lung mass biopsy ( she is not sure if she'll agree for it), ongoing GOC conversation with pt and family all day today   - Cefepime 2 gm iv q8 and Levaquin 750 mg iv q24h  - nebs Q 6 hours standing  - pulmonary toilet   - pt underwent diagnostic  thoracentesis on 5/9, pulmonary are holding off with bronchoscopy for now ( pt is unstable)   - aspiration precautions, chest PT  - Mucinex standing    - BIPAP PRN and QHS   -  Nicotine patch   -  palliative care follow up for GOC and code status,  possibility of hospice discussed with family in case if pt'll decline cancer directed therapy     #HTN   - DASH diet   - C/w Amlodipine 5mg QD    # constipation   - high fiber diet   - c/w bowel regimen     # Weight loss/ mild malnutrition  - consult dietitian   - high calories diet     #MISC  - DVT: Lovenox  - GI: Protonix  - Diet: DASH     Overall prognosis is grave     #Progress Note Handoff  Pending (specify): supportive care, c/w same dose of  steroids, c/w   cefepime, c/w Levofloxacin, high flow oxygen, NIV PRN and QHS, palliative care follow up for GOC and code status ( pt will likely lean towards palliative care and hospice), needs tissue diagnosis ( pulmonary is holding off with bronchoscopy/mass biopsy, pt is unstable)   Family discussion: I spoke with pt and multiple family members at the bedside thought-out all day, they agreed with a plan of care   Disposition: SDU

## 2024-05-14 NOTE — PROGRESS NOTE ADULT - SUBJECTIVE AND OBJECTIVE BOX
74 y/o female PMHx COPD, HTN, CHronic Smoker P/W SOB x weeks, cough, wheezing, O2 sat 70% at home placed on NRB by EMS, confused.  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 Solumedrol 60mg Q8H. Duoneb Q4/PRN. BiPAP 2 on 2 off and QHS. RVP negative. Send Ur Strep/Legionella & Nasal MRSA. Procal. TTE. Pt will require mass biopsy, likely bronchoscopic, for further management once stable.  5/8: TTE with EF 70-75%, Mild MR, Trivial pericardial effusion. Procal 0.09. Pt refused BiPAP overnight. AM ABG with pH 7.25, CO2 88. Plan to continue ABx, BiPAP 4/4 & QHS. Stable for DG to SDU. Spoke with family, pt has been having fevers, weight loss and hemoptysis for ~1y but refused to visit doctor. They are aware of masses in lungs and ultimate need for biopsy if patient is agreeable.  Pt was downgraded to SDU, on 5/9 underwent diagnostic thoracentesis at the bedside,  cytology is pending, family requested palliative care consult. ID is following, Abx were changed on 5/10 ( pt developed worsening leukocytosis, has nonobstructive PNA)   Today pt is anxious, refusing to have detailed conversations about plan of care, denies any specific complaints, on high flow oxygen saturating 92%, family at the bedside.       Vital Signs Last 24 Hrs  T(C): 36.7 (14 May 2024 11:06), Max: 36.7 (14 May 2024 11:06)  T(F): 98.1 (14 May 2024 11:06), Max: 98.1 (14 May 2024 11:06)  HR: 92 (14 May 2024 11:06) (85 - 92)  BP: 98/57 (14 May 2024 11:06) (98/57 - 129/66)  BP(mean): 72 (14 May 2024 11:06) (72 - 87)  RR: 20 (14 May 2024 11:06) (16 - 20)  SpO2: 94% (14 May 2024 11:06) (93% - 98%)    Parameters below as of 14 May 2024 11:06  Patient On (Oxygen Delivery Method): nasal cannula, high flow      PHYSICAL EXAM:  GENERAL: anxious, cachectic female with temporal muscle waisting   HEAD:  Atraumatic, Normocephalic  NECK: Supple, No JVD, Normal thyroid  NERVOUS SYSTEM:  Alert & Oriented X3, Good concentration; Motor Strength 5/5 B/L upper and lower extremities; DTRs 2+ intact and symmetric  CHEST/LUNG: decreased BS b/l, prolonged inspiratory phase   HEART: S1, S2   ABDOMEN: Soft, Nontender, Nondistended; Bowel sounds present  EXTREMITIES:  2+ Peripheral Pulses, No clubbing, cyanosis, or edema  LYMPH: No lymphadenopathy noted  SKIN: No rashes or lesions      LABS:                                     10.7   17.33 )-----------( 253      ( 14 May 2024 06:17 )             35.4   05-14    141  |  93<L>  |  22<H>  ----------------------------<  99  4.6   |  45<HH>  |  <0.5<L>    Ca    8.6      14 May 2024 06:17  Phos  3.5     05-13  Mg     2.1     05-13    TPro  5.1<L>  /  Alb  3.5  /  TBili  0.4  /  DBili  x   /  AST  15  /  ALT  47<H>  /  AlkPhos  64  05-13        Urinalysis Basic - ( 09 May 2024 05:41 )    Color: x / Appearance: x / SG: x / pH: x  Gluc: 126 mg/dL / Ketone: x  / Bili: x / Urobili: x   Blood: x / Protein: x / Nitrite: x   Leuk Esterase: x / RBC: x / WBC x   Sq Epi: x / Non Sq Epi: x / Bacteria: x    Culture - Blood (collected 08 May 2024 00:23)  Source: .Blood None  Preliminary Report (09 May 2024 09:02):    No growth at 24 hours      Pleural Fluid   Final Diagnosis   PLEURAL FLUID (THINPREP AND CELL BLOCK):   - NEGATIVE FOR MALIGNANT CELLS   - Rare benign mesothelial cells in proteinaceous material with   scattered chronicinflammatory cells     RADIOLOGY & ADDITIONAL TESTS:    < from: Xray Chest 1 View- PORTABLE-Urgent (Xray Chest 1 View- PORTABLE-Urgent .) (05.13.24 @ 12:41) >  Impression:    Worsening left lower lobe pneumonia.    < end of copied text >      < from: Xray Chest 1 View- PORTABLE-Urgent (Xray Chest 1 View- PORTABLE-Urgent .) (05.09.24 @ 12:23) >  Impression:    Left lung masslike consolidation. Decreased left effusion. No   pneumothorax.    Improving right basilar nodular opacities.    < from: CT Angio Chest PE Protocol w/ IV Cont (05.07.24 @ 00:27) >  IMPRESSION:    No pulmonary embolism.    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 Findings highly suspicious for malignancy. Further   oncological care is recommended.    Likely superimposed consolidative and groundglass opacities in the left   lung is well probably reflecting superimposed pneumonia. Correlate with   clinical symptoms.    Approximately 2 cm right lower lobe spiculated nodule concerning for   malignancy as well.    Additional 8 mm solid nodule in the right apex. Indeterminate   approximately 4 cm groundglass density in the right upper lobe. Findings   are indeterminate however neoplastic processes on the differential.   Attention on follow-up is recommended.    Partially imaged 1.6 cm right hepatic lobe hypodense lesion may reflect a   cyst.    < end of copied text >  < from: TTE Echo Complete w/o Contrast w/ Doppler (05.07.24 @ 09:58) >  Summary:   1. Hyperdynamic global left ventricular systolic function with a   visually estimated EF of 70-75%. No regional wall motion abnormalities.   2. Normal right ventricular size with mildly reduced RV systolic   function.   3. Normal left atrial size.   4. Sclerotic aortic valve with normal opening.   5. Mild tricuspid regurgitation.   6. No echocardiographic evidence of pulmonary hypertension. The IVC is   normal in size with <50% respiratory variability indicating mildly   increased right atrial pressure.   7. Trivial pericardial effusion.   8. No prior TTE is available for comparison.        MEDICATIONS  (STANDING):  albuterol/ipratropium for Nebulization 3 milliLiter(s) Nebulizer every 4 hours  amLODIPine   Tablet 5 milliGRAM(s) Oral daily  bisacodyl Suppository 10 milliGRAM(s) Rectal daily  cefepime   IVPB 2000 milliGRAM(s) IV Intermittent every 8 hours  chlorhexidine 2% Cloths 1 Application(s) Topical <User Schedule>  enoxaparin Injectable 40 milliGRAM(s) SubCutaneous every 24 hours  hydrOXYzine hydrochloride 25 milliGRAM(s) Oral daily  levoFLOXacin IVPB      levoFLOXacin IVPB 750 milliGRAM(s) IV Intermittent every 24 hours  methylPREDNISolone sodium succinate Injectable 40 milliGRAM(s) IV Push daily  nicotine -   7 mG/24Hr(s) Patch 1 Patch Transdermal daily  pantoprazole    Tablet 40 milliGRAM(s) Oral before breakfast  polyethylene glycol 3350 17 Gram(s) Oral daily  saccharomyces boulardii 250 milliGRAM(s) Oral two times a day  senna 2 Tablet(s) Oral at bedtime    MEDICATIONS  (PRN):  albuterol    90 MICROgram(s) HFA Inhaler 2 Puff(s) Inhalation every 6 hours PRN for shortness of breath and/or wheezing  sodium chloride 0.65% Nasal 1 Spray(s) Both Nostrils three times a day PRN Nasal Congestion

## 2024-05-14 NOTE — PROGRESS NOTE ADULT - SUBJECTIVE AND OBJECTIVE BOX
CC: SOB    HPI:  73-year-old female with past medical history of COPD not on home oxygen, chronic smoker >60ppy, hypertension who presents for shortness of breath x few weeks.  Pt reports worsening productive cough and shortness of breath with wheezing for the past 1 week.  Increased work of breathing today, was satting 70% on room air.  Was placed on nonrebreather by EMS.  As per son at the bedside pt was confused in the morning, wasn't aware of the surrounding.  She also admits to hemoptysis  along with weight loss over the past few months.  Admits to fever and chills, and denies chest pain, palpitations, nausea, vomiting, diarrhea, abdominal pain, urinary symptoms, weakness, numbness, falls, recent travel, recent surgeries.  Not on anticoagulation.  Denies substance use and alcohol use.    In ED  VT bp 143/80, , RR 28, T 99.4 F, SpO2 98% on NRB 15 L  Labs showed Lac 2.3, trop 26, BNP 2044, VBG  pH 7.18 PCo2 102 HCO3 38, O2 sat 46%    s/p IV solumedrol, IV Mag, Nebs and Albuterol in ED.  Pt was placed on BIPAP and admitted to MICU for further managements.   (06 May 2024 23:30)    PERTINENT PM/SXH:   COPD not on home oxygen, chronic smoker >60ppy, hypertension     FAMILY HISTORY:    None pertinent    ITEMS NOT CHECKED ARE NOT PRESENT    SOCIAL HISTORY:   Significant other/partner[ ]  Children[ ]  Denominational/Spirituality:  Substance hx:  [ ]   Tobacco hx:  [ ]   Alcohol hx: [ ]   Living Situation: [x ]Home  [ ]Long term care  [ ]Rehab [ ]Other  Home Services: [ ] HHA [ ] Visting RN [ ] Hospice  Occupation:  Home Opioid hx:  [ ] Y [ ] N [x ] I-Stop Reference No:    Reference #: [id number] - no meds     ADVANCE DIRECTIVES:     [x ] Full Code [ ] DNR  MOLST  [ ]  Living Will  [ ]   DECISION MAKER(s):  [ ] Health Care Proxy(s)  [x ] Surrogate(s)  [ ] Guardian           Name(s): Phone Number(s):  Children      BASELINE (I)ADL(s) (prior to admission):    [place]: [ ]Total  [ ] Moderate [ ]Dependent  Palliative Performance Status Version 2:         %    http://npcrc.org/files/news/palliative_performance_scale_ppsv2.pdf    Allergies    No Known Allergies    Intolerances    MEDICATIONS  (STANDING):  albuterol/ipratropium for Nebulization 3 milliLiter(s) Nebulizer every 4 hours  amLODIPine   Tablet 5 milliGRAM(s) Oral daily  bisacodyl Suppository 10 milliGRAM(s) Rectal daily  cefepime   IVPB 2000 milliGRAM(s) IV Intermittent every 8 hours  chlorhexidine 2% Cloths 1 Application(s) Topical <User Schedule>  enoxaparin Injectable 40 milliGRAM(s) SubCutaneous every 24 hours  hydrOXYzine hydrochloride 25 milliGRAM(s) Oral daily  levoFLOXacin IVPB      levoFLOXacin IVPB 750 milliGRAM(s) IV Intermittent every 24 hours  methylPREDNISolone sodium succinate Injectable 40 milliGRAM(s) IV Push daily  nicotine -   7 mG/24Hr(s) Patch 1 Patch Transdermal daily  pantoprazole    Tablet 40 milliGRAM(s) Oral before breakfast  polyethylene glycol 3350 17 Gram(s) Oral daily  saccharomyces boulardii 250 milliGRAM(s) Oral two times a day  senna 2 Tablet(s) Oral at bedtime    MEDICATIONS  (PRN):  albuterol    90 MICROgram(s) HFA Inhaler 2 Puff(s) Inhalation every 6 hours PRN for shortness of breath and/or wheezing  sodium chloride 0.65% Nasal 1 Spray(s) Both Nostrils three times a day PRN Nasal Congestion      PRESENT SYMPTOMS: [ ]Unable to obtain due to poor mentation   Source if other than patient:  [ ]Family   [ ]Team     Pain: [ ]yes [ x]no  ALL PAIN ASSESSMENT COMPONENTS WERE ASKED ABOUT UNLESS OTHERWISE NOTED  QOL impact -   Location -                    Aggravating factors -  Quality -  Radiation -  Timing-  Severity (0-10 scale):  Minimal acceptable level (0-10 scale):     CPOT:    https://www.sccm.org/getattachment/ahl30a06-6w1s-4j0o-0l0u-2967y8525m8l/Critical-Care-Pain-Observation-Tool-(CPOT)    PAIN AD Score:   [email or web address].pdf (press ctrl +  left click to view)    Dyspnea:                           [ ]None[x ]Mild [ ]Moderate [ ]Severe     Respiratory Distress Observation Scale (RDOS):   A score of 0 to 2 signifies little or no respiratory distress, 3 signifies mild distress, scores 4 to 6 indicate moderate distress, and scores greater than 7 signify severe distress  [email or web address]://[email or web address]/sites/default/files/PDFS/[email or web address]-imoqvkifoqw-gpmmizoy-djaacebfrod-vynwu.pdf    Anxiety:                             [ ]None[ ]Mild [ x]Moderate [ ]Severe   Fatigue:                             [ ]None[ x]Mild [ ]Moderate [ ]Severe   Nausea:                             [ x]None[ ]Mild [ ]Moderate [ ]Severe   Loss of appetite:              [x ]None[ ]Mild [ ]Moderate [ ]Severe   Constipation:                    [x ]None[ ]Mild [ ]Moderate [ ]Severe    Other Symptoms:  [x ]All other review of systems negative     Palliative Performance Status Version 2:         30%    http://npcrc.org/files/news/palliative_performance_scale_ppsv2.pdf    PHYSICAL EXAM:    Vital Signs Last 24 Hrs  T(C): 36.7 (14 May 2024 11:06), Max: 37.6 (13 May 2024 15:40)  T(F): 98.1 (14 May 2024 11:06), Max: 99.7 (13 May 2024 15:40)  HR: 92 (14 May 2024 11:06) (85 - 92)  BP: 98/57 (14 May 2024 11:06) (98/57 - 131/69)  BP(mean): 72 (14 May 2024 11:06) (72 - 87)  RR: 20 (14 May 2024 11:06) (16 - 20)  SpO2: 94% (14 May 2024 11:06) (92% - 99%)    Parameters below as of 14 May 2024 11:06  Patient On (Oxygen Delivery Method): nasal cannula, high flow        GENERAL:  [ ] No acute distress [ ]Lethargic  [ ]Unarousable  [ x]Verbal  [ ]Non-Verbal [ ]Cachexia    BEHAVIORAL/PSYCH:  [ x]Alert and Oriented x3  [ x] Anxiety [ ] Delirium [ ] Agitation [ ] Calm   EYES: [x ] No scleral icterus [ ] Scleral icterus [ ] Closed  ENMT:  [ ]Dry mouth  [ x]No external oral lesions [ ] No external ear or nose lesions  CARDIOVASCULAR:  [ ]Regular [ ]Irregular [ ]Tachy [ x]Not Tachy  [ ]Kalpesh [ ] Edema [ ] No edema  PULMONARY:  [x ]Tachypnea  [ ]Audible excessive secretions [ ] No labored breathing [ x] labored breathing  GASTROINTESTINAL: [ ]Soft  [ ]Distended  [ ]Not distended [ ]Non tender [ ]Tender  MUSCULOSKELETAL: [ ]No clubbing [ ] clubbing  [ ] No cyanosis [ ] cyanosis  NEUROLOGIC: [ ]No focal deficits  [ x]Follows commands  [ ]Does not follow commands  [ ]Cognitive impairment  [ ]Dysphagia  [ ]Dysarthria  [ ]Paresis   SKIN: [ ] Jaundiced [x ] Non-jaundiced [ ]Rash [ ]No Rash [ ] Warm [ ] Dry  MISC/LINES: [ ] ET tube [ ] Trach [ ]NGT/OGT [ ]PEG [ ]Paulino    LABS: reviewed by me                                   10.7   17.33 )-----------( 253      ( 14 May 2024 06:17 )             35.4     05-14    141  |  93<L>  |  22<H>  ----------------------------<  99  4.6   |  45<HH>  |  <0.5<L>    Ca    8.6      14 May 2024 06:17  Phos  3.5     05-13  Mg     2.1     05-13    TPro  5.1<L>  /  Alb  3.5  /  TBili  0.4  /  DBili  x   /  AST  15  /  ALT  47<H>  /  AlkPhos  64  05-13      Urinalysis Basic - ( 14 May 2024 06:17 )    Color: x / Appearance: x / SG: x / pH: x  Gluc: 99 mg/dL / Ketone: x  / Bili: x / Urobili: x   Blood: x / Protein: x / Nitrite: x   Leuk Esterase: x / RBC: x / WBC x   Sq Epi: x / Non Sq Epi: x / Bacteria: x              RADIOLOGY & ADDITIONAL STUDIES: reviewed by me    < from: Xray Chest 1 View- PORTABLE-Urgent (Xray Chest 1 View- PORTABLE-Urgent .) (05.09.24 @ 12:23) >  Impression:    Left lung masslike consolidation. Decreased left effusion. No   pneumothorax.    Improving right basilar nodular opacities.    < end of copied text >      EKG: reviewed by me    < from: 12 Lead ECG (05.06.24 @ 19:52) >  Ventricular Rate 90 BPM    Atrial Rate 90 BPM    P-R Interval 170 ms    QRS Duration 80 ms    Q-T Interval 360 ms    QTC Calculation(Bazett) 440 ms    P Axis 84 degrees    R Axis -70 degrees    T Axis 77 degrees    Diagnosis Line Normal sinus rhythm  Left anterior fascicular block  Septal infarct , age undetermined  Abnormal ECG    < end of copied text >      PROTEIN CALORIE MALNUTRITION PRESENT: [ ]mild [ ]moderate [ ]severe [ ]underweight [ ]morbid obesity  https://www.andeal.org/vault/2440/web/files/ONC/Table_Clinical%20Characteristics%20to%20Document%20Malnutrition-White%20JV%20et%20al%202012.pdf    Height (cm): 154.9 (05-07-24 @ 00:20)  Weight (kg): 47 (05-07-24 @ 00:20)  BMI (kg/m2): 19.6 (05-07-24 @ 00:20)  [ ]PPSV2 < or = to 30% [ ]significant weight loss  [ ]poor nutritional intake  [ ]anasarca      [ ]Artificial Nutrition      Palliative Care Spiritual/Emotional Screening Tool Question  Severity (0-4):                    OR                    [ x] Unable to determine. Will assess at later time if appropriate.  Score of 2 or greater indicates recommendation of Chaplaincy and/or SW referral  Chaplaincy Referral: [ ] Yes [ ] Refused [ ] Following     Caregiver [place]:  [ ] Yes [ ] No    OR    [x ] Unable to determine. Will assess at later time if appropriate.  Social Work Referral [ ]  Patient and Family Centered Care Referral [ ]    Anticipatory Grief Present: [ ] Yes [ ] No    OR     [ x] Unable to determine. Will assess at later time if appropriate.  Social Work Referral [ ]  Patient and Family Centered Care Referral [ ]    Patient discussed with primary medical team MD  Palliative care education provided to patient and/or family

## 2024-05-14 NOTE — CHART NOTE - NSCHARTNOTEFT_GEN_A_CORE
Pt. was resting, pt's 3 children were at bedside. They are having  difficulty with breaking the news to Pt in ref to the latest report from doctor.  I was a very supportive pastoral presence and suggested they have a conversation with Pt.  I will follow up for support.

## 2024-05-14 NOTE — PROGRESS NOTE ADULT - ASSESSMENT
IMPRESSION:  Acute hypoxemic resp failure on NC slowly improving  Acute on chronic hypercapnic resp failure  COPD exacerbation   Lung mass r/o malignancy   Superimposed pneumonia  Left sided effusion s/p thoracentesis negative for malignant cells      PLAN:    CNS: Avoid CNS depressant    HEENT: oral care     PULMONARY: keep pox 88 to 92%M, Now using NIV QHS. albuterol Q4 hrs and prn , solumedrol 40 q 24. encourage NIV. will need biopsy when stable    CARDIOVASCULAR: TTE noted with 70-75%. Keep is = os  BNP.    GI: GI prophylaxis.  Feeding per speech and swallow     RENAL: follow lytes     INFECTIOUS DISEASE: continue abx Procal noted.     HEMATOLOGICAL:  DVT prophylaxis. Dimer    ENDOCRINE:  Follow up FS.  Insulin protocol if needed.    DGTF    GOC IMPRESSION:  Acute hypoxemic respiratory failure   Acute on chronic hypercapnic resp failure  COPD exacerbation   Lung mass r/o malignancy   Superimposed pneumonia  Left sided effusion s/p thoracentesis negative for malignant cells      PLAN:    CNS: Avoid CNS depressant    HEENT: oral care     PULMONARY: keep pox 88 to 92%M, Now using NIV QHS. albuterol Q4 hrs and prn , solumedrol 40 q 24. Encourage NIV.  Will need biopsy when stable    CARDIOVASCULAR: TTE noted with 70-75%. Keep is = os  BNP.    GI: GI prophylaxis.  Feeding per speech and swallow     RENAL: follow lytes     INFECTIOUS DISEASE: continue abx Procal noted.     HEMATOLOGICAL:  DVT prophylaxis. Dimer and LE duplex     ENDOCRINE:  Follow up FS.  Insulin protocol if needed.    GOC    SDU

## 2024-05-14 NOTE — PROGRESS NOTE ADULT - ASSESSMENT
74 y/o female with PMHx COPD, HTN, Chronic Smoker P/W SOB x weeks, cough, wheezing, O2 sat 70% at home placed on NRB by EMS, confused. Admitted to MICU for ARF, was found to have lungs mass and postobstructive PNA.   Pt clinically improved and was downgraded to SDU.     A/P   #Acute Hypercapnic/Hypoxemic Respiratory Failure 2/2 COPD Exacerbation/ Lungs mass, highly suspicious for advanced malignancy/ Possible Post-Obstructive PNA / h/o hemoptysis in current smoker   - CT on admission chest large mass and superimposed PNA  -Upgraded to SDU for desat, now on HFNC.   - declining bipap -high risk of intubation and patient is full code   -Send procal, dimer, BNP   - continue with Steroids, abx as per ID,  on cefepime and levaquin)  - pt underwent diagnostic  thoracentesis on 5/9, will follow up fluid cytology ( mass is highly suspicious for advance malignancy) - reach out to heme-onc when cytology comes back - pleural fluid came back nag for malignant cells   -- aspiration precautions, chest PT  - Mucinex standing    - BIPAP PRN and QHS ordered although patient has not been using   - palliative care on board, possibility of hospice discussed with family in case if pt'll decline cancer directed therapy   -Hydroxyzine 25 for anxiety    #HTN   - DASH diet   - C/w Amlodipine 5mg QD    # constipation   - high fiber diet   - c/w bowel regimen     # Weight loss/ mild malnutrition  - consult dietitian   - high calories diet     #MISC  - DVT: Lovenox  - GI: Protonix  - Diet: DASH     Overall prognosis is poor.

## 2024-05-14 NOTE — PROGRESS NOTE ADULT - ASSESSMENT
73yFemale with history of COPD, HTN, active smoker presents with SOB and hypoxia. Patient found to have likely COPD exacerbation on arrival with new lung mass concerned for malignancy. Palliative care consulted for GOC.    Spoke with patient and family at bedside. Denied any pain and only slightly dyspneic but tolerable with hi-flow nasal cannula. Considering filling out a HCP to have one of her sons be designated but wants some time to consider it. Worried about being "forced" to do anything or make any decisions.      Education about palliative care provided to patient/family.  See Recs below.    Please call [phone] with questions or concerns 24/7.   We will continue to follow.

## 2024-05-14 NOTE — PROGRESS NOTE ADULT - ATTENDING COMMENTS
IMPRESSION:  Acute hypoxemic respiratory failure   Acute on chronic hypercapnic resp failure  COPD exacerbation   Lung mass r/o malignancy   Superimposed pneumonia  Left sided effusion s/p thoracentesis negative for malignant cells      Plan as outlined above

## 2024-05-14 NOTE — PROGRESS NOTE ADULT - SUBJECTIVE AND OBJECTIVE BOX
Patient is a 73y old  Female who presents with a chief complaint of COPD exacerbation (13 May 2024 13:18)      Overnight events: upgraded from floor for SOB. on HFNC 30/60. refused NIV overnight                ROS: as in HPI; All other systems reviewed are negative        PHYSICAL EXAM  Vital Signs Last 24 Hrs  T(C): 36.2 (14 May 2024 07:32), Max: 37.6 (13 May 2024 15:40)  T(F): 97.2 (14 May 2024 07:32), Max: 99.7 (13 May 2024 15:40)  HR: 92 (14 May 2024 07:32) (85 - 92)  BP: 120/65 (14 May 2024 07:32) (112/61 - 131/69)  BP(mean): 87 (14 May 2024 07:32) (87 - 87)  RR: 16 (14 May 2024 09:02) (16 - 20)  SpO2: 93% (14 May 2024 09:02) (92% - 99%)    Parameters below as of 14 May 2024 09:02  Patient On (Oxygen Delivery Method): nasal cannula, high flow  O2 Flow (L/min): 30  O2 Concentration (%): 60      CONSTITUTIONAL:    NAD    ENT:   Airway patent,     EYES:   Clear bilaterally,   pupils equal,   round and reactive to light.    CARDIAC:   Normal rate,   regular rhythm.    no edema    RESPIRATORY:   No wheezing   Normal chest expansion  Not tachypneic,  decreased breath sound bilateral    GASTROINTESTINAL:  Abdomen soft, non-tender,   No guarding,   Positive BS    MUSCULOSKELETAL:   Range of motion is not limited,    NEUROLOGICAL:   Alert and oriented   No motor deficits.    SKIN:   Skin normal color for race,   No evidence of rash.            ABG - ( 13 May 2024 14:03 )  pH, Arterial: 7.40  pH, Blood: x     /  pCO2: 77    /  pO2: 51    / HCO3: 48    / Base Excess: 18.5  /  SaO2: 85.4                I&O's Detail    13 May 2024 07:01  -  14 May 2024 07:00  --------------------------------------------------------  IN:    Oral Fluid: 60 mL  Total IN: 60 mL    OUT:    Voided (mL): 0 mL  Total OUT: 0 mL    Total NET: 60 mL            LABS:                        10.7   17.33 )-----------( 253      ( 14 May 2024 06:17 )             35.4     14 May 2024 06:17    141    |  93     |  22     ----------------------------<  99     4.6     |  45     |  <0.5     Ca    8.6        14 May 2024 06:17  Phos  3.5       13 May 2024 06:55  Mg     2.1       13 May 2024 06:55            CAPILLARY BLOOD GLUCOSE          Urinalysis Basic - ( 14 May 2024 06:17 )    Color: x / Appearance: x / SG: x / pH: x  Gluc: 99 mg/dL / Ketone: x  / Bili: x / Urobili: x   Blood: x / Protein: x / Nitrite: x   Leuk Esterase: x / RBC: x / WBC x   Sq Epi: x / Non Sq Epi: x / Bacteria: x      Culture        MEDICATIONS  (STANDING):  albuterol/ipratropium for Nebulization 3 milliLiter(s) Nebulizer every 4 hours  amLODIPine   Tablet 5 milliGRAM(s) Oral daily  bisacodyl Suppository 10 milliGRAM(s) Rectal daily  cefepime   IVPB 2000 milliGRAM(s) IV Intermittent every 8 hours  chlorhexidine 2% Cloths 1 Application(s) Topical <User Schedule>  enoxaparin Injectable 40 milliGRAM(s) SubCutaneous every 24 hours  hydrOXYzine hydrochloride 25 milliGRAM(s) Oral daily  levoFLOXacin IVPB      levoFLOXacin IVPB 750 milliGRAM(s) IV Intermittent every 24 hours  methylPREDNISolone sodium succinate Injectable 40 milliGRAM(s) IV Push daily  nicotine -   7 mG/24Hr(s) Patch 1 Patch Transdermal daily  pantoprazole    Tablet 40 milliGRAM(s) Oral before breakfast  polyethylene glycol 3350 17 Gram(s) Oral daily  saccharomyces boulardii 250 milliGRAM(s) Oral two times a day  senna 2 Tablet(s) Oral at bedtime    MEDICATIONS  (PRN):  albuterol    90 MICROgram(s) HFA Inhaler 2 Puff(s) Inhalation every 6 hours PRN for shortness of breath and/or wheezing  sodium chloride 0.65% Nasal 1 Spray(s) Both Nostrils three times a day PRN Nasal Congestion

## 2024-05-14 NOTE — PROGRESS NOTE ADULT - SUBJECTIVE AND OBJECTIVE BOX
24H events:    Patient is a 73y old Female who presents with a chief complaint of COPD exacerbation (14 May 2024 09:12)    Primary diagnosis of COPD exacerbation      Day 1:  Day 2:  Day 3:     Today is hospital day 8d. This morning patient was seen and examined at bedside, resting comfortably in bed.    No acute or major events overnight.    Code Status:    Family communication:  Contact date:  Name of person contacted:  Relationship to patient:  Communication details:  What matters most:    PAST MEDICAL & SURGICAL HISTORY    SOCIAL HISTORY:  Social History:      ALLERGIES:  No Known Allergies    MEDICATIONS:  STANDING MEDICATIONS  albuterol/ipratropium for Nebulization 3 milliLiter(s) Nebulizer every 4 hours  amLODIPine   Tablet 5 milliGRAM(s) Oral daily  bisacodyl Suppository 10 milliGRAM(s) Rectal daily  cefepime   IVPB 2000 milliGRAM(s) IV Intermittent every 8 hours  chlorhexidine 2% Cloths 1 Application(s) Topical <User Schedule>  enoxaparin Injectable 40 milliGRAM(s) SubCutaneous every 24 hours  hydrOXYzine hydrochloride 25 milliGRAM(s) Oral daily  levoFLOXacin IVPB      levoFLOXacin IVPB 750 milliGRAM(s) IV Intermittent every 24 hours  methylPREDNISolone sodium succinate Injectable 40 milliGRAM(s) IV Push daily  nicotine -   7 mG/24Hr(s) Patch 1 Patch Transdermal daily  pantoprazole    Tablet 40 milliGRAM(s) Oral before breakfast  polyethylene glycol 3350 17 Gram(s) Oral daily  saccharomyces boulardii 250 milliGRAM(s) Oral two times a day  senna 2 Tablet(s) Oral at bedtime    PRN MEDICATIONS  albuterol    90 MICROgram(s) HFA Inhaler 2 Puff(s) Inhalation every 6 hours PRN  sodium chloride 0.65% Nasal 1 Spray(s) Both Nostrils three times a day PRN    VITALS:   T(F): 97.2  HR: 92  BP: 120/65  RR: 16  SpO2: 93%    PHYSICAL EXAM:  GENERAL: NAD, lying in bed comfortably     HEART: normal rate    regular   normal s1s2      LUNGS: HFNC Decreased BS  ABDOMEN:   Soft     nondistended     nontender      EXTREMITIES:  (  ) Normal     NERVOUS SYSTEM:    (  ) A&Ox3    SKIN:   (  ) No rashes or lesions        LABS:                        10.7   17.33 )-----------( 253      ( 14 May 2024 06:17 )             35.4     05-14    141  |  93<L>  |  22<H>  ----------------------------<  99  4.6   |  45<HH>  |  <0.5<L>    Ca    8.6      14 May 2024 06:17  Phos  3.5     05-13  Mg     2.1     05-13    TPro  5.1<L>  /  Alb  3.5  /  TBili  0.4  /  DBili  x   /  AST  15  /  ALT  47<H>  /  AlkPhos  64  05-13      Urinalysis Basic - ( 14 May 2024 06:17 )    Color: x / Appearance: x / SG: x / pH: x  Gluc: 99 mg/dL / Ketone: x  / Bili: x / Urobili: x   Blood: x / Protein: x / Nitrite: x   Leuk Esterase: x / RBC: x / WBC x   Sq Epi: x / Non Sq Epi: x / Bacteria: x      ABG - ( 13 May 2024 14:03 )  pH, Arterial: 7.40  pH, Blood: x     /  pCO2: 77    /  pO2: 51    / HCO3: 48    / Base Excess: 18.5  /  SaO2: 85.4                      RADIOLOGY:

## 2024-05-15 NOTE — PROGRESS NOTE ADULT - SUBJECTIVE AND OBJECTIVE BOX
RITESH THOMAS  73y Female    CHIEF COMPLAINT:    Patient is a 73y old  Female who presents with a chief complaint of COPD exacerbation (15 May 2024 13:36)    INTERVAL HPI/OVERNIGHT EVENTS:    Patient seen and examined. No acute events overnight. on [facility]     ROS: All other systems are negative.    Vital Signs:    T(F): 97 (05-15-24 @ 11:57), Max: 98.1 (05-15-24 @ 07:17)  HR: 98 (05-15-24 @ 11:57) (79 - 100)  BP: 103/55 (05-15-24 @ 11:57) (94/53 - 118/61)  RR: 20 (05-15-24 @ 11:57) (18 - 20)  SpO2: 93% (05-15-24 @ 11:57) (93% - 98%)    14 May 2024 07:01  -  15 May 2024 07:00  --------------------------------------------------------  IN: 0 mL / OUT: 450 mL / NET: -450 mL      Daily Weight in k.5 (15 May 2024 00:00)    PHYSICAL EXAM:    GENERAL:  NAD chronically ill appearing   SKIN: No rashes or lesions  HEENT: Atraumatic. Normocephalic.    NECK: Supple, No JVD.  .  PULMONARY: decreased breath sounds  B/L. No wheezing.   CVS: Normal S1, S2. Rate and Rhythm are regular   ABDOMEN/GI: Soft, Nontender, Nondistended   MSK:  No clubbing or cyanosis   NEUROLOGIC: moves all extremities   PSYCH: Awake and alert     Consultant(s) Notes Reviewed:  [x ] YES  [ ] NO  Care Discussed with Consultants/Other Providers [ x] YES  [ ] NO    LABS:                        10.2   14.66 )-----------( 237      ( 15 May 2024 04:53 )             35.1     140  |  95<L>  |  27<H>  ----------------------------<  88  5.0   |  43<HH>  |  <0.5<L>    Ca    8.4      15 May 2024 04:53  Mg     2.0     -15    TPro  4.7<L>  /  Alb  3.1<L>  /  TBili  0.4  /  DBili  x   /  AST  12  /  ALT  48<H>  /  AlkPhos  58  05-15    RADIOLOGY & ADDITIONAL TESTS:  Imaging or report Personally Reviewed:  [x] YES  [ ] NO  EKG reviewed: [x] YES  [ ] NO    Medications:  Standing  albuterol/ipratropium for Nebulization 3 milliLiter(s) Nebulizer every 6 hours  amLODIPine   Tablet 5 milliGRAM(s) Oral daily  bisacodyl Suppository 10 milliGRAM(s) Rectal daily  chlorhexidine 2% Cloths 1 Application(s) Topical <User Schedule>  enoxaparin Injectable 40 milliGRAM(s) SubCutaneous every 24 hours  hydrOXYzine hydrochloride 25 milliGRAM(s) Oral daily  methylPREDNISolone sodium succinate Injectable 40 milliGRAM(s) IV Push daily  nicotine -   7 mG/24Hr(s) Patch 1 Patch Transdermal daily  pantoprazole    Tablet 40 milliGRAM(s) Oral before breakfast  polyethylene glycol 3350 17 Gram(s) Oral daily  saccharomyces boulardii 250 milliGRAM(s) Oral two times a day  senna 2 Tablet(s) Oral at bedtime    PRN Meds  albuterol    90 MICROgram(s) HFA Inhaler 2 Puff(s) Inhalation every 6 hours PRN  sodium chloride 0.65% Nasal 1 Spray(s) Both Nostrils three times a day PRN

## 2024-05-15 NOTE — CHART NOTE - NSCHARTNOTEFT_GEN_A_CORE
Registered Dietitian Follow-Up     Patient Profile Reviewed                           Yes [x]   No []     Nutrition History Previously Obtained        Yes [x]  No []       Pertinent Medical Interventions: Presented w/ SOB x weeks, cough, wheezing, O2 sat 70% at home placed on NRB. Pt admitted to MICU for acute respiratory failure, was found to have lungs mass and postobstructive PNA. Noted pt clinically improved and was downgraded to SDU. New lung mass noted concerned for malignancy.    PMH includes COPD, HTN, Chronic Smoker.     Diet order: DASH/TLC diet     Anthropometrics:  Ht: 154.9 cm  Initial wt: 43.5 kg.  BMI: 18.1  IBW: 47.7 kg.    Daily Weight in k.5 (05-15), Weight in k (), Weight in k.5 ()     wt duspected to be documentation error (possibly intended to be noted as 96 lbs/43.6 kg instead of 96 kg). ? etiology of wt gain from  wt 43.5 kg to 5/15 wt 51.5 kg. Will continue to monitor wt trends. BMI calculated using initial wt 43.5 kg ().    MEDICATIONS  (STANDING):  albuterol/ipratropium for Nebulization 3 milliLiter(s) Nebulizer every 6 hours  amLODIPine   Tablet 5 milliGRAM(s) Oral daily  bisacodyl Suppository 10 milliGRAM(s) Rectal daily  chlorhexidine 2% Cloths 1 Application(s) Topical <User Schedule>  enoxaparin Injectable 40 milliGRAM(s) SubCutaneous every 24 hours  methylPREDNISolone sodium succinate Injectable 40 milliGRAM(s) IV Push daily  nicotine -   7 mG/24Hr(s) Patch 1 Patch Transdermal daily  pantoprazole    Tablet 40 milliGRAM(s) Oral before breakfast  polyethylene glycol 3350 17 Gram(s) Oral daily  saccharomyces boulardii 250 milliGRAM(s) Oral two times a day  senna 2 Tablet(s) Oral at bedtime    MEDICATIONS  (PRN):  albuterol    90 MICROgram(s) HFA Inhaler 2 Puff(s) Inhalation every 6 hours PRN for shortness of breath and/or wheezing  hydrOXYzine hydrochloride 25 milliGRAM(s) Oral two times a day PRN Anxiety  sodium chloride 0.65% Nasal 1 Spray(s) Both Nostrils three times a day PRN Nasal Congestion    Pertinent Labs: -15 @ 04:53: Na 140, BUN 27<H>, Cr <0.5<L>, BG 88, K+ 5.0, Phos --, Mg 2.0, Alk Phos 58, ALT/SGPT 48<H>, AST/SGOT 12    Physical Findings:  - Appearance: alert  - GI function: last BM ; no GI issues reported at this time  - Tubes: no feeding tubes  - Oral/Mouth cavity: seen by SLP services;  SLP eval notes "+ toleration observed without overt symptoms of penetration/aspiration for Regular/thins" Recommends regular diet with thin liquids.  - Skin: ecchymosis noted  - Edema: no edema noted in flowsheet documentation     Nutrition Requirements:  Weight Used: 43.5 kg. as per  RD assessment     Estimated Energy Needs    Continue [x]  Adjust []  0170-9593 kcal/day (30-35 kcal/kg ABW)        Estimated Protein Needs    Continue [x]  Adjust []  54-65 g/day (1.25-1.5 g/kg ABW)        Estimated Fluid Needs        Continue [x]  Adjust []  9376-3691 mL/day (30-35 mL/kg ABW)     Nutrient Intake: Pt reportedly w/ fair appetite & po intake following previous RD assessment; consuming % of meals at this time per report. Given pt met malnutrition criteria earlier this admit, would liberalize diet order & continue to recommend po supplement.    [x] Previous Nutrition Diagnosis: Severe protein-calorie malnutrition            [x] Ongoing          [] Resolved    Nutrition Education: Reviewed diet order & po supplements; encouraged adequate po intake.     Goal/Expected Outcome: Pt to demonstrate tolerance to diet order, with at least 75% po intake maintained over next 5-7 days.    Pt at moderate nutrition risk; RD to follow-up in 5-7 days.     Indicator/Monitoring: Skin, labs, BM, wt, nutrition focused physical findings, body composition, GI, swallow functin, po intake, diet order.    Recommendation: Remove DASH/TLC diet restriction to liberalize diet & encourage adequate po intake. Order Ensure Plus High Protein twice daily (350 kcal, 20 g protein per serving). Order Magic Cup twice daily (290 kcal, 9 g protein per serving).

## 2024-05-15 NOTE — CHART NOTE - NSCHARTNOTEFT_GEN_A_CORE
PALLIATIVE MEDICINE INTERDISCIPLINARY TEAM NOTE    Provider:                                              Met with: [ x  ] Patient  [ x  ] Family  [   ] Other:    Primary Language: [  x ] English [   ] Other*:                      *Interpretation provided by:    SUPPORT DIAGNOSES            (Check all that apply)    [   ] EOL issues  [ x  ] Advanced Illness  [   ] Cultural / spiritual concerns  [   ] Pain / suffering  [   ] Dementia / AMS  [   ] Other:  [   ] AD issues  [   ] Grief / loss / sadness  [   ] Discharge issues  [x   ] Distress / coping    PSYCHOSOCIAL ASSESSMENT OF PATIENT         (Check all that apply)    [ x  ] Initial Assessment            [   ] Reassessment          [   ] Not Applicable this visit    Pain/suffering acuity:  [  x ] None to mild (0-3)           [   ] Moderate (4-6)        [   ] High (7-10)    Mental Status:  [  x ] Alert/oriented (x3)          [   ] Confused/Altered(x2/x1)         [   ] Non-resp    Functional status:  [   ] Independent w ADLs      [  x ] Needs Assistance             [   ] Bedbound/Full Care    Coping:  [   ] Coping well                     [ x  ] Coping w/difficulty            [   ] Poor coping    Support system:  [  x ] Strong                              [   ] Adequate                        [   ] Inadequate      Past history and medications for:     [ ] Anxiety       [ ] Depression    [ ] Sleep disorders       SERVICE PROVIDED  [   ]Discharge support / facilitation  [   ]AD / goals of care counseling                                  [   ]EOL / death / bereavement counseling  [ x  ]Counseling / support                                              [   ]Family meeting  [   ]Prayer / sacrament / ritual                                       [   ]Referral   [   ]Other                                                                       NOTE and Plan of Care (PoC):    patient is a 74 y/o F with pmhx of COPD, HTN, chronic smoker, presenting with c/o SOB, cough, wheezing. met with patient earlier today. patient encountered resting in bed - awake and alert, on HFNC. her son August at bedside. Reviewed role of palliative SW with them. They declined having any palliative-SW needs at present time. provided August with my contact info and encouraged him/patient to contact me should they need anything. psychosocial support provided. will follow as appropriate. [phone]

## 2024-05-15 NOTE — PROGRESS NOTE ADULT - SUBJECTIVE AND OBJECTIVE BOX
Patient is a 73y old  Female who presents with a chief complaint of COPD exacerbation (14 May 2024 16:04)      overnight events: remains on HFNC 30 60. off pressor. refused NIV overnight.     Drips: none            ROS: as in HPI; All other systems reviewed are negative        PHYSICAL EXAM  Vital Signs Last 24 Hrs  T(C): 36.7 (15 May 2024 07:17), Max: 36.7 (14 May 2024 11:06)  T(F): 98.1 (15 May 2024 07:17), Max: 98.1 (14 May 2024 11:06)  HR: 96 (15 May 2024 07:17) (79 - 100)  BP: 109/56 (15 May 2024 07:17) (94/53 - 118/61)  BP(mean): 77 (15 May 2024 07:17) (72 - 77)  RR: 20 (15 May 2024 07:17) (20 - 20)  SpO2: 94% (15 May 2024 07:17) (94% - 98%)    Parameters below as of 15 May 2024 07:17  Patient On (Oxygen Delivery Method): nasal cannula, high flow          CONSTITUTIONAL:   NAD    ENT:   Airway patent,     EYES:   Clear bilaterally,   pupils equal,   round and reactive to light.    CARDIAC:   Normal rate,   regular rhythm.    no edema    RESPIRATORY:   no Wheezing   Normal chest expansion  Not tachypneic,    GASTROINTESTINAL:  Abdomen soft, non-tender,   No guarding,   Positive BS    MUSCULOSKELETAL:   Range of motion is not limited,    NEUROLOGICAL:   Alert and oriented   No motor deficits.    SKIN:   Skin normal color for race,   No evidence of rash.            ABG - ( 13 May 2024 14:03 )  pH, Arterial: 7.40  pH, Blood: x     /  pCO2: 77    /  pO2: 51    / HCO3: 48    / Base Excess: 18.5  /  SaO2: 85.4                I&O's Detail    14 May 2024 07:01  -  15 May 2024 07:00  --------------------------------------------------------  IN:  Total IN: 0 mL    OUT:    Oral Fluid: 0 mL    Voided (mL): 450 mL  Total OUT: 450 mL    Total NET: -450 mL            LABS:                        10.2   14.66 )-----------( 237      ( 15 May 2024 04:53 )             35.1     15 May 2024 04:53    140    |  95     |  27     ----------------------------<  88     5.0     |  43     |  <0.5     Ca    8.4        15 May 2024 04:53  Mg     2.0       15 May 2024 04:53    TPro  4.7    /  Alb  3.1    /  TBili  0.4    /  DBili  x      /  AST  12     /  ALT  48     /  AlkPhos  58     15 May 2024 04:53  Amylase x     lipase x              CAPILLARY BLOOD GLUCOSE          Urinalysis Basic - ( 15 May 2024 04:53 )    Color: x / Appearance: x / SG: x / pH: x  Gluc: 88 mg/dL / Ketone: x  / Bili: x / Urobili: x   Blood: x / Protein: x / Nitrite: x   Leuk Esterase: x / RBC: x / WBC x   Sq Epi: x / Non Sq Epi: x / Bacteria: x      Culture        MEDICATIONS  (STANDING):  albuterol/ipratropium for Nebulization 3 milliLiter(s) Nebulizer every 4 hours  amLODIPine   Tablet 5 milliGRAM(s) Oral daily  bisacodyl Suppository 10 milliGRAM(s) Rectal daily  cefepime   IVPB 2000 milliGRAM(s) IV Intermittent every 8 hours  chlorhexidine 2% Cloths 1 Application(s) Topical <User Schedule>  enoxaparin Injectable 40 milliGRAM(s) SubCutaneous every 24 hours  hydrOXYzine hydrochloride 25 milliGRAM(s) Oral daily  levoFLOXacin IVPB 750 milliGRAM(s) IV Intermittent every 24 hours  levoFLOXacin IVPB      methylPREDNISolone sodium succinate Injectable 40 milliGRAM(s) IV Push daily  nicotine -   7 mG/24Hr(s) Patch 1 Patch Transdermal daily  pantoprazole    Tablet 40 milliGRAM(s) Oral before breakfast  polyethylene glycol 3350 17 Gram(s) Oral daily  saccharomyces boulardii 250 milliGRAM(s) Oral two times a day  senna 2 Tablet(s) Oral at bedtime    MEDICATIONS  (PRN):  albuterol    90 MICROgram(s) HFA Inhaler 2 Puff(s) Inhalation every 6 hours PRN for shortness of breath and/or wheezing  sodium chloride 0.65% Nasal 1 Spray(s) Both Nostrils three times a day PRN Nasal Congestion

## 2024-05-15 NOTE — PROGRESS NOTE ADULT - ASSESSMENT
74 y/o female with PMHx COPD, HTN, Chronic Smoker P/W SOB x weeks, cough, wheezing, O2 sat 70% at home placed on NRB by EMS, confused. Admitted to MICU for ARF, was found to have lungs mass and postobstructive PNA.   Pt clinically improved and was downgraded to SDU.     A/P   #Acute Hypercapnic/Hypoxemic Respiratory Failure 2/2 COPD Exacerbation/ Lungs mass, highly suspicious for advanced malignancy/ Possible Post-Obstructive PNA / h/o hemoptysis in current smoker   - CT on admission chest large mass and superimposed PNA  -Upgraded to SDU for desat, now on HFNC.   - pt underwent diagnostic  thoracentesis on 5/9, will follow up fluid cytology ( mass is highly suspicious for advance malignancy) - reach out to heme-onc when cytology comes back - pleural fluid came back nag for malignant cells   -Will need biopsy when more stable   - declining bipap -high risk of intubation and patient is full code   -Procal, BNP, Dimer neg  -Completed Cefepime and Levaquin  -HFNC 30/60  - Continue Solumedrol 40 daily for now  -aspiration precautions, chest PT  - Mucinex standing    - BIPAP PRN and QHS ordered although patient has not been using   - palliative care on board, possibility of hospice discussed with family in case if pt'll decline cancer directed therapy     #Anxiety  -Hydroxyzine    #HTN   - DASH diet   - C/w Amlodipine 5mg QD    # constipation   - high fiber diet   - c/w bowel regimen     # Weight loss/ mild malnutrition  - consult dietitian   - high calories diet     #MISC  - DVT: Lovenox  - GI: Protonix  - Diet: DASH     Overall prognosis is poor.

## 2024-05-15 NOTE — PROGRESS NOTE ADULT - SUBJECTIVE AND OBJECTIVE BOX
24H events:    Patient is a 73y old Female who presents with a chief complaint of COPD exacerbation (15 May 2024 09:01)    Primary diagnosis of COPD exacerbation      Day 1:  Day 2:  Day 3:     Today is hospital day 9d. This morning patient was seen and examined at bedside, resting comfortably in bed.    No acute or major events overnight.    Code Status:    Family communication:  Contact date:  Name of person contacted:  Relationship to patient:  Communication details:  What matters most:    PAST MEDICAL & SURGICAL HISTORY  Chronic obstructive pulmonary disease, unspecified COPD type    Hypertension    No significant past surgical history      SOCIAL HISTORY:  Social History:      ALLERGIES:  No Known Allergies    MEDICATIONS:  STANDING MEDICATIONS  albuterol/ipratropium for Nebulization 3 milliLiter(s) Nebulizer every 6 hours  amLODIPine   Tablet 5 milliGRAM(s) Oral daily  bisacodyl Suppository 10 milliGRAM(s) Rectal daily  chlorhexidine 2% Cloths 1 Application(s) Topical <User Schedule>  enoxaparin Injectable 40 milliGRAM(s) SubCutaneous every 24 hours  hydrOXYzine hydrochloride 25 milliGRAM(s) Oral daily  methylPREDNISolone sodium succinate Injectable 40 milliGRAM(s) IV Push daily  nicotine -   7 mG/24Hr(s) Patch 1 Patch Transdermal daily  pantoprazole    Tablet 40 milliGRAM(s) Oral before breakfast  polyethylene glycol 3350 17 Gram(s) Oral daily  saccharomyces boulardii 250 milliGRAM(s) Oral two times a day  senna 2 Tablet(s) Oral at bedtime    PRN MEDICATIONS  albuterol    90 MICROgram(s) HFA Inhaler 2 Puff(s) Inhalation every 6 hours PRN  sodium chloride 0.65% Nasal 1 Spray(s) Both Nostrils three times a day PRN    VITALS:   T(F): 98.1  HR: 96  BP: 109/56  RR: 20  SpO2: 94%    PHYSICAL EXAM:  GENERAL: NAD, lying in bed comfortably     HEART: normal rate    regular   normal s1s2      LUNGS: HFNC Decreased BS  ABDOMEN:   Soft     nondistended     nontender      EXTREMITIES: Normal     NERVOUS SYSTEM:   A&Ox3    SKIN: No rashes or lesions          LABS:                        10.2   14.66 )-----------( 237      ( 15 May 2024 04:53 )             35.1     05-15    140  |  95<L>  |  27<H>  ----------------------------<  88  5.0   |  43<HH>  |  <0.5<L>    Ca    8.4      15 May 2024 04:53  Mg     2.0     05-15    TPro  4.7<L>  /  Alb  3.1<L>  /  TBili  0.4  /  DBili  x   /  AST  12  /  ALT  48<H>  /  AlkPhos  58  05-15      Urinalysis Basic - ( 15 May 2024 04:53 )    Color: x / Appearance: x / SG: x / pH: x  Gluc: 88 mg/dL / Ketone: x  / Bili: x / Urobili: x   Blood: x / Protein: x / Nitrite: x   Leuk Esterase: x / RBC: x / WBC x   Sq Epi: x / Non Sq Epi: x / Bacteria: x      ABG - ( 13 May 2024 14:03 )  pH, Arterial: 7.40  pH, Blood: x     /  pCO2: 77    /  pO2: 51    / HCO3: 48    / Base Excess: 18.5  /  SaO2: 85.4                      RADIOLOGY:

## 2024-05-15 NOTE — PROGRESS NOTE ADULT - SUBJECTIVE AND OBJECTIVE BOX
CC: SOB    HPI:  73-year-old female with past medical history of COPD not on home oxygen, chronic smoker >60ppy, hypertension who presents for shortness of breath x few weeks.  Pt reports worsening productive cough and shortness of breath with wheezing for the past 1 week.  Increased work of breathing today, was satting 70% on room air.  Was placed on nonrebreather by EMS.  As per son at the bedside pt was confused in the morning, wasn't aware of the surrounding.  She also admits to hemoptysis  along with weight loss over the past few months.  Admits to fever and chills, and denies chest pain, palpitations, nausea, vomiting, diarrhea, abdominal pain, urinary symptoms, weakness, numbness, falls, recent travel, recent surgeries.  Not on anticoagulation.  Denies substance use and alcohol use.    In ED  VT bp 143/80, , RR 28, T 99.4 F, SpO2 98% on NRB 15 L  Labs showed Lac 2.3, trop 26, BNP 2044, VBG  pH 7.18 PCo2 102 HCO3 38, O2 sat 46%    s/p IV solumedrol, IV Mag, Nebs and Albuterol in ED.  Pt was placed on BIPAP and admitted to MICU for further managements.   (06 May 2024 23:30)    PERTINENT PM/SXH:   COPD not on home oxygen, chronic smoker >60ppy, hypertension     FAMILY HISTORY:    None pertinent    ITEMS NOT CHECKED ARE NOT PRESENT    SOCIAL HISTORY:   Significant other/partner[ ]  Children[ ]  Evangelical/Spirituality:  Substance hx:  [ ]   Tobacco hx:  [ ]   Alcohol hx: [ ]   Living Situation: [x ]Home  [ ]Long term care  [ ]Rehab [ ]Other  Home Services: [ ] HHA [ ] Visting RN [ ] Hospice  Occupation:  Home Opioid hx:  [ ] Y [ ] N [x ] I-Stop Reference No:    Reference #: [id number] - no meds     ADVANCE DIRECTIVES:     [x ] Full Code [ ] DNR  MOLST  [ ]  Living Will  [ ]   DECISION MAKER(s):  [ ] Health Care Proxy(s)  [x ] Surrogate(s)  [ ] Guardian           Name(s): Phone Number(s):  Children      BASELINE (I)ADL(s) (prior to admission):    [place]: [ ]Total  [ ] Moderate [ ]Dependent  Palliative Performance Status Version 2:         %    http://npcrc.org/files/news/palliative_performance_scale_ppsv2.pdf    Allergies    No Known Allergies    Intolerances    MEDICATIONS  (STANDING):  albuterol/ipratropium for Nebulization 3 milliLiter(s) Nebulizer every 6 hours  amLODIPine   Tablet 5 milliGRAM(s) Oral daily  bisacodyl Suppository 10 milliGRAM(s) Rectal daily  chlorhexidine 2% Cloths 1 Application(s) Topical <User Schedule>  enoxaparin Injectable 40 milliGRAM(s) SubCutaneous every 24 hours  hydrOXYzine hydrochloride 25 milliGRAM(s) Oral daily  methylPREDNISolone sodium succinate Injectable 40 milliGRAM(s) IV Push daily  nicotine -   7 mG/24Hr(s) Patch 1 Patch Transdermal daily  pantoprazole    Tablet 40 milliGRAM(s) Oral before breakfast  polyethylene glycol 3350 17 Gram(s) Oral daily  saccharomyces boulardii 250 milliGRAM(s) Oral two times a day  senna 2 Tablet(s) Oral at bedtime    MEDICATIONS  (PRN):  albuterol    90 MICROgram(s) HFA Inhaler 2 Puff(s) Inhalation every 6 hours PRN for shortness of breath and/or wheezing  sodium chloride 0.65% Nasal 1 Spray(s) Both Nostrils three times a day PRN Nasal Congestion      PRESENT SYMPTOMS: [ ]Unable to obtain due to poor mentation   Source if other than patient:  [ ]Family   [ ]Team     Pain: [ ]yes [ x]no  ALL PAIN ASSESSMENT COMPONENTS WERE ASKED ABOUT UNLESS OTHERWISE NOTED  QOL impact -   Location -                    Aggravating factors -  Quality -  Radiation -  Timing-  Severity (0-10 scale):  Minimal acceptable level (0-10 scale):     CPOT:    https://www.sccm.org/getattachment/jyf04d70-2w1e-2e4y-6s5m-5218s7103a5n/Critical-Care-Pain-Observation-Tool-(CPOT)    PAIN AD Score:   http://geriatrictoolkit.[place].[facility]/cog/painad.pdf (press ctrl +  left click to view)    Dyspnea:                           [ ]None[x ]Mild [ ]Moderate [ ]Severe     Respiratory Distress Observation Scale (RDOS):   A score of 0 to 2 signifies little or no respiratory distress, 3 signifies mild distress, scores 4 to 6 indicate moderate distress, and scores greater than 7 signify severe distress  https://www.[facility].ca/sites/default/files/PDFS/654354-acrtepseheo-bwqfftsx-otlqlrxcpdv-zcgss.pdf    Anxiety:                             [ ]None[ ]Mild [ x]Moderate [ ]Severe   Fatigue:                             [ ]None[ x]Mild [ ]Moderate [ ]Severe   Nausea:                             [ x]None[ ]Mild [ ]Moderate [ ]Severe   Loss of appetite:              [x ]None[ ]Mild [ ]Moderate [ ]Severe   Constipation:                    [x ]None[ ]Mild [ ]Moderate [ ]Severe    Other Symptoms:  [x ]All other review of systems negative     Palliative Performance Status Version 2:         30%    http://[facility].org/files/news/palliative_performance_scale_ppsv2.pdf    PHYSICAL EXAM:    Vital Signs Last 24 Hrs  T(C): 36.1 (15 May 2024 11:57), Max: 36.7 (15 May 2024 07:17)  T(F): 97 (15 May 2024 11:57), Max: 98.1 (15 May 2024 07:17)  HR: 98 (15 May 2024 11:57) (79 - 100)  BP: 103/55 (15 May 2024 11:57) (94/53 - 118/61)  BP(mean): 70 (15 May 2024 11:57) (70 - 77)  RR: 20 (15 May 2024 11:57) (18 - 20)  SpO2: 93% (15 May 2024 11:57) (93% - 98%)    Parameters below as of 15 May 2024 11:57  Patient On (Oxygen Delivery Method): nasal cannula, high flow          GENERAL:  [ ] No acute distress [ ]Lethargic  [ ]Unarousable  [ x]Verbal  [ ]Non-Verbal [ ]Cachexia    BEHAVIORAL/PSYCH:  [ x]Alert and Oriented x3  [ x] Anxiety [ ] Delirium [ ] Agitation [ ] Calm   EYES: [x ] No scleral icterus [ ] Scleral icterus [ ] Closed  ENMT:  [ ]Dry mouth  [ x]No external oral lesions [ ] No external ear or nose lesions  CARDIOVASCULAR:  [ ]Regular [ ]Irregular [ ]Tachy [ x]Not Tachy  [ ]Kalpesh [ ] Edema [ ] No edema  PULMONARY:  [x ]Tachypnea  [ ]Audible excessive secretions [ ] No labored breathing [ x] labored breathing  GASTROINTESTINAL: [ ]Soft  [ ]Distended  [ ]Not distended [ ]Non tender [ ]Tender  MUSCULOSKELETAL: [ ]No clubbing [ ] clubbing  [ ] No cyanosis [ ] cyanosis  NEUROLOGIC: [ ]No focal deficits  [ x]Follows commands  [ ]Does not follow commands  [ ]Cognitive impairment  [ ]Dysphagia  [ ]Dysarthria  [ ]Paresis   SKIN: [ ] Jaundiced [x ] Non-jaundiced [ ]Rash [ ]No Rash [ ] Warm [ ] Dry  MISC/LINES: [ ] ET tube [ ] Trach [ ]NGT/OGT [ ]PEG [ ]Paulino    LABS: reviewed by me                                   10.2   14.66 )-----------( 237      ( 15 May 2024 04:53 )             35.1     05-15    140  |  95<L>  |  27<H>  ----------------------------<  88  5.0   |  43<HH>  |  <0.5<L>    Ca    8.4      15 May 2024 04:53  Mg     2.0     05-15    TPro  4.7<L>  /  Alb  3.1<L>  /  TBili  0.4  /  DBili  x   /  AST  12  /  ALT  48<H>  /  AlkPhos  58  05-15      Urinalysis Basic - ( 15 May 2024 04:53 )    Color: x / Appearance: x / SG: x / pH: x  Gluc: 88 mg/dL / Ketone: x  / Bili: x / Urobili: x   Blood: x / Protein: x / Nitrite: x   Leuk Esterase: x / RBC: x / WBC x   Sq Epi: x / Non Sq Epi: x / Bacteria: x        RADIOLOGY & ADDITIONAL STUDIES: reviewed by me    < from: Xray Chest 1 View- PORTABLE-Urgent (Xray Chest 1 View- PORTABLE-Urgent .) (05.09.24 @ 12:23) >  Impression:    Left lung masslike consolidation. Decreased left effusion. No   pneumothorax.    Improving right basilar nodular opacities.    < end of copied text >      EKG: reviewed by me    < from: 12 Lead ECG (05.06.24 @ 19:52) >  Ventricular Rate 90 BPM    Atrial Rate 90 BPM    P-R Interval 170 ms    QRS Duration 80 ms    Q-T Interval 360 ms    QTC Calculation(Bazett) 440 ms    P Axis 84 degrees    R Axis -70 degrees    T Axis 77 degrees    Diagnosis Line Normal sinus rhythm  Left anterior fascicular block  Septal infarct , age undetermined  Abnormal ECG    < end of copied text >      PROTEIN CALORIE MALNUTRITION PRESENT: [ ]mild [ ]moderate [ ]severe [ ]underweight [ ]morbid obesity  https://www.andeal.org/vault/2440/web/files/ONC/Table_Clinical%20Characteristics%20to%20Document%20Malnutrition-White%20JV%20et%20al%202012.pdf    Height (cm): 154.9 (05-07-24 @ 00:20)  Weight (kg): 47 (05-07-24 @ 00:20)  BMI (kg/m2): 19.6 (05-07-24 @ 00:20)  [ ]PPSV2 < or = to 30% [ ]significant weight loss  [ ]poor nutritional intake  [ ]anasarca      [ ]Artificial Nutrition      Palliative Care Spiritual/Emotional Screening Tool Question  Severity (0-4):                    OR                    [ x] Unable to determine. Will assess at later time if appropriate.  Score of 2 or greater indicates recommendation of Chaplaincy and/or SW referral  Chaplaincy Referral: [ ] Yes [ ] Refused [ ] Following     Caregiver [place]:  [ ] Yes [ ] No    OR    [x ] Unable to determine. Will assess at later time if appropriate.  Social Work Referral [ ]  Patient and Family Centered Care Referral [ ]    Anticipatory Grief Present: [ ] Yes [ ] No    OR     [ x] Unable to determine. Will assess at later time if appropriate.  Social Work Referral [ ]  Patient and Family Centered Care Referral [ ]    Patient discussed with primary medical team MD  Palliative care education provided to patient and/or family

## 2024-05-15 NOTE — PROGRESS NOTE ADULT - ASSESSMENT
IMPRESSION:  Acute hypoxemic respiratory failure   Acute on chronic hypercapnic resp failure  COPD exacerbation   Lung mass r/o malignancy   Superimposed pneumonia  Left sided effusion s/p thoracentesis negative for malignant cells      PLAN:    CNS: Avoid CNS depressant    HEENT: oral care     PULMONARY: keep pox 88 to 92%M, Now using NIV QHS. albuterol Q6 hrs and prn , Prednisone 40 for 5 days. Encourage NIV.  Will need biopsy when stable. Left chest US    CARDIOVASCULAR: TTE noted with 70-75%. Keep is = os  . BNP noted    GI: GI prophylaxis.  Feeding per speech and swallow     RENAL: follow lytes     INFECTIOUS DISEASE: Complete abx course.  Procal noted.     HEMATOLOGICAL:  DVT prophylaxis. Dimer noted and  LE duplex pending    ENDOCRINE:  Follow up FS.  Insulin protocol if needed.    GOC    SDU  IMPRESSION:  Acute hypoxemic respiratory failure   Acute on chronic hypercapnic resp failure  COPD exacerbation   Lung mass possibly malignant    Superimposed pneumonia  Left sided effusion s/p thoracentesis negative for malignant cells    PLAN:    CNS: Avoid CNS depressant    HEENT: oral care     PULMONARY: keep pox 88 to 92%M, Now using NIV QHS. albuterol Q6 hrs and prn , Prednisone 40 for 5 days. Encourage NIV.  Will need biopsy when stable. Left chest US    CARDIOVASCULAR: TTE noted with 70-75%. Keep is = os  . BNP noted    GI: GI prophylaxis.  Feeding per speech and swallow     RENAL: follow lytes.  Correct as needed     INFECTIOUS DISEASE: Complete abx course.  Procal noted.     HEMATOLOGICAL:  DVT prophylaxis. Dimer noted and  LE duplex pending    ENDOCRINE:  Follow up FS.  Insulin protocol if needed.    GOC    SDU

## 2024-05-15 NOTE — CHART NOTE - NSCHARTNOTEFT_GEN_A_CORE
Pt is lethargic, daughter states she's like that all morning. I discussed the importance of having a HCP documented and advance directive. Daughter agrees to have a family discussion with siblings. I was a supportive and emphatic pastoral presence. I will follow up for support.

## 2024-05-15 NOTE — PROGRESS NOTE ADULT - ATTENDING COMMENTS
IMPRESSION:    Acute hypoxemic respiratory failure   Acute on chronic hypercapnic resp failure  COPD exacerbation   Lung mass possibly malignant    Superimposed pneumonia  Left sided effusion s/p thoracentesis negative for malignant cells    plan as outlined above

## 2024-05-15 NOTE — PROGRESS NOTE ADULT - ASSESSMENT
72 y/o female with PMHx COPD, HTN, Chronic Smoker P/W SOB x weeks, cough, wheezing, O2 sat 70% at home placed on NRB by EMS, confused. Admitted to MICU for ARF, was found to have lungs mass and postobstructive PNA. Pt clinically improved and was downgraded to SDU on HFNC .     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  - on  Solumedrol  to 40 mg Q 24 hours now, nebs Q6H and PRN  -  pleural fluid cultures and cytology is negative for malignant cells, pt needs lung mass biopsy ( she is not sure if she'll agree for it), ongoing GOC conversation with pt and family   - off antibiotics - completed course   - encourage BIPAP use, patient often refuses    -  Nicotine patch   - dietary following     HTN   - DASH diet   - C/w Amlodipine 5mg QD    Constipation   - high fiber diet   - c/w bowel regimen      Overall prognosis is poor     Progress Note Handoff  Pending (specify): taper down supplemental 02, pulm fu for bronch pending ongoing GOC, labs, c/w steroids, monitor off abx  Disposition: SDU       Patient seen at bedside, total time spent to evaluate and treat the patient's acute illness and chronic medical conditions as well as time spent reviewing prior records, labs, radiology, documenting in electronic medical records,  discussing medical plan with   medical team was more than 50 minutes with >50% of time spent face to face with patient, discussing with patient/family as well as coordination of care

## 2024-05-15 NOTE — PROGRESS NOTE ADULT - ASSESSMENT
73yFemale with history of COPD, HTN, active smoker presents with SOB and hypoxia. Patient found to have likely COPD exacerbation on arrival with new lung mass concerned for malignancy. Palliative care consulted for GOC.    Spoke with patient and family at bedside. Denied any pain and only slightly dyspneic but tolerable with hi-flow nasal cannula. Received medical update from team and speaking amongst family members to try and decide what to do.    Education about palliative care provided to patient/family.  See Recs below.    Please call [phone] with questions or concerns 24/7.   We will continue to follow.    day(s)

## 2024-05-16 NOTE — PROGRESS NOTE ADULT - ASSESSMENT
73yFemale with history of COPD, HTN, active smoker presents with SOB and hypoxia. Patient found to have likely COPD exacerbation on arrival with new lung mass concerned for malignancy. Palliative care consulted for GOC.    Spoke with patient at bedside. Denied any pain and only slightly dyspneic but tolerable with hi-flow nasal cannula. She seemed agreeable to finalizing HCP documentation but did not want to sign anything until her family came to the bedside, family was currently not present at this time.    Education about palliative care provided to patient/family.  See Recs below.    Please call [phone] with questions or concerns 24/7.   We will continue to follow.

## 2024-05-16 NOTE — PROGRESS NOTE ADULT - ASSESSMENT
IMPRESSION:  Acute hypoxemic respiratory failure   Acute on chronic hypercapnic resp failure  COPD exacerbation   Left lung atelectasis   Lung mass possibly malignant    Superimposed pneumonia  Left sided effusion s/p thoracentesis negative for malignant cells    PLAN:    CNS: Avoid CNS depressant    HEENT: oral care     PULMONARY: keep pox 88 to 92%M, Albuterol Q6 hrs and prn , Prednisone 40 for 5 days. Encourage NIV.  Will need intubation and MV.  Bronchoscopy after intubation  Biopsy when stable. Left chest US small effusion and atelectasis     CARDIOVASCULAR: TTE noted with 70-75%. Keep is = os  . BNP noted    GI: GI prophylaxis.  NPO for now.       RENAL: follow lytes.  Correct as needed     INFECTIOUS DISEASE: Complete abx course.  Procal noted.     HEMATOLOGICAL:  DVT prophylaxis. Dimer noted. LE duplex negative.       ENDOCRINE:  Follow up FS.  Insulin protocol if needed.    GOC    SDU might need ICU

## 2024-05-16 NOTE — CHART NOTE - NSCHARTNOTEFT_GEN_A_CORE
Transfer from CEU to ICU    For Follow up - O2 requirements, intubate and bronch mohinder, low threshold for intubation if she decompensates    73-year-old female with past medical history of COPD not on home oxygen, chronic smoker >60ppy, hypertension who presents for shortness of breath x few weeks.  Pt reports worsening productive cough and shortness of breath with wheezing for the past 1 week.  Increased work of breathing today, was satting 70% on room air.  Was placed on nonrebreather by EMS.  As per son at the bedside pt was confused in the morning, wasn't aware of the surrounding.  She also admits to hemoptysis  along with weight loss over the past few months.  Admits to fever and chills, and denies chest pain, palpitations, nausea, vomiting, diarrhea, abdominal pain, urinary symptoms, weakness, numbness, falls, recent travel, recent surgeries.  Not on anticoagulation.  Denies substance use and alcohol use.    In ED  VT bp 143/80, , RR 28, T 99.4 F, SpO2 98% on NRB 15 L  Labs showed Lac 2.3, trop 26, BNP 2044, VBG  pH 7.18 PCo2 102 HCO3 38, O2 sat 46%    s/p IV solumedrol, IV Mag, Nebs and Albuterol in ED.  Pt was placed on BIPAP and admitted to MICU for further managements.    CEU Course:  -Patient stable on NC. BiPAP 4 on 4 off and QHS. Patient often refuses BiPAP despite extensive counseling and encouragement (gets anxious, refuses antianxiety or pain medication), however no changes in mental status. Solumedrol 60mg q12h.   -Diagnostic thoracentesis done on 5/10, f/u cytology. Once cytology results have heme/onc see her.   -ID following, on cefepime and levaquin for post-obstructive pneumonia.   -Patient defers conversations regarding possible cancer diagnosis to her children. They state there is a possibility she may decline cancer treatment and opt for hospice.     4A course:   -the morning of 5/13 patient was tachypneic, anxious appearing.  O2 sat ranged from 78%-82% on 6L nasal cannula, patient was placed on non rebreather mask 15L and was still satting in the mid 80's.  Patient declines BIPAP as she feels she cannot breath with the mask on.  High Flow nasal cannula placed with improvement in oxygenation to 90%.  Chest x-ray demonstrated worsening left sided pneumonia and ABG pH 7.4, CO2 77, lactate 1.2.  Discussed goals of care with patient's children who stated that this time patient is full code. Discussed with crit fellow who will see patient and recommended upgrade to stepdown.       CEU course: Pt upgraded for Acute hypercapnic resp fialure. Pt is refusing BIPAP, pCO2 77.  Chest xray now shows complete opacification of left lung, with increasing HFNC requirements. Patient will need intubation and Bronchoscopy with possible biopsy. Upgrading to ICU for intubation and bronch mohinder. Low threshold for intubation if she decompensates.    74 y/o female with PMHx COPD, HTN, Chronic Smoker P/W SOB x weeks, cough, wheezing, O2 sat 70% at home placed on NRB by EMS, confused. Admitted to MICU for ARF, was found to have lungs mass and postobstructive PNA.   Pt clinically improved and was downgraded to SDU.     A/P   #Acute Hypercapnic/Hypoxemic Respiratory Failure 2/2 COPD Exacerbation/ Lungs mass, highly suspicious for advanced malignancy/ Possible Post-Obstructive PNA / h/o hemoptysis in current smoker   - CT on admission chest large mass and superimposed PNA  -Upgraded to SDU for desat, now on HFNC.   - pt underwent diagnostic  thoracentesis on 5/9, will follow up fluid cytology ( mass is highly suspicious for advance malignancy) - reach out to heme-onc when cytology comes back - pleural fluid came back nag for malignant cells   -Will need biopsy when more stable   - declining bipap -high risk of intubation and patient is full code   -Procal, BNP, Dimer neg  -Completed Cefepime and Levaquin  -HFNC 30/60  - Continue Solumedrol 40 daily for now  -aspiration precautions, chest PT  - Mucinex standing    - BIPAP PRN and QHS ordered although patient has not been using   - palliative care on board, possibility of hospice discussed with family in case if pt'll decline cancer directed therapy   -Chest xray 5/16 Complete opacification of lung.   -upgrade to ICU for intubation and bronch mohinder AM  holding lovenox in preparation    #Anxiety  -Hydroxyzine    #HTN   - DASH diet   - C/w Amlodipine 5mg QD    # constipation   - high fiber diet   - c/w bowel regimen     # Weight loss/ mild malnutrition  - consult dietitian   - high calories diet     #MISC  - DVT: Lovenox - holding for bronch  - GI: Protonix  - Diet: DASH     Overall prognosis is poor.

## 2024-05-16 NOTE — PROGRESS NOTE ADULT - SUBJECTIVE AND OBJECTIVE BOX
CC: SOB    HPI:  73-year-old female with past medical history of COPD not on home oxygen, chronic smoker >60ppy, hypertension who presents for shortness of breath x few weeks.  Pt reports worsening productive cough and shortness of breath with wheezing for the past 1 week.  Increased work of breathing today, was satting 70% on room air.  Was placed on nonrebreather by EMS.  As per son at the bedside pt was confused in the morning, wasn't aware of the surrounding.  She also admits to hemoptysis  along with weight loss over the past few months.  Admits to fever and chills, and denies chest pain, palpitations, nausea, vomiting, diarrhea, abdominal pain, urinary symptoms, weakness, numbness, falls, recent travel, recent surgeries.  Not on anticoagulation.  Denies substance use and alcohol use.    In ED  VT bp 143/80, , RR 28, T 99.4 F, SpO2 98% on NRB 15 L  Labs showed Lac 2.3, trop 26, BNP 2044, VBG  pH 7.18 PCo2 102 HCO3 38, O2 sat 46%    s/p IV solumedrol, IV Mag, Nebs and Albuterol in ED.  Pt was placed on BIPAP and admitted to MICU for further managements.   (06 May 2024 23:30)    PERTINENT PM/SXH:   COPD not on home oxygen, chronic smoker >60ppy, hypertension     FAMILY HISTORY:    None pertinent    ITEMS NOT CHECKED ARE NOT PRESENT    SOCIAL HISTORY:   Significant other/partner[ ]  Children[ ]  Jehovah's witness/Spirituality:  Substance hx:  [ ]   Tobacco hx:  [ ]   Alcohol hx: [ ]   Living Situation: [x ]Home  [ ]Long term care  [ ]Rehab [ ]Other  Home Services: [ ] HHA [ ] Visting RN [ ] Hospice  Occupation:  Home Opioid hx:  [ ] Y [ ] N [x ] I-Stop Reference No:    Reference #: [id number] - no meds     ADVANCE DIRECTIVES:     [x ] Full Code [ ] DNR  MOLST  [ ]  Living Will  [ ]   DECISION MAKER(s):  [ ] Health Care Proxy(s)  [x ] Surrogate(s)  [ ] Guardian           Name(s): Phone Number(s):  Children      BASELINE (I)ADL(s) (prior to admission):    [place]: [ ]Total  [ ] Moderate [ ]Dependent  Palliative Performance Status Version 2:         %    http://npcrc.org/files/news/palliative_performance_scale_ppsv2.pdf    Allergies    No Known Allergies    Intolerances    MEDICATIONS  (STANDING):  albuterol/ipratropium for Nebulization 3 milliLiter(s) Nebulizer every 6 hours  amLODIPine   Tablet 5 milliGRAM(s) Oral daily  bisacodyl Suppository 10 milliGRAM(s) Rectal daily  chlorhexidine 2% Cloths 1 Application(s) Topical <User Schedule>  enoxaparin Injectable 40 milliGRAM(s) SubCutaneous every 24 hours  methylPREDNISolone sodium succinate Injectable 40 milliGRAM(s) IV Push daily  nicotine -   7 mG/24Hr(s) Patch 1 Patch Transdermal daily  pantoprazole    Tablet 40 milliGRAM(s) Oral before breakfast  polyethylene glycol 3350 17 Gram(s) Oral daily  saccharomyces boulardii 250 milliGRAM(s) Oral two times a day  senna 2 Tablet(s) Oral at bedtime  sodium zirconium cyclosilicate 10 Gram(s) Oral once    MEDICATIONS  (PRN):  albuterol    90 MICROgram(s) HFA Inhaler 2 Puff(s) Inhalation every 6 hours PRN for shortness of breath and/or wheezing  hydrOXYzine hydrochloride 25 milliGRAM(s) Oral two times a day PRN Anxiety  sodium chloride 0.65% Nasal 1 Spray(s) Both Nostrils three times a day PRN Nasal Congestion      PRESENT SYMPTOMS: [ ]Unable to obtain due to poor mentation   Source if other than patient:  [ ]Family   [ ]Team     Pain: [ ]yes [ x]no  ALL PAIN ASSESSMENT COMPONENTS WERE ASKED ABOUT UNLESS OTHERWISE NOTED  QOL impact -   Location -                    Aggravating factors -  Quality -  Radiation -  Timing-  Severity (0-10 scale):  Minimal acceptable level (0-10 scale):     CPOT:    https://www.sccm.org/getattachment/[email or web address]-9i9y-[email or web address]-8174s0521w5u/Critical-Care-Pain-Observation-Tool-(CPOT)    PAIN AD Score:   http://geriatrictoolkit.[facility]/cog/painad.pdf (press ctrl +  left click to view)    Dyspnea:                           [ ]None[x ]Mild [ ]Moderate [ ]Severe     Respiratory Distress Observation Scale (RDOS):   A score of 0 to 2 signifies little or no respiratory distress, 3 signifies mild distress, scores 4 to 6 indicate moderate distress, and scores greater than 7 signify severe distress  [email or web address]/sites/default/files/[email or web address]-diddpvklwqc-xjdrqoli-juajwjzqkpm-ynxlj.[email or web address]    Anxiety:                             [ ]None[ ]Mild [ x]Moderate [ ]Severe   Fatigue:                             [ ]None[ x]Mild [ ]Moderate [ ]Severe   Nausea:                             [ x]None[ ]Mild [ ]Moderate [ ]Severe   Loss of appetite:              [x ]None[ ]Mild [ ]Moderate [ ]Severe   Constipation:                    [x ]None[ ]Mild [ ]Moderate [ ]Severe    Other Symptoms:  [x ]All other review of systems negative     Palliative Performance Status Version 2:         30%    http://[facility].org/files/news/palliative_performance_scale_[email or web address].pdf    PHYSICAL EXAM:    Vital Signs Last 24 Hrs  T(C): 36.5 (16 May 2024 11:04), Max: 36.5 (16 May 2024 11:04)  T(F): 97.7 (16 May 2024 11:04), Max: 97.7 (16 May 2024 11:04)  HR: 82 (16 May 2024 11:04) (81 - 92)  BP: 109/57 (16 May 2024 11:04) (103/82 - 117/56)  BP(mean): 77 (16 May 2024 11:04) (77 - 77)  RR: 18 (16 May 2024 11:04) (18 - 20)  SpO2: 90% (16 May 2024 11:04) (88% - 95%)    Parameters below as of 16 May 2024 11:04  Patient On (Oxygen Delivery Method): nasal cannula          GENERAL:  [ ] No acute distress [ ]Lethargic  [ ]Unarousable  [ x]Verbal  [ ]Non-Verbal [ ]Cachexia    BEHAVIORAL/PSYCH:  [ x]Alert and Oriented x3  [ x] Anxiety [ ] Delirium [ ] Agitation [ ] Calm   EYES: [x ] No scleral icterus [ ] Scleral icterus [ ] Closed  ENMT:  [ ]Dry mouth  [ x]No external oral lesions [ ] No external ear or nose lesions  CARDIOVASCULAR:  [ ]Regular [ ]Irregular [ ]Tachy [ x]Not Tachy  [ ]Kalpesh [ ] Edema [ ] No edema  PULMONARY:  [x ]Tachypnea  [ ]Audible excessive secretions [ ] No labored breathing [ x] labored breathing  GASTROINTESTINAL: [ ]Soft  [ ]Distended  [ ]Not distended [ ]Non tender [ ]Tender  MUSCULOSKELETAL: [ ]No clubbing [ ] clubbing  [ ] No cyanosis [ ] cyanosis  NEUROLOGIC: [ ]No focal deficits  [ x]Follows commands  [ ]Does not follow commands  [ ]Cognitive impairment  [ ]Dysphagia  [ ]Dysarthria  [ ]Paresis   SKIN: [ ] Jaundiced [x ] Non-jaundiced [ ]Rash [ ]No Rash [ ] Warm [ ] Dry  MISC/LINES: [ ] ET tube [ ] Trach [ ]NGT/OGT [ ]PEG [ ]Paulino    LABS: reviewed by me                                   10.8   15.55 )-----------( 237      ( 16 May 2024 06:09 )             36.7     05-16    140  |  94<L>  |  24<H>  ----------------------------<  98  5.7<H>   |  43<HH>  |  <0.5<L>    Ca    8.7      16 May 2024 06:09  Mg     2.0     05-16    TPro  5.1<L>  /  Alb  3.4<L>  /  TBili  0.4  /  DBili  x   /  AST  18  /  ALT  55<H>  /  AlkPhos  61  05-16      Urinalysis Basic - ( 16 May 2024 06:09 )    Color: x / Appearance: x / SG: x / pH: x  Gluc: 98 mg/dL / Ketone: x  / Bili: x / Urobili: x   Blood: x / Protein: x / Nitrite: x   Leuk Esterase: x / RBC: x / WBC x   Sq Epi: x / Non Sq Epi: x / Bacteria: x              RADIOLOGY & ADDITIONAL STUDIES: reviewed by me    < from: Xray Chest 1 View- PORTABLE-Urgent (Xray Chest 1 View- PORTABLE-Urgent .) (05.09.24 @ 12:23) >  Impression:    Left lung masslike consolidation. Decreased left effusion. No   pneumothorax.    Improving right basilar nodular opacities.    < end of copied text >      EKG: reviewed by me    < from: 12 Lead ECG (05.06.24 @ 19:52) >  Ventricular Rate 90 BPM    Atrial Rate 90 BPM    P-R Interval 170 ms    QRS Duration 80 ms    Q-T Interval 360 ms    QTC Calculation(Bazett) 440 ms    P Axis 84 degrees    R Axis -70 degrees    T Axis 77 degrees    Diagnosis Line Normal sinus rhythm  Left anterior fascicular block  Septal infarct , age undetermined  Abnormal ECG    < end of copied text >      PROTEIN CALORIE MALNUTRITION PRESENT: [ ]mild [ ]moderate [ ]severe [ ]underweight [ ]morbid obesity  https://www.andeal.org/vault/2440/web/files/ONC/Table_Clinical%20Characteristics%20to%20Document%20Malnutrition-White%20JV%20et%20al%202012.pdf    Height (cm): 154.9 (05-07-24 @ 00:20)  Weight (kg): 47 (05-07-24 @ 00:20)  BMI (kg/m2): 19.6 (05-07-24 @ 00:20)  [ ]PPSV2 < or = to 30% [ ]significant weight loss  [ ]poor nutritional intake  [ ]anasarca      [ ]Artificial Nutrition      Palliative Care Spiritual/Emotional Screening Tool Question  Severity (0-4):                    OR                    [ x] Unable to determine. Will assess at later time if appropriate.  Score of 2 or greater indicates recommendation of Chaplaincy and/or SW referral  Chaplaincy Referral: [ ] Yes [ ] Refused [ ] Following     Caregiver [place]:  [ ] Yes [ ] No    OR    [x ] Unable to determine. Will assess at later time if appropriate.  Social Work Referral [ ]  Patient and Family Centered Care Referral [ ]    Anticipatory Grief Present: [ ] Yes [ ] No    OR     [ x] Unable to determine. Will assess at later time if appropriate.  Social Work Referral [ ]  Patient and Family Centered Care Referral [ ]    Patient discussed with primary medical team MD  Palliative care education provided to patient and/or family

## 2024-05-16 NOTE — CHART NOTE - NSCHARTNOTEFT_GEN_A_CORE
I attempted to sees Pt. Pt. refused pastoral care today. She stated she needs to rest. No family members were at bedside at time of visit.  I will follow up as needed.

## 2024-05-16 NOTE — PROGRESS NOTE ADULT - SUBJECTIVE AND OBJECTIVE BOX
Patient is a 73y old  Female who presents with a chief complaint of COPD exacerbation (15 May 2024 14:56)        Over Night Events:        ROS:     All ROS are negative except HPI         PHYSICAL EXAM    ICU Vital Signs Last 24 Hrs  T(C): 35.8 (16 May 2024 04:00), Max: 36.3 (15 May 2024 21:00)  T(F): 96.5 (16 May 2024 04:00), Max: 97.4 (15 May 2024 21:00)  HR: 81 (16 May 2024 04:00) (81 - 98)  BP: 106/56 (16 May 2024 04:00) (103/55 - 117/56)  BP(mean): 70 (15 May 2024 11:57) (70 - 70)  ABP: --  ABP(mean): --  RR: 20 (16 May 2024 04:00) (18 - 20)  SpO2: 94% (16 May 2024 04:00) (88% - 95%)    O2 Parameters below as of 16 May 2024 04:00  Patient On (Oxygen Delivery Method): nasal cannula, high flow            CONSTITUTIONAL:  Well nourished.  NAD    ENT:   Airway patent,   Mouth with normal mucosa.   No thrush    EYES:   Pupils equal,   Round and reactive to light.    CARDIAC:   Normal rate,   Regular rhythm.    No edema      Vascular:  Normal systolic impulse  No Carotid bruits    RESPIRATORY:   No wheezing  Bilateral BS  Normal chest expansion  Not tachypneic,  No use of accessory muscles    GASTROINTESTINAL:  Abdomen soft,   Non-tender,   No guarding,   + BS    MUSCULOSKELETAL:   Range of motion is not limited,  No clubbing, cyanosis    NEUROLOGICAL:   Alert and oriented   No motor  deficits.    SKIN:   Skin normal color for race,   Warm and dry and intact.   No evidence of rash.    PSYCHIATRIC:   Normal mood and affect.   No apparent risk to self or others.    HEMATOLOGICAL:  No cervical  lymphadenopathy.  no inguinal lymphadenopathy      05-15-24 @ 07:01  -  05-16-24 @ 07:00  --------------------------------------------------------  IN:    Oral Fluid: 340 mL  Total IN: 340 mL    OUT:    Voided (mL): 950 mL  Total OUT: 950 mL    Total NET: -610 mL          LABS:                            10.8   15.55 )-----------( 237      ( 16 May 2024 06:09 )             36.7                                               05-16    140  |  94<L>  |  24<H>  ----------------------------<  98  5.7<H>   |  43<HH>  |  <0.5<L>    Ca    8.7      16 May 2024 06:09  Mg     2.0     05-16    TPro  5.1<L>  /  Alb  3.4<L>  /  TBili  0.4  /  DBili  x   /  AST  18  /  ALT  55<H>  /  AlkPhos  61  05-16                                             Urinalysis Basic - ( 16 May 2024 06:09 )    Color: x / Appearance: x / SG: x / pH: x  Gluc: 98 mg/dL / Ketone: x  / Bili: x / Urobili: x   Blood: x / Protein: x / Nitrite: x   Leuk Esterase: x / RBC: x / WBC x   Sq Epi: x / Non Sq Epi: x / Bacteria: x                                                  LIVER FUNCTIONS - ( 16 May 2024 06:09 )  Alb: 3.4 g/dL / Pro: 5.1 g/dL / ALK PHOS: 61 U/L / ALT: 55 U/L / AST: 18 U/L / GGT: x                                                                                                                                       MEDICATIONS  (STANDING):  albuterol/ipratropium for Nebulization 3 milliLiter(s) Nebulizer every 6 hours  amLODIPine   Tablet 5 milliGRAM(s) Oral daily  bisacodyl Suppository 10 milliGRAM(s) Rectal daily  chlorhexidine 2% Cloths 1 Application(s) Topical <User Schedule>  enoxaparin Injectable 40 milliGRAM(s) SubCutaneous every 24 hours  methylPREDNISolone sodium succinate Injectable 40 milliGRAM(s) IV Push daily  nicotine -   7 mG/24Hr(s) Patch 1 Patch Transdermal daily  pantoprazole    Tablet 40 milliGRAM(s) Oral before breakfast  polyethylene glycol 3350 17 Gram(s) Oral daily  saccharomyces boulardii 250 milliGRAM(s) Oral two times a day  senna 2 Tablet(s) Oral at bedtime    MEDICATIONS  (PRN):  albuterol    90 MICROgram(s) HFA Inhaler 2 Puff(s) Inhalation every 6 hours PRN for shortness of breath and/or wheezing  hydrOXYzine hydrochloride 25 milliGRAM(s) Oral two times a day PRN Anxiety  sodium chloride 0.65% Nasal 1 Spray(s) Both Nostrils three times a day PRN Nasal Congestion      New X-rays reviewed:                                                                                  ECHO    CXR interpreted by me:       Freddie

## 2024-05-16 NOTE — PROGRESS NOTE ADULT - SUBJECTIVE AND OBJECTIVE BOX
RITESH THOMAS  73y Female    CHIEF COMPLAINT:    Patient is a 73y old  Female who presents with a chief complaint of COPD exacerbation (16 May 2024 13:34)    INTERVAL HPI/OVERNIGHT EVENTS:    Patient seen and examined. No acute events overnight. Remains critically ill on HFNC     ROS: All other systems are negative.    Vital Signs:    T(F): 97.7 (24 @ 11:04), Max: 97.7 (24 @ 11:04)  HR: 82 (24 @ 11:04) (81 - 92)  BP: 109/57 (24 @ 11:04) (103/82 - 117/56)  RR: 18 (24 @ 11:04) (18 - 20)  SpO2: 90% (24 @ 11:04) (88% - 95%)    15 May 2024 07:01  -  16 May 2024 07:00  --------------------------------------------------------  IN: 340 mL / OUT: 950 mL / NET: -610 mL      Daily Weight in k.7 (16 May 2024 00:00)    PHYSICAL EXAM:    GENERAL:  NAD chronically ill appearing   SKIN: No rashes or lesions  HEENT: Atraumatic. Normocephalic.    NECK: Supple, No JVD.   PULMONARY: decreased breath sounds L>R. No wheezing.    CVS: Normal S1, S2. Rate and Rhythm are regular   ABDOMEN/GI: Soft, Nontender, Nondistended   MSK:  No clubbing or cyanosis   NEUROLOGIC: moves all extremities   PSYCH: Awake and alert     Consultant(s) Notes Reviewed:  [x ] YES  [ ] NO  Care Discussed with Consultants/Other Providers [ x] YES  [ ] NO    LABS:                        10.8   15.55 )-----------( 237      ( 16 May 2024 06:09 )             36.7     140  |  94<L>  |  24<H>  ----------------------------<  98  5.7<H>   |  43<HH>  |  <0.5<L>    Ca    8.7      16 May 2024 06:09  Mg     2.0     -    TPro  5.1<L>  /  Alb  3.4<L>  /  TBili  0.4  /  DBili  x   /  AST  18  /  ALT  55<H>  /  AlkPhos  61  -    RADIOLOGY & ADDITIONAL TESTS:  Imaging or report Personally Reviewed:  [x] YES  [ ] NO  EKG reviewed: [x] YES  [ ] NO    Medications:  Standing  albuterol/ipratropium for Nebulization 3 milliLiter(s) Nebulizer every 6 hours  amLODIPine   Tablet 5 milliGRAM(s) Oral daily  bisacodyl Suppository 10 milliGRAM(s) Rectal daily  chlorhexidine 2% Cloths 1 Application(s) Topical <User Schedule>  enoxaparin Injectable 40 milliGRAM(s) SubCutaneous every 24 hours  methylPREDNISolone sodium succinate Injectable 40 milliGRAM(s) IV Push daily  nicotine -   7 mG/24Hr(s) Patch 1 Patch Transdermal daily  pantoprazole    Tablet 40 milliGRAM(s) Oral before breakfast  polyethylene glycol 3350 17 Gram(s) Oral daily  saccharomyces boulardii 250 milliGRAM(s) Oral two times a day  senna 2 Tablet(s) Oral at bedtime    PRN Meds  albuterol    90 MICROgram(s) HFA Inhaler 2 Puff(s) Inhalation every 6 hours PRN  hydrOXYzine hydrochloride 25 milliGRAM(s) Oral two times a day PRN  sodium chloride 0.65% Nasal 1 Spray(s) Both Nostrils three times a day PRN

## 2024-05-16 NOTE — PROGRESS NOTE ADULT - ASSESSMENT
74 y/o female with PMHx COPD, HTN, Chronic Smoker P/W SOB x weeks, cough, wheezing, O2 sat 70% at home placed on NRB by EMS, confused. Admitted to MICU for ARF, was found to have lungs mass and postobstructive PNA. Pt clinically improved and was downgraded to SDU on HFNC .     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 Patient wishes to await family's arrival to discuss, does not want them to be called     HTN   - DASH diet   - hold Amlodipine 5mg QD    Constipation   - high fiber diet   - c/w bowel regimen      Overall prognosis is poor     Progress Note Handoff  Pending (specify): taper down supplemental 02, pulm fu for intubation/bronch pending family discussion, labs, c/w steroids, monitor off abx  Disposition: SDU, ICU if intubated    Patient seen at bedside, total time spent to evaluate and treat the patient's acute illness and chronic medical conditions as well as time spent reviewing prior records, labs, radiology, documenting in electronic medical records,  discussing medical plan with   medical team was more than 52 minutes with >50% of time spent face to face with patient, discussing with patient/family as well as coordination of care

## 2024-05-16 NOTE — PROGRESS NOTE ADULT - SUBJECTIVE AND OBJECTIVE BOX
24H events:    Patient is a 73y old Female who presents with a chief complaint of COPD exacerbation (16 May 2024 12:47)    Primary diagnosis of COPD exacerbation      Day 1:  Day 2:  Day 3:     Today is hospital day 10d. This morning patient was seen and examined at bedside, resting comfortably in bed.    No acute or major events overnight.    Code Status:    Family communication:  Contact date:  Name of person contacted:  Relationship to patient:  Communication details:  What matters most:    PAST MEDICAL & SURGICAL HISTORY  Chronic obstructive pulmonary disease, unspecified COPD type    Hypertension    No significant past surgical history      SOCIAL HISTORY:  Social History:      ALLERGIES:  No Known Allergies    MEDICATIONS:  STANDING MEDICATIONS  albuterol/ipratropium for Nebulization 3 milliLiter(s) Nebulizer every 6 hours  amLODIPine   Tablet 5 milliGRAM(s) Oral daily  bisacodyl Suppository 10 milliGRAM(s) Rectal daily  chlorhexidine 2% Cloths 1 Application(s) Topical <User Schedule>  enoxaparin Injectable 40 milliGRAM(s) SubCutaneous every 24 hours  methylPREDNISolone sodium succinate Injectable 40 milliGRAM(s) IV Push daily  nicotine -   7 mG/24Hr(s) Patch 1 Patch Transdermal daily  pantoprazole    Tablet 40 milliGRAM(s) Oral before breakfast  polyethylene glycol 3350 17 Gram(s) Oral daily  saccharomyces boulardii 250 milliGRAM(s) Oral two times a day  senna 2 Tablet(s) Oral at bedtime    PRN MEDICATIONS  albuterol    90 MICROgram(s) HFA Inhaler 2 Puff(s) Inhalation every 6 hours PRN  hydrOXYzine hydrochloride 25 milliGRAM(s) Oral two times a day PRN  sodium chloride 0.65% Nasal 1 Spray(s) Both Nostrils three times a day PRN    VITALS:   T(F): 97.7  HR: 82  BP: 109/57  RR: 18  SpO2: 90%    PHYSICAL EXAM:  GENERAL: NAD, lying in bed comfortably     HEART: normal rate    regular   normal s1s2      LUNGS: HFNC Decreased BS  ABDOMEN:   Soft     nondistended     nontender      EXTREMITIES: Normal     NERVOUS SYSTEM:   A&Ox3    SKIN: No rashes or lesions        LABS:                        10.8   15.55 )-----------( 237      ( 16 May 2024 06:09 )             36.7     05-16    140  |  94<L>  |  24<H>  ----------------------------<  98  5.7<H>   |  43<HH>  |  <0.5<L>    Ca    8.7      16 May 2024 06:09  Mg     2.0     05-16    TPro  5.1<L>  /  Alb  3.4<L>  /  TBili  0.4  /  DBili  x   /  AST  18  /  ALT  55<H>  /  AlkPhos  61  05-16      Urinalysis Basic - ( 16 May 2024 06:09 )    Color: x / Appearance: x / SG: x / pH: x  Gluc: 98 mg/dL / Ketone: x  / Bili: x / Urobili: x   Blood: x / Protein: x / Nitrite: x   Leuk Esterase: x / RBC: x / WBC x   Sq Epi: x / Non Sq Epi: x / Bacteria: x                RADIOLOGY:

## 2024-05-16 NOTE — PROGRESS NOTE ADULT - ASSESSMENT
74 y/o female with PMHx COPD, HTN, Chronic Smoker P/W SOB x weeks, cough, wheezing, O2 sat 70% at home placed on NRB by EMS, confused. Admitted to MICU for ARF, was found to have lungs mass and postobstructive PNA.   Pt clinically improved and was downgraded to SDU.     A/P   #Acute Hypercapnic/Hypoxemic Respiratory Failure 2/2 COPD Exacerbation/ Lungs mass, highly suspicious for advanced malignancy/ Possible Post-Obstructive PNA / h/o hemoptysis in current smoker   - CT on admission chest large mass and superimposed PNA  -Upgraded to SDU for desat, now on HFNC.   - pt underwent diagnostic  thoracentesis on 5/9, will follow up fluid cytology ( mass is highly suspicious for advance malignancy) - reach out to heme-onc when cytology comes back - pleural fluid came back nag for malignant cells   -Will need biopsy when more stable   - declining bipap -high risk of intubation and patient is full code   -Procal, BNP, Dimer neg  -Completed Cefepime and Levaquin  -HFNC 30/60  - Continue Solumedrol 40 daily for now  -aspiration precautions, chest PT  - Mucinex standing    - BIPAP PRN and QHS ordered although patient has not been using   - palliative care on board, possibility of hospice discussed with family in case if pt'll decline cancer directed therapy   -Chest xray 5/16 Complete opacification of lung. Will need bronch pending family discussion    #Anxiety  -Hydroxyzine    #HTN   - DASH diet   - C/w Amlodipine 5mg QD    # constipation   - high fiber diet   - c/w bowel regimen     # Weight loss/ mild malnutrition  - consult dietitian   - high calories diet     #MISC  - DVT: Lovenox  - GI: Protonix  - Diet: DASH     Overall prognosis is poor.

## 2024-05-17 NOTE — PROGRESS NOTE ADULT - SUBJECTIVE AND OBJECTIVE BOX
Patient is a 73y old  Female who presents with a chief complaint of COPD exacerbation (16 May 2024 13:46)        Over Night Events:    Afberile   On HFNC   Left lung collapse         ROS:  See HPI    PHYSICAL EXAM    ICU Vital Signs Last 24 Hrs  T(C): 37.1 (17 May 2024 04:00), Max: 37.2 (16 May 2024 18:55)  T(F): 98.8 (17 May 2024 04:00), Max: 99 (16 May 2024 18:55)  HR: 81 (17 May 2024 06:00) (80 - 100)  BP: 126/65 (17 May 2024 06:00) (109/57 - 136/65)  BP(mean): 90 (17 May 2024 06:00) (77 - 91)  ABP: --  ABP(mean): --  RR: 23 (17 May 2024 06:00) (18 - 41)  SpO2: 93% (17 May 2024 06:00) (90% - 98%)    O2 Parameters below as of 17 May 2024 06:00  Patient On (Oxygen Delivery Method): nasal cannula, high flow  O2 Flow (L/min): 45  O2 Concentration (%): 75        General: HFNC  HEENT: DARWIN             Lymphatic system: No cervical LN   Lungs: Bilateral BS, absent breath sounds on the left   Cardiovascular: Regular   Gastrointestinal: Soft, Positive BS  Extremities: No clubbing.  Moves extremities.  Full Range of motion   Skin: Warm, intact  Neurological: No motor or sensory deficit       05-16-24 @ 07:01  -  05-17-24 @ 07:00  --------------------------------------------------------  IN:    Oral Fluid: 400 mL  Total IN: 400 mL    OUT:    Intermittent Catheterization - Urethral (mL): 650 mL    Voided (mL): 0 mL  Total OUT: 650 mL    Total NET: -250 mL          LABS:                            9.5    13.06 )-----------( 223      ( 17 May 2024 04:45 )             32.0                                               05-17    139  |  94<L>  |  20  ----------------------------<  87  4.4   |  44<HH>  |  <0.5<L>    Ca    8.4      17 May 2024 04:45  Mg     1.9     05-17    TPro  4.6<L>  /  Alb  3.0<L>  /  TBili  0.3  /  DBili  x   /  AST  24  /  ALT  65<H>  /  AlkPhos  61  05-17      PT/INR - ( 17 May 2024 04:45 )   PT: 10.90 sec;   INR: 0.96 ratio         PTT - ( 17 May 2024 04:45 )  PTT:25.3 sec                                       Urinalysis Basic - ( 17 May 2024 04:45 )    Color: x / Appearance: x / SG: x / pH: x  Gluc: 87 mg/dL / Ketone: x  / Bili: x / Urobili: x   Blood: x / Protein: x / Nitrite: x   Leuk Esterase: x / RBC: x / WBC x   Sq Epi: x / Non Sq Epi: x / Bacteria: x                                                  LIVER FUNCTIONS - ( 17 May 2024 04:45 )  Alb: 3.0 g/dL / Pro: 4.6 g/dL / ALK PHOS: 61 U/L / ALT: 65 U/L / AST: 24 U/L / GGT: x                                                                                                                                       MEDICATIONS  (STANDING):  albuterol/ipratropium for Nebulization 3 milliLiter(s) Nebulizer every 6 hours  bisacodyl Suppository 10 milliGRAM(s) Rectal daily  chlorhexidine 2% Cloths 1 Application(s) Topical <User Schedule>  methylPREDNISolone sodium succinate Injectable 40 milliGRAM(s) IV Push daily  nicotine -   7 mG/24Hr(s) Patch 1 Patch Transdermal daily  pantoprazole    Tablet 40 milliGRAM(s) Oral before breakfast  polyethylene glycol 3350 17 Gram(s) Oral daily  saccharomyces boulardii 250 milliGRAM(s) Oral two times a day  senna 2 Tablet(s) Oral at bedtime    MEDICATIONS  (PRN):  albuterol    90 MICROgram(s) HFA Inhaler 2 Puff(s) Inhalation every 6 hours PRN for shortness of breath and/or wheezing  hydrOXYzine hydrochloride 25 milliGRAM(s) Oral two times a day PRN Anxiety  sodium chloride 0.65% Nasal 1 Spray(s) Both Nostrils three times a day PRN Nasal Congestion      Xrays: Left lung atelectasis                                                                                     ECHO

## 2024-05-17 NOTE — PROGRESS NOTE ADULT - SUBJECTIVE AND OBJECTIVE BOX
Patient is a 73y old  Female who presents with a chief complaint of COPD exacerbation (17 May 2024 11:52)    HPI:  73-year-old female with past medical history of COPD not on home oxygen, chronic smoker >60ppy, hypertension who presents for shortness of breath x few weeks.  Pt reports worsening productive cough and shortness of breath with wheezing for the past 1 week.  Increased work of breathing today, was satting 70% on room air.  Was placed on nonrebreather by EMS.  As per son at the bedside pt was confused in the morning, wasn't aware of the surrounding.  She also admits to hemoptysis  along with weight loss over the past few months.  Admits to fever and chills, and denies chest pain, palpitations, nausea, vomiting, diarrhea, abdominal pain, urinary symptoms, weakness, numbness, falls, recent travel, recent surgeries.  Not on anticoagulation.  Denies substance use and alcohol use.    In ED  VT bp 143/80, , RR 28, T 99.4 F, SpO2 98% on NRB 15 L  Labs showed Lac 2.3, trop 26, BNP 2044, VBG  pH 7.18 PCo2 102 HCO3 38, O2 sat 46%    s/p IV solumedrol, IV Mag, Nebs and Albuterol in ED.  Pt was placed on BIPAP and admitted to MICU for further managements.   (06 May 2024 23:30)       INTERVAL HPI/OVERNIGHT EVENTS:   No overnight events, patient was stable on High Flow.  Afebrile, hemodynamically stable.   Patient underwent intubation and bronchoscopy and multiple clots were removed from the airway.    Subjective:    ICU Vital Signs Last 24 Hrs  T(C): 36.9 (17 May 2024 08:00), Max: 37.2 (16 May 2024 18:55)  T(F): 98.5 (17 May 2024 08:00), Max: 99 (16 May 2024 18:55)  HR: 67 (17 May 2024 11:00) (67 - 102)  BP: 114/65 (17 May 2024 11:00) (109/61 - 136/72)  BP(mean): 82 (17 May 2024 11:00) (78 - 97)   RR: 16 (17 May 2024 11:00) (16 - 41)  SpO2: 96% (17 May 2024 11:00) (91% - 98%)    O2 Parameters below as of 17 May 2024 12:00  Patient On (Oxygen Delivery Method): ventilator    O2 Concentration (%): 100      I&O's Summary    16 May 2024 07:01  -  17 May 2024 07:00  --------------------------------------------------------  IN: 400 mL / OUT: 700 mL / NET: -300 mL    17 May 2024 07:01  -  17 May 2024 13:11  --------------------------------------------------------  IN: 120 mL / OUT: 150 mL / NET: -30 mL      Mode: AC/ CMV (Assist Control/ Continuous Mandatory Ventilation)  RR (machine): 16  TV (machine): 450  FiO2: 100  PEEP: 5  ITime: 1  MAP: 11  PIP: 31    Daily Height in cm: 154.94 (16 May 2024 18:55)    Daily Weight in k.6 (17 May 2024 06:00)    Adult Advanced Hemodynamics Last 24 Hrs  X    EKG/Telemetry Events:    MEDICATIONS  (STANDING):  albuterol/ipratropium for Nebulization 3 milliLiter(s) Nebulizer every 6 hours  bisacodyl Suppository 10 milliGRAM(s) Rectal daily  chlorhexidine 2% Cloths 1 Application(s) Topical <User Schedule>  dexMEDEtomidine Infusion 0.2 MICROgram(s)/kG/Hr (2.53 mL/Hr) IV Continuous <Continuous>  enoxaparin Injectable 40 milliGRAM(s) SubCutaneous every 24 hours  fentaNYL   Infusion. 0.5 MICROgram(s)/kG/Hr (2.53 mL/Hr) IV Continuous <Continuous>  methylPREDNISolone sodium succinate Injectable 40 milliGRAM(s) IV Push daily  nicotine -   7 mG/24Hr(s) Patch 1 Patch Transdermal daily  norepinephrine Infusion 0.05 MICROgram(s)/kG/Min (2.37 mL/Hr) IV Continuous <Continuous>  pantoprazole    Tablet 40 milliGRAM(s) Oral before breakfast  piperacillin/tazobactam IVPB.- 3.375 Gram(s) IV Intermittent once  piperacillin/tazobactam IVPB.. 3.375 Gram(s) IV Intermittent every 8 hours  polyethylene glycol 3350 17 Gram(s) Oral daily  saccharomyces boulardii 250 milliGRAM(s) Oral two times a day  senna 2 Tablet(s) Oral at bedtime  sodium chloride 3%  Inhalation 4 milliLiter(s) Inhalation every 4 hours    MEDICATIONS  (PRN):  albuterol    90 MICROgram(s) HFA Inhaler 2 Puff(s) Inhalation every 6 hours PRN for shortness of breath and/or wheezing  hydrOXYzine hydrochloride 25 milliGRAM(s) Oral two times a day PRN Anxiety  sodium chloride 0.65% Nasal 1 Spray(s) Both Nostrils three times a day PRN Nasal Congestion    PHYSICAL EXAM:  GENERAL: Alert and awake this morning  HEAD:  Atraumatic, Normocephalic  EYES: EOMI, PERRLA   NECK: Supple, No JVD   CHEST/LUNG: Inspiratory wheezes on the right and no lung sounds audible on the left  HEART: S1S2 normal, no S3   ABDOMEN: Soft, Nontender, Nondistended; Bowel sounds present  EXTREMITIES:  No clubbing, cyanosis, or edema  SKIN: No rashes or lesions    LABS:                        9.5    13.06 )-----------( 223      ( 17 May 2024 04:45 )             32.0         139  |  94<L>  |  20  ----------------------------<  87  4.4   |  44<HH>  |  <0.5<L>    Ca    8.4      17 May 2024 04:45  Mg     1.9         TPro  4.6<L>  /  Alb  3.0<L>  /  TBili  0.3  /  DBili  x   /  AST  24  /  ALT  65<H>  /  AlkPhos  61      LIVER FUNCTIONS - ( 17 May 2024 04:45 )  Alb: 3.0 g/dL / Pro: 4.6 g/dL / ALK PHOS: 61 U/L / ALT: 65 U/L / AST: 24 U/L / GGT: x           PT/INR - ( 17 May 2024 04:45 )   PT: 10.90 sec;   INR: 0.96 ratio       PTT - ( 17 May 2024 04:45 )  PTT:25.3 sec    CAPILLARY BLOOD GLUCOSE  POCT Blood Glucose.: 102 mg/dL (17 May 2024 08:17)  POCT Blood Glucose.: 103 mg/dL (16 May 2024 18:51)    Urinalysis Basic - ( 17 May 2024 04:45 )    Color: x / Appearance: x / SG: x / pH: x  Gluc: 87 mg/dL / Ketone: x  / Bili: x / Urobili: x   Blood: x / Protein: x / Nitrite: x   Leuk Esterase: x / RBC: x / WBC x   Sq Epi: x / Non Sq Epi: x / Bacteria: x    RADIOLOGY & ADDITIONAL TESTS:  ACC: [id number] EXAM:  XR CHEST PORTABLE IMMED 1V       PROCEDURE DATE:  2024      INTERPRETATION:  Clinical History / Reason for exam: Shortness of breath    Comparison : Chest radiograph earlier the same day.    Technique/Positioning: Frontal portable.    Findings:    Support devices: Patient is intubated. Telemetry leads are seen    Cardiac/mediastinum/hilum: Unremarkable.    Lung parenchyma/Pleura: Retrocardiac opacification. Improved aeration.    Skeleton/soft tissues: Unchanged    Impression:    Left-sided effusion and opacification, improved.    --- End of Report ---    GERARD GARCÍA MD; Attending Interventional Radiologist  This document has been electronically signed. May 17 2024 12:44PM    Care Discussed with Consultants/Other Providers [ x] YES  [ ] NO

## 2024-05-17 NOTE — PROGRESS NOTE ADULT - ASSESSMENT
74 y/o female with PMHx COPD, HTN, Chronic Smoker P/W SOB x weeks, cough, wheezing, O2 sat 70% at home placed on NRB by EMS, confused. Admitted to MICU for ARF, was found to have lungs mass and postobstructive PNA.   Pt clinically improved and was downgraded to SDU.     A/P   #Acute Hypercapnic/Hypoxemic Respiratory Failure 2/2 COPD Exacerbation/ Lungs mass, highly suspicious for advanced malignancy/ Possible Post-Obstructive PNA / h/o hemoptysis in current smoker   - CT on admission chest large mass and superimposed PNA  -Upgraded to SDU for desat, now on HFNC.   - pt underwent diagnostic  thoracentesis on 5/9, will follow up fluid cytology ( mass is highly suspicious for advance malignancy) - reach out to heme-onc when cytology comes back - pleural fluid came back nag for malignant cells   -Will need biopsy when more stable   - declining bipap -high risk of intubation and patient is full code   -Procal, BNP, Dimer neg  -Completed Cefepime and Levaquin  -HFNC 30/60  - Continue Solumedrol 40 daily for now  -aspiration precautions, chest PT  - Mucinex standing    - BIPAP PRN and QHS ordered although patient has not been using   - palliative care on board, possibility of hospice discussed with family in case if pt'll decline cancer directed therapy   -Chest xray 5/16 Complete opacification of lung.   -upgraded to ICU for intubation and bronch   - 05/17: The Right tracheobronchial tree was inspected closely to the level of the subsegmental bronchi. All bronchi are patent with no endobronchial lesions and no mucosal lesions noted.   The Left tracheobronchial tree was filled with blood clots that appeared old and were difficult to remove, until multiple NS aliquots were instilled and suctioned.    #Anxiety  -Hydroxyzine    #HTN   - DASH diet   - C/w Amlodipine 5mg QD    # constipation   - high fiber diet   - c/w bowel regimen     # Weight loss/ mild malnutrition  - consult dietitian   - high calories diet     #MISC  - DVT: Lovenox - holding for bronch  - GI: Protonix  - Diet: DASH     Overall prognosis is poor.

## 2024-05-17 NOTE — PROGRESS NOTE ADULT - ASSESSMENT
IMPRESSION:    Acute hypoxemic respiratory failure   Acute on chronic hypercapnic resp failure  COPD exacerbation   Left lung atelectasis   Lung mass possibly malignant    Superimposed pneumonia  Left sided effusion s/p thoracentesis negative for malignant cells    PLAN:    CNS: Avoid CNS depressant    HEENT: oral care     PULMONARY: HFNC and alternate with BIPAP. Nebs every 4 hours: Albuterol and saline nebs. IPV and chest PT. CXR in the afternoon. Otherwise may need elective intubation and bronchoscopy. Solumedrol 40mg daily. Bed in chair.     CARDIOVASCULAR: TTE noted with 70-75%. Keep is = os.     GI: GI prophylaxis.  Tolerating diet.     RENAL: follow lytes.  Correct as needed. No kinney. Elevated bicarb noted.     INFECTIOUS DISEASE: Check procal. Zosyn for now.     HEMATOLOGICAL:  DVT prophylaxis. Dimer noted. LE duplex negative.       ENDOCRINE:  Follow up FS.  Insulin protocol if needed.    GOC; pall care     Discussed with patient; would like to discuss with family.

## 2024-05-17 NOTE — PROGRESS NOTE ADULT - SUBJECTIVE AND OBJECTIVE BOX
CC: SOB    HPI:  73-year-old female with past medical history of COPD not on home oxygen, chronic smoker >60ppy, hypertension who presents for shortness of breath x few weeks.  Pt reports worsening productive cough and shortness of breath with wheezing for the past 1 week.  Increased work of breathing today, was satting 70% on room air.  Was placed on nonrebreather by EMS.  As per son at the bedside pt was confused in the morning, wasn't aware of the surrounding.  She also admits to hemoptysis  along with weight loss over the past few months.  Admits to fever and chills, and denies chest pain, palpitations, nausea, vomiting, diarrhea, abdominal pain, urinary symptoms, weakness, numbness, falls, recent travel, recent surgeries.  Not on anticoagulation.  Denies substance use and alcohol use.    In ED  VT bp 143/80, , RR 28, T 99.4 F, SpO2 98% on NRB 15 L  Labs showed Lac 2.3, trop 26, BNP 2044, VBG  pH 7.18 PCo2 102 HCO3 38, O2 sat 46%    s/p IV solumedrol, IV Mag, Nebs and Albuterol in ED.  Pt was placed on BIPAP and admitted to MICU for further managements.   (06 May 2024 23:30)    PERTINENT PM/SXH:   COPD not on home oxygen, chronic smoker >60ppy, hypertension     FAMILY HISTORY:    None pertinent    ITEMS NOT CHECKED ARE NOT PRESENT    SOCIAL HISTORY:   Significant other/partner[ ]  Children[ ]  Yazidi/Spirituality:  Substance hx:  [ ]   Tobacco hx:  [ ]   Alcohol hx: [ ]   Living Situation: [x ]Home  [ ]Long term care  [ ]Rehab [ ]Other  Home Services: [ ] HHA [ ] Visting RN [ ] Hospice  Occupation:  Home Opioid hx:  [ ] Y [ ] N [x ] I-Stop Reference No:    Reference #: [id number] - no meds     ADVANCE DIRECTIVES:     [x ] Full Code [ ] DNR  MOLST  [ ]  Living Will  [ ]   DECISION MAKER(s):  [ ] Health Care Proxy(s)  [x ] Surrogate(s)  [ ] Guardian           Name(s): Phone Number(s):  Children      BASELINE (I)ADL(s) (prior to admission):    [place]: [ ]Total  [ ] Moderate [ ]Dependent  Palliative Performance Status Version 2:         %    http://npcrc.org/files/news/palliative_performance_scale_ppsv2.pdf    Allergies    No Known Allergies    Intolerances    MEDICATIONS  (STANDING):  albuterol/ipratropium for Nebulization 3 milliLiter(s) Nebulizer every 6 hours  bisacodyl Suppository 10 milliGRAM(s) Rectal daily  chlorhexidine 2% Cloths 1 Application(s) Topical <User Schedule>  dexMEDEtomidine Infusion 0.2 MICROgram(s)/kG/Hr (2.53 mL/Hr) IV Continuous <Continuous>  enoxaparin Injectable 40 milliGRAM(s) SubCutaneous every 24 hours  fentaNYL   Infusion. 0.5 MICROgram(s)/kG/Hr (2.53 mL/Hr) IV Continuous <Continuous>  lactated ringers Bolus 500 milliLiter(s) IV Bolus once  methylPREDNISolone sodium succinate Injectable 40 milliGRAM(s) IV Push daily  nicotine -   7 mG/24Hr(s) Patch 1 Patch Transdermal daily  norepinephrine Infusion 0.05 MICROgram(s)/kG/Min (2.37 mL/Hr) IV Continuous <Continuous>  pantoprazole    Tablet 40 milliGRAM(s) Oral before breakfast  piperacillin/tazobactam IVPB.- 3.375 Gram(s) IV Intermittent once  piperacillin/tazobactam IVPB.. 3.375 Gram(s) IV Intermittent every 8 hours  polyethylene glycol 3350 17 Gram(s) Oral daily  saccharomyces boulardii 250 milliGRAM(s) Oral two times a day  senna 2 Tablet(s) Oral at bedtime  sodium chloride 3%  Inhalation 4 milliLiter(s) Inhalation every 4 hours    MEDICATIONS  (PRN):  albuterol    90 MICROgram(s) HFA Inhaler 2 Puff(s) Inhalation every 6 hours PRN for shortness of breath and/or wheezing  hydrOXYzine hydrochloride 25 milliGRAM(s) Oral two times a day PRN Anxiety  sodium chloride 0.65% Nasal 1 Spray(s) Both Nostrils three times a day PRN Nasal Congestion      PRESENT SYMPTOMS: [ ]Unable to obtain due to poor mentation   Source if other than patient:  [ ]Family   [ ]Team     Pain: [ ]yes [ x]no  ALL PAIN ASSESSMENT COMPONENTS WERE ASKED ABOUT UNLESS OTHERWISE NOTED  QOL impact -   Location -                    Aggravating factors -  Quality -  Radiation -  Timing-  Severity (0-10 scale):  Minimal acceptable level (0-10 scale):     CPOT:    https://www.s[facility]m.org/getattachment/[email or web address]-0e4k-7g3l-8196s8692g3p/Critical-Care-Pain-Observation-Tool-(CPOT)    PAIN AD Score:   http://geriatrictoolkit.[facility]/cog/painad.pdf (press ctrl +  left click to view)    Dyspnea:                           [ ]None[x ]Mild [ ]Moderate [ ]Severe     Respiratory Distress Observation Scale (RDOS):   A score of 0 to 2 signifies little or no respiratory distress, 3 signifies mild distress, scores 4 to 6 indicate moderate distress, and scores greater than 7 signify severe distress  https://www.[facility].ca/sites/default/files/PDFS/413045-wcrmsphryjd-akqsgezr-rbwbnqvdnju-nhloa.pdf    Anxiety:                             [ ]None[ ]Mild [ x]Moderate [ ]Severe   Fatigue:                             [ ]None[ x]Mild [ ]Moderate [ ]Severe   Nausea:                             [ x]None[ ]Mild [ ]Moderate [ ]Severe   Loss of appetite:              [x ]None[ ]Mild [ ]Moderate [ ]Severe   Constipation:                    [x ]None[ ]Mild [ ]Moderate [ ]Severe    Other Symptoms:  [x ]All other review of systems negative     Palliative Performance Status Version 2:         30%    http://[facility].org/files/news/palliative_performance_scale_ppsv2.pdf    PHYSICAL EXAM:    ICU Vital Signs Last 24 Hrs  T(C): 36.9 (17 May 2024 08:00), Max: 37.2 (16 May 2024 18:55)  T(F): 98.5 (17 May 2024 08:00), Max: 99 (16 May 2024 18:55)  HR: 67 (17 May 2024 11:00) (67 - 102)  BP: 114/65 (17 May 2024 11:00) (109/61 - 136/72)  BP(mean): 82 (17 May 2024 11:00) (78 - 97)  ABP: --  ABP(mean): --  RR: 16 (17 May 2024 11:00) (16 - 41)  SpO2: 96% (17 May 2024 11:00) (91% - 98%)    O2 Parameters below as of 17 May 2024 12:00  Patient On (Oxygen Delivery Method): ventilator    O2 Concentration (%): 100          GENERAL:  [ ] No acute distress [ ]Lethargic  [ ]Unarousable  [ x]Verbal  [ ]Non-Verbal [ ]Cachexia    BEHAVIORAL/PSYCH:  [ x]Alert and Oriented x3  [ x] Anxiety [ ] Delirium [ ] Agitation [ ] Calm   EYES: [x ] No scleral icterus [ ] Scleral icterus [ ] Closed  ENMT:  [ ]Dry mouth  [ x]No external oral lesions [ ] No external ear or nose lesions  CARDIOVASCULAR:  [ ]Regular [ ]Irregular [ ]Tachy [ x]Not Tachy  [ ]Kalpesh [ ] Edema [ ] No edema  PULMONARY:  [x ]Tachypnea  [ ]Audible excessive secretions [ ] No labored breathing [ x] labored breathing  GASTROINTESTINAL: [ ]Soft  [ ]Distended  [ ]Not distended [ ]Non tender [ ]Tender  MUSCULOSKELETAL: [ ]No clubbing [ ] clubbing  [ ] No cyanosis [ ] cyanosis  NEUROLOGIC: [ ]No focal deficits  [ x]Follows commands  [ ]Does not follow commands  [ ]Cognitive impairment  [ ]Dysphagia  [ ]Dysarthria  [ ]Paresis   SKIN: [ ] Jaundiced [x ] Non-jaundiced [ ]Rash [ ]No Rash [ ] Warm [ ] Dry  MISC/LINES: [ ] ET tube [ ] Trach [ ]NGT/OGT [ ]PEG [ ]Paulino    LABS: reviewed by me                                   9.5    13.06 )-----------( 223      ( 17 May 2024 04:45 )             32.0     05-17    139  |  94<L>  |  20  ----------------------------<  87  4.4   |  44<HH>  |  <0.5<L>    Ca    8.4      17 May 2024 04:45  Mg     1.9     05-17    TPro  4.6<L>  /  Alb  3.0<L>  /  TBili  0.3  /  DBili  x   /  AST  24  /  ALT  65<H>  /  AlkPhos  61  05-17    PT/INR - ( 17 May 2024 04:45 )   PT: 10.90 sec;   INR: 0.96 ratio         PTT - ( 17 May 2024 04:45 )  PTT:25.3 sec  Urinalysis Basic - ( 17 May 2024 04:45 )    Color: x / Appearance: x / SG: x / pH: x  Gluc: 87 mg/dL / Ketone: x  / Bili: x / Urobili: x   Blood: x / Protein: x / Nitrite: x   Leuk Esterase: x / RBC: x / WBC x   Sq Epi: x / Non Sq Epi: x / Bacteria: x            RADIOLOGY & ADDITIONAL STUDIES: reviewed by me    < from: Xray Chest 1 View- PORTABLE-Urgent (Xray Chest 1 View- PORTABLE-Urgent .) (05.09.24 @ 12:23) >  Impression:    Left lung masslike consolidation. Decreased left effusion. No   pneumothorax.    Improving right basilar nodular opacities.    < end of copied text >      EKG: reviewed by me    < from: 12 Lead ECG (05.06.24 @ 19:52) >  Ventricular Rate 90 BPM    Atrial Rate 90 BPM    P-R Interval 170 ms    QRS Duration 80 ms    Q-T Interval 360 ms    QTC Calculation(Bazett) 440 ms    P Axis 84 degrees    R Axis -70 degrees    T Axis 77 degrees    Diagnosis Line Normal sinus rhythm  Left anterior fascicular block  Septal infarct , age undetermined  Abnormal ECG    < end of copied text >      PROTEIN CALORIE MALNUTRITION PRESENT: [ ]mild [ ]moderate [ ]severe [ ]underweight [ ]morbid obesity  https://www.andeal.org/vault/2440/web/files/ONC/Table_Clinical%20Characteristics%20to%20Document%20Malnutrition-White%20JV%20et%20al%528813.pdf    Height (cm): 154.9 (05-07-24 @ 00:20)  Weight (kg): 47 (05-07-24 @ 00:20)  BMI (kg/m2): 19.6 (05-07-24 @ 00:20)  [ ]PPSV2 < or = to 30% [ ]significant weight loss  [ ]poor nutritional intake  [ ]anasarca      [ ]Artificial Nutrition      Palliative Care Spiritual/Emotional Screening Tool Question  Severity (0-4):                    OR                    [ x] Unable to determine. Will assess at later time if appropriate.  Score of 2 or greater indicates recommendation of Chaplaincy and/or SW referral  Chaplaincy Referral: [ ] Yes [ ] Refused [ ] Following     Caregiver [place]:  [ ] Yes [ ] No    OR    [x ] Unable to determine. Will assess at later time if appropriate.  Social Work Referral [ ]  Patient and Family Centered Care Referral [ ]    Anticipatory Grief Present: [ ] Yes [ ] No    OR     [ x] Unable to determine. Will assess at later time if appropriate.  Social Work Referral [ ]  Patient and Family Centered Care Referral [ ]    Patient discussed with primary medical team MD  Palliative care education provided to patient and/or family

## 2024-05-17 NOTE — PROGRESS NOTE ADULT - ASSESSMENT
73yFemale with history of COPD, HTN, active smoker presents with SOB and hypoxia. Patient found to have likely COPD exacerbation on arrival with new lung mass concerned for malignancy. Palliative care consulted for GOC.    Spoke with patient and family at bedside. Patient decided along with family that she would proceed with planned intubation for a bronchoscopy and see how well it goes over the weekend. They want for the patient to remain full code for now and to just see how well everything progresses going forward.     Education about palliative care provided to patient/family.  See Recs below.    Please call [phone] with questions or concerns 24/7.   We will continue to follow.

## 2024-05-17 NOTE — PROCEDURE NOTE - SUPERVISORY STATEMENT
Extensive clots, plugs in the left mainstem bronchus; suctioned; airways patent no visible endobronchial lesion; CXR better; D/W family. Will eventually need a biopsyif O2 requirements improve

## 2024-05-17 NOTE — PROCEDURAL SAFETY CHECKLIST WITH OR WITHOUT SEDATION - NSPXCONFIRMCONSENT_GEN_ALL_CORE
Ruxience changed to Truxima. Routed to provider to review and sign  
Taryn Villeda, Kamila Rae, PharmD  Can you assist with this or forward to Dr. Buchanan if not.  Thank you.          Previous Messages       ----- Message -----  From: Phyllis Lay  Sent: 3/19/2024  10:30 AM CDT  To: Dewayne MOJICA Staff  Subject: Ruxience non preferred                          Good morning,    Patient Shamika Young  1981 referral [id number] has [employer] insurance.    [employer]'s preferred Rituximab product is Truxima.  Patient has to try/fail Truxima prior to Ruxience being considered for authorization.    Can you please change Ruxience to Truxima?    Thanks  Phyllis RIOS Rn  Preservice  [phone]     
Truxima 1gm x 2 15 days apart  Solumedrol 80mg IV every infusion.   Dr. MOJICA signed.   
done
done

## 2024-05-17 NOTE — PROCEDURE NOTE - NSBRONCHPROCDETAILS_GEN_A_CORE_FT
DATE OF PROCEDURE:     PREOPERATIVE DIAGNOSIS:     POSTOPERATIVE DIAGNOSES:       PROCEDURE PERFORMED:  Bronchoscopy,  brushing and washing.         Attending:         ASSISTANT:          ANESTHESIA:    CONSENT: After explanining the risk and benefit the consent was obtained from       The patient had been previously intubated and was on mechanical ventilatory support. He was on sedation, which was continued and adjusted during the procedure. His FiO2 was increased to 100% during the procedure. The  fiberoptic bronchoscope was introduced through an endotracheal tube adaptor and the tip of the endotracheal tube was noted to be in good position above the mike.   The Right tracheobronchial tree was inspected closely to the level of the subsegmental bronchi. All bronchi are patent with no endobronchial lesions and no mucosal lesions noted.   The Left tracheobronchial tree was filled with blood clots that appeared old and were difficult to remove, until multiple NS aliquots were instilled and suctioned.  The bronchoscope was then introduced to the LLL and washings were taken from that area.  The procedure was completed and all samples were submitted for appropriate studies  After adequate clearing of secretions was accomplished, the bronchoscope was removed from the patient and the procedure was ended.   The patient tolerated the procedure well and there were no complications.

## 2024-05-18 NOTE — PROGRESS NOTE ADULT - SUBJECTIVE AND OBJECTIVE BOX
Patient is a 73y old  Female who presents with a chief complaint of COPD exacerbation (18 May 2024 01:43)      Over Night Events:  Patient seen and examined.   s/p bronch   cxr improved Daniela    ROS:  See HPI    PHYSICAL EXAM    ICU Vital Signs Last 24 Hrs  T(C): 37.8 (18 May 2024 04:00), Max: 37.8 (18 May 2024 04:00)  T(F): 100.1 (18 May 2024 04:00), Max: 100.1 (18 May 2024 04:00)  HR: 80 (18 May 2024 08:00) (57 - 102)  BP: 156/74 (18 May 2024 08:00) (78/48 - 167/83)  BP(mean): 107 (18 May 2024 08:00) (58 - 117)  ABP: --  ABP(mean): --  RR: 22 (18 May 2024 08:00) (16 - 38)  SpO2: 95% (18 May 2024 08:00) (90% - 100%)    O2 Parameters below as of 18 May 2024 08:00  Patient On (Oxygen Delivery Method): ventilator    O2 Concentration (%): 50        General: awake   HEENT:        et tube         Lymph Nodes: NO cervical LN   Lungs: decrease left side   Cardiovascular: Regular   Abdomen: Soft, Positive BS  Extremities: No clubbing   Skin: warm   Neurological: no focal   Musculoskeletal: move all ext     I&O's Detail    17 May 2024 07:01  -  18 May 2024 07:00  --------------------------------------------------------  IN:    Dexmedetomidine: 18.9 mL    Enteral Tube Flush: 120 mL    FentaNYL: 200 mL    IV PiggyBack: 200 mL    Jevity 1.2: 140 mL    Lactated Ringers Bolus: 1000 mL    Norepinephrine: 143 mL    Oral Fluid: 120 mL  Total IN: 1941.9 mL    OUT:    Indwelling Catheter - Urethral (mL): 1590 mL  Total OUT: 1590 mL    Total NET: 351.9 mL      18 May 2024 07:01  -  18 May 2024 08:21  --------------------------------------------------------  IN:    Norepinephrine: 3 mL  Total IN: 3 mL    OUT:    FentaNYL: 0 mL    Indwelling Catheter - Urethral (mL): 150 mL    Jevity 1.2: 0 mL  Total OUT: 150 mL    Total NET: -147 mL          LABS:                          9.7    17.46 )-----------( 232      ( 18 May 2024 04:26 )             32.0         18 May 2024 04:26    134    |  90     |  21     ----------------------------<  125    4.6     |  42     |  <0.5     Ca    8.6        18 May 2024 04:26  Mg     1.9       18 May 2024 04:26    TPro  5.1    /  Alb  3.4    /  TBili  0.4    /  DBili  x      /  AST  42     /  ALT  160    /  AlkPhos  103    18 May 2024 04:26  Amylase x     lipase x                                                 PT/INR - ( 17 May 2024 04:45 )   PT: 10.90 sec;   INR: 0.96 ratio         PTT - ( 17 May 2024 04:45 )  PTT:25.3 sec                                       Urinalysis Basic - ( 18 May 2024 04:26 )    Color: x / Appearance: x / SG: x / pH: x  Gluc: 125 mg/dL / Ketone: x  / Bili: x / Urobili: x   Blood: x / Protein: x / Nitrite: x   Leuk Esterase: x / RBC: x / WBC x   Sq Epi: x / Non Sq Epi: x / Bacteria: x                                                                                                             Mode: AC/ CMV (Assist Control/ Continuous Mandatory Ventilation)  RR (machine): 22  TV (machine): 380  FiO2: 50  PEEP: 5  ITime: 1  MAP: 11  PIP: 25                                      ABG - ( 18 May 2024 06:51 )  pH, Arterial: 7.36  pH, Blood: x     /  pCO2: 82    /  pO2: 63    / HCO3: 46    / Base Excess: 16.5  /  SaO2: 93.7                MEDICATIONS  (STANDING):  albuterol    90 MICROgram(s) HFA Inhaler 2 Puff(s) Inhalation every 4 hours  bisacodyl Suppository 10 milliGRAM(s) Rectal daily  chlorhexidine 0.12% Liquid 15 milliLiter(s) Oral Mucosa every 12 hours  chlorhexidine 2% Cloths 1 Application(s) Topical <User Schedule>  chlorhexidine 2% Cloths 1 Application(s) Topical <User Schedule>  dexMEDEtomidine Infusion 0.2 MICROgram(s)/kG/Hr (2.53 mL/Hr) IV Continuous <Continuous>  enoxaparin Injectable 40 milliGRAM(s) SubCutaneous every 24 hours  fentaNYL   Infusion. 0.5 MICROgram(s)/kG/Hr (2.53 mL/Hr) IV Continuous <Continuous>  ipratropium 17 MICROgram(s) HFA Inhaler 1 Puff(s) Inhalation every 6 hours  methylPREDNISolone sodium succinate Injectable 40 milliGRAM(s) IV Push daily  nicotine -   7 mG/24Hr(s) Patch 1 Patch Transdermal daily  norepinephrine Infusion 0.05 MICROgram(s)/kG/Min (2.37 mL/Hr) IV Continuous <Continuous>  pantoprazole    Tablet 40 milliGRAM(s) Oral before breakfast  piperacillin/tazobactam IVPB.. 3.375 Gram(s) IV Intermittent every 8 hours  polyethylene glycol 3350 17 Gram(s) Oral daily  saccharomyces boulardii 250 milliGRAM(s) Oral two times a day  senna 2 Tablet(s) Oral at bedtime  sodium chloride 3%  Inhalation 4 milliLiter(s) Inhalation every 4 hours    MEDICATIONS  (PRN):  hydrOXYzine hydrochloride 25 milliGRAM(s) Oral two times a day PRN Anxiety  sodium chloride 0.65% Nasal 1 Spray(s) Both Nostrils three times a day PRN Nasal Congestion          Xrays:  TLC:  OG:  ET tube:                                                                                    decrease left opacity    ECHO:  [facility] ICU:

## 2024-05-18 NOTE — PROGRESS NOTE ADULT - ASSESSMENT
74 y/o female with PMHx COPD, HTN, Chronic Smoker P/W SOB x weeks, cough, wheezing, O2 sat 70% at home placed on NRB by EMS, confused. Admitted to MICU for ARF, was found to have lungs mass and postobstructive PNA.     A/P   #Acute Hypercapnic/Hypoxemic Respiratory Failure 2/2 COPD Exacerbation/ Lungs mass, highly suspicious for advanced malignancy/ Possible Post-Obstructive PNA / h/o hemoptysis in current smoker   - CT on admission chest large mass and superimposed PNA  - pt underwent diagnostic thoracentesis on 5/9, will follow up fluid cytology (mass is highly suspicious for advance malignancy) - reach out to heme-onc when cytology comes back - pleural fluid came back nag for malignant cells   - Will need biopsy when more stable   - Procal, BNP, Dimer neg  - Completed Cefepime and Levaquin  - Continue Solumedrol 40 daily for now  - palliative care on board, possibility of hospice discussed with family in case if pt'll decline cancer directed therapy   - Chest xray 5/16 Complete opacification of lung.   - Upgraded to ICU for intubation and bronch   - 05/17: The Right tracheobronchial tree was inspected closely to the level of the subsegmental bronchi. All bronchi are patent with no endobronchial lesions and no mucosal lesions noted.   The Left tracheobronchial tree was filled with blood clots that appeared old and were difficult to remove, until multiple NS aliquots were instilled and suctioned.    #Anxiety  - Hydroxyzine    #HTN  - DASH diet   - Amlodipine discontinued, BP has been controlled     #constipation   - high fiber diet   - c/w bowel regimen     #Weight loss/ mild malnutrition  - consult dietitian   - high calories diet     #MISC  - DVT: Lovenox   - GI: Protonix  - Diet: DASH     Overall prognosis is poor.

## 2024-05-18 NOTE — PROGRESS NOTE ADULT - SUBJECTIVE AND OBJECTIVE BOX
24H events:    Patient is a 73y old Female who presents with a chief complaint of COPD exacerbation (17 May 2024 13:10)    Primary diagnosis of COPD exacerbation    Today is hospital day 12d. This morning patient was seen and examined at bedside, resting comfortably in bed.      HPI:  73-year-old female with past medical history of COPD not on home oxygen, chronic smoker >60ppy, hypertension who presents for shortness of breath x few weeks.  Pt reports worsening productive cough and shortness of breath with wheezing for the past 1 week.  Increased work of breathing today, was satting 70% on room air.  Was placed on nonrebreather by EMS.  As per son at the bedside pt was confused in the morning, wasn't aware of the surrounding.  She also admits to hemoptysis  along with weight loss over the past few months.  Admits to fever and chills, and denies chest pain, palpitations, nausea, vomiting, diarrhea, abdominal pain, urinary symptoms, weakness, numbness, falls, recent travel, recent surgeries.  Not on anticoagulation.  Denies substance use and alcohol use.    Family communication:  Contact date:  Name of person contacted:  Relationship to patient:  Communication details:  What matters most:    PAST MEDICAL & SURGICAL HISTORY  Chronic obstructive pulmonary disease, unspecified COPD type    Hypertension    No significant past surgical history      SOCIAL HISTORY:  Social History:      ALLERGIES:  No Known Allergies    MEDICATIONS:  STANDING MEDICATIONS  albuterol/ipratropium for Nebulization 3 milliLiter(s) Nebulizer every 6 hours  bisacodyl Suppository 10 milliGRAM(s) Rectal daily  chlorhexidine 0.12% Liquid 15 milliLiter(s) Oral Mucosa every 12 hours  chlorhexidine 2% Cloths 1 Application(s) Topical <User Schedule>  chlorhexidine 2% Cloths 1 Application(s) Topical <User Schedule>  dexMEDEtomidine Infusion 0.2 MICROgram(s)/kG/Hr IV Continuous <Continuous>  enoxaparin Injectable 40 milliGRAM(s) SubCutaneous every 24 hours  fentaNYL   Infusion. 0.5 MICROgram(s)/kG/Hr IV Continuous <Continuous>  methylPREDNISolone sodium succinate Injectable 40 milliGRAM(s) IV Push daily  nicotine -   7 mG/24Hr(s) Patch 1 Patch Transdermal daily  norepinephrine Infusion 0.05 MICROgram(s)/kG/Min IV Continuous <Continuous>  pantoprazole    Tablet 40 milliGRAM(s) Oral before breakfast  piperacillin/tazobactam IVPB.. 3.375 Gram(s) IV Intermittent every 8 hours  polyethylene glycol 3350 17 Gram(s) Oral daily  saccharomyces boulardii 250 milliGRAM(s) Oral two times a day  senna 2 Tablet(s) Oral at bedtime  sodium chloride 3%  Inhalation 4 milliLiter(s) Inhalation every 4 hours    PRN MEDICATIONS  albuterol    90 MICROgram(s) HFA Inhaler 2 Puff(s) Inhalation every 6 hours PRN  hydrOXYzine hydrochloride 25 milliGRAM(s) Oral two times a day PRN  sodium chloride 0.65% Nasal 1 Spray(s) Both Nostrils three times a day PRN    VITALS:   T(F): 98.3  HR: 73  BP: 119/60  RR: 17  SpO2: 100%    PHYSICAL EXAM:  GENERAL: Alert and awake this morning  HEAD:  Atraumatic, Normocephalic  EYES: EOMI, PERRLA   NECK: Supple, No JVD   CHEST/LUNG: Inspiratory wheezes on the right and no lung sounds audible on the left  HEART: S1S2 normal, no S3   ABDOMEN: Soft, Nontender, Nondistended; Bowel sounds present  EXTREMITIES:  No clubbing, cyanosis, or edema  SKIN: No rashes or lesions    LABS:                        9.5    13.06 )-----------( 223      ( 17 May 2024 04:45 )             32.0     05-17    139  |  94<L>  |  20  ----------------------------<  87  4.4   |  44<HH>  |  <0.5<L>    Ca    8.4      17 May 2024 04:45  Mg     1.9     05-17    TPro  4.6<L>  /  Alb  3.0<L>  /  TBili  0.3  /  DBili  x   /  AST  24  /  ALT  65<H>  /  AlkPhos  61  05-17    PT/INR - ( 17 May 2024 04:45 )   PT: 10.90 sec;   INR: 0.96 ratio         PTT - ( 17 May 2024 04:45 )  PTT:25.3 sec  Urinalysis Basic - ( 17 May 2024 04:45 )    Color: x / Appearance: x / SG: x / pH: x  Gluc: 87 mg/dL / Ketone: x  / Bili: x / Urobili: x   Blood: x / Protein: x / Nitrite: x   Leuk Esterase: x / RBC: x / WBC x   Sq Epi: x / Non Sq Epi: x / Bacteria: x      ABG - ( 17 May 2024 16:18 )  pH, Arterial: 7.43  pH, Blood: x     /  pCO2: 71    /  pO2: 76    / HCO3: 47    / Base Excess: x     /  SaO2: 97.3

## 2024-05-18 NOTE — PROGRESS NOTE ADULT - ASSESSMENT
IMPRESSION:  Acute hypoxemic respiratory failure   Acute on chronic hypercapnic resp failure  COPD exacerbation   Left lung atelectasis   Lung mass possibly malignant    Superimposed pneumonia  Left sided effusion s/p thoracentesis negative for malignant cells    PLAN:    CNS: Avoid CNS depressant    HEENT: oral care     PULMONARY: keep pox 88 to 92%M, Albuterol Q6 hrs and prn , Prednisone 40 for 5 days. Encourage NIV.    increase peep to 8   increase rate to 24   chest pt   frequent   follow BAL   CARDIOVASCULAR: TTE noted with 70-75%. Keep is = os  . BNP noted    GI: GI prophylaxis.   NG feed      RENAL: follow lytes.  Correct as needed     INFECTIOUS DISEASE: continue abx .  Procal noted.   follow BAL cx   and cytology   HEMATOLOGICAL:  DVT prophylaxis. Dimer noted. LE duplex negative.       ENDOCRINE:  Follow up FS.  Insulin protocol if needed.    MICU

## 2024-05-19 NOTE — PROGRESS NOTE ADULT - ASSESSMENT
IMPRESSION:  Acute hypoxemic respiratory failure   Acute on chronic hypercapnic resp failure  COPD exacerbation   Left lung atelectasis   Lung mass possibly malignant    Superimposed pneumonia  Left sided effusion s/p thoracentesis negative for malignant cells    PLAN:    CNS: Avoid CNS depressant    HEENT: oral care     PULMONARY: keep pox 88 to 92%M, Albuterol Q6 hrs and prn , Prednisone 40 for 5 days. Encourage NIV.    keep same vent setting   chest   pt   frequent suction   follow BAL   CARDIOVASCULAR: TTE noted with 70-75%. Keep is = os  . BNP noted    GI: GI prophylaxis.   NG feed      RENAL: follow lytes.  Correct as needed     INFECTIOUS DISEASE: continue abx .  Procal noted.   follow BAL cx   and cytology   HEMATOLOGICAL:  DVT prophylaxis. Dimer noted. LE duplex negative.       ENDOCRINE:  Follow up FS.  Insulin protocol if needed.    MICU   discussed with family at bed side

## 2024-05-19 NOTE — PROGRESS NOTE ADULT - ASSESSMENT
74 y/o female with PMHx COPD, HTN, Chronic Smoker P/W SOB x weeks, cough, wheezing, O2 sat 70% at home placed on NRB by EMS, confused. Admitted to MICU for ARF, was found to have lungs mass and postobstructive PNA.     A/P   #Acute Hypercapnic/Hypoxemic Respiratory Failure 2/2 COPD Exacerbation/ Lungs mass, highly suspicious for advanced malignancy/ Possible Post-Obstructive PNA / h/o hemoptysis in current smoker   - CT on admission chest large mass and superimposed PNA  - pt underwent diagnostic thoracentesis on 5/9, will follow up fluid cytology (mass is highly suspicious for advance malignancy) - reach out to heme-onc when cytology comes back - pleural fluid came back nag for malignant cells   - Will need biopsy when more stable   - Procal, BNP, Dimer neg  - Completed Cefepime and Levaquin  - Continue Solumedrol 40 daily for now  - palliative care on board, possibility of hospice discussed with family in case if pt'll decline cancer directed therapy   - Chest xray 5/16 Complete opacification of lung.   - Upgraded to ICU for intubation and bronch   - 05/17: The Right tracheobronchial tree was inspected closely to the level of the subsegmental bronchi. All bronchi are patent with no endobronchial lesions and no mucosal lesions noted.   The Left tracheobronchial tree was filled with blood clots that appeared old and were difficult to remove, until multiple NS aliquots were instilled and suctioned.    #Anxiety  - Hydroxyzine    #HTN  - DASH diet   - Amlodipine discontinued, BP has been controlled on levo    #constipation   - high fiber diet   - c/w bowel regimen     #Weight loss/ mild malnutrition  - consult dietitian   - high calories diet     #MISC  - DVT: Lovenox   - GI: Protonix  - Diet: DASH     Overall prognosis is poor.    Handoff:  monitor ABG  Daily Cxr  Remains intubated

## 2024-05-19 NOTE — PROGRESS NOTE ADULT - SUBJECTIVE AND OBJECTIVE BOX
Patient is a 73y old  Female who presents with a chief complaint of COPD exacerbation (19 May 2024 01:30)      Over Night Events:  Patient seen and examined.   on vent   on sedation   off levo this morning     ROS:  See HPI    PHYSICAL EXAM    ICU Vital Signs Last 24 Hrs  T(C): 36.3 (19 May 2024 08:00), Max: 37.7 (19 May 2024 04:00)  T(F): 97.3 (19 May 2024 08:00), Max: 99.9 (19 May 2024 04:00)  HR: 83 (19 May 2024 07:00) (63 - 98)  BP: 131/63 (19 May 2024 07:00) (74/50 - 172/84)  BP(mean): 90 (19 May 2024 07:00) (58 - 118)  ABP: --  ABP(mean): --  RR: 24 (19 May 2024 07:00) (22 - 35)  SpO2: 90% (19 May 2024 07:00) (89% - 100%)    O2 Parameters below as of 19 May 2024 08:00  Patient On (Oxygen Delivery Method): ventilator    O2 Concentration (%): 50        General: awake   HEENT:     et tube           Lymph Nodes: NO cervical LN   Lungs: Bilateral BS  Cardiovascular: Regular   Abdomen: Soft, Positive BS  Extremities: No clubbing   Skin: warm   Neurological: no focal   Musculoskeletal: move all ext     I&O's Detail    18 May 2024 07:01  -  19 May 2024 07:00  --------------------------------------------------------  IN:    Enteral Tube Flush: 50 mL    FentaNYL: 96 mL    IV PiggyBack: 250 mL    Jevity 1.2: 680 mL    Norepinephrine: 19 mL  Total IN: 1095 mL    OUT:    Dexmedetomidine: 0 mL    Indwelling Catheter - Urethral (mL): 1360 mL  Total OUT: 1360 mL    Total NET: -265 mL      19 May 2024 07:01  -  19 May 2024 08:05  --------------------------------------------------------  IN:    IV PiggyBack: 25 mL  Total IN: 25 mL    OUT:    FentaNYL: 0 mL    Indwelling Catheter - Urethral (mL): 125 mL    Norepinephrine: 0 mL  Total OUT: 125 mL    Total NET: -100 mL          LABS:                          8.5    13.12 )-----------( 201      ( 19 May 2024 06:41 )             27.3         19 May 2024 06:41    135    |  92     |  19     ----------------------------<  111    3.9     |  40     |  <0.5     Ca    8.5        19 May 2024 06:41  Mg     1.9       19 May 2024 06:41    TPro  4.6    /  Alb  3.0    /  TBili  0.4    /  DBili  x      /  AST  23     /  ALT  109    /  AlkPhos  84     19 May 2024 06:41  Amylase x     lipase x                                                                                        Urinalysis Basic - ( 19 May 2024 06:41 )    Color: x / Appearance: x / SG: x / pH: x  Gluc: 111 mg/dL / Ketone: x  / Bili: x / Urobili: x   Blood: x / Protein: x / Nitrite: x   Leuk Esterase: x / RBC: x / WBC x   Sq Epi: x / Non Sq Epi: x / Bacteria: x                                                                Culture - Fungal, Bronchial (collected 17 May 2024 12:06)  Source: .Bronchial None  Preliminary Report (19 May 2024 06:40):    Testing in progress    Culture - Acid Fast - Bronchial w/Smear (collected 17 May 2024 12:06)  Source: .Bronchial None    Culture - Bronchial (collected 17 May 2024 12:06)  Source: Bronch Wash None  Gram Stain (18 May 2024 11:28):    No polymorphonuclear leukocytes seen per low power field    No Squamous epithelial cells seen per low power field    No organisms seen per oil power field                                                   Mode: AC/ CMV (Assist Control/ Continuous Mandatory Ventilation)  RR (machine): 24  TV (machine): 380  FiO2: 50  PEEP: 8  ITime: 1  MAP: 14  PIP: 24                                      ABG - ( 19 May 2024 04:00 )  pH, Arterial: 7.40  pH, Blood: x     /  pCO2: 70    /  pO2: 73    / HCO3: 43    / Base Excess: 15.7  /  SaO2: 96.3                MEDICATIONS  (STANDING):  albuterol    90 MICROgram(s) HFA Inhaler 2 Puff(s) Inhalation every 4 hours  chlorhexidine 0.12% Liquid 15 milliLiter(s) Oral Mucosa every 12 hours  chlorhexidine 2% Cloths 1 Application(s) Topical <User Schedule>  chlorhexidine 2% Cloths 1 Application(s) Topical <User Schedule>  dexMEDEtomidine Infusion 0.2 MICROgram(s)/kG/Hr (2.53 mL/Hr) IV Continuous <Continuous>  enoxaparin Injectable 40 milliGRAM(s) SubCutaneous every 24 hours  fentaNYL   Infusion. 0.5 MICROgram(s)/kG/Hr (2.53 mL/Hr) IV Continuous <Continuous>  ipratropium 17 MICROgram(s) HFA Inhaler 1 Puff(s) Inhalation every 6 hours  methylPREDNISolone sodium succinate Injectable 40 milliGRAM(s) IV Push daily  nicotine -   7 mG/24Hr(s) Patch 1 Patch Transdermal daily  norepinephrine Infusion 0.05 MICROgram(s)/kG/Min (2.37 mL/Hr) IV Continuous <Continuous>  pantoprazole    Tablet 40 milliGRAM(s) Oral before breakfast  piperacillin/tazobactam IVPB.. 3.375 Gram(s) IV Intermittent every 8 hours  polyethylene glycol 3350 17 Gram(s) Oral daily  saccharomyces boulardii 250 milliGRAM(s) Oral two times a day  senna 2 Tablet(s) Oral at bedtime  sodium chloride 3%  Inhalation 4 milliLiter(s) Inhalation every 4 hours    MEDICATIONS  (PRN):  hydrOXYzine hydrochloride 25 milliGRAM(s) Oral two times a day PRN Anxiety  sodium chloride 0.65% Nasal 1 Spray(s) Both Nostrils three times a day PRN Nasal Congestion          Xrays:  TLC:  OG:  ET tube:                                                                                    left opacity    ECHO:  [facility] ICU:

## 2024-05-19 NOTE — PROGRESS NOTE ADULT - SUBJECTIVE AND OBJECTIVE BOX
24H events:    Patient is a 73y old Female who presents with a chief complaint of COPD exacerbation (17 May 2024 13:10)    Primary diagnosis of COPD exacerbation     This morning patient was seen and examined at bedside, resting comfortably in bed.  Remains on ventilator, levo running, when awake is alert and receiving small pushes of ativan PRN for agitation.    HPI:  73-year-old female with past medical history of COPD not on home oxygen, chronic smoker >60ppy, hypertension who presents for shortness of breath x few weeks.  Pt reports worsening productive cough and shortness of breath with wheezing for the past 1 week.  Increased work of breathing today, was satting 70% on room air.  Was placed on nonrebreather by EMS.  As per son at the bedside pt was confused in the morning, wasn't aware of the surrounding.  She also admits to hemoptysis  along with weight loss over the past few months.  Admits to fever and chills, and denies chest pain, palpitations, nausea, vomiting, diarrhea, abdominal pain, urinary symptoms, weakness, numbness, falls, recent travel, recent surgeries.  Not on anticoagulation.  Denies substance use and alcohol use.    PAST MEDICAL & SURGICAL HISTORY  Chronic obstructive pulmonary disease, unspecified COPD type    Hypertension    No significant past surgical history      ALLERGIES:  No Known Allergies    MEDICATIONS:  STANDING MEDICATIONS  albuterol/ipratropium for Nebulization 3 milliLiter(s) Nebulizer every 6 hours  bisacodyl Suppository 10 milliGRAM(s) Rectal daily  chlorhexidine 0.12% Liquid 15 milliLiter(s) Oral Mucosa every 12 hours  chlorhexidine 2% Cloths 1 Application(s) Topical <User Schedule>  chlorhexidine 2% Cloths 1 Application(s) Topical <User Schedule>  dexMEDEtomidine Infusion 0.2 MICROgram(s)/kG/Hr IV Continuous <Continuous>  enoxaparin Injectable 40 milliGRAM(s) SubCutaneous every 24 hours  fentaNYL   Infusion. 0.5 MICROgram(s)/kG/Hr IV Continuous <Continuous>  methylPREDNISolone sodium succinate Injectable 40 milliGRAM(s) IV Push daily  nicotine -   7 mG/24Hr(s) Patch 1 Patch Transdermal daily  norepinephrine Infusion 0.05 MICROgram(s)/kG/Min IV Continuous <Continuous>  pantoprazole    Tablet 40 milliGRAM(s) Oral before breakfast  piperacillin/tazobactam IVPB.. 3.375 Gram(s) IV Intermittent every 8 hours  polyethylene glycol 3350 17 Gram(s) Oral daily  saccharomyces boulardii 250 milliGRAM(s) Oral two times a day  senna 2 Tablet(s) Oral at bedtime  sodium chloride 3%  Inhalation 4 milliLiter(s) Inhalation every 4 hours    PRN MEDICATIONS  albuterol    90 MICROgram(s) HFA Inhaler 2 Puff(s) Inhalation every 6 hours PRN  hydrOXYzine hydrochloride 25 milliGRAM(s) Oral two times a day PRN  sodium chloride 0.65% Nasal 1 Spray(s) Both Nostrils three times a day PRN    VITALS:   T(F): 98.3  HR: 73  BP: 119/60  RR: 17  SpO2: 100%    PHYSICAL EXAM:  GENERAL: Alert and awake this morning  HEAD:  Atraumatic, Normocephalic  EYES: EOMI, PERRLA   NECK: Supple, No JVD   CHEST/LUNG: Inspiratory wheezes on the right and no lung sounds audible on the left  HEART: S1S2 normal, no S3   ABDOMEN: Soft, Nontender, Nondistended; Bowel sounds present  EXTREMITIES:  No clubbing, cyanosis, or edema  SKIN: No rashes or lesions    LABS:                        9.5    13.06 )-----------( 223      ( 17 May 2024 04:45 )             32.0     05-17    139  |  94<L>  |  20  ----------------------------<  87  4.4   |  44<HH>  |  <0.5<L>    Ca    8.4      17 May 2024 04:45  Mg     1.9     05-17    TPro  4.6<L>  /  Alb  3.0<L>  /  TBili  0.3  /  DBili  x   /  AST  24  /  ALT  65<H>  /  AlkPhos  61  05-17    PT/INR - ( 17 May 2024 04:45 )   PT: 10.90 sec;   INR: 0.96 ratio         PTT - ( 17 May 2024 04:45 )  PTT:25.3 sec  Urinalysis Basic - ( 17 May 2024 04:45 )    Color: x / Appearance: x / SG: x / pH: x  Gluc: 87 mg/dL / Ketone: x  / Bili: x / Urobili: x   Blood: x / Protein: x / Nitrite: x   Leuk Esterase: x / RBC: x / WBC x   Sq Epi: x / Non Sq Epi: x / Bacteria: x      ABG - ( 17 May 2024 16:18 )  pH, Arterial: 7.43  pH, Blood: x     /  pCO2: 71    /  pO2: 76    / HCO3: 47    / Base Excess: x     /  SaO2: 97.3

## 2024-05-20 NOTE — PROGRESS NOTE ADULT - ASSESSMENT
73yFemale with history of COPD, HTN, active smoker presents with SOB and hypoxia. Patient found to have likely COPD exacerbation on arrival with new lung mass concerned for malignancy. Palliative care consulted for C.    Spoke with patient's sons and other family outside of patient's room. They have been discussing amongst themselves over the weekend and decided that they would want the patient to be comfortable and extubated. If that means that comfort measures would need to be initiated and for a palliative extubation to occur, they would be agreeable to that. Patient is currently awaiting to undergo SBTs to reassess breathing status and how well she would do and if she would be eligible to be medically extubated. Family would want her to be DNR/DNI once she is extubated if medically extubated or DNR/DNI/CMO if palliatively extubated. Medical team to follow up with family first once SBT occurs.    Education about palliative care provided to patient/family.  See Recs below.    Please call [phone] with questions or concerns 24/7.   We will continue to follow.

## 2024-05-20 NOTE — PROGRESS NOTE ADULT - PROBLEM SELECTOR PLAN 3
-Full code  -Considering palliative extubation and CMO depending on SBT result  -ongoing medical management  -will follow.

## 2024-05-20 NOTE — CHART NOTE - NSCHARTNOTEFT_GEN_A_CORE
Patient refused to use bipap overnight, we spoke to her son Isaías as well, they both decided to not use bipap overnight as pt feels anxious and if she did not feel okay will try to use it tomorrow during the day time.

## 2024-05-20 NOTE — PROGRESS NOTE ADULT - ASSESSMENT
IMPRESSION:  Acute hypoxemic respiratory failure   Acute on chronic hypercapnic resp failure  COPD exacerbation   Left lung atelectasis   Lung mass possibly malignant    Superimposed pneumonia  Left sided effusion s/p thoracentesis negative for malignant cells    PLAN:    CNS: Avoid CNS depressant.  Sedation for comfort as needed.    HEENT: oral care. ETT care.  ETT day #4 --> patient was extubated today  ENT evaluated for hearing loss --> impacted cerumen --> will prescribe Debrox    PULMONARY:   keep pox 88 to 92%M, Albuterol Q6 hrs and prn , Solumedrol 40 daily while intubated/wheezing.   peep to 8   rate to 24   TV at 380 (8cc/kg IBW)  chest pt   frequent suctioning --> blood clots removed  follow BAL, (-) so far     CARDIOVASCULAR: TTE noted with 70-75%. Keep is = os  . BNP noted    GI: GI prophylaxis. Bowel regimen PRN. Will resume oral feeds    RENAL: follow lytes.  Correct as needed     INFECTIOUS DISEASE: continue abx .  Procal noted.   follow BAL cx and cytology     HEMATOLOGICAL:  DVT prophylaxis. Dimer noted. LE duplex negative.       ENDOCRINE:  Follow up FS.  Insulin protocol if needed.    MSK: offloading    MICU     CODE: DNR/DNI TRIAL NIV with palliative follow-up    nirmal GUSMAN    Discussed with sons at bedside

## 2024-05-20 NOTE — ADVANCED PRACTICE NURSE CONSULT - RECOMMEDATIONS
Cleanse sacrococcygeal region with soap and water.   Pat dry apply moisture barrier cream twice a day and prn for soiling.     Maintain pressure injury prevention.   Keep skin clean.   Maintain incontinence care.   Monitor wound for changes and notify provider   Case discussed with primary RN Cleanse sacrococcygeal region with soap and water.   Pat dry apply moisture barrier cream twice a day and prn for soiling.     Maintain pressure injury prevention.   Keep skin clean.   Maintain incontinence care.   Monitor wound for changes and notify provider   Case discussed with primary RN and Nurse Manager

## 2024-05-20 NOTE — PROGRESS NOTE ADULT - SUBJECTIVE AND OBJECTIVE BOX
Patient is a 73y old  Female who presents with a chief complaint of COPD exacerbation (19 May 2024 08:05)        Over Night Events:    Awake, alert, following commands and asking questions      ROS:     Unable to assess ROS: patient intubated.        PHYSICAL EXAM    ICU Vital Signs Last 24 Hrs  T(C): 36.7 (20 May 2024 08:00), Max: 37.2 (19 May 2024 20:00)  T(F): 98 (20 May 2024 08:00), Max: 98.9 (19 May 2024 20:00)  HR: 81 (20 May 2024 08:00) (59 - 92)  BP: 159/93 (20 May 2024 08:00) (93/52 - 159/93)  BP(mean): 118 (20 May 2024 08:00) (67 - 118)  ABP: --  ABP(mean): --  RR: 28 (20 May 2024 08:00) (18 - 28)  SpO2: 94% (20 May 2024 08:00) (90% - 98%)    O2 Parameters below as of 20 May 2024 08:00  Patient On (Oxygen Delivery Method): ventilator    O2 Concentration (%): 50        Constitutional: no acute distress, well nourished well developed  Neuro: moving all 4 limbs spontaneously, no facial droop or dysarthria  HEENT: NCAT, anicteric  Neck: no visible lymphadenopathy or goiter  Pulm: no respiratory distress. clear to auscultation bilaterally  Cardiac: extremities appear pink and well-perfused.  regular rhythm and rate, no murmur detected  Abdomen: non-distended  Extremities: no peripheral edema      05-19-24 @ 07:01  -  05-20-24 @ 07:00  --------------------------------------------------------  IN:    Enteral Tube Flush: 500 mL    FentaNYL: 164.6 mL    IV PiggyBack: 225 mL    Jevity 1.2: 580 mL    Norepinephrine: 35.2 mL    Norepinephrine: 4.8 mL    Propofol: 50.8 mL  Total IN: 1560.4 mL    OUT:    Indwelling Catheter - Urethral (mL): 1535 mL  Total OUT: 1535 mL    Total NET: 25.4 mL      05-20-24 @ 07:01  -  05-20-24 @ 09:39  --------------------------------------------------------  IN:  Total IN: 0 mL    OUT:    FentaNYL: 0 mL    Indwelling Catheter - Urethral (mL): 80 mL    Norepinephrine: 0 mL    Propofol: 0 mL  Total OUT: 80 mL    Total NET: -80 mL          LABS:                            8.5    12.82 )-----------( 195      ( 20 May 2024 04:51 )             27.5                                               05-20    130<L>  |  97<L>  |  17  ----------------------------<  116<H>  3.7   |  36<H>  |  <0.5<L>    Ca    8.5      20 May 2024 04:51  Mg     1.9     05-20    TPro  4.7<L>  /  Alb  3.0<L>  /  TBili  0.5  /  DBili  x   /  AST  19  /  ALT  84<H>  /  AlkPhos  79  05-20                                             Urinalysis Basic - ( 20 May 2024 04:51 )    Color: x / Appearance: x / SG: x / pH: x  Gluc: 116 mg/dL / Ketone: x  / Bili: x / Urobili: x   Blood: x / Protein: x / Nitrite: x   Leuk Esterase: x / RBC: x / WBC x   Sq Epi: x / Non Sq Epi: x / Bacteria: x                                                  LIVER FUNCTIONS - ( 20 May 2024 04:51 )  Alb: 3.0 g/dL / Pro: 4.7 g/dL / ALK PHOS: 79 U/L / ALT: 84 U/L / AST: 19 U/L / GGT: x                                                  Culture - Fungal, Bronchial (collected 17 May 2024 12:06)  Source: .Bronchial None  Preliminary Report (19 May 2024 06:40):    Testing in progress    Culture - Acid Fast - Bronchial w/Smear (collected 17 May 2024 12:06)  Source: .Bronchial None    Culture - Bronchial (collected 17 May 2024 12:06)  Source: Bronch Wash None  Gram Stain (18 May 2024 11:28):    No polymorphonuclear leukocytes seen per low power field    No Squamous epithelial cells seen per low power field    No organisms seen per oil power field  Final Report (19 May 2024 20:15):    Normal Respiratory Shana present                                                   Mode: AC/ CMV (Assist Control/ Continuous Mandatory Ventilation)  RR (machine): 24  TV (machine): 380  FiO2: 50  PEEP: 8  ITime: 0.7  MAP: 13  PIP: 25                                      ABG - ( 20 May 2024 03:45 )  pH, Arterial: 7.43  pH, Blood: x     /  pCO2: 63    /  pO2: 72    / HCO3: 42    / Base Excess: 15.1  /  SaO2: 95.7                MEDICATIONS  (STANDING):  albuterol    90 MICROgram(s) HFA Inhaler 2 Puff(s) Inhalation every 6 hours  chlorhexidine 0.12% Liquid 15 milliLiter(s) Oral Mucosa every 12 hours  chlorhexidine 2% Cloths 1 Application(s) Topical <User Schedule>  chlorhexidine 2% Cloths 1 Application(s) Topical <User Schedule>  enoxaparin Injectable 40 milliGRAM(s) SubCutaneous every 24 hours  fentaNYL   Infusion. 0.5 MICROgram(s)/kG/Hr (2.53 mL/Hr) IV Continuous <Continuous>  ipratropium 17 MICROgram(s) HFA Inhaler 1 Puff(s) Inhalation every 6 hours  methylPREDNISolone sodium succinate Injectable 40 milliGRAM(s) IV Push daily  nicotine -   7 mG/24Hr(s) Patch 1 Patch Transdermal daily  norepinephrine Infusion 0.05 MICROgram(s)/kG/Min (4.73 mL/Hr) IV Continuous <Continuous>  pantoprazole    Tablet 40 milliGRAM(s) Oral before breakfast  piperacillin/tazobactam IVPB.. 3.375 Gram(s) IV Intermittent every 8 hours  polyethylene glycol 3350 17 Gram(s) Oral daily  propofol Infusion 10 MICROgram(s)/kG/Min (3.03 mL/Hr) IV Continuous <Continuous>  saccharomyces boulardii 250 milliGRAM(s) Oral two times a day  senna 2 Tablet(s) Oral at bedtime    MEDICATIONS  (PRN):  hydrOXYzine hydrochloride 25 milliGRAM(s) Oral two times a day PRN Anxiety  sodium chloride 0.65% Nasal 1 Spray(s) Both Nostrils three times a day PRN Nasal Congestion      New X-rays reviewed:       ECHO reviewed    CXR interpreted by me:    5/20  images and radiologist read reviewed, by my read demonstrating stable LLL consolidation and volume loss, improved since 5/17 but stable from recent days.

## 2024-05-20 NOTE — ADVANCED PRACTICE NURSE CONSULT - ASSESSMENT
History of Present Illness:   73-year-old female with past medical history of COPD not on home oxygen, chronic smoker >60ppy, hypertension who presents for shortness of breath x few weeks.  Pt reports worsening productive cough and shortness of breath with wheezing for the past 1 week.  Increased work of breathing today, was satting 70% on room air.  Was placed on nonrebreather by EMS.  As per son at the bedside pt was confused in the morning, wasn't aware of the surrounding.  She also admits to hemoptysis  along with weight loss over the past few months.  Admits to fever and chills, and denies chest pain, palpitations, nausea, vomiting, diarrhea, abdominal pain, urinary symptoms, weakness, numbness, falls, recent travel, recent surgeries.  Not on anticoagulation.  Denies substance use and alcohol use. s/p IV solumedrol, IV Mag, Nebs and Albuterol in ED. Pt was placed on BIPAP and admitted to MICU for further managements.    Allergies and Intolerances:        Allergies:  	No Known Allergies:     Home Medications:   * Patient Currently Takes Medications as of 06-May-2024 23:42 documented in Structured Notes  · 	amLODIPine 5 mg oral tablet: Last Dose Taken:  , 1 tab(s) orally once a day  · 	Albuterol (Eqv-Ventolin HFA) 90 mcg/inh inhalation aerosol: Last Dose Taken:  , 2 puff(s) inhaled every 6 hours as needed for  shortness of breath and/or wheezing  · 	ipratropium-albuterol 18 mcg-90 mcg/inh inhalation aerosol with adapter: Last Dose Taken:  , by nebulizer every 6 hours as needed for  shortness of breath and/or wheezing    Patient received lying in bed. Intubated, awake. Limited mobility. Paulino in place. High risk for pressure injury.    Dry hyperpigmentation to sacrococcygeal region.     Skin to bilateral heels intact at time of assessment.

## 2024-05-20 NOTE — CONSULT NOTE ADULT - SUBJECTIVE AND OBJECTIVE BOX
Pt c/o diminished hearing on right ear side. Pt admits to hx of ear wax build up which she has treated by Dr Bullard every 6 months.  Pt denies any hearing difficulty in the left ear.  Denies any pain or dizziness    PAST MEDICAL & SURGICAL HISTORY:  Chronic obstructive pulmonary disease, unspecified COPD type      Hypertension      No significant past surgical history        Allergies    No Known Allergies    Intolerances      MEDICATIONS  (STANDING):  albuterol    90 MICROgram(s) HFA Inhaler 2 Puff(s) Inhalation every 6 hours  carbamide peroxide Otic Solution 1 Drop(s) Both Ears two times a day  chlorhexidine 0.12% Liquid 15 milliLiter(s) Oral Mucosa every 12 hours  chlorhexidine 2% Cloths 1 Application(s) Topical <User Schedule>  chlorhexidine 2% Cloths 1 Application(s) Topical <User Schedule>  enoxaparin Injectable 40 milliGRAM(s) SubCutaneous every 24 hours  ipratropium 17 MICROgram(s) HFA Inhaler 1 Puff(s) Inhalation every 6 hours  methylPREDNISolone sodium succinate Injectable 40 milliGRAM(s) IV Push daily  nicotine -   7 mG/24Hr(s) Patch 1 Patch Transdermal daily  ondansetron Injectable 4 milliGRAM(s) IV Push once  pantoprazole    Tablet 40 milliGRAM(s) Oral before breakfast  piperacillin/tazobactam IVPB.. 3.375 Gram(s) IV Intermittent every 8 hours  polyethylene glycol 3350 17 Gram(s) Oral daily  saccharomyces boulardii 250 milliGRAM(s) Oral two times a day  senna 2 Tablet(s) Oral at bedtime    MEDICATIONS  (PRN):  hydrOXYzine hydrochloride 25 milliGRAM(s) Oral two times a day PRN Anxiety  sodium chloride 0.65% Nasal 1 Spray(s) Both Nostrils three times a day PRN Nasal Congestion    Social History: ????    ROS: ENT, GI, , CV, Pulm, Neuro, Psych, MS, Heme, Endo, Constitional; all negative except as noted in HPI    Vital Signs Last 24 Hrs  T(C): 36.7 (20 May 2024 08:00), Max: 37.2 (19 May 2024 20:00)  T(F): 98 (20 May 2024 08:00), Max: 98.9 (19 May 2024 20:00)  HR: 88 (20 May 2024 15:00) (59 - 97)  BP: 136/69 (20 May 2024 15:00) (109/58 - 159/93)  BP(mean): 96 (20 May 2024 15:00) (77 - 120)  RR: 31 (20 May 2024 15:00) (18 - 35)  SpO2: 97% (20 May 2024 15:00) (87% - 98%)    Parameters below as of 20 May 2024 15:00  Patient On (Oxygen Delivery Method): nasal cannula, high flow  O2 Flow (L/min): 50  O2 Concentration (%): 60                          8.5    12.82 )-----------( 195      ( 20 May 2024 04:51 )             27.5    05-20    130<L>  |  97<L>  |  17  ----------------------------<  116<H>  3.7   |  36<H>  |  <0.5<L>    Ca    8.5      20 May 2024 04:51  Mg     1.9     05-20    TPro  4.7<L>  /  Alb  3.0<L>  /  TBili  0.5  /  DBili  x   /  AST  19  /  ALT  84<H>  /  AlkPhos  79  05-20       PHYSICAL EXAM:  Gen: NAD, well-developed, on hi flow oxygen  Head: Normocephalic, Atraumatic  Face: no edema/erythema/fluctuance, parotid glands soft without mass  Eyes: PERRL, EOMI, no scleral injection  Ears: Right - ear canal clear patent with large impacted layer of cerumen. No tragal tenderness            Left - ear canal mostly clear with a minimal amount of cerumen present, TM intact without effusion  Nose: Nares bilaterally patent, no discharge  Mouth: Mucosa moist, tongue/uvula midline, oropharynx clear  Neck: Flat, supple, no lymphadenopathy, trachea midline, no masses  Resp: breathing easily, no stridor  CV: no peripheral edema/cyanosis
  Patient is a 73y old  Female who presents with a chief complaint of COPD exacerbation (06 May 2024 23:30)      HPI:  73-year-old female with past medical history of COPD not on home oxygen, chronic smoker >60ppy, hypertension who presents for shortness of breath x few weeks.  Pt reports worsening productive cough and shortness of breath with wheezing for the past 1 week.  Increased work of breathing today, was satting 70% on room air.  Was placed on nonrebreather by EMS.  As per son at the bedside pt was confused in the morning, wasn't aware of the surrounding.  She also admits to hemoptysis  along with weight loss over the past few months.  Admits to fever and chills, and denies chest pain, palpitations, nausea, vomiting, diarrhea, abdominal pain, urinary symptoms, weakness, numbness, falls, recent travel, recent surgeries.  Not on anticoagulation.  Denies substance use and alcohol use.    In ED  VT bp 143/80, , RR 28, T 99.4 F, SpO2 98% on NRB 15 L  Labs showed Lac 2.3, trop 26, BNP 2044, VBG  pH 7.18 PCo2 102 HCO3 38, O2 sat 46%    s/p IV solumedrol, IV Mag, Nebs and Albuterol in ED.  Pt was placed on BIPAP and admitted to MICU for further managements.   (06 May 2024 23:30)      PAST MEDICAL & SURGICAL HISTORY:    Allergies    No Known Allergies    Intolerances      Family history : no cardiovscular family history   Home Medications:  Albuterol (Eqv-Ventolin HFA) 90 mcg/inh inhalation aerosol: 2 puff(s) inhaled every 6 hours as needed for  shortness of breath and/or wheezing (06 May 2024 23:42)  amLODIPine 5 mg oral tablet: 1 tab(s) orally once a day (06 May 2024 23:42)  ipratropium-albuterol 18 mcg-90 mcg/inh inhalation aerosol with adapter: by nebulizer every 6 hours as needed for  shortness of breath and/or wheezing (06 May 2024 23:42)    Occupation:  Alochol: Denied  Smoking: Denied  Drug Use: Denied  Marital Status:         ROS: as in HPI; All other systems reviewed are negative    ICU Vital Signs Last 24 Hrs  T(C): 36.4 (07 May 2024 04:00), Max: 37.4 (06 May 2024 18:32)  T(F): 97.6 (07 May 2024 04:00), Max: 99.4 (06 May 2024 18:32)  HR: 83 (07 May 2024 05:00) (77 - 103)  BP: 123/74 (07 May 2024 05:00) (123/74 - 143/80)  BP(mean): 93 (07 May 2024 05:00) (93 - 94)  ABP: --  ABP(mean): --  RR: 20 (07 May 2024 05:00) (20 - 28)  SpO2: 92% (07 May 2024 05:00) (88% - 98%)    O2 Parameters below as of 07 May 2024 06:00  Patient On (Oxygen Delivery Method): BiPAP/CPAP              Physical Examination:    General: No acute distress.  Alert, oriented, interactive, nonfocal    HEENT: Pupils equal, reactive to light.  Symmetric.    PULM: b/l wheeze     CVS: Regular rate and rhythm, no murmurs, rubs, or gallops    ABD: Soft, nondistended, nontender, normoactive bowel sounds, no masses    EXT: No edema, nontender, no clubbing     SKIN: Warm and well perfused, no rashes noted.    Neurology : no motor or sensory deficit               ABG - ( 06 May 2024 23:51 )  pH, Arterial: 7.32  pH, Blood: x     /  pCO2: 74    /  pO2: 64    / HCO3: 38    / Base Excess: 9.0   /  SaO2: 94.6                I&O's Detail    06 May 2024 07:01  -  07 May 2024 07:00  --------------------------------------------------------  IN:  Total IN: 0 mL    OUT:    Voided (mL): 0 mL  Total OUT: 0 mL    Total NET: 0 mL            LABS:                        11.0   9.19  )-----------( 247      ( 07 May 2024 04:19 )             37.5     07 May 2024 04:19    134    |  95     |  20     ----------------------------<  109    5.0     |  32     |  0.5      Ca    8.4        07 May 2024 04:19  Mg     2.5       07 May 2024 04:19    TPro  6.1    /  Alb  3.6    /  TBili  <0.2   /  DBili  x      /  AST  18     /  ALT  25     /  AlkPhos  95     06 May 2024 19:05  Amylase x     lipase x              CAPILLARY BLOOD GLUCOSE      POCT Blood Glucose.: 132 mg/dL (07 May 2024 00:46)      Urinalysis Basic - ( 07 May 2024 04:19 )    Color: x / Appearance: x / SG: x / pH: x  Gluc: 109 mg/dL / Ketone: x  / Bili: x / Urobili: x   Blood: x / Protein: x / Nitrite: x   Leuk Esterase: x / RBC: x / WBC x   Sq Epi: x / Non Sq Epi: x / Bacteria: x      Culture    Lactate, Blood: 2.3 mmol/L (05-06-24 @ 19:05)      MEDICATIONS  (STANDING):  albuterol/ipratropium for Nebulization 3 milliLiter(s) Nebulizer every 6 hours  amLODIPine   Tablet 5 milliGRAM(s) Oral daily  cefTRIAXone   IVPB 1000 milliGRAM(s) IV Intermittent every 24 hours  chlorhexidine 2% Cloths 1 Application(s) Topical <User Schedule>  enoxaparin Injectable 40 milliGRAM(s) SubCutaneous every 24 hours  levoFLOXacin IVPB      levoFLOXacin IVPB 750 milliGRAM(s) IV Intermittent every 24 hours  levoFLOXacin IVPB 750 milliGRAM(s) IV Intermittent once  methylPREDNISolone sodium succinate Injectable 60 milliGRAM(s) IV Push every 12 hours  pantoprazole  Injectable 40 milliGRAM(s) IV Push every 24 hours    MEDICATIONS  (PRN):  albuterol    90 MICROgram(s) HFA Inhaler 2 Puff(s) Inhalation every 6 hours PRN for shortness of breath and/or wheezing        RADIOLOGY: ***     CXR: left opacity   TLC:  OG:  ET tube:        [facility] ICU:  ECHO:        
  RITESH THOMAS  73y, Female  Allergy: No Known Allergies      All historical available data reviewed.    HPI:  73-year-old female with past medical history of COPD not on home oxygen, chronic smoker >60ppy, hypertension who presents for shortness of breath x few weeks.  Pt reports worsening productive cough and shortness of breath with wheezing for the past 1 week.  Increased work of breathing today, was satting 70% on room air.  Was placed on nonrebreather by EMS.  As per son at the bedside pt was confused in the morning, wasn't aware of the surrounding.  She also admits to hemoptysis  along with weight loss over the past few months.  Admits to fever and chills, and denies chest pain, palpitations, nausea, vomiting, diarrhea, abdominal pain, urinary symptoms, weakness, numbness, falls, recent travel, recent surgeries.  Not on anticoagulation.  Denies substance use and alcohol use.    In ED  VT bp 143/80, , RR 28, T 99.4 F, SpO2 98% on NRB 15 L  Labs showed Lac 2.3, trop 26, BNP 2044, VBG  pH 7.18 PCo2 102 HCO3 38, O2 sat 46%    s/p IV solumedrol, IV Mag, Nebs and Albuterol in ED.  Pt was placed on BIPAP and admitted to MICU for further managements.   (06 May 2024 23:30)    FAMILY HISTORY:    PAST MEDICAL & SURGICAL HISTORY:        VITALS:  T(F): 98.6, Max: 98.6 (05-10-24 @ 07:12)  HR: 94  BP: 118/66  RR: 20Vital Signs Last 24 Hrs  T(C): 37 (10 May 2024 07:12), Max: 37 (10 May 2024 07:12)  T(F): 98.6 (10 May 2024 07:12), Max: 98.6 (10 May 2024 07:12)  HR: 94 (10 May 2024 07:12) (81 - 105)  BP: 118/66 (10 May 2024 07:12) (111/62 - 137/72)  BP(mean): 87 (10 May 2024 07:12) (87 - 97)  RR: 20 (10 May 2024 07:12) (18 - 20)  SpO2: 98% (10 May 2024 07:12) (89% - 99%)    Parameters below as of 10 May 2024 07:12  Patient On (Oxygen Delivery Method): nasal cannula        TESTS & MEASUREMENTS:                        10.6   21.07 )-----------( 255      ( 09 May 2024 05:41 )             35.6     05-09    140  |  100  |  25<H>  ----------------------------<  126<H>  5.3<H>   |  34<H>  |  0.5<L>    Ca    8.8      09 May 2024 05:41  Mg     2.1     05-09    TPro  5.7<L>  /  Alb  3.5  /  TBili  <0.2  /  DBili  x   /  AST  18  /  ALT  30  /  AlkPhos  81  05-09    LIVER FUNCTIONS - ( 09 May 2024 05:41 )  Alb: 3.5 g/dL / Pro: 5.7 g/dL / ALK PHOS: 81 U/L / ALT: 30 U/L / AST: 18 U/L / GGT: x             Culture - Blood (collected 05-08-24 @ 00:23)  Source: .Blood None  Preliminary Report (05-10-24 @ 09:01):    No growth at 48 Hours      Urinalysis Basic - ( 09 May 2024 05:41 )    Color: x / Appearance: x / SG: x / pH: x  Gluc: 126 mg/dL / Ketone: x  / Bili: x / Urobili: x   Blood: x / Protein: x / Nitrite: x   Leuk Esterase: x / RBC: x / WBC x   Sq Epi: x / Non Sq Epi: x / Bacteria: x          RADIOLOGY & ADDITIONAL TESTS:  Personal review of radiological diagnostics performed  Echo and EKG results noted when applicable.     MEDICATIONS:  albuterol    90 MICROgram(s) HFA Inhaler 2 Puff(s) Inhalation every 6 hours PRN  albuterol/ipratropium for Nebulization 3 milliLiter(s) Nebulizer every 4 hours  amLODIPine   Tablet 5 milliGRAM(s) Oral daily  bisacodyl Suppository 10 milliGRAM(s) Rectal daily  cefTRIAXone   IVPB 1000 milliGRAM(s) IV Intermittent every 24 hours  chlorhexidine 2% Cloths 1 Application(s) Topical <User Schedule>  enoxaparin Injectable 40 milliGRAM(s) SubCutaneous every 24 hours  levoFLOXacin IVPB 750 milliGRAM(s) IV Intermittent once  levoFLOXacin IVPB 750 milliGRAM(s) IV Intermittent every 24 hours  levoFLOXacin IVPB      methylPREDNISolone sodium succinate Injectable 60 milliGRAM(s) IV Push every 12 hours  pantoprazole    Tablet 40 milliGRAM(s) Oral before breakfast  polyethylene glycol 3350 17 Gram(s) Oral daily  senna 2 Tablet(s) Oral at bedtime      ANTIBIOTICS:  cefTRIAXone   IVPB 1000 milliGRAM(s) IV Intermittent every 24 hours  levoFLOXacin IVPB 750 milliGRAM(s) IV Intermittent once  levoFLOXacin IVPB 750 milliGRAM(s) IV Intermittent every 24 hours  levoFLOXacin IVPB        
CC: SOB    HPI:  73-year-old female with past medical history of COPD not on home oxygen, chronic smoker >60ppy, hypertension who presents for shortness of breath x few weeks.  Pt reports worsening productive cough and shortness of breath with wheezing for the past 1 week.  Increased work of breathing today, was satting 70% on room air.  Was placed on nonrebreather by EMS.  As per son at the bedside pt was confused in the morning, wasn't aware of the surrounding.  She also admits to hemoptysis  along with weight loss over the past few months.  Admits to fever and chills, and denies chest pain, palpitations, nausea, vomiting, diarrhea, abdominal pain, urinary symptoms, weakness, numbness, falls, recent travel, recent surgeries.  Not on anticoagulation.  Denies substance use and alcohol use.    In ED  VT bp 143/80, , RR 28, T 99.4 F, SpO2 98% on NRB 15 L  Labs showed Lac 2.3, trop 26, BNP 2044, VBG  pH 7.18 PCo2 102 HCO3 38, O2 sat 46%    s/p IV solumedrol, IV Mag, Nebs and Albuterol in ED.  Pt was placed on BIPAP and admitted to MICU for further managements.   (06 May 2024 23:30)    PERTINENT PM/SXH:   COPD not on home oxygen, chronic smoker >60ppy, hypertension     FAMILY HISTORY:    None pertinent    ITEMS NOT CHECKED ARE NOT PRESENT    SOCIAL HISTORY:   Significant other/partner[ ]  Children[ ]  Adventism/Spirituality:  Substance hx:  [ ]   Tobacco hx:  [ ]   Alcohol hx: [ ]   Living Situation: [x ]Home  [ ]Long term care  [ ]Rehab [ ]Other  Home Services: [ ] HHA [ ] Visting RN [ ] Hospice  Occupation:  Home Opioid hx:  [ ] Y [ ] N [x ] I-Stop Reference No:    Reference #: [id number] - no meds     ADVANCE DIRECTIVES:     [x ] Full Code [ ] DNR  MOLST  [ ]  Living Will  [ ]   DECISION MAKER(s):  [ ] Health Care Proxy(s)  [x ] Surrogate(s)  [ ] Guardian           Name(s): Phone Number(s):  Children      BASELINE (I)ADL(s) (prior to admission):    [place]: [ ]Total  [ ] Moderate [ ]Dependent  Palliative Performance Status Version 2:         %    http://npcrc.org/files/news/palliative_performance_scale_ppsv2.pdf    Allergies    No Known Allergies    Intolerances    MEDICATIONS  (STANDING):  albuterol/ipratropium for Nebulization 3 milliLiter(s) Nebulizer every 4 hours  amLODIPine   Tablet 5 milliGRAM(s) Oral daily  bisacodyl Suppository 10 milliGRAM(s) Rectal daily  cefepime   IVPB 2000 milliGRAM(s) IV Intermittent every 8 hours  chlorhexidine 2% Cloths 1 Application(s) Topical <User Schedule>  enoxaparin Injectable 40 milliGRAM(s) SubCutaneous every 24 hours  levoFLOXacin IVPB 750 milliGRAM(s) IV Intermittent once  levoFLOXacin IVPB 750 milliGRAM(s) IV Intermittent every 24 hours  levoFLOXacin IVPB      methylPREDNISolone sodium succinate Injectable 60 milliGRAM(s) IV Push every 12 hours  nicotine -   7 mG/24Hr(s) Patch 1 Patch Transdermal daily  pantoprazole    Tablet 40 milliGRAM(s) Oral before breakfast  polyethylene glycol 3350 17 Gram(s) Oral daily  senna 2 Tablet(s) Oral at bedtime    MEDICATIONS  (PRN):  albuterol    90 MICROgram(s) HFA Inhaler 2 Puff(s) Inhalation every 6 hours PRN for shortness of breath and/or wheezing    PRESENT SYMPTOMS: [ ]Unable to obtain due to poor mentation   Source if other than patient:  [ ]Family   [ ]Team     Pain: [ ]yes [ x]no  ALL PAIN ASSESSMENT COMPONENTS WERE ASKED ABOUT UNLESS OTHERWISE NOTED  QOL impact -   Location -                    Aggravating factors -  Quality -  Radiation -  Timing-  Severity (0-10 scale):  Minimal acceptable level (0-10 scale):     CPOT:    https://www.sccm.org/getattachment/zpo07b97-2k4o-6y5w-0c3i-6726p3617f4f/Critical-Care-Pain-Observation-Tool-(CPOT)    PAIN AD Score:   http://geriatrictoolkit.[place].[email or web address]/cog/painad.pdf (press ctrl +  left click to view)    Dyspnea:                           [ ]None[ ]Mild [x ]Moderate [ ]Severe     Respiratory Distress Observation Scale (RDOS):   A score of 0 to 2 signifies little or no respiratory distress, 3 signifies mild distress, scores 4 to 6 indicate moderate distress, and scores greater than 7 signify severe distress  [email or web address]://[email or web address]/sites/default/files/PDFS/543453-mvvexncolws-oaipnskl-nfzxngydikb-yyacd.pdf    Anxiety:                             [ ]None[ ]Mild [ x]Moderate [ ]Severe   Fatigue:                             [ ]None[ x]Mild [ ]Moderate [ ]Severe   Nausea:                             [ x]None[ ]Mild [ ]Moderate [ ]Severe   Loss of appetite:              [x ]None[ ]Mild [ ]Moderate [ ]Severe   Constipation:                    [x ]None[ ]Mild [ ]Moderate [ ]Severe    Other Symptoms:  [x ]All other review of systems negative     Palliative Performance Status Version 2:         30%    http://[facility].org/files/news/palliative_performance_scale_[email or web address].pdf    PHYSICAL EXAM:  Vital Signs Last 24 Hrs  T(C): 36.5 (10 May 2024 16:25), Max: 37 (10 May 2024 07:12)  T(F): 97.7 (10 May 2024 16:25), Max: 98.6 (10 May 2024 07:12)  HR: 92 (10 May 2024 16:25) (81 - 98)  BP: 128/60 (10 May 2024 16:25) (111/62 - 132/74)  BP(mean): 93 (10 May 2024 16:25) (87 - 93)  RR: 18 (10 May 2024 16:25) (18 - 20)  SpO2: 89% (10 May 2024 16:25) (85% - 99%)    Parameters below as of 10 May 2024 16:25  Patient On (Oxygen Delivery Method): nasal cannula  O2 Flow (L/min): 6   I&O's Summary    10 May 2024 07:01  -  10 May 2024 19:29  --------------------------------------------------------  IN: 0 mL / OUT: 550 mL / NET: -550 mL        GENERAL:  [ ] No acute distress [ ]Lethargic  [ ]Unarousable  [ x]Verbal  [ ]Non-Verbal [ ]Cachexia    BEHAVIORAL/PSYCH:  [ x]Alert and Oriented x3  [ x] Anxiety [ ] Delirium [ ] Agitation [ ] Calm   EYES: [x ] No scleral icterus [ ] Scleral icterus [ ] Closed  ENMT:  [ ]Dry mouth  [ x]No external oral lesions [ ] No external ear or nose lesions  CARDIOVASCULAR:  [ ]Regular [ ]Irregular [ ]Tachy [ x]Not Tachy  [ ]Kalpesh [ ] Edema [ ] No edema  PULMONARY:  [x ]Tachypnea  [ ]Audible excessive secretions [ ] No labored breathing [ ] labored breathing  GASTROINTESTINAL: [ ]Soft  [ ]Distended  [ ]Not distended [ ]Non tender [ ]Tender  MUSCULOSKELETAL: [ ]No clubbing [ ] clubbing  [ ] No cyanosis [ ] cyanosis  NEUROLOGIC: [ ]No focal deficits  [ x]Follows commands  [ ]Does not follow commands  [ ]Cognitive impairment  [ ]Dysphagia  [ ]Dysarthria  [ ]Paresis   SKIN: [ ] Jaundiced [x ] Non-jaundiced [ ]Rash [ ]No Rash [ ] Warm [ ] Dry  MISC/LINES: [ ] ET tube [ ] Trach [ ]NGT/OGT [ ]PEG [ ]Paulino    LABS: reviewed by me                        10.6   21.07 )-----------( 255      ( 09 May 2024 05:41 )             35.6   05-09    140  |  100  |  25<H>  ----------------------------<  126<H>  5.3<H>   |  34<H>  |  0.5<L>    Ca    8.8      09 May 2024 05:41  Mg     2.1     05-09    TPro  5.7<L>  /  Alb  3.5  /  TBili  <0.2  /  DBili  x   /  AST  18  /  ALT  30  /  AlkPhos  81  05-09      Urinalysis Basic - ( 09 May 2024 05:41 )    Color: x / Appearance: x / SG: x / pH: x  Gluc: 126 mg/dL / Ketone: x  / Bili: x / Urobili: x   Blood: x / Protein: x / Nitrite: x   Leuk Esterase: x / RBC: x / WBC x   Sq Epi: x / Non Sq Epi: x / Bacteria: x      RADIOLOGY & ADDITIONAL STUDIES: reviewed by me    < from: Xray Chest 1 View- PORTABLE-Urgent (Xray Chest 1 View- PORTABLE-Urgent .) (05.09.24 @ 12:23) >  Impression:    Left lung masslike consolidation. Decreased left effusion. No   pneumothorax.    Improving right basilar nodular opacities.    < end of copied text >      EKG: reviewed by me    < from: 12 Lead ECG (05.06.24 @ 19:52) >  Ventricular Rate 90 BPM    Atrial Rate 90 BPM    P-R Interval 170 ms    QRS Duration 80 ms    Q-T Interval 360 ms    QTC Calculation(Bazett) 440 ms    P Axis 84 degrees    R Axis -70 degrees    T Axis 77 degrees    Diagnosis Line Normal sinus rhythm  Left anterior fascicular block  Septal infarct , age undetermined  Abnormal ECG    < end of copied text >      PROTEIN CALORIE MALNUTRITION PRESENT: [ ]mild [ ]moderate [ ]severe [ ]underweight [ ]morbid obesity  https://www.andeal.org/vault/2440/web/files/ONC/Table_Clinical%20Characteristics%20to%20Document%20Malnutrition-White%20JV%20et%20al%737709.pdf    Height (cm): 154.9 (05-07-24 @ 00:20)  Weight (kg): 47 (05-07-24 @ 00:20)  BMI (kg/m2): 19.6 (05-07-24 @ 00:20)  [ ]PPSV2 < or = to 30% [ ]significant weight loss  [ ]poor nutritional intake  [ ]anasarca      [ ]Artificial Nutrition      Palliative Care Spiritual/Emotional Screening Tool Question  Severity (0-4):                    OR                    [ x] Unable to determine. Will assess at later time if appropriate.  Score of 2 or greater indicates recommendation of Chaplaincy and/or SW referral  Chaplaincy Referral: [ ] Yes [ ] Refused [ ] Following     Caregiver Miller:  [ ] Yes [ ] No    OR    [x ] Unable to determine. Will assess at later time if appropriate.  Social Work Referral [ ]  Patient and Family Centered Care Referral [ ]    Anticipatory Grief Present: [ ] Yes [ ] No    OR     [ x] Unable to determine. Will assess at later time if appropriate.  Social Work Referral [ ]  Patient and Family Centered Care Referral [ ]    Patient discussed with primary medical team MD  Palliative care education provided to patient and/or family

## 2024-05-20 NOTE — CONSULT NOTE ADULT - ASSESSMENT
IMPRESSION:  acute resp failure hypercapnia and hypoxia   copd exacerbation   lung mass r/o malignancy   possible postobstructive PNA       PLAN:    CNS: no sedation     HEENT: oral care     PULMONARY: keep pox > 92%   NIv 2 hrs on and 2 hrs   at night and as needed   albuterol Q4 hrs and prn   solumedrol 60 mg Q 8 hrs   will need biopsy / bronch when stable     CARDIOVASCULAR: echo   CE     GI: GI prophylaxis.  Feeding speech and swallow     RENAL: follow lytes K     INFECTIOUS DISEASE: continue abx   nasal mrsa   procal   urine legionella strept    HEMATOLOGICAL:  DVT prophylaxis.    ENDOCRINE:  Follow up FS.  Insulin protocol if needed.    MICU for now   patient want to discus case with the son       CRITICAL CARE TIME SPENT: ***
Right sided hearing loss secondary to cerumen impaction
73-year-old female with past medical history of COPD not on home oxygen, chronic smoker >60ppy, hypertension who presents for shortness of breath x few weeks.  Pt reports worsening productive cough and shortness of breath with wheezing for the past 1 week.  Increased work of breathing today, was satting 70% on room air.  Was placed on nonrebreather by EMS.  As per son at the bedside pt was confused in the morning, wasn't aware of the surrounding.  She also admits to hemoptysis  along with weight loss over the past few months.  < from: CT Angio Chest PE Protocol w/ IV Cont (05.07.24 @ 00:27) >  No pulmonary embolism.    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 Findings highly suspicious for malignancy. Further   oncological care is recommended.    Likely superimposed consolidative and groundglass opacities in the left   lung is well probably reflecting superimposed pneumonia. Correlate with   clinical symptoms.    Approximately 2 cm right lower lobe spiculated nodule concerning for   malignancy as well.    Additional 8 mm solid nodule in the right apex. Indeterminate   approximately 4 cm groundglass density in the right upper lobe. Findings   are indeterminate however neoplastic processes on the differential.     < end of copied text >    IMPRESSION/RECOMMENDATIONS  Immunosuppression/Immunosenescence ( above age 60 yrs there is a exponential decline in immunity which could result in poor clinical outcomes.   High probability of left sided lung malignancy  Suspected GNR post obstructive PNA left  WBC 21  5/9 s/p thoracocentesis  5/8 BCx NG  COVID 19/ Influenza/ RSV NG.     -f/u pleural fluid cultures  -malignancy w/u as per Pulmonary  -nares ORSA  -Cefepime 2 gm iv q8  -levoquin 750 mg iv q24h
73yFemale with history of COPD, HTN, active smoker presents with SOB and hypoxia. Patient found to have likely COPD exacerbation on arrival with new lung mass concerned for malignancy. Palliative care consulted for GOC.    Spoke with patient at bedside. She asked that I not speak to her about her health and only speak to her children. Spoke to daughter Brenda outside. Palliative care introduced.  SHe was able to provide a medical history and hospital course. She noted the patient had not been going to see doctors and had been losing weight with hemoptysis for some time.  She noted it will be difficult to make decisions for the patient because she is alert and oriented, but does not want make decisions for herself.  We discussed that the team is awaiting cytology, so it is unknown if it is cancer, what type of cancer it is (if cancer at all), and what options would be. We discussed that the heme onc team would provide information about prognosis and treatment options, and we could discuss further about GOC and options depending on the prognosis. Discussed DNR/DNI and hospice, and noted that we could discuss more after diagnosis established.      MEDD (morphine equivalent daily dose):    Education about palliative care provided to patient/family.  See Recs below.    Please call [phone] with questions or concerns 24/7.   We will continue to follow.

## 2024-05-20 NOTE — PROGRESS NOTE ADULT - CONVERSATION DETAILS
Spoke with patient's sons and other family outside of patient's room. They have been discussing amongst themselves over the weekend and decided that they would want the patient to be comfortable and extubated. If that means that comfort measures would need to be initiated and for a palliative extubation to occur, they would be agreeable to that. Patient is currently awaiting to undergo SBTs to reassess breathing status and how well she would do and if she would be eligible to be medically extubated. Family would want her to be DNR/DNI once she is extubated if medically extubated or DNR/DNI/CMO if palliatively extubated. Medical team to follow up with family first once SBT occurs.
All active issues and plan of care discussed with pt and family.   Pt's son is not sure if pt'll agree for cancer directed therapy when final diagnosis will be made.   I discussed a possibility of comfort/end of life and hospice care as an alternative option.  Palliative care consult requested.

## 2024-05-20 NOTE — PROGRESS NOTE ADULT - SUBJECTIVE AND OBJECTIVE BOX
24H events:    Patient is a 73y old Female who presents with a chief complaint of COPD exacerbation (20 May 2024 14:05)    Primary diagnosis of COPD exacerbation    Today is hospital day 14d. This morning patient was seen and examined at bedside.   Patient was comfortable this morning, communicating with the primary team. Patient passed SAT and SBT, was extubated early afternoon and placed on high flow nasal cannula  Hemodynamically stable, will resume oral diet, voiding appropriately with appropriate bowel movements.     Code Status:     Family communication:  Contact date: 05/20/24  Name of person contacted: Grayson Yang  Relationship to patient: Son  Communication details: Code status  What matters most: Patient placed DNR/DNI with trial of NIV    PAST MEDICAL & SURGICAL HISTORY  Chronic obstructive pulmonary disease, unspecified COPD type  Hypertension    No significant past surgical history      SOCIAL HISTORY:  Social History:      ALLERGIES:  No Known Allergies    MEDICATIONS:  STANDING MEDICATIONS  albuterol    90 MICROgram(s) HFA Inhaler 2 Puff(s) Inhalation every 6 hours  chlorhexidine 0.12% Liquid 15 milliLiter(s) Oral Mucosa every 12 hours  chlorhexidine 2% Cloths 1 Application(s) Topical <User Schedule>  chlorhexidine 2% Cloths 1 Application(s) Topical <User Schedule>  enoxaparin Injectable 40 milliGRAM(s) SubCutaneous every 24 hours  fentaNYL   Infusion. 0.5 MICROgram(s)/kG/Hr IV Continuous <Continuous>  ipratropium 17 MICROgram(s) HFA Inhaler 1 Puff(s) Inhalation every 6 hours  methylPREDNISolone sodium succinate Injectable 40 milliGRAM(s) IV Push daily  nicotine -   7 mG/24Hr(s) Patch 1 Patch Transdermal daily  ondansetron Injectable 4 milliGRAM(s) IV Push once  pantoprazole    Tablet 40 milliGRAM(s) Oral before breakfast  piperacillin/tazobactam IVPB.. 3.375 Gram(s) IV Intermittent every 8 hours  polyethylene glycol 3350 17 Gram(s) Oral daily  propofol Infusion 10 MICROgram(s)/kG/Min IV Continuous <Continuous>  saccharomyces boulardii 250 milliGRAM(s) Oral two times a day  senna 2 Tablet(s) Oral at bedtime    PRN MEDICATIONS  hydrOXYzine hydrochloride 25 milliGRAM(s) Oral two times a day PRN  sodium chloride 0.65% Nasal 1 Spray(s) Both Nostrils three times a day PRN    VITALS:   T(F): 98  HR: 93  BP: 151/75  RR: 34  SpO2: 97%    PHYSICAL EXAM:  GENERAL: NAD, lying in bed comfortably   HEAD: NCAD, no hematoma or laceration   NECK: Supple, o JVD    HEART: Regular rate and rhythm, normal S1/S2, no murmurs, heaves, thrills  LUNGS: No acute respiratory distress, decreased breath sounds on the left side with rhonchi  ABDOMEN:  soft, non-tender, non-distented   EXTREMITIES: no edema, ulcerations or ecchymosis  NERVOUS SYSTEM:  A&Ox3, CNII-XII intact, follows commands, answers questions appropriately   SKIN: No rashes or lesions       [facility] score:    LABS:                        8.5    12.82 )-----------( 195      ( 20 May 2024 04:51 )             27.5     05-20    130<L>  |  97<L>  |  17  ----------------------------<  116<H>  3.7   |  36<H>  |  <0.5<L>    Ca    8.5      20 May 2024 04:51  Mg     1.9     05-20    TPro  4.7<L>  /  Alb  3.0<L>  /  TBili  0.5  /  DBili  x   /  AST  19  /  ALT  84<H>  /  AlkPhos  79  05-20    Urinalysis Basic - ( 20 May 2024 04:51 )    Color: x / Appearance: x / SG: x / pH: x  Gluc: 116 mg/dL / Ketone: x  / Bili: x / Urobili: x   Blood: x / Protein: x / Nitrite: x   Leuk Esterase: x / RBC: x / WBC x   Sq Epi: x / Non Sq Epi: x / Bacteria: x    ABG - ( 20 May 2024 03:45 )  pH, Arterial: 7.43  pH, Blood: x     /  pCO2: 63    /  pO2: 72    / HCO3: 42    / Base Excess: 15.1  /  SaO2: 95.7        RADIOLOGY:  ACC: [id number] EXAM:  XR CHEST PORTABLE ROUTINE 1V        PROCEDURE DATE:  05/20/2024      INTERPRETATION:  Clinical History / Reason for exam: Intubated    Comparison : Chest radiograph May 20, 2024.    Technique/Positioning: Frontal chest radiograph.    Findings:    Support devices: Stable ET tube, enteric tube. Additional wires overlie the chest.    Cardiac/mediastinum/hilum: Partially obscured.    Lung parenchyma/Pleura: Unchanged extensive left lung opacities. No pneumothorax.    Skeleton/soft tissues: Unchanged.    Impression:    Unchanged extensive left lung opacities.    --- End of Report ---    JENNY RAI MD; Attending Radiologist  This document has been electronically signed. May 20 33120:29AM

## 2024-05-20 NOTE — PROGRESS NOTE ADULT - SUBJECTIVE AND OBJECTIVE BOX
CC: SOB    HPI:  73-year-old female with past medical history of COPD not on home oxygen, chronic smoker >60ppy, hypertension who presents for shortness of breath x few weeks.  Pt reports worsening productive cough and shortness of breath with wheezing for the past 1 week.  Increased work of breathing today, was satting 70% on room air.  Was placed on nonrebreather by EMS.  As per son at the bedside pt was confused in the morning, wasn't aware of the surrounding.  She also admits to hemoptysis  along with weight loss over the past few months.  Admits to fever and chills, and denies chest pain, palpitations, nausea, vomiting, diarrhea, abdominal pain, urinary symptoms, weakness, numbness, falls, recent travel, recent surgeries.  Not on anticoagulation.  Denies substance use and alcohol use.    In ED  VT bp 143/80, , RR 28, T 99.4 F, SpO2 98% on NRB 15 L  Labs showed Lac 2.3, trop 26, BNP 2044, VBG  pH 7.18 PCo2 102 HCO3 38, O2 sat 46%    s/p IV solumedrol, IV Mag, Nebs and Albuterol in ED.  Pt was placed on BIPAP and admitted to MICU for further managements.   (06 May 2024 23:30)    PERTINENT PM/SXH:   COPD not on home oxygen, chronic smoker >60ppy, hypertension     FAMILY HISTORY:    None pertinent    ITEMS NOT CHECKED ARE NOT PRESENT    SOCIAL HISTORY:   Significant other/partner[ ]  Children[ ]  Yarsani/Spirituality:  Substance hx:  [ ]   Tobacco hx:  [ ]   Alcohol hx: [ ]   Living Situation: [x ]Home  [ ]Long term care  [ ]Rehab [ ]Other  Home Services: [ ] HHA [ ] Visting RN [ ] Hospice  Occupation:  Home Opioid hx:  [ ] Y [ ] N [x ] I-Stop Reference No:    Reference #: [id number] - no meds     ADVANCE DIRECTIVES:     [x ] Full Code [ ] DNR  MOLST  [ ]  Living Will  [ ]   DECISION MAKER(s):  [ ] Health Care Proxy(s)  [x ] Surrogate(s)  [ ] Guardian           Name(s): Phone Number(s):  Children      BASELINE (I)ADL(s) (prior to admission):    [place]: [ ]Total  [ ] Moderate [ ]Dependent  Palliative Performance Status Version 2:         %    http://npcrc.org/files/news/palliative_performance_scale_ppsv2.pdf    Allergies    No Known Allergies    Intolerances    MEDICATIONS  (STANDING):  albuterol    90 MICROgram(s) HFA Inhaler 2 Puff(s) Inhalation every 6 hours  chlorhexidine 0.12% Liquid 15 milliLiter(s) Oral Mucosa every 12 hours  chlorhexidine 2% Cloths 1 Application(s) Topical <User Schedule>  chlorhexidine 2% Cloths 1 Application(s) Topical <User Schedule>  enoxaparin Injectable 40 milliGRAM(s) SubCutaneous every 24 hours  fentaNYL   Infusion. 0.5 MICROgram(s)/kG/Hr (2.53 mL/Hr) IV Continuous <Continuous>  ipratropium 17 MICROgram(s) HFA Inhaler 1 Puff(s) Inhalation every 6 hours  methylPREDNISolone sodium succinate Injectable 40 milliGRAM(s) IV Push daily  nicotine -   7 mG/24Hr(s) Patch 1 Patch Transdermal daily  pantoprazole    Tablet 40 milliGRAM(s) Oral before breakfast  piperacillin/tazobactam IVPB.. 3.375 Gram(s) IV Intermittent every 8 hours  polyethylene glycol 3350 17 Gram(s) Oral daily  propofol Infusion 10 MICROgram(s)/kG/Min (3.03 mL/Hr) IV Continuous <Continuous>  saccharomyces boulardii 250 milliGRAM(s) Oral two times a day  senna 2 Tablet(s) Oral at bedtime    MEDICATIONS  (PRN):  hydrOXYzine hydrochloride 25 milliGRAM(s) Oral two times a day PRN Anxiety  sodium chloride 0.65% Nasal 1 Spray(s) Both Nostrils three times a day PRN Nasal Congestion      PRESENT SYMPTOMS: [ ]Unable to obtain due to poor mentation   Source if other than patient:  [ ]Family   [ ]Team     Pain: [ ]yes [ x]no  ALL PAIN ASSESSMENT COMPONENTS WERE ASKED ABOUT UNLESS OTHERWISE NOTED  QOL impact -   Location -                    Aggravating factors -  Quality -  Radiation -  Timing-  Severity (0-10 scale):  Minimal acceptable level (0-10 scale):     CPOT:    https://www.sccm.org/getattachment/tbh21p02-7z9p-5j8s-1v4k-7600a5276s6g/Critical-Care-Pain-Observation-Tool-(CPOT)    PAIN AD Score:   [email or web address]/painad.pdf (press ctrl +  left click to view)    Dyspnea:                           [ ]None[ ]Mild [ ]Moderate [ ]Severe     Respiratory Distress Observation Scale (RDOS): 0  A score of 0 to 2 signifies little or no respiratory distress, 3 signifies mild distress, scores 4 to 6 indicate moderate distress, and scores greater than 7 signify severe distress  [email or web address]/files/[email or web address]-hqnhsyhdsiz-fbvcoogf-[email or web address]-qrapl.pdf    Anxiety:                             [ ]None[ ]Mild [ x]Moderate [ ]Severe   Fatigue:                             [ ]None[ x]Mild [ ]Moderate [ ]Severe   Nausea:                             [ x]None[ ]Mild [ ]Moderate [ ]Severe   Loss of appetite:              [x ]None[ ]Mild [ ]Moderate [ ]Severe   Constipation:                    [x ]None[ ]Mild [ ]Moderate [ ]Severe    Other Symptoms:  [x ]All other review of systems negative     Palliative Performance Status Version 2:         30%    http://[email or web address]/files/news/palliative_performance_scale_[email or web address].pdf    PHYSICAL EXAM:    ICU Vital Signs Last 24 Hrs  T(C): 36.7 (20 May 2024 08:00), Max: 37.2 (19 May 2024 20:00)  T(F): 98 (20 May 2024 08:00), Max: 98.9 (19 May 2024 20:00)  HR: 80 (20 May 2024 13:00) (59 - 92)  BP: 133/77 (20 May 2024 13:00) (109/58 - 159/93)  BP(mean): 99 (20 May 2024 13:00) (77 - 118)  ABP: --  ABP(mean): --  RR: 26 (20 May 2024 13:00) (18 - 35)  SpO2: 93% (20 May 2024 13:00) (87% - 98%)    O2 Parameters below as of 20 May 2024 13:00  Patient On (Oxygen Delivery Method): ventilator    O2 Concentration (%): 40          GENERAL:  [x ] No acute distress [ ]Lethargic  [ ]Unarousable  [ ]Verbal  [ ]Non-Verbal [ ]Cachexia    BEHAVIORAL/PSYCH:  [ x]Alert and Oriented x3  [ x] Anxiety [ ] Delirium [ ] Agitation [ ] Calm   EYES: [x ] No scleral icterus [ ] Scleral icterus [ ] Closed  ENMT:  [ ]Dry mouth  [ x]No external oral lesions [ ] No external ear or nose lesions  CARDIOVASCULAR:  [ ]Regular [ ]Irregular [ ]Tachy [ x]Not Tachy  [ ]Kalpesh [ ] Edema [ ] No edema  PULMONARY:  [x ]Tachypnea  [ ]Audible excessive secretions [x ] No labored breathing [ ] labored breathing  GASTROINTESTINAL: [ ]Soft  [ ]Distended  [x ]Not distended [ ]Non tender [ ]Tender  MUSCULOSKELETAL: [ ]No clubbing [ ] clubbing  [ x] No cyanosis [ ] cyanosis  NEUROLOGIC: [ ]No focal deficits  [ x]Follows commands  [ ]Does not follow commands  [ ]Cognitive impairment  [ ]Dysphagia  [ ]Dysarthria  [ ]Paresis   SKIN: [ ] Jaundiced [x ] Non-jaundiced [ ]Rash [ ]No Rash [ ] Warm [ ] Dry  MISC/LINES: [x ] ET tube [ ] Trach [ ]NGT/OGT [ ]PEG [ ]Paulino    LABS: reviewed by me                                   8.5    12.82 )-----------( 195      ( 20 May 2024 04:51 )             27.5     05-20    130<L>  |  97<L>  |  17  ----------------------------<  116<H>  3.7   |  36<H>  |  <0.5<L>    Ca    8.5      20 May 2024 04:51  Mg     1.9     05-20    TPro  4.7<L>  /  Alb  3.0<L>  /  TBili  0.5  /  DBili  x   /  AST  19  /  ALT  84<H>  /  AlkPhos  79  05-20      Urinalysis Basic - ( 20 May 2024 04:51 )    Color: x / Appearance: x / SG: x / pH: x  Gluc: 116 mg/dL / Ketone: x  / Bili: x / Urobili: x   Blood: x / Protein: x / Nitrite: x   Leuk Esterase: x / RBC: x / WBC x   Sq Epi: x / Non Sq Epi: x / Bacteria: x        RADIOLOGY & ADDITIONAL STUDIES: reviewed by me    < from: Xray Chest 1 View- PORTABLE-Urgent (Xray Chest 1 View- PORTABLE-Urgent .) (05.09.24 @ 12:23) >  Impression:    Left lung masslike consolidation. Decreased left effusion. No   pneumothorax.    Improving right basilar nodular opacities.    < end of copied text >      EKG: reviewed by me    < from: 12 Lead ECG (05.06.24 @ 19:52) >  Ventricular Rate 90 BPM    Atrial Rate 90 BPM    P-R Interval 170 ms    QRS Duration 80 ms    Q-T Interval 360 ms    QTC Calculation(Bazett) 440 ms    P Axis 84 degrees    R Axis -70 degrees    T Axis 77 degrees    Diagnosis Line Normal sinus rhythm  Left anterior fascicular block  Septal infarct , age undetermined  Abnormal ECG    < end of copied text >      PROTEIN CALORIE MALNUTRITION PRESENT: [ ]mild [ ]moderate [ ]severe [ ]underweight [ ]morbid obesity  https://www.andeal.org/vault/2440/web/files/ONC/Table_Clinical%20Characteristics%20to%20Document%20Malnutrition-White%20JV%20et%20al%577856.pdf    Height (cm): 154.9 (05-07-24 @ 00:20)  Weight (kg): 47 (05-07-24 @ 00:20)  BMI (kg/m2): 19.6 (05-07-24 @ 00:20)  [ ]PPSV2 < or = to 30% [ ]significant weight loss  [ ]poor nutritional intake  [ ]anasarca      [ ]Artificial Nutrition      Palliative Care Spiritual/Emotional Screening Tool Question  Severity (0-4):                    OR                    [ x] Unable to determine. Will assess at later time if appropriate.  Score of 2 or greater indicates recommendation of Chaplaincy and/or SW referral  Chaplaincy Referral: [ ] Yes [ ] Refused [ ] Following     Caregiver [place]:  [ ] Yes [ ] No    OR    [x ] Unable to determine. Will assess at later time if appropriate.  Social Work Referral [ ]  Patient and Family Centered Care Referral [ ]    Anticipatory Grief Present: [ ] Yes [ ] No    OR     [ x] Unable to determine. Will assess at later time if appropriate.  Social Work Referral [ ]  Patient and Family Centered Care Referral [ ]    Patient discussed with primary medical team MD  Palliative care education provided to patient and/or family

## 2024-05-20 NOTE — CONSULT NOTE ADULT - PROBLEM SELECTOR RECOMMENDATION 9
Recommend Debrox ear gtts to affected ear,5gtts twice daily for 3-4 days  Audiogram as outpatient  Outpt f/u with Dr Bullard whom pt follow with  Will d/w attending
IN the setting of COPD exacerbation, possible lung mass  -continue solumedrol  -continue IV antibiotics  -continue nebs  -BIPAP PRN and qhs  -follow up pleural fluid workup

## 2024-05-20 NOTE — PROGRESS NOTE ADULT - ASSESSMENT
IMPRESSION:  Acute hypoxemic respiratory failure   Acute on chronic hypercapnic resp failure  COPD exacerbation   Left lung atelectasis   Lung mass possibly malignant    Superimposed pneumonia  Left sided effusion s/p thoracentesis negative for malignant cells    PLAN:    CNS: Avoid CNS depressant.  Sedation for comfort as needed.    HEENT: oral care. ETT care.  ETT day #4.    PULMONARY:   keep pox 88 to 92%M, Albuterol Q6 hrs and prn , Solumedrol 40 daily while intubated/wheezing.   peep to 8   rate to 24   TV at 380 (8cc/kg IBW)  chest pt   frequent suctioning  follow BAL, (-) so far  SBT daily, intent to extubate if tolerating    CARDIOVASCULAR: TTE noted with 70-75%. Keep is = os  . BNP noted    GI: GI prophylaxis.   NG feeds.  Bowel regimen PRN    RENAL: follow lytes.  Correct as needed     INFECTIOUS DISEASE: continue abx .  Procal noted.   follow BAL cx   and cytology     HEMATOLOGICAL:  DVT prophylaxis. Dimer noted. LE duplex negative.       ENDOCRINE:  Follow up FS.  Insulin protocol if needed.    MSK: offloading    MICU   FULL, palliative follow-up, possible palliative extubation today  nirmal GUSMAN      Discussed with sons at bedside

## 2024-05-21 NOTE — PROGRESS NOTE ADULT - SUBJECTIVE AND OBJECTIVE BOX
Patient is a 73y old  Female who presents with a chief complaint of COPD exacerbation (20 May 2024 15:42)        Over Night Events:    extubated yesterday, now on 50/50L HFNC  c/o abdominal cramps/pain  Now DNR/DNI  Refused NIV overnight      ROS:     All ROS are negative except HPI           PHYSICAL EXAM    ICU Vital Signs Last 24 Hrs  T(C): 36.4 (21 May 2024 04:00), Max: 36.9 (20 May 2024 20:00)  T(F): 97.5 (21 May 2024 04:00), Max: 98.5 (20 May 2024 20:00)  HR: 79 (21 May 2024 07:00) (73 - 98)  BP: 147/76 (21 May 2024 07:00) (111/57 - 153/80)  BP(mean): 106 (21 May 2024 07:00) (79 - 120)  ABP: --  ABP(mean): --  RR: 26 (21 May 2024 07:00) (17 - 37)  SpO2: 92% (21 May 2024 07:00) (87% - 98%)    O2 Parameters below as of 21 May 2024 07:00  Patient On (Oxygen Delivery Method): nasal cannula, high flow  O2 Flow (L/min): 50  O2 Concentration (%): 50        Constitutional: no acute distress, well nourished well developed  Neuro: moving all 4 limbs spontaneously, no facial droop or dysarthria  HEENT: NCAT, anicteric  Neck: no visible lymphadenopathy or goiter  Pulm: no respiratory distress. clear to auscultation bilaterally  Cardiac: extremities appear pink and well-perfused.  regular rhythm and rate, no murmur detected  Abdomen: non-distended  Extremities: no peripheral edema.  Digital clubbing.      05-20-24 @ 07:01  -  05-21-24 @ 07:00  --------------------------------------------------------  IN:    IV PiggyBack: 300 mL  Total IN: 300 mL    OUT:    FentaNYL: 0 mL    Indwelling Catheter - Urethral (mL): 660 mL    Norepinephrine: 0 mL    Propofol: 0 mL    Voided (mL): 820 mL  Total OUT: 1480 mL    Total NET: -1180 mL          LABS:                            8.8    13.69 )-----------( 221      ( 21 May 2024 04:32 )             27.4                                               05-21    135  |  95<L>  |  14  ----------------------------<  112<H>  3.4<L>   |  35<H>  |  <0.5<L>    Ca    8.6      21 May 2024 04:32  Mg     1.8     05-21    TPro  5.0<L>  /  Alb  3.3<L>  /  TBili  0.5  /  DBili  x   /  AST  16  /  ALT  75<H>  /  AlkPhos  86  05-21                                             Urinalysis Basic - ( 21 May 2024 04:32 )    Color: x / Appearance: x / SG: x / pH: x  Gluc: 112 mg/dL / Ketone: x  / Bili: x / Urobili: x   Blood: x / Protein: x / Nitrite: x   Leuk Esterase: x / RBC: x / WBC x   Sq Epi: x / Non Sq Epi: x / Bacteria: x                                                  LIVER FUNCTIONS - ( 21 May 2024 04:32 )  Alb: 3.3 g/dL / Pro: 5.0 g/dL / ALK PHOS: 86 U/L / ALT: 75 U/L / AST: 16 U/L / GGT: x                                                                                               Mode: CPAP with PS  FiO2: 40  PEEP: 8  PS: 6                                      ABG - ( 21 May 2024 03:38 )  pH, Arterial: 7.45  pH, Blood: x     /  pCO2: 57    /  pO2: 64    / HCO3: 40    / Base Excess: 13.8  /  SaO2: 94.6                MEDICATIONS  (STANDING):  albuterol    90 MICROgram(s) HFA Inhaler 2 Puff(s) Inhalation every 6 hours  albuterol/ipratropium for Nebulization 3 milliLiter(s) Nebulizer every 6 hours  carbamide peroxide Otic Solution 1 Drop(s) Both Ears two times a day  chlorhexidine 2% Cloths 1 Application(s) Topical <User Schedule>  enoxaparin Injectable 40 milliGRAM(s) SubCutaneous every 24 hours  methylPREDNISolone sodium succinate Injectable 40 milliGRAM(s) IV Push daily  nicotine -   7 mG/24Hr(s) Patch 1 Patch Transdermal daily  pantoprazole    Tablet 40 milliGRAM(s) Oral before breakfast  piperacillin/tazobactam IVPB.. 3.375 Gram(s) IV Intermittent every 8 hours  polyethylene glycol 3350 17 Gram(s) Oral daily  saccharomyces boulardii 250 milliGRAM(s) Oral two times a day  senna 2 Tablet(s) Oral at bedtime  simethicone 80 milliGRAM(s) Chew once    MEDICATIONS  (PRN):  hydrOXYzine hydrochloride 25 milliGRAM(s) Oral two times a day PRN Anxiety  sodium chloride 0.65% Nasal 1 Spray(s) Both Nostrils three times a day PRN Nasal Congestion      New X-rays reviewed:       ECHO reviewed    CXR interpreted by me:    5/21  images and radiologist read reviewed, by my read demonstrating interval complete collapse of left lung with hemithorax opacity.

## 2024-05-21 NOTE — PROGRESS NOTE ADULT - SUBJECTIVE AND OBJECTIVE BOX
Patient is a 73y old  Female who presents with a chief complaint of COPD exacerbation (21 May 2024 13:18)    HPI:  73-year-old female with past medical history of COPD not on home oxygen, chronic smoker >60ppy, hypertension who presents for shortness of breath x few weeks.  Pt reports worsening productive cough and shortness of breath with wheezing for the past 1 week.  Increased work of breathing today, was satting 70% on room air.  Was placed on nonrebreather by EMS.  As per son at the bedside pt was confused in the morning, wasn't aware of the surrounding.  She also admits to hemoptysis  along with weight loss over the past few months.  Admits to fever and chills, and denies chest pain, palpitations, nausea, vomiting, diarrhea, abdominal pain, urinary symptoms, weakness, numbness, falls, recent travel, recent surgeries.  Not on anticoagulation.  Denies substance use and alcohol use.    In ED  VT bp 143/80, , RR 28, T 99.4 F, SpO2 98% on NRB 15 L  Labs showed Lac 2.3, trop 26, BNP 2044, VBG  pH 7.18 PCo2 102 HCO3 38, O2 sat 46%    s/p IV solumedrol, IV Mag, Nebs and Albuterol in ED.  Pt was placed on BIPAP and admitted to MICU for further managements (06 May 2024 23:30)    INTERVAL HPI/OVERNIGHT EVENTS:   Afebrile, hemodynamically stable   Patient looked tired this morning  DNR/DNI yesterday - MOLST signed in chart    Subjective:    ICU Vital Signs Last 24 Hrs  T(C): 36.4 (21 May 2024 04:00), Max: 36.9 (20 May 2024 20:00)  T(F): 97.5 (21 May 2024 04:00), Max: 98.5 (20 May 2024 20:00)  HR: 76 (21 May 2024 09:00) (74 - 98)  BP: 148/78 (21 May 2024 09:00) (111/57 - 153/80)  BP(mean): 106 (21 May 2024 09:00) (79 - 120)  RR: 25 (21 May 2024 09:00) (17 - 37)  SpO2: 94% (21 May 2024 09:00) (92% - 98%)    O2 Parameters below as of 21 May 2024 09:00  Patient On (Oxygen Delivery Method): nasal cannula, high flow  O2 Flow (L/min): 50  O2 Concentration (%): 50    I&O's Detail    20 May 2024 07:01  -  21 May 2024 07:00  --------------------------------------------------------  IN:    IV PiggyBack: 300 mL  Total IN: 300 mL    OUT:    FentaNYL: 0 mL    Indwelling Catheter - Urethral (mL): 660 mL    Norepinephrine: 0 mL    Propofol: 0 mL    Voided (mL): 820 mL  Total OUT: 1480 mL    Total NET: -1180 mL      MEDICATIONS  (STANDING):  albuterol    90 MICROgram(s) HFA Inhaler 2 Puff(s) Inhalation every 6 hours  albuterol/ipratropium for Nebulization 3 milliLiter(s) Nebulizer every 6 hours  carbamide peroxide Otic Solution 1 Drop(s) Both Ears two times a day  chlorhexidine 2% Cloths 1 Application(s) Topical <User Schedule>  enoxaparin Injectable 40 milliGRAM(s) SubCutaneous every 24 hours  lactulose Syrup 10 Gram(s) Oral every 8 hours  methylPREDNISolone sodium succinate Injectable 40 milliGRAM(s) IV Push daily  pantoprazole    Tablet 40 milliGRAM(s) Oral before breakfast  piperacillin/tazobactam IVPB.. 3.375 Gram(s) IV Intermittent every 8 hours  polyethylene glycol 3350 17 Gram(s) Oral two times a day  saccharomyces boulardii 250 milliGRAM(s) Oral two times a day  senna 2 Tablet(s) Oral at bedtime  sodium chloride 3%  Inhalation 4 milliLiter(s) Inhalation every 6 hours    MEDICATIONS  (PRN):  bisacodyl Suppository 10 milliGRAM(s) Rectal daily PRN Constipation  hydrOXYzine hydrochloride 25 milliGRAM(s) Oral two times a day PRN Anxiety  sodium chloride 0.65% Nasal 1 Spray(s) Both Nostrils three times a day PRN Nasal Congestion      PHYSICAL EXAM:  GENERAL:  Alert & Awake.  HEAD:  Atraumatic, Normocephalic  EYES: EOMI, PERRLA   NECK: Supple, No JVD   CHEST/LUNG: Decreased breath sounds on the left  HEART: S1S2 normal, no S3   ABDOMEN: Soft, Nontender, Slightly distended   EXTREMITIES:  2+ Peripheral Pulses   SKIN: No rashes or lesions    LABS:                        8.8    13.69 )-----------( 221      ( 21 May 2024 04:32 )             27.4     05-21    135  |  95<L>  |  14  ----------------------------<  112<H>  3.4<L>   |  35<H>  |  <0.5<L>    Ca    8.6      21 May 2024 04:32  Mg     1.8     05-21    TPro  5.0<L>  /  Alb  3.3<L>  /  TBili  0.5  /  DBili  x   /  AST  16  /  ALT  75<H>  /  AlkPhos  86  05-21    LIVER FUNCTIONS - ( 21 May 2024 04:32 )  Alb: 3.3 g/dL / Pro: 5.0 g/dL / ALK PHOS: 86 U/L / ALT: 75 U/L / AST: 16 U/L / GGT: x            ABG - ( 21 May 2024 03:38 )  pH, Arterial: 7.45  pH, Blood: x     /  pCO2: 57    /  pO2: 64    / HCO3: 40    / Base Excess: 13.8  /  SaO2: 94.6      Urinalysis Basic - ( 21 May 2024 04:32 )    Color: x / Appearance: x / SG: x / pH: x  Gluc: 112 mg/dL / Ketone: x  / Bili: x / Urobili: x   Blood: x / Protein: x / Nitrite: x   Leuk Esterase: x / RBC: x / WBC x   Sq Epi: x / Non Sq Epi: x / Bacteria: x      RADIOLOGY & ADDITIONAL TESTS:  ACC: [id number] EXAM:  XR CHEST PORTABLE ROUTINE 1V        PROCEDURE DATE:  05/21/2024      INTERPRETATION:  Clinical History / Reason for exam: Post extubation.    Comparison : Chest radiograph dated May 20, 2024.    Technique/Positioning: Portable frontal.    Findings:    Support devices: Interval extubation and removal of nasogastric tube.    Cardiac/mediastinum/hilum: Stable    Lung parenchyma/Pleura: New complete opacification of the left hemithorax, possibly reflecting bronchial obstruction related to mucous plug. No pneumothorax.    Skeleton/soft tissues: Stable.    Impression:    1. New complete opacification of the left hemithorax, possibly reflecting bronchial obstruction related to mucous plug.  2. Lines and tubes as above.    Spoke with ANJANA RAMÍREZ on 5/21/2024 12:55 PM with readback.    --- End of Report ---      JUAN CARLOS MULLEN MD; Attending Radiologist  This document has been electronically signed. May 21 2024  1:06PM      ACC: [id number] EXAM:  XR KUB 1 VIEW   ORDERED BY: ANJANA RAMÍREZ     PROCEDURE DATE:  05/21/2024      INTERPRETATION:  CLINICAL HISTORY: Abdominal pain.    COMPARISON: None.    VIEWS: Single frontal view of the abdomen.    FINDINGS/IMPRESSION:    Calcified lucent structure in the right upper quadrant may represent a gallstone. Correlate with ultrasound if clinically indicated.   Nonobstructive bowel gas pattern. Stool seen in the pelvis. Marked degenerative changes of the spine as well as the bilateral hip joints.    --- End of Report ---    CHAD FELIX MD; Attending Radiologist  This document has been electronically signed. May 21 2024  1:20PM        Care Discussed with Consultants/Other Providers [ x] YES  [ ] NO

## 2024-05-21 NOTE — PROGRESS NOTE ADULT - ASSESSMENT
IMPRESSION:  Acute hypoxemic respiratory failure   Acute on chronic hypercapnic resp failure  COPD exacerbation   Left lung atelectasis   Lung mass possibly malignant    Superimposed pneumonia  Left sided effusion s/p thoracentesis negative for malignant cells    PLAN:    CNS: Avoid CNS depressant.  Sedation for comfort as needed.    HEENT: oral care. ETT care.  Patient intubated for 4 days between  05/17 and 05/20 --> patient is day 1 post extubation  ENT evaluated for hearing loss --> impacted cerumen --> patient refused Debrox    PULMONARY:   keep pox 88 to 92%M, Albuterol Q6 hrs and prn , Solumedrol 40 daily while intubated/wheezing.   chest pt   frequent suctioning --> blood clots removed  follow BAL, (-) so far   New complete opacification of the left hemithorax, possibly reflecting bronchial obstruction related to mucous plug.    CARDIOVASCULAR: TTE noted with 70-75%. Keep is = os  . BNP noted    GI: GI prophylaxis. Bowel regimen increased with Lactulose. Will resume oral feeds    RENAL: follow lytes.  Correct as needed     INFECTIOUS DISEASE:   On Zosyn since 05/17 - continue for a total of 7 days .  Procal noted.   follow BAL cx and cytology     HEMATOLOGICAL:  DVT prophylaxis. Dimer noted. LE duplex negative.       ENDOCRINE:  Follow up FS.  Insulin protocol if needed.    MSK: offloading    MICU     CODE: DNR/DNI TRIAL NIV with palliative follow-up    nirmal GUSMAN

## 2024-05-21 NOTE — PROGRESS NOTE ADULT - SUBJECTIVE AND OBJECTIVE BOX
CC: SOB    HPI:  73-year-old female with past medical history of COPD not on home oxygen, chronic smoker >60ppy, hypertension who presents for shortness of breath x few weeks.  Pt reports worsening productive cough and shortness of breath with wheezing for the past 1 week.  Increased work of breathing today, was satting 70% on room air.  Was placed on nonrebreather by EMS.  As per son at the bedside pt was confused in the morning, wasn't aware of the surrounding.  She also admits to hemoptysis  along with weight loss over the past few months.  Admits to fever and chills, and denies chest pain, palpitations, nausea, vomiting, diarrhea, abdominal pain, urinary symptoms, weakness, numbness, falls, recent travel, recent surgeries.  Not on anticoagulation.  Denies substance use and alcohol use.    In ED  VT bp 143/80, , RR 28, T 99.4 F, SpO2 98% on NRB 15 L  Labs showed Lac 2.3, trop 26, BNP 2044, VBG  pH 7.18 PCo2 102 HCO3 38, O2 sat 46%    s/p IV solumedrol, IV Mag, Nebs and Albuterol in ED.  Pt was placed on BIPAP and admitted to MICU for further managements.   (06 May 2024 23:30)    PERTINENT PM/SXH:   COPD not on home oxygen, chronic smoker >60ppy, hypertension     FAMILY HISTORY:    None pertinent    ITEMS NOT CHECKED ARE NOT PRESENT    SOCIAL HISTORY:   Significant other/partner[ ]  Children[ ]  Hindu/Spirituality:  Substance hx:  [ ]   Tobacco hx:  [ ]   Alcohol hx: [ ]   Living Situation: [x ]Home  [ ]Long term care  [ ]Rehab [ ]Other  Home Services: [ ] HHA [ ] Visting RN [ ] Hospice  Occupation:  Home Opioid hx:  [ ] Y [ ] N [x ] I-Stop Reference No:    Reference #: [id number] - no meds     ADVANCE DIRECTIVES:     [x ] Full Code [ ] DNR  MOLST  [ ]  Living Will  [ ]   DECISION MAKER(s):  [ ] Health Care Proxy(s)  [x ] Surrogate(s)  [ ] Guardian           Name(s): Phone Number(s):  Children      BASELINE (I)ADL(s) (prior to admission):    [place]: [ ]Total  [ ] Moderate [ ]Dependent  Palliative Performance Status Version 2:         %    http://npcrc.org/files/news/palliative_performance_scale_ppsv2.pdf    Allergies    No Known Allergies    Intolerances    MEDICATIONS  (STANDING):  albuterol    90 MICROgram(s) HFA Inhaler 2 Puff(s) Inhalation every 6 hours  albuterol/ipratropium for Nebulization 3 milliLiter(s) Nebulizer every 6 hours  carbamide peroxide Otic Solution 1 Drop(s) Both Ears two times a day  chlorhexidine 2% Cloths 1 Application(s) Topical <User Schedule>  enoxaparin Injectable 40 milliGRAM(s) SubCutaneous every 24 hours  lactulose Syrup 10 Gram(s) Oral every 8 hours  methylPREDNISolone sodium succinate Injectable 40 milliGRAM(s) IV Push daily  pantoprazole    Tablet 40 milliGRAM(s) Oral before breakfast  piperacillin/tazobactam IVPB.. 3.375 Gram(s) IV Intermittent every 8 hours  polyethylene glycol 3350 17 Gram(s) Oral two times a day  saccharomyces boulardii 250 milliGRAM(s) Oral two times a day  senna 2 Tablet(s) Oral at bedtime  sodium chloride 3%  Inhalation 4 milliLiter(s) Inhalation every 6 hours    MEDICATIONS  (PRN):  bisacodyl Suppository 10 milliGRAM(s) Rectal daily PRN Constipation  hydrOXYzine hydrochloride 25 milliGRAM(s) Oral two times a day PRN Anxiety  sodium chloride 0.65% Nasal 1 Spray(s) Both Nostrils three times a day PRN Nasal Congestion      PRESENT SYMPTOMS: [ ]Unable to obtain due to poor mentation   Source if other than patient:  [ ]Family   [ ]Team     Pain: [x ]yes [ ]no  ALL PAIN ASSESSMENT COMPONENTS WERE ASKED ABOUT UNLESS OTHERWISE NOTED  QOL impact - moderate  Location - stomach  Aggravating factors -  Quality - bloating  Radiation - stomach  Timing- constant  Severity (0-10 scale):5/10  Minimal acceptable level (0-10 scale): 2/10    CPOT:    https://www.sccm.org/getattachment/cqq75h24-5t6x-6o4h-1c7h-0525w8156t5c/Critical-Care-Pain-Observation-Tool-(CPOT)    PAIN AD Score:   http://geriatrictoolkit.[place].[facility]/cog/painad.pdf (press ctrl +  left click to view)    Dyspnea:                           [x ]None[ ]Mild [ ]Moderate [ ]Severe     Respiratory Distress Observation Scale (RDOS): 0  A score of 0 to 2 signifies little or no respiratory distress, 3 signifies mild distress, scores 4 to 6 indicate moderate distress, and scores greater than 7 signify severe distress  https://[email or web address]/sites/default/files/PDFS/445578-kyjytpitsya-qdnhulcp-maudrswlwss-xejjj.pdf    Anxiety:                             [ ]None[ ]Mild [ x]Moderate [ ]Severe   Fatigue:                             [ ]None[ x]Mild [ ]Moderate [ ]Severe   Nausea:                             [ x]None[ ]Mild [ ]Moderate [ ]Severe   Loss of appetite:              [x ]None[ ]Mild [ ]Moderate [ ]Severe   Constipation:                    [x ]None[ ]Mild [ ]Moderate [ ]Severe    Other Symptoms:  [x ]All other review of systems negative     Palliative Performance Status Version 2:         30%    http://[facility].org/files/news/palliative_performance_scale_ppsv2.pdf    PHYSICAL EXAM:    ICU Vital Signs Last 24 Hrs  T(C): 36.4 (21 May 2024 04:00), Max: 36.9 (20 May 2024 20:00)  T(F): 97.5 (21 May 2024 04:00), Max: 98.5 (20 May 2024 20:00)  HR: 76 (21 May 2024 09:00) (74 - 98)  BP: 148/78 (21 May 2024 09:00) (111/57 - 153/80)  BP(mean): 106 (21 May 2024 09:00) (79 - 120)  ABP: --  ABP(mean): --  RR: 25 (21 May 2024 09:00) (17 - 37)  SpO2: 94% (21 May 2024 09:00) (92% - 98%)    O2 Parameters below as of 21 May 2024 09:00  Patient On (Oxygen Delivery Method): nasal cannula, high flow  O2 Flow (L/min): 50  O2 Concentration (%): 50        GENERAL:  [x ] No acute distress [ ]Lethargic  [ ]Unarousable  [ ]Verbal  [ ]Non-Verbal [ ]Cachexia    BEHAVIORAL/PSYCH:  [ x]Alert and Oriented x3  [ x] Anxiety [ ] Delirium [ ] Agitation [ ] Calm   EYES: [x ] No scleral icterus [ ] Scleral icterus [ ] Closed  ENMT:  [ ]Dry mouth  [ x]No external oral lesions [ ] No external ear or nose lesions  CARDIOVASCULAR:  [ ]Regular [ ]Irregular [ ]Tachy [ x]Not Tachy  [ ]Kalpesh [ ] Edema [ ] No edema  PULMONARY:  [x ]Tachypnea  [ ]Audible excessive secretions [x ] No labored breathing [ ] labored breathing  GASTROINTESTINAL: [ ]Soft  [ ]Distended  [x ]Not distended [ ]Non tender [ ]Tender  MUSCULOSKELETAL: [ ]No clubbing [ ] clubbing  [ x] No cyanosis [ ] cyanosis  NEUROLOGIC: [ ]No focal deficits  [ x]Follows commands  [ ]Does not follow commands  [ ]Cognitive impairment  [ ]Dysphagia  [ ]Dysarthria  [ ]Paresis   SKIN: [ ] Jaundiced [x ] Non-jaundiced [ ]Rash [ ]No Rash [ ] Warm [ ] Dry  MISC/LINES: [ ] ET tube [ ] Trach [ ]NGT/OGT [ ]PEG [ ]Paulino    LABS: reviewed by me                                   8.8    13.69 )-----------( 221      ( 21 May 2024 04:32 )             27.4     05-21    135  |  95<L>  |  14  ----------------------------<  112<H>  3.4<L>   |  35<H>  |  <0.5<L>    Ca    8.6      21 May 2024 04:32  Mg     1.8     05-21    TPro  5.0<L>  /  Alb  3.3<L>  /  TBili  0.5  /  DBili  x   /  AST  16  /  ALT  75<H>  /  AlkPhos  86  05-21      Urinalysis Basic - ( 21 May 2024 04:32 )    Color: x / Appearance: x / SG: x / pH: x  Gluc: 112 mg/dL / Ketone: x  / Bili: x / Urobili: x   Blood: x / Protein: x / Nitrite: x   Leuk Esterase: x / RBC: x / WBC x   Sq Epi: x / Non Sq Epi: x / Bacteria: x              RADIOLOGY & ADDITIONAL STUDIES: reviewed by me    < from: Xray Chest 1 View- PORTABLE-Urgent (Xray Chest 1 View- PORTABLE-Urgent .) (05.09.24 @ 12:23) >  Impression:    Left lung masslike consolidation. Decreased left effusion. No   pneumothorax.    Improving right basilar nodular opacities.    < end of copied text >      EKG: reviewed by me    < from: 12 Lead ECG (05.06.24 @ 19:52) >  Ventricular Rate 90 BPM    Atrial Rate 90 BPM    P-R Interval 170 ms    QRS Duration 80 ms    Q-T Interval 360 ms    QTC Calculation(Bazett) 440 ms    P Axis 84 degrees    R Axis -70 degrees    T Axis 77 degrees    Diagnosis Line Normal sinus rhythm  Left anterior fascicular block  Septal infarct , age undetermined  Abnormal ECG    < end of copied text >      PROTEIN CALORIE MALNUTRITION PRESENT: [ ]mild [ ]moderate [ ]severe [ ]underweight [ ]morbid obesity  https://www.andeal.org/vault/2440/web/files/ONC/Table_Clinical%20Characteristics%20to%20Document%20Malnutrition-White%20JV%20et%20al%222465.pdf    Height (cm): 154.9 (05-07-24 @ 00:20)  Weight (kg): 47 (05-07-24 @ 00:20)  BMI (kg/m2): 19.6 (05-07-24 @ 00:20)  [ ]PPSV2 < or = to 30% [ ]significant weight loss  [ ]poor nutritional intake  [ ]anasarca      [ ]Artificial Nutrition      Palliative Care Spiritual/Emotional Screening Tool Question  Severity (0-4):                    OR                    [ x] Unable to determine. Will assess at later time if appropriate.  Score of 2 or greater indicates recommendation of Chaplaincy and/or SW referral  Chaplaincy Referral: [ ] Yes [ ] Refused [ ] Following     Caregiver [place]:  [ ] Yes [ ] No    OR    [x ] Unable to determine. Will assess at later time if appropriate.  Social Work Referral [ ]  Patient and Family Centered Care Referral [ ]    Anticipatory Grief Present: [ ] Yes [ ] No    OR     [ x] Unable to determine. Will assess at later time if appropriate.  Social Work Referral [ ]  Patient and Family Centered Care Referral [ ]    Patient discussed with primary medical team MD  Palliative care education provided to patient and/or family

## 2024-05-21 NOTE — PROGRESS NOTE ADULT - ASSESSMENT
IMPRESSION:  Acute hypoxemic respiratory failure   Acute on chronic hypercapnic resp failure  COPD exacerbation   Left lung atelectasis   Lung mass possibly malignant    Superimposed pneumonia  Left sided effusion s/p thoracentesis negative for malignant cells    PLAN:    CNS: Avoid CNS depressant.  Pain control PRN.    HEENT: oral care.    PULMONARY:   keep pox 88 to 92%M, Albuterol Q6 hrs and prn , Solumedrol 40 daily for now, wean in coming days.   Pulmonary toilet: albuterol/saline nebs, IPV, incentive spirometry, ambulate as tolerated, chest PT, frequent suctioning  follow BAL, (-) so far    CARDIOVASCULAR: TTE noted with 70-75%. Keep is = os  . BNP noted    GI: GI prophylaxis.   Advance diet as tolerated.  Increase bowel regimen today, simethicone for abdominal cramps.    RENAL: follow lytes.  Correct as needed     INFECTIOUS DISEASE: continue abx .  Procal noted.   follow BAL cx   and cytology     HEMATOLOGICAL:  DVT prophylaxis. Dimer noted. LE duplex negative.       ENDOCRINE:  Follow up FS.  Insulin protocol if needed.    MSK: ORAKESHT    MICU   DNR/DNI, continue palliative follow-up  PIV      Discussed with sons at bedside

## 2024-05-21 NOTE — PROGRESS NOTE ADULT - ASSESSMENT
73yFemale with history of COPD, HTN, active smoker presents with SOB and hypoxia. Patient found to have likely COPD exacerbation on arrival with new lung mass concerned for malignancy. Palliative care consulted for GOC.    Spoke with patient at bedside. Patient was having some abdominal pain that was revealed to be a large stool burden. Primary team managing medically at this time. She stated she feels fine otherwise and is breathing better and easier. Confirmed DNR/DNI status with patient briefly.     Education about palliative care provided to patient/family.  See Recs below.    Please call [phone] with questions or concerns 24/7.   We will continue to follow.

## 2024-05-22 NOTE — PROGRESS NOTE ADULT - CRITICAL CARE ATTENDING COMMENT
This patient is critically ill due to the following:  * Multiple organ failure requiring complex decision-making, and there is a high probability of imminent or life-threatening deterioration in the patient’s condition  * The patient required frequent reassessments and monitoring to ensure response to interventions and therapies.    Critical care time includes time spent evaluating and treating the patient's acute illness as well as time spent reviewing labs, radiology,  and discussing the case with a multidisciplinary team in an effort to prevent further life threatening deterioration or end organ damage. This time is independent of any procedures performed.

## 2024-05-22 NOTE — OCCUPATIONAL THERAPY INITIAL EVALUATION ADULT - ADL RETRAINING, OT EVAL
Patient will perform upper body dressing with supervision by discharge. Patient will perform lower body dressing with CGA with use of appropriate adaptive equipment as needed by discharge.

## 2024-05-22 NOTE — PROGRESS NOTE ADULT - SUBJECTIVE AND OBJECTIVE BOX
CC: SOB    HPI:  73-year-old female with past medical history of COPD not on home oxygen, chronic smoker >60ppy, hypertension who presents for shortness of breath x few weeks.  Pt reports worsening productive cough and shortness of breath with wheezing for the past 1 week.  Increased work of breathing today, was satting 70% on room air.  Was placed on nonrebreather by EMS.  As per son at the bedside pt was confused in the morning, wasn't aware of the surrounding.  She also admits to hemoptysis  along with weight loss over the past few months.  Admits to fever and chills, and denies chest pain, palpitations, nausea, vomiting, diarrhea, abdominal pain, urinary symptoms, weakness, numbness, falls, recent travel, recent surgeries.  Not on anticoagulation.  Denies substance use and alcohol use.    In ED  VT bp 143/80, , RR 28, T 99.4 F, SpO2 98% on NRB 15 L  Labs showed Lac 2.3, trop 26, BNP 2044, VBG  pH 7.18 PCo2 102 HCO3 38, O2 sat 46%    s/p IV solumedrol, IV Mag, Nebs and Albuterol in ED.  Pt was placed on BIPAP and admitted to MICU for further managements.   (06 May 2024 23:30)    PERTINENT PM/SXH:   COPD not on home oxygen, chronic smoker >60ppy, hypertension     FAMILY HISTORY:    None pertinent    ITEMS NOT CHECKED ARE NOT PRESENT    SOCIAL HISTORY:   Significant other/partner[ ]  Children[ ]  Judaism/Spirituality:  Substance hx:  [ ]   Tobacco hx:  [ ]   Alcohol hx: [ ]   Living Situation: [x ]Home  [ ]Long term care  [ ]Rehab [ ]Other  Home Services: [ ] HHA [ ] Visting RN [ ] Hospice  Occupation:  Home Opioid hx:  [ ] Y [ ] N [x ] I-Stop Reference No:    Reference #: [id number] - no meds     ADVANCE DIRECTIVES:     [x ] Full Code [ ] DNR  MOLST  [ ]  Living Will  [ ]   DECISION MAKER(s):  [ ] Health Care Proxy(s)  [x ] Surrogate(s)  [ ] Guardian           Name(s): Phone Number(s):  Children      BASELINE (I)ADL(s) (prior to admission):    [place]: [ ]Total  [ ] Moderate [ ]Dependent  Palliative Performance Status Version 2:         %    http://npcrc.org/files/news/palliative_performance_scale_[email or web address].pdf    Allergies    No Known Allergies    Intolerances    MEDICATIONS  (STANDING):  albuterol    90 MICROgram(s) HFA Inhaler 2 Puff(s) Inhalation every 6 hours  albuterol/ipratropium for Nebulization 3 milliLiter(s) Nebulizer every 6 hours  carbamide peroxide Otic Solution 1 Drop(s) Both Ears two times a day  chlorhexidine 2% Cloths 1 Application(s) Topical <User Schedule>  enoxaparin Injectable 40 milliGRAM(s) SubCutaneous every 24 hours  pantoprazole    Tablet 40 milliGRAM(s) Oral before breakfast  piperacillin/tazobactam IVPB.. 3.375 Gram(s) IV Intermittent every 8 hours  polyethylene glycol 3350 17 Gram(s) Oral two times a day  saccharomyces boulardii 250 milliGRAM(s) Oral two times a day  senna 2 Tablet(s) Oral at bedtime  sodium chloride 3%  Inhalation 4 milliLiter(s) Inhalation every 6 hours    MEDICATIONS  (PRN):  bisacodyl Suppository 10 milliGRAM(s) Rectal daily PRN Constipation  hydrOXYzine hydrochloride 25 milliGRAM(s) Oral two times a day PRN Anxiety  lactulose Syrup 10 Gram(s) Oral every 6 hours PRN constipation  ondansetron Injectable 4 milliGRAM(s) IV Push once PRN Nausea and/or Vomiting  sodium chloride 0.65% Nasal 1 Spray(s) Both Nostrils three times a day PRN Nasal Congestion      PRESENT SYMPTOMS: [ ]Unable to obtain due to poor mentation   Source if other than patient:  [ ]Family   [ ]Team     Pain: [ ]yes [x ]no  ALL PAIN ASSESSMENT COMPONENTS WERE ASKED ABOUT UNLESS OTHERWISE NOTED  QOL impact -   Location -   Aggravating factors -  Quality -   Radiation -   Timing-   Severity (0-10 scale):  Minimal acceptable level (0-10 scale):     CPOT:    https://www.sccm.org/getattachment/[email or web address]-[email or web address]-3273n4456e4x/Critical-Care-Pain-Observation-Tool-(CPOT)    PAIN AD Score:   http://geriatrictoolkit.[place].[facility]/cog/painad.pdf (press ctrl +  left click to view)    Dyspnea:                           [x ]None[ ]Mild [ ]Moderate [ ]Severe     Respiratory Distress Observation Scale (RDOS): 0  A score of 0 to 2 signifies little or no respiratory distress, 3 signifies mild distress, scores 4 to 6 indicate moderate distress, and scores greater than 7 signify severe distress  https://www.[email or web address]/sites/default/files/PDFS/125807-oleqoqvernn-xwjsbmza-[email or web address]-[email or web address].pdf    Anxiety:                             [ ]None[ ]Mild [ x]Moderate [ ]Severe   Fatigue:                             [ ]None[ x]Mild [ ]Moderate [ ]Severe   Nausea:                             [ x]None[ ]Mild [ ]Moderate [ ]Severe   Loss of appetite:              [x ]None[ ]Mild [ ]Moderate [ ]Severe   Constipation:                    [x ]None[ ]Mild [ ]Moderate [ ]Severe    Other Symptoms:  [x ]All other review of systems negative     Palliative Performance Status Version 2:         30%    [email or web address]://[email or web address]/[email or web address]_performance_scale_[email or web address].pdf    PHYSICAL EXAM:    ICU Vital Signs Last 24 Hrs  T(C): 36.6 (22 May 2024 08:00), Max: 36.7 (22 May 2024 04:00)  T(F): 97.8 (22 May 2024 08:00), Max: 98 (22 May 2024 04:00)  HR: 87 (22 May 2024 10:00) (70 - 91)  BP: 120/76 (22 May 2024 10:00) (120/69 - 151/77)  BP(mean): 91 (22 May 2024 10:00) (88 - 107)  ABP: --  ABP(mean): --  RR: 33 (22 May 2024 10:00) (17 - 33)  SpO2: 95% (22 May 2024 10:00) (91% - 97%)    O2 Parameters below as of 22 May 2024 10:00  Patient On (Oxygen Delivery Method): nasal cannula, high flow  O2 Flow (L/min): 50  O2 Concentration (%): 50          GENERAL:  [x ] No acute distress [ ]Lethargic  [ ]Unarousable  [ ]Verbal  [ ]Non-Verbal [ ]Cachexia    BEHAVIORAL/PSYCH:  [ x]Alert and Oriented x3  [ x] Anxiety [ ] Delirium [ ] Agitation [ ] Calm   EYES: [x ] No scleral icterus [ ] Scleral icterus [ ] Closed  ENMT:  [ ]Dry mouth  [ x]No external oral lesions [ ] No external ear or nose lesions  CARDIOVASCULAR:  [ ]Regular [ ]Irregular [ ]Tachy [ x]Not Tachy  [ ]Kalpesh [ ] Edema [ ] No edema  PULMONARY:  [x ]Tachypnea  [ ]Audible excessive secretions [x ] No labored breathing [ ] labored breathing  GASTROINTESTINAL: [ ]Soft  [ ]Distended  [x ]Not distended [ ]Non tender [ ]Tender  MUSCULOSKELETAL: [ ]No clubbing [ ] clubbing  [ x] No cyanosis [ ] cyanosis  NEUROLOGIC: [ ]No focal deficits  [ x]Follows commands  [ ]Does not follow commands  [ ]Cognitive impairment  [ ]Dysphagia  [ ]Dysarthria  [ ]Paresis   SKIN: [ ] Jaundiced [x ] Non-jaundiced [ ]Rash [ ]No Rash [ ] Warm [ ] Dry  MISC/LINES: [ ] ET tube [ ] Trach [ ]NGT/OGT [ ]PEG [ ]Paulino    LABS: reviewed by me                                   8.7    13.17 )-----------( 230      ( 22 May 2024 04:47 )             26.9     05-22    137  |  96<L>  |  10  ----------------------------<  91  3.5   |  34<H>  |  <0.5<L>    Ca    8.4      22 May 2024 04:47  Phos  2.7     05-22  Mg     1.8     05-22    TPro  4.6<L>  /  Alb  3.0<L>  /  TBili  0.4  /  DBili  x   /  AST  9   /  ALT  49<H>  /  AlkPhos  72  05-22      Urinalysis Basic - ( 22 May 2024 04:47 )    Color: x / Appearance: x / SG: x / pH: x  Gluc: 91 mg/dL / Ketone: x  / Bili: x / Urobili: x   Blood: x / Protein: x / Nitrite: x   Leuk Esterase: x / RBC: x / WBC x   Sq Epi: x / Non Sq Epi: x / Bacteria: x                  RADIOLOGY & ADDITIONAL STUDIES: reviewed by me    < from: Xray Chest 1 View- PORTABLE-Urgent (Xray Chest 1 View- PORTABLE-Urgent .) (05.09.24 @ 12:23) >  Impression:    Left lung masslike consolidation. Decreased left effusion. No   pneumothorax.    Improving right basilar nodular opacities.    < end of copied text >      EKG: reviewed by me    < from: 12 Lead ECG (05.06.24 @ 19:52) >  Ventricular Rate 90 BPM    Atrial Rate 90 BPM    P-R Interval 170 ms    QRS Duration 80 ms    Q-T Interval 360 ms    QTC Calculation(Bazett) 440 ms    P Axis 84 degrees    R Axis -70 degrees    T Axis 77 degrees    Diagnosis Line Normal sinus rhythm  Left anterior fascicular block  Septal infarct , age undetermined  Abnormal ECG    < end of copied text >      PROTEIN CALORIE MALNUTRITION PRESENT: [ ]mild [ ]moderate [ ]severe [ ]underweight [ ]morbid obesity  https://www.andeal.org/vault/2440/web/files/ONC/Table_Clinical%20Characteristics%20to%20Document%20Malnutrition-White%20JV%20et%20al%415312.pdf    Height (cm): 154.9 (05-07-24 @ 00:20)  Weight (kg): 47 (05-07-24 @ 00:20)  BMI (kg/m2): 19.6 (05-07-24 @ 00:20)  [ ]PPSV2 < or = to 30% [ ]significant weight loss  [ ]poor nutritional intake  [ ]anasarca      [ ]Artificial Nutrition      Palliative Care Spiritual/Emotional Screening Tool Question  Severity (0-4):                    OR                    [ x] Unable to determine. Will assess at later time if appropriate.  Score of 2 or greater indicates recommendation of Chaplaincy and/or SW referral  Chaplaincy Referral: [ ] Yes [ ] Refused [ ] Following     Caregiver [place]:  [ ] Yes [ ] No    OR    [x ] Unable to determine. Will assess at later time if appropriate.  Social Work Referral [ ]  Patient and Family Centered Care Referral [ ]    Anticipatory Grief Present: [ ] Yes [ ] No    OR     [ x] Unable to determine. Will assess at later time if appropriate.  Social Work Referral [ ]  Patient and Family Centered Care Referral [ ]    Patient discussed with primary medical team MD  Palliative care education provided to patient and/or family   CC: SOB    HPI:  73-year-old female with past medical history of COPD not on home oxygen, chronic smoker >60ppy, hypertension who presents for shortness of breath x few weeks.  Pt reports worsening productive cough and shortness of breath with wheezing for the past 1 week.  Increased work of breathing today, was satting 70% on room air.  Was placed on nonrebreather by EMS.  As per son at the bedside pt was confused in the morning, wasn't aware of the surrounding.  She also admits to hemoptysis  along with weight loss over the past few months.  Admits to fever and chills, and denies chest pain, palpitations, nausea, vomiting, diarrhea, abdominal pain, urinary symptoms, weakness, numbness, falls, recent travel, recent surgeries.  Not on anticoagulation.  Denies substance use and alcohol use.    In ED  VT bp 143/80, , RR 28, T 99.4 F, SpO2 98% on NRB 15 L  Labs showed Lac 2.3, trop 26, BNP 2044, VBG  pH 7.18 PCo2 102 HCO3 38, O2 sat 46%    s/p IV solumedrol, IV Mag, Nebs and Albuterol in ED.  Pt was placed on BIPAP and admitted to MICU for further managements.   (06 May 2024 23:30)    PERTINENT PM/SXH:   COPD not on home oxygen, chronic smoker >60ppy, hypertension     FAMILY HISTORY:    None pertinent    ITEMS NOT CHECKED ARE NOT PRESENT    SOCIAL HISTORY:   Significant other/partner[ ]  Children[ ]  Yazidi/Spirituality:  Substance hx:  [ ]   Tobacco hx:  [ ]   Alcohol hx: [ ]   Living Situation: [x ]Home  [ ]Long term care  [ ]Rehab [ ]Other  Home Services: [ ] HHA [ ] Visting RN [ ] Hospice  Occupation:  Home Opioid hx:  [ ] Y [ ] N [x ] I-Stop Reference No:    Reference #: [id number] - no meds     ADVANCE DIRECTIVES:     [ ] Full Code [x ] DNR  MOLST  [ ]  Living Will  [ ]   DECISION MAKER(s):  [ ] Health Care Proxy(s)  [x ] Surrogate(s)  [ ] Guardian           Name(s): Phone Number(s):  Children      BASELINE (I)ADL(s) (prior to admission):    [place]: [ ]Total  [ ] Moderate [ ]Dependent  Palliative Performance Status Version 2:         %    http://npcrc.org/files/news/palliative_performance_scale_[email or web address].pdf    Allergies    No Known Allergies    Intolerances    MEDICATIONS  (STANDING):  albuterol    90 MICROgram(s) HFA Inhaler 2 Puff(s) Inhalation every 6 hours  albuterol/ipratropium for Nebulization 3 milliLiter(s) Nebulizer every 6 hours  carbamide peroxide Otic Solution 1 Drop(s) Both Ears two times a day  chlorhexidine 2% Cloths 1 Application(s) Topical <User Schedule>  enoxaparin Injectable 40 milliGRAM(s) SubCutaneous every 24 hours  pantoprazole    Tablet 40 milliGRAM(s) Oral before breakfast  piperacillin/tazobactam IVPB.. 3.375 Gram(s) IV Intermittent every 8 hours  polyethylene glycol 3350 17 Gram(s) Oral two times a day  saccharomyces boulardii 250 milliGRAM(s) Oral two times a day  senna 2 Tablet(s) Oral at bedtime  sodium chloride 3%  Inhalation 4 milliLiter(s) Inhalation every 6 hours    MEDICATIONS  (PRN):  bisacodyl Suppository 10 milliGRAM(s) Rectal daily PRN Constipation  hydrOXYzine hydrochloride 25 milliGRAM(s) Oral two times a day PRN Anxiety  lactulose Syrup 10 Gram(s) Oral every 6 hours PRN constipation  ondansetron Injectable 4 milliGRAM(s) IV Push once PRN Nausea and/or Vomiting  sodium chloride 0.65% Nasal 1 Spray(s) Both Nostrils three times a day PRN Nasal Congestion      PRESENT SYMPTOMS: [ ]Unable to obtain due to poor mentation   Source if other than patient:  [ ]Family   [ ]Team     Pain: [ ]yes [x ]no  ALL PAIN ASSESSMENT COMPONENTS WERE ASKED ABOUT UNLESS OTHERWISE NOTED  QOL impact -   Location -   Aggravating factors -  Quality -   Radiation -   Timing-   Severity (0-10 scale):  Minimal acceptable level (0-10 scale):     CPOT:    https://www.sccm.org/getattachment/[email or web address]-[email or web address]-1473g8985g6t/Critical-Care-Pain-Observation-Tool-(CPOT)    PAIN AD Score:   http://geriatrictoolkit.[place].[facility]/cog/painad.pdf (press ctrl +  left click to view)    Dyspnea:                           [x ]None[ ]Mild [ ]Moderate [ ]Severe     Respiratory Distress Observation Scale (RDOS): 0  A score of 0 to 2 signifies little or no respiratory distress, 3 signifies mild distress, scores 4 to 6 indicate moderate distress, and scores greater than 7 signify severe distress  https://www.[facility].ca/sites/default/files/PDFS/033709-xvgmosxisrc-miwtners-hvellhmldaf-uiyxl.pdf    Anxiety:                             [ ]None[ ]Mild [ x]Moderate [ ]Severe   Fatigue:                             [ ]None[ x]Mild [ ]Moderate [ ]Severe   Nausea:                             [ x]None[ ]Mild [ ]Moderate [ ]Severe   Loss of appetite:              [x ]None[ ]Mild [ ]Moderate [ ]Severe   Constipation:                    [x ]None[ ]Mild [ ]Moderate [ ]Severe    Other Symptoms:  [x ]All other review of systems negative     Palliative Performance Status Version 2:         30%    http://[email or web address]/files/news/palliative_performance_scale_[email or web address].pdf    PHYSICAL EXAM:    ICU Vital Signs Last 24 Hrs  T(C): 36.6 (22 May 2024 08:00), Max: 36.7 (22 May 2024 04:00)  T(F): 97.8 (22 May 2024 08:00), Max: 98 (22 May 2024 04:00)  HR: 87 (22 May 2024 10:00) (70 - 91)  BP: 120/76 (22 May 2024 10:00) (120/69 - 151/77)  BP(mean): 91 (22 May 2024 10:00) (88 - 107)  ABP: --  ABP(mean): --  RR: 33 (22 May 2024 10:00) (17 - 33)  SpO2: 95% (22 May 2024 10:00) (91% - 97%)    O2 Parameters below as of 22 May 2024 10:00  Patient On (Oxygen Delivery Method): nasal cannula, high flow  O2 Flow (L/min): 50  O2 Concentration (%): 50          GENERAL:  [x ] No acute distress [ ]Lethargic  [ ]Unarousable  [ ]Verbal  [ ]Non-Verbal [ ]Cachexia    BEHAVIORAL/PSYCH:  [ x]Alert and Oriented x3  [ x] Anxiety [ ] Delirium [ ] Agitation [ ] Calm   EYES: [x ] No scleral icterus [ ] Scleral icterus [ ] Closed  ENMT:  [ ]Dry mouth  [ x]No external oral lesions [ ] No external ear or nose lesions  CARDIOVASCULAR:  [ ]Regular [ ]Irregular [ ]Tachy [ x]Not Tachy  [ ]Kalpesh [ ] Edema [ ] No edema  PULMONARY:  [x ]Tachypnea  [ ]Audible excessive secretions [x ] No labored breathing [ ] labored breathing  GASTROINTESTINAL: [ ]Soft  [ ]Distended  [x ]Not distended [ ]Non tender [ ]Tender  MUSCULOSKELETAL: [ ]No clubbing [ ] clubbing  [ x] No cyanosis [ ] cyanosis  NEUROLOGIC: [ ]No focal deficits  [ x]Follows commands  [ ]Does not follow commands  [ ]Cognitive impairment  [ ]Dysphagia  [ ]Dysarthria  [ ]Paresis   SKIN: [ ] Jaundiced [x ] Non-jaundiced [ ]Rash [ ]No Rash [ ] Warm [ ] Dry  MISC/LINES: [ ] ET tube [ ] Trach [ ]NGT/OGT [ ]PEG [ ]Paulino    LABS: reviewed by me                                   8.7    13.17 )-----------( 230      ( 22 May 2024 04:47 )             26.9     05-22    137  |  96<L>  |  10  ----------------------------<  91  3.5   |  34<H>  |  <0.5<L>    Ca    8.4      22 May 2024 04:47  Phos  2.7     05-22  Mg     1.8     05-22    TPro  4.6<L>  /  Alb  3.0<L>  /  TBili  0.4  /  DBili  x   /  AST  9   /  ALT  49<H>  /  AlkPhos  72  05-22      Urinalysis Basic - ( 22 May 2024 04:47 )    Color: x / Appearance: x / SG: x / pH: x  Gluc: 91 mg/dL / Ketone: x  / Bili: x / Urobili: x   Blood: x / Protein: x / Nitrite: x   Leuk Esterase: x / RBC: x / WBC x   Sq Epi: x / Non Sq Epi: x / Bacteria: x                  RADIOLOGY & ADDITIONAL STUDIES: reviewed by me    < from: Xray Chest 1 View- PORTABLE-Urgent (Xray Chest 1 View- PORTABLE-Urgent .) (05.09.24 @ 12:23) >  Impression:    Left lung masslike consolidation. Decreased left effusion. No   pneumothorax.    Improving right basilar nodular opacities.    < end of copied text >      EKG: reviewed by me    < from: 12 Lead ECG (05.06.24 @ 19:52) >  Ventricular Rate 90 BPM    Atrial Rate 90 BPM    P-R Interval 170 ms    QRS Duration 80 ms    Q-T Interval 360 ms    QTC Calculation(Bazett) 440 ms    P Axis 84 degrees    R Axis -70 degrees    T Axis 77 degrees    Diagnosis Line Normal sinus rhythm  Left anterior fascicular block  Septal infarct , age undetermined  Abnormal ECG    < end of copied text >      PROTEIN CALORIE MALNUTRITION PRESENT: [ ]mild [ ]moderate [ ]severe [ ]underweight [ ]morbid obesity  https://www.andeal.org/vault/2440/web/files/ONC/Table_Clinical%20Characteristics%20to%20Document%20Malnutrition-White%20JV%20et%20al%443818.pdf    Height (cm): 154.9 (05-07-24 @ 00:20)  Weight (kg): 47 (05-07-24 @ 00:20)  BMI (kg/m2): 19.6 (05-07-24 @ 00:20)  [ ]PPSV2 < or = to 30% [ ]significant weight loss  [ ]poor nutritional intake  [ ]anasarca      [ ]Artificial Nutrition      Palliative Care Spiritual/Emotional Screening Tool Question  Severity (0-4):                    OR                    [ x] Unable to determine. Will assess at later time if appropriate.  Score of 2 or greater indicates recommendation of Chaplaincy and/or SW referral  Chaplaincy Referral: [ ] Yes [ ] Refused [ ] Following     Caregiver [place]:  [ ] Yes [ ] No    OR    [x ] Unable to determine. Will assess at later time if appropriate.  Social Work Referral [ ]  Patient and Family Centered Care Referral [ ]    Anticipatory Grief Present: [ ] Yes [ ] No    OR     [ x] Unable to determine. Will assess at later time if appropriate.  Social Work Referral [ ]  Patient and Family Centered Care Referral [ ]    Patient discussed with primary medical team MD  Palliative care education provided to patient and/or family

## 2024-05-22 NOTE — PROGRESS NOTE ADULT - SUBJECTIVE AND OBJECTIVE BOX
Patient is a 73y old  Female who presents with a chief complaint of COPD exacerbation (21 May 2024 13:31)        Over Night Events:    No acute events  Only used bipap about 1h  Feeling well today, denies dyspnea.  Promises to try using bipap again later    ROS:     All ROS are negative except HPI       PHYSICAL EXAM    ICU Vital Signs Last 24 Hrs  T(C): 36.7 (22 May 2024 04:00), Max: 36.7 (22 May 2024 04:00)  T(F): 98 (22 May 2024 04:00), Max: 98 (22 May 2024 04:00)  HR: 74 (22 May 2024 06:00) (65 - 91)  BP: 143/78 (22 May 2024 06:00) (120/69 - 151/77)  BP(mean): 104 (22 May 2024 06:00) (88 - 107)  ABP: --  ABP(mean): --  RR: 18 (22 May 2024 06:00) (17 - 32)  SpO2: 94% (22 May 2024 06:00) (91% - 97%)    O2 Parameters below as of 22 May 2024 06:00  Patient On (Oxygen Delivery Method): nasal cannula, high flow  O2 Flow (L/min): 50  O2 Concentration (%): 50        Constitutional: no acute distress, well nourished well developed  Neuro: moving all 4 limbs spontaneously, no facial droop or dysarthria  HEENT: NCAT, anicteric  Neck: no visible lymphadenopathy or goiter  Pulm: no respiratory distress. clear to auscultation bilaterally  Cardiac: extremities appear pink and well-perfused.  regular rhythm and rate, no murmur detected  Abdomen: non-distended  Extremities: no peripheral edema      05-21-24 @ 07:01  -  05-22-24 @ 07:00  --------------------------------------------------------  IN:    IV PiggyBack: 200 mL    Oral Fluid: 670 mL  Total IN: 870 mL    OUT:    Voided (mL): 1400 mL  Total OUT: 1400 mL    Total NET: -530 mL          LABS:                            8.7    13.17 )-----------( 230      ( 22 May 2024 04:47 )             26.9                                               05-22    137  |  96<L>  |  10  ----------------------------<  91  3.5   |  34<H>  |  <0.5<L>    Ca    8.4      22 May 2024 04:47  Phos  2.7     05-22  Mg     1.8     05-22    TPro  4.6<L>  /  Alb  3.0<L>  /  TBili  0.4  /  DBili  x   /  AST  9   /  ALT  49<H>  /  AlkPhos  72  05-22                                             Urinalysis Basic - ( 22 May 2024 04:47 )    Color: x / Appearance: x / SG: x / pH: x  Gluc: 91 mg/dL / Ketone: x  / Bili: x / Urobili: x   Blood: x / Protein: x / Nitrite: x   Leuk Esterase: x / RBC: x / WBC x   Sq Epi: x / Non Sq Epi: x / Bacteria: x                                                  LIVER FUNCTIONS - ( 22 May 2024 04:47 )  Alb: 3.0 g/dL / Pro: 4.6 g/dL / ALK PHOS: 72 U/L / ALT: 49 U/L / AST: 9 U/L / GGT: x                                                                                                                                   ABG - ( 21 May 2024 03:38 )  pH, Arterial: 7.45  pH, Blood: x     /  pCO2: 57    /  pO2: 64    / HCO3: 40    / Base Excess: 13.8  /  SaO2: 94.6                MEDICATIONS  (STANDING):  albuterol    90 MICROgram(s) HFA Inhaler 2 Puff(s) Inhalation every 6 hours  albuterol/ipratropium for Nebulization 3 milliLiter(s) Nebulizer every 6 hours  carbamide peroxide Otic Solution 1 Drop(s) Both Ears two times a day  chlorhexidine 2% Cloths 1 Application(s) Topical <User Schedule>  enoxaparin Injectable 40 milliGRAM(s) SubCutaneous every 24 hours  methylPREDNISolone sodium succinate Injectable 40 milliGRAM(s) IV Push daily  pantoprazole    Tablet 40 milliGRAM(s) Oral before breakfast  piperacillin/tazobactam IVPB.. 3.375 Gram(s) IV Intermittent every 8 hours  polyethylene glycol 3350 17 Gram(s) Oral two times a day  saccharomyces boulardii 250 milliGRAM(s) Oral two times a day  senna 2 Tablet(s) Oral at bedtime  sodium chloride 3%  Inhalation 4 milliLiter(s) Inhalation every 6 hours    MEDICATIONS  (PRN):  bisacodyl Suppository 10 milliGRAM(s) Rectal daily PRN Constipation  hydrOXYzine hydrochloride 25 milliGRAM(s) Oral two times a day PRN Anxiety  lactulose Syrup 10 Gram(s) Oral every 6 hours PRN constipation  ondansetron Injectable 4 milliGRAM(s) IV Push once PRN Nausea and/or Vomiting  sodium chloride 0.65% Nasal 1 Spray(s) Both Nostrils three times a day PRN Nasal Congestion      New X-rays reviewed:       ECHO reviewed    CXR interpreted by me:    5/22  images and radiologist read reviewed, by my read demonstrating stable complete left hemithorax whiteout

## 2024-05-22 NOTE — PROGRESS NOTE ADULT - ASSESSMENT
73yFemale with history of COPD, HTN, active smoker presents with SOB and hypoxia. Patient found to have likely COPD exacerbation on arrival with new lung mass concerned for malignancy. Palliative care consulted for GOC.    Spoke with patient and family at bedside. Patient denied any pain or discomfort at this time with her abdominal pain from before feeling better today. Planned for downgrade to stepdown unit. Briefly discussed with the son about next steps for the patient. Patient is undecided and does not know what she would want to do at this time in terms of ongoing medical management or limited medical management.     Education about palliative care provided to patient/family.  See Recs below.    Please call [phone] with questions or concerns 24/7.   We will continue to follow.

## 2024-05-22 NOTE — OCCUPATIONAL THERAPY INITIAL EVALUATION ADULT - PLANNED THERAPY INTERVENTIONS, OT EVAL
ADL retraining/balance training/bed mobility training/cognitive, visual perceptual/fine motor coordination training/neuromuscular re-education/parent/caregiver training.../ROM/stretching/transfer training

## 2024-05-22 NOTE — OCCUPATIONAL THERAPY INITIAL EVALUATION ADULT - GENERAL OBSERVATIONS, REHAB EVAL
Pt received seated in b/s chair in NAD, agreeable to OT evaluaiton, +HFNC, +tele, +BP cuff, +pulse oxi, son and dtr in law at bedside, left seated in b/s chair vitals stable RN aware

## 2024-05-22 NOTE — PROGRESS NOTE ADULT - PROBLEM SELECTOR PLAN 3
-Full code  -extubated to HFNC  -ongoing medical management  -will follow. -DNR/DNI  -extubated to HFNC  -ongoing medical management  -will follow.

## 2024-05-22 NOTE — OCCUPATIONAL THERAPY INITIAL EVALUATION ADULT - ASSISTIVE DEVICE FOR TRANSFER: BED/CHAIR, REHAB EVAL
therapist anterior Consent (Lip)/Introductory Paragraph: The rationale for Mohs was explained to the patient and consent was obtained. The risks, benefits and alternatives to therapy were discussed in detail. Specifically, the risks of lip deformity, changes in the oral aperture, infection, scarring, bleeding, prolonged wound healing, incomplete removal, allergy to anesthesia, nerve injury and recurrence were addressed. Prior to the procedure, the treatment site was clearly identified and confirmed by the patient. All components of Universal Protocol/PAUSE Rule completed.

## 2024-05-22 NOTE — OCCUPATIONAL THERAPY INITIAL EVALUATION ADULT - PERTINENT HX OF CURRENT PROBLEM, REHAB EVAL
Acute hypoxemic respiratory failure   Acute on chronic hypercapnic resp failure  COPD exacerbation   Left lung atelectasis   Lung mass possibly malignant    Superimposed pneumonia  Left sided effusion s/p thoracentesis negative for malignant cells

## 2024-05-22 NOTE — OCCUPATIONAL THERAPY INITIAL EVALUATION ADULT - ADDITIONAL COMMENTS
pt's dtr in law assists with homemaking and grocery shopping 2 days per week - pt has not gone out in community since COVID pandemic, pt showers only when dtr in law present for supervision

## 2024-05-22 NOTE — PROGRESS NOTE ADULT - SUBJECTIVE AND OBJECTIVE BOX
Patient is a 73y old  Female who presents with a chief complaint of COPD exacerbation (22 May 2024 12:09)    HPI:  73-year-old female with past medical history of COPD not on home oxygen, chronic smoker >60ppy, hypertension who presents for shortness of breath x few weeks.  Pt reports worsening productive cough and shortness of breath with wheezing for the past 1 week.  Increased work of breathing today, was satting 70% on room air.  Was placed on nonrebreather by EMS.  As per son at the bedside pt was confused in the morning, wasn't aware of the surrounding.  She also admits to hemoptysis  along with weight loss over the past few months.  Admits to fever and chills, and denies chest pain, palpitations, nausea, vomiting, diarrhea, abdominal pain, urinary symptoms, weakness, numbness, falls, recent travel, recent surgeries.  Not on anticoagulation.  Denies substance use and alcohol use.    In ED  VT bp 143/80, , RR 28, T 99.4 F, SpO2 98% on NRB 15 L  Labs showed Lac 2.3, trop 26, BNP 2044, VBG  pH 7.18 PCo2 102 HCO3 38, O2 sat 46%    s/p IV solumedrol, IV Mag, Nebs and Albuterol in ED.  Pt was placed on BIPAP and admitted to MICU for further managements.   (06 May 2024 23:30)       INTERVAL HPI/OVERNIGHT EVENTS:   No overnight events   Afebrile, hemodynamically stable     Subjective:    ICU Vital Signs Last 24 Hrs  T(C): 36.7 (22 May 2024 12:00), Max: 36.7 (22 May 2024 04:00)  T(F): 98.1 (22 May 2024 12:00), Max: 98.1 (22 May 2024 12:00)  HR: 95 (22 May 2024 12:00) (71 - 95)  BP: 119/75 (22 May 2024 12:00) (119/75 - 151/77)  BP(mean): 90 (22 May 2024 12:00) (88 - 107)  ABP: --  ABP(mean): --  RR: 33 (22 May 2024 12:00) (17 - 33)  SpO2: 97% (22 May 2024 12:00) (91% - 97%)    O2 Parameters below as of 22 May 2024 12:00  Patient On (Oxygen Delivery Method): nasal cannula, high flow  O2 Flow (L/min): 50  O2 Concentration (%): 50      I&O's Summary    21 May 2024 07:01  -  22 May 2024 07:00  --------------------------------------------------------  IN: 870 mL / OUT: 1400 mL / NET: -530 mL    22 May 2024 07:01  -  22 May 2024 15:06  --------------------------------------------------------  IN: 840 mL / OUT: 650 mL / NET: 190 mL          Daily     Daily Weight in k.4 (22 May 2024 04:00)    Adult Advanced Hemodynamics Last 24 Hrs  CVP(mm Hg): --  CVP(cm H2O): --  CO: --  CI: --  PA: --  PA(mean): --  PCWP: --  SVR: --  SVRI: --  PVR: --  PVRI: --    EKG/Telemetry Events:    MEDICATIONS  (STANDING):  albuterol    90 MICROgram(s) HFA Inhaler 2 Puff(s) Inhalation every 6 hours  albuterol/ipratropium for Nebulization 3 milliLiter(s) Nebulizer every 6 hours  carbamide peroxide Otic Solution 1 Drop(s) Both Ears two times a day  chlorhexidine 2% Cloths 1 Application(s) Topical <User Schedule>  enoxaparin Injectable 40 milliGRAM(s) SubCutaneous every 24 hours  pantoprazole    Tablet 40 milliGRAM(s) Oral before breakfast  piperacillin/tazobactam IVPB.. 3.375 Gram(s) IV Intermittent every 8 hours  polyethylene glycol 3350 17 Gram(s) Oral two times a day  saccharomyces boulardii 250 milliGRAM(s) Oral two times a day  senna 2 Tablet(s) Oral at bedtime  sodium chloride 3%  Inhalation 4 milliLiter(s) Inhalation every 6 hours    MEDICATIONS  (PRN):  bisacodyl Suppository 10 milliGRAM(s) Rectal daily PRN Constipation  hydrOXYzine hydrochloride 25 milliGRAM(s) Oral two times a day PRN Anxiety  lactulose Syrup 10 Gram(s) Oral every 6 hours PRN constipation  ondansetron Injectable 4 milliGRAM(s) IV Push once PRN Nausea and/or Vomiting  sodium chloride 0.65% Nasal 1 Spray(s) Both Nostrils three times a day PRN Nasal Congestion      PHYSICAL EXAM:  GENERAL:   HEAD:  Atraumatic, Normocephalic  EYES: EOMI, PERRLA, conjunctiva and sclera clear  NECK: Supple, No JVD, Normal thyroid, no enlarged nodes  NERVOUS SYSTEM:  Alert & Awake.   CHEST/LUNG: B/L good air entry; No rales, rhonchi, or wheezing  HEART: S1S2 normal, no S3, Regular rate and rhythm; No murmurs  ABDOMEN: Soft, Nontender, Nondistended; Bowel sounds present  EXTREMITIES:  2+ Peripheral Pulses, No clubbing, cyanosis, or edema  LYMPH: No lymphadenopathy noted  SKIN: No rashes or lesions    LABS:                        8.7    13.17 )-----------( 230      ( 22 May 2024 04:47 )             26.9         137  |  96<L>  |  10  ----------------------------<  91  3.5   |  34<H>  |  <0.5<L>    Ca    8.4      22 May 2024 04:47  Phos  2.7       Mg     1.8         TPro  4.6<L>  /  Alb  3.0<L>  /  TBili  0.4  /  DBili  x   /  AST  9   /  ALT  49<H>  /  AlkPhos  72  22    LIVER FUNCTIONS - ( 22 May 2024 04:47 )  Alb: 3.0 g/dL / Pro: 4.6 g/dL / ALK PHOS: 72 U/L / ALT: 49 U/L / AST: 9 U/L / GGT: x             CAPILLARY BLOOD GLUCOSE        ABG - ( 21 May 2024 03:38 )  pH, Arterial: 7.45  pH, Blood: x     /  pCO2: 57    /  pO2: 64    / HCO3: 40    / Base Excess: 13.8  /  SaO2: 94.6                    Urinalysis Basic - ( 22 May 2024 04:47 )    Color: x / Appearance: x / SG: x / pH: x  Gluc: 91 mg/dL / Ketone: x  / Bili: x / Urobili: x   Blood: x / Protein: x / Nitrite: x   Leuk Esterase: x / RBC: x / WBC x   Sq Epi: x / Non Sq Epi: x / Bacteria: x          RADIOLOGY & ADDITIONAL TESTS:  CXR:        Care Discussed with Consultants/Other Providers [ x] YES  [ ] NO         Patient is a 73y old  Female who presents with a chief complaint of COPD exacerbation (22 May 2024 12:09)    H&P  (06 May 2024 23:30)  73-year-old female with past medical history of COPD not on home oxygen, chronic smoker >60ppy, hypertension who presents for shortness of breath x few weeks.  Pt reports worsening productive cough and shortness of breath with wheezing for the past 1 week.  Increased work of breathing today, was satting 70% on room air.  Was placed on nonrebreather by EMS.  As per son at the bedside pt was confused in the morning, wasn't aware of the surrounding.  She also admits to hemoptysis  along with weight loss over the past few months.  Admits to fever and chills, and denies chest pain, palpitations, nausea, vomiting, diarrhea, abdominal pain, urinary symptoms, weakness, numbness, falls, recent travel, recent surgeries.  Not on anticoagulation.  Denies substance use and alcohol use.    In ED  VT bp 143/80, , RR 28, T 99.4 F, SpO2 98% on NRB 15 L  Labs showed Lac 2.3, trop 26, BNP 2044, VBG  pH 7.18 PCo2 102 HCO3 38, O2 sat 46%    s/p IV solumedrol, IV Mag, Nebs and Albuterol in ED.  Pt was placed on BIPAP and admitted to MICU for further managements.    INTERVAL HPI/OVERNIGHT EVENTS:   No overnight events   Afebrile, hemodynamically stable     ICU Vital Signs Last 24 Hrs  T(C): 36.7 (22 May 2024 12:00), Max: 36.7 (22 May 2024 04:00)  T(F): 98.1 (22 May 2024 12:00), Max: 98.1 (22 May 2024 12:00)  HR: 95 (22 May 2024 12:00) (71 - 95)  BP: 119/75 (22 May 2024 12:00) (119/75 - 151/77)  BP(mean): 90 (22 May 2024 12:00) (88 - 107)  RR: 33 (22 May 2024 12:00) (17 - 33)  SpO2: 97% (22 May 2024 12:00) (91% - 97%)    O2 Parameters below as of 22 May 2024 12:00  Patient On (Oxygen Delivery Method): nasal cannula, high flow  O2 Flow (L/min): 50  O2 Concentration (%): 50    I&O's Summary  21 May 2024 07:  -  22 May 2024 07:00  --------------------------------------------------------  IN: 870 mL / OUT: 1400 mL / NET: -530 mL    22 May 2024 07:01  -  22 May 2024 15:06  --------------------------------------------------------  IN: 840 mL / OUT: 650 mL / NET: 190 mL    Daily     Daily Weight in k.4 (22 May 2024 04:00)    MEDICATIONS  (STANDING):  albuterol    90 MICROgram(s) HFA Inhaler 2 Puff(s) Inhalation every 6 hours  albuterol/ipratropium for Nebulization 3 milliLiter(s) Nebulizer every 6 hours  carbamide peroxide Otic Solution 1 Drop(s) Both Ears two times a day  chlorhexidine 2% Cloths 1 Application(s) Topical <User Schedule>  enoxaparin Injectable 40 milliGRAM(s) SubCutaneous every 24 hours  pantoprazole    Tablet 40 milliGRAM(s) Oral before breakfast  piperacillin/tazobactam IVPB.. 3.375 Gram(s) IV Intermittent every 8 hours  polyethylene glycol 3350 17 Gram(s) Oral two times a day  saccharomyces boulardii 250 milliGRAM(s) Oral two times a day  senna 2 Tablet(s) Oral at bedtime  sodium chloride 3%  Inhalation 4 milliLiter(s) Inhalation every 6 hours    MEDICATIONS  (PRN):  bisacodyl Suppository 10 milliGRAM(s) Rectal daily PRN Constipation  hydrOXYzine hydrochloride 25 milliGRAM(s) Oral two times a day PRN Anxiety  lactulose Syrup 10 Gram(s) Oral every 6 hours PRN constipation  ondansetron Injectable 4 milliGRAM(s) IV Push once PRN Nausea and/or Vomiting  sodium chloride 0.65% Nasal 1 Spray(s) Both Nostrils three times a day PRN Nasal Congestion    PHYSICAL EXAM:  GENERAL: Comfortable this morning  EYES: EOMI, PERRLA, conjunctiva and sclera clear  NECK: Supple, No JVD  NERVOUS SYSTEM:  Alert & Awake.   CHEST/LUNG: poor air entry on the left, with bilateral rhonchi   HEART: S1S2 normal, no S3, Regular rate and rhythm   ABDOMEN: Soft, Nontender, Nondistended  EXTREMITIES:  2+ Peripheral Pulses   LYMPH: No lymphadenopathy noted  SKIN: No rashes or lesions    LABS:                        8.7    13.17 )-----------( 230      ( 22 May 2024 04:47 )             26.9         137  |  96<L>  |  10  ----------------------------<  91  3.5   |  34<H>  |  <0.5<L>    Ca    8.4      22 May 2024 04:47  Phos  2.7       Mg     1.8         TPro  4.6<L>  /  Alb  3.0<L>  /  TBili  0.4  /  DBili  x   /  AST  9   /  ALT  49<H>  /  AlkPhos  72      LIVER FUNCTIONS - ( 22 May 2024 04:47 )  Alb: 3.0 g/dL / Pro: 4.6 g/dL / ALK PHOS: 72 U/L / ALT: 49 U/L / AST: 9 U/L / GGT: x           ABG - ( 21 May 2024 03:38 )  pH, Arterial: 7.45  pH, Blood: x     /  pCO2: 57    /  pO2: 64    / HCO3: 40    / Base Excess: 13.8  /  SaO2: 94.6      Urinalysis Basic - ( 22 May 2024 04:47 )  Color: x / Appearance: x / SG: x / pH: x  Gluc: 91 mg/dL / Ketone: x  / Bili: x / Urobili: x   Blood: x / Protein: x / Nitrite: x   Leuk Esterase: x / RBC: x / WBC x   Sq Epi: x / Non Sq Epi: x / Bacteria: x    RADIOLOGY & ADDITIONAL TESTS:  ACC: [id number] EXAM:  XR CHEST PORTABLE ROUTINE 1V        PROCEDURE DATE:  2024      INTERPRETATION:  CLINICAL HISTORY: Left lung atelectasis.    COMPARISON: 2024.    TECHNIQUE: Portable frontal chest radiograph. Adequate positioning.    FINDINGS:    Support devices: None.    Cardiac/mediastinum/hilum: Stable right-to-left mediastinal shift.    Lung parenchyma/Pleura: Stable complete opacification of the left hemithorax.    Skeleton/soft tissues: Stable.    IMPRESSION:    Stable exam since one day earlier.    --- End of Report ---    CHAD FELIX MD; Attending Radiologist  This document has been electronically signed. May 22 2024  8:49AM      ACC: [id number] EXAM:  XR KUB 1 VIEW   ORDERED BY: BIGG PUGA     PROCEDURE DATE:  2024      INTERPRETATION:  CLINICAL INDICATION: Constipation and abdominal pain  TECHNIQUE: Frontal radiograph of the abdomen  COMPARISON: Abdominal x-ray 2024    FINDINGS:  Nonobstructive bowel gas pattern. Calcification in the right upper quadrant is unchanged. The moderate amount of stool in the colon.   Degenerative changes, no acute osseous abnormality.    IMPRESSION:  No significant interval change.    --- End of Report ---    RIZWANA MAGDALENO MD; Attending Radiologist  This document has been electronically signed. May 22 2024  8:41AM      Care Discussed with Consultants/Other Providers [ x] YES  [ ] NO  - Palliative care

## 2024-05-22 NOTE — PROGRESS NOTE ADULT - ASSESSMENT
IMPRESSION:  Acute hypoxemic respiratory failure   Acute on chronic hypercapnic resp failure  COPD exacerbation   Left lung atelectasis   Lung mass possibly malignant    Superimposed pneumonia  Left sided effusion s/p thoracentesis negative for malignant cells    PLAN:    CNS: Avoid CNS depressant.  Sedation for comfort as needed.    HEENT: oral care. ETT care.  Patient intubated for 4 days between  05/17 and 05/20 --> patient is day 1 post extubation  ENT evaluated for hearing loss --> impacted cerumen --> patient refused Debrox    PULMONARY:   keep pox 88 to 92%M, Albuterol Q6 hrs and prn , Solumedrol 40 daily while intubated/wheezing.   chest pt   frequent suctioning --> blood clots removed  follow BAL, (-) so far   New complete opacification of the left hemithorax, possibly reflecting bronchial obstruction related to mucous plug.    CARDIOVASCULAR: TTE noted with 70-75%. Keep is = os  . BNP noted    GI: GI prophylaxis. Bowel regimen increased with Lactulose. Will resume oral feeds    RENAL: follow lytes.  Correct as needed     INFECTIOUS DISEASE:   On Zosyn since 05/17 - continue for a total of 7 days .  Procal noted.   follow BAL cx and cytology     HEMATOLOGICAL:  DVT prophylaxis. Dimer noted. LE duplex negative.       ENDOCRINE:  Follow up FS.  Insulin protocol if needed.    MSK: offloading    MICU     CODE: DNR/DNI TRIAL NIV with palliative follow-up    nirmal GUSMAN IMPRESSION:  Acute hypoxemic respiratory failure   Acute on chronic hypercapnic resp failure  COPD exacerbation   Left lung atelectasis   Lung mass possibly malignant    Superimposed pneumonia  Left sided effusion s/p thoracentesis negative for malignant cells    PLAN:    CNS: Avoid CNS depressant.  Sedation for comfort as needed.    HEENT: oral care. ETT care.  Patient intubated for 4 days between  05/17 and 05/20 --> patient is day 2 post extubation  ENT evaluated for hearing loss --> impacted cerumen --> patient refused Debrox    PULMONARY:   Keep pox 88 to 92%M, Albuterol Q6 hrs and PRN  Solumedrol 40 daily --> decreased to 20 mg daily starting 05/23   Chest pt   frequent suctioning --> blood clots removed  follow BAL, (-) so far   New complete opacification of the left hemithorax, possibly reflecting bronchial obstruction related to mucous plug.    CARDIOVASCULAR: TTE noted with 70-75%. Keep is = os  . BNP noted    GI: GI prophylaxis. Bowel regimen increased with Lactulose. Will resume oral feeds    RENAL: follow lytes.  Correct as needed     INFECTIOUS DISEASE:   On Zosyn since 05/17 - continue for a total of 7 days .  Procal noted.   follow BAL cx and cytology     HEMATOLOGICAL:  DVT prophylaxis. Dimer noted. LE duplex negative.       ENDOCRINE:  Follow up FS.  Insulin protocol if needed.    MSK: offloading    MICU     CODE: DNR/DNI TRIAL NIV with palliative follow-up    nirmal GUSMAN

## 2024-05-22 NOTE — PROGRESS NOTE ADULT - ASSESSMENT
IMPRESSION:  Acute hypoxemic respiratory failure   Acute on chronic hypercapnic resp failure  COPD exacerbation   Left lung atelectasis   Lung mass possibly malignant    Superimposed pneumonia  Left sided effusion s/p thoracentesis negative for malignant cells    PLAN:    CNS: Avoid CNS depressant.  Pain control PRN.  Palliative reporting full code, but our team's discussion shows DNR/DNI, discussed with palliative that their documentation is incorrect.    HEENT: oral care.    PULMONARY:   keep pox 88 to 92%M, Albuterol Q6 hrs and prn , Solumedrol 40 daily for now, wean in coming days.   Pulmonary toilet: albuterol/saline nebs, IPV, incentive spirometry, ambulate as tolerated, chest PT, frequent suctioning  Decrease solu-medrol to 20mg/day starting tomorrow, taper off over coming days  follow BAL, (-) so far    CARDIOVASCULAR: TTE noted with 70-75%. Keep is = os  . BNP noted    GI: GI prophylaxis while on high-dose steroid.     Advance diet as tolerated.    Increase bowel regimen today, simethicone for abdominal cramps.    RENAL: follow lytes.  Correct as needed     INFECTIOUS DISEASE: continue abx .  Procal noted.  Complete empiric 5-days Abx today  follow BAL cx   and cytology     HEMATOLOGICAL:  DVT prophylaxis. Dimer noted. LE duplex negative.       ENDOCRINE:  Follow up FS.  Insulin protocol if needed.      MSK: OOBAT    MICU   DNR/DNI, continue palliative follow-up  PIV

## 2024-05-23 NOTE — CHART NOTE - NSCHARTNOTEFT_GEN_A_CORE
Pt. states she feels a lot better and is comfortable. Pt has community, I was a supportive pastoral presence. I will follow up for support.

## 2024-05-23 NOTE — PROGRESS NOTE ADULT - ATTENDING COMMENTS
Ms Yang was seen & examined overnight, patient continues to refuse overnight BiPAP but is having mild improvements in ALLEY aeration on CXR.  Patient is reporting feeling stronger, and is interested in PT and mobilization despite having refused on prior days.  Will continue to encourage improved pulmonary toilet, mobilization, and overnight BiPAP.  Continue to wean FiO2 to target spO2 88-92%.  Continue taper of steroids, currently on 20mg solu-medrol daily for at least the next several days.  Patient continues to remain fairly stable though on fairly high oxygen at 50%/50L.  Will initiate transfer to SDU for ongoing care.  I agree with the fellow note, with the exceptions listed in my attestation above.  The remainder of impression and plan per fellow note.      SDU

## 2024-05-23 NOTE — PROGRESS NOTE ADULT - SUBJECTIVE AND OBJECTIVE BOX
Patient is a 73y old  Female who presents with a chief complaint of COPD exacerbation (23 May 2024 08:54)    HPI:  73-year-old female with past medical history of COPD not on home oxygen, chronic smoker >60ppy, hypertension who presents for shortness of breath x few weeks.  Pt reports worsening productive cough and shortness of breath with wheezing for the past 1 week.  Increased work of breathing today, was satting 70% on room air.  Was placed on nonrebreather by EMS.  As per son at the bedside pt was confused in the morning, wasn't aware of the surrounding.  She also admits to hemoptysis  along with weight loss over the past few months.  Admits to fever and chills, and denies chest pain, palpitations, nausea, vomiting, diarrhea, abdominal pain, urinary symptoms, weakness, numbness, falls, recent travel, recent surgeries.  Not on anticoagulation.  Denies substance use and alcohol use.    In ED  VT bp 143/80, , RR 28, T 99.4 F, SpO2 98% on NRB 15 L  Labs showed Lac 2.3, trop 26, BNP 2044, VBG  pH 7.18 PCo2 102 HCO3 38, O2 sat 46%    s/p IV solumedrol, IV Mag, Nebs and Albuterol in ED.  Pt was placed on BIPAP and admitted to MICU for further managements.   (06 May 2024 23:30)       INTERVAL HPI/OVERNIGHT EVENTS:   No overnight events   Afebrile, hemodynamically stable   Off Bipap  Alert, requesting PT    ICU Vital Signs Last 24 Hrs  T(C): 36.5 (23 May 2024 08:00), Max: 36.5 (22 May 2024 16:00)  T(F): 97.7 (23 May 2024 08:00), Max: 97.7 (22 May 2024 16:00)  HR: 91 (23 May 2024 12:00) (80 - 100)  BP: 111/68 (23 May 2024 11:00) (98/58 - 134/70)  BP(mean): 85 (23 May 2024 11:00) (73 - 95)  RR: 31 (23 May 2024 12:00) (19 - 37)  SpO2: 97% (23 May 2024 12:00) (91% - 98%)    O2 Parameters below as of 23 May 2024 12:00  Patient On (Oxygen Delivery Method): nasal cannula, high flow  O2 Flow (L/min): 50  O2 Concentration (%): 50    I&O's Summary    22 May 2024 07:01  -  23 May 2024 07:00  --------------------------------------------------------  IN: 1930 mL / OUT: 1750 mL / NET: 180 mL    23 May 2024 07:01  -  23 May 2024 13:23  --------------------------------------------------------  IN: 360 mL / OUT: 0 mL / NET: 360 mL    MEDICATIONS  (STANDING):  albuterol    90 MICROgram(s) HFA Inhaler 2 Puff(s) Inhalation every 6 hours  albuterol/ipratropium for Nebulization 3 milliLiter(s) Nebulizer every 6 hours  carbamide peroxide Otic Solution 1 Drop(s) Both Ears two times a day  chlorhexidine 2% Cloths 1 Application(s) Topical <User Schedule>  enoxaparin Injectable 40 milliGRAM(s) SubCutaneous every 24 hours  methylPREDNISolone sodium succinate Injectable 20 milliGRAM(s) IV Push daily  pantoprazole    Tablet 40 milliGRAM(s) Oral before breakfast  polyethylene glycol 3350 17 Gram(s) Oral two times a day  saccharomyces boulardii 250 milliGRAM(s) Oral two times a day  senna 2 Tablet(s) Oral at bedtime  sodium chloride 3%  Inhalation 4 milliLiter(s) Inhalation every 6 hours    MEDICATIONS  (PRN):  bisacodyl Suppository 10 milliGRAM(s) Rectal daily PRN Constipation  hydrOXYzine hydrochloride 25 milliGRAM(s) Oral two times a day PRN Anxiety  lactulose Syrup 10 Gram(s) Oral every 6 hours PRN constipation  ondansetron Injectable 4 milliGRAM(s) IV Push once PRN Nausea and/or Vomiting  sodium chloride 0.65% Nasal 1 Spray(s) Both Nostrils three times a day PRN Nasal Congestion    PHYSICAL EXAM:  GENERAL: Atraumatic, Normocephalic  EYES: EOMI, PERRLA, conjunctiva and sclera clear  NECK: Supple, No JVD   NERVOUS SYSTEM:  A&Ox3, follows command, appropriate response  CHEST/LUNG: B/L good air entry; No rales, rhonchi, or wheezing  HEART: S1S2 normal, no S3, Regular rate and rhythm   ABDOMEN: Soft, Nontender, Nondistended   EXTREMITIES:  No clubbing, cyanosis, or edema    LABS:                        8.6    12.17 )-----------( 244      ( 23 May 2024 04:43 )             27.4     05-23    136  |  97<L>  |  17  ----------------------------<  105<H>  3.5   |  35<H>  |  <0.5<L>    Ca    8.2<L>      23 May 2024 04:43  Phos  2.7     05-22  Mg     1.8     05-23    TPro  4.5<L>  /  Alb  3.0<L>  /  TBili  0.3  /  DBili  x   /  AST  8   /  ALT  38  /  AlkPhos  80  05-23    LIVER FUNCTIONS - ( 23 May 2024 04:43 )  Alb: 3.0 g/dL / Pro: 4.5 g/dL / ALK PHOS: 80 U/L / ALT: 38 U/L / AST: 8 U/L / GGT: x           Urinalysis Basic - ( 23 May 2024 04:43 )  Color: x / Appearance: x / SG: x / pH: x  Gluc: 105 mg/dL / Ketone: x  / Bili: x / Urobili: x   Blood: x / Protein: x / Nitrite: x   Leuk Esterase: x / RBC: x / WBC x   Sq Epi: x / Non Sq Epi: x / Bacteria: x    RADIOLOGY & ADDITIONAL TESTS:          Care Discussed with Consultants/Other Providers [ x] YES  [ ] NO

## 2024-05-23 NOTE — CHART NOTE - NSCHARTNOTEFT_GEN_A_CORE
Downgrade Transfer Note    Transfer from: MICU  Transfer to: CEU    73-year-old female with past medical history of COPD not on home oxygen, chronic smoker >60ppy, hypertension who presents for shortness of breath x few weeks.  Pt reports worsening productive cough and shortness of breath with wheezing for the past 1 week.  Increased work of breathing today, was satting 70% on room air.  Was placed on nonrebreather by EMS.  As per son at the bedside pt was confused in the morning, wasn't aware of the surrounding.  She also admits to hemoptysis  along with weight loss over the past few months.  Admits to fever and chills, and denies chest pain, palpitations, nausea, vomiting, diarrhea, abdominal pain, urinary symptoms, weakness, numbness, falls, recent travel, recent surgeries.  Not on anticoagulation.  Denies substance use and alcohol use.    In ED  VT bp 143/80, , RR 28, T 99.4 F, SpO2 98% on NRB 15 L  Labs showed Lac 2.3, trop 26, BNP 2044, VBG  pH 7.18 PCo2 102 HCO3 38, O2 sat 46%    s/p IV solumedrol, IV Mag, Nebs and Albuterol in ED.  Pt was placed on BIPAP and admitted to MICU for further managements.    CEU Course:  -Patient stable on NC. BiPAP 4 on 4 off and QHS. Patient often refuses BiPAP despite extensive counseling and encouragement (gets anxious, refuses antianxiety or pain medication), however no changes in mental status. Solumedrol 60mg q12h.   -Diagnostic thoracentesis done on 5/10, f/u cytology. Once cytology results have heme/onc see her.   -ID following, on cefepime and levaquin for post-obstructive pneumonia.   -Patient defers conversations regarding possible cancer diagnosis to her children. They state there is a possibility she may decline cancer treatment and opt for hospice.     4A course:   -the morning of 5/13 patient was tachypneic, anxious appearing.  O2 sat ranged from 78%-82% on 6L nasal cannula, patient was placed on non rebreather mask 15L and was still satting in the mid 80's.  Patient declines BIPAP as she feels she cannot breath with the mask on.  High Flow nasal cannula placed with improvement in oxygenation to 90%.  Chest x-ray demonstrated worsening left sided pneumonia and ABG pH 7.4, CO2 77, lactate 1.2.  Discussed goals of care with patient's children who stated that this time patient is full code. Discussed with crit fellow who will see patient and recommended upgrade to stepdown.     CEU course:   Pt upgraded for Acute hypercapnic Respiratory Failure. Pt is refusing BIPAP, pCO2 77.  Chest xray now shows complete opacification of left lung, with increasing HFNC requirements. Patient will need intubation and Bronchoscopy with possible biopsy. Upgrading to ICU for intubation and bronch mohinder. Low threshold for intubation if she decompensates.    MICU:  Patient intubated on 05/17 for bronchoscopy: multiple blood clots, BAL showed rare yeast  Extubated on 05/20, on high flow  Patient on high flow since, poor compliance with BiPAP  Last ABG on 05/21 pH 7.45, CO2 of 53, Bicarb 40  Currently DNR/DNI      Vital Signs Last 24 Hrs  T(C): 36.6 (23 May 2024 12:01), Max: 36.6 (23 May 2024 12:01)  T(F): 97.8 (23 May 2024 12:01), Max: 97.8 (23 May 2024 12:01)  HR: 90 (23 May 2024 14:33) (80 - 100)  BP: 105/58 (23 May 2024 14:33) (98/58 - 134/70)  BP(mean): 74 (23 May 2024 14:33) (73 - 95)  RR: 33 (23 May 2024 14:33) (19 - 37)  SpO2: 95% (23 May 2024 14:33) (91% - 98%)    Parameters below as of 23 May 2024 14:33  Patient On (Oxygen Delivery Method): nasal cannula, high flow  O2 Flow (L/min): 50  O2 Concentration (%): 40  I&O's Summary    22 May 2024 07:01  -  23 May 2024 07:00  --------------------------------------------------------  IN: 1930 mL / OUT: 1750 mL / NET: 180 mL    23 May 2024 07:01  -  23 May 2024 16:28  --------------------------------------------------------  IN: 960 mL / OUT: 200 mL / NET: 760 mL      MEDICATIONS  (STANDING):  albuterol    90 MICROgram(s) HFA Inhaler 2 Puff(s) Inhalation every 6 hours  albuterol/ipratropium for Nebulization 3 milliLiter(s) Nebulizer every 6 hours  carbamide peroxide Otic Solution 1 Drop(s) Both Ears two times a day  chlorhexidine 2% Cloths 1 Application(s) Topical <User Schedule>  enoxaparin Injectable 40 milliGRAM(s) SubCutaneous every 24 hours  methylPREDNISolone sodium succinate Injectable 20 milliGRAM(s) IV Push daily  pantoprazole    Tablet 40 milliGRAM(s) Oral before breakfast  polyethylene glycol 3350 17 Gram(s) Oral two times a day  saccharomyces boulardii 250 milliGRAM(s) Oral two times a day  senna 2 Tablet(s) Oral at bedtime  sodium chloride 3%  Inhalation 4 milliLiter(s) Inhalation every 6 hours    MEDICATIONS  (PRN):  bisacodyl Suppository 10 milliGRAM(s) Rectal daily PRN Constipation  hydrOXYzine hydrochloride 25 milliGRAM(s) Oral two times a day PRN Anxiety  lactulose Syrup 10 Gram(s) Oral every 6 hours PRN constipation  ondansetron Injectable 4 milliGRAM(s) IV Push once PRN Nausea and/or Vomiting  sodium chloride 0.65% Nasal 1 Spray(s) Both Nostrils three times a day PRN Nasal Congestion    LABS                                            8.6                   Neurophils% (auto):   80.5   (05-23 @ 04:43):    12.17)-----------(244          Lymphocytes% (auto):  6.3                                           27.4                   Eosinphils% (auto):   2.7      Manual%: Neutrophils x    ; Lymphocytes x    ; Eosinophils x    ; Bands%: x    ; Blasts x                                    136    |  97     |  17                  Calcium: 8.2   / iCa: x      (05-23 @ 04:43)    ----------------------------<  105       Magnesium: 1.8                              3.5     |  35     |  <0.5             Phosphorous: x        TPro  4.5    /  Alb  3.0    /  TBili  0.3    /  DBili  x      /  AST  8      /  ALT  38     /  AlkPhos  80     23 May 2024 04:43    CHEST X_RAY    ACC: [id number] EXAM:  XR CHEST PORTABLE ROUTINE 1V   ORDERED BY: ANJANA RAMÍREZ     PROCEDURE DATE:  05/23/2024          INTERPRETATION:  CLINICAL HISTORY: Left lung atelectasis.    COMPARISON: 5/22/2024.    TECHNIQUE: Portable frontal chest radiograph. Adequate positioning.    FINDINGS:    Support devices: None.    Cardiac/mediastinum/hilum: Stable right left mediastinal shift.    Lung parenchyma/Pleura: Near complete opacification of the left   hemithorax, now with some aeration in the left upper lung.    Skeleton/soft tissues: Stable.      IMPRESSION:    Near complete opacification of the left hemithorax, now with some   aeration in the left upper lung.    --- End of Report ---    IMPRESSIONS    Acute hypoxemic respiratory failure improving  Acute on chronic hypercapnic resp failure  COPD exacerbation   Left lung atelectasis   Lung mass possibly malignant    Superimposed pneumonia  Left sided effusion s/p thoracentesis negative for malignant cells    PLAN:    CNS: Avoid CNS depressant.  Pain control PRN.    HEENT: oral care.    PULMONARY:   keep pox 88 to 92%M, Albuterol Q6 hrs and prn , Solumedrol 20 daily for now, wean in coming days.   Pulmonary toilet: albuterol/saline nebs, IPV, incentive spirometry, ambulate as tolerated, chest PT, frequent suctioning  follow BAL, (-) so far    CARDIOVASCULAR: TTE noted with 70-75%. Keep is = os  . BNP noted    GI:   GI prophylaxis while on high-dose steroid.     Advance diet as tolerated.    not adherent to bowel regimen today, had BM last night, simethicone for abdominal cramps.    RENAL: follow lytes.  Correct as needed     INFECTIOUS DISEASE: continue abx.  Procal noted.    HEMATOLOGICAL:  DVT prophylaxis. Dimer noted. LE duplex negative.       ENDOCRINE:  Follow up FS.  Insulin protocol if needed.      MSK: OOBAT    SDU     DNR/DNI, continue palliative follow-up    PIV    For Follow up   - O2 requirements  - Chest x-ray for left lung atelectasis  - Continue weaning Solumedrol, currently at 20 mg IV daily HPI





- General


Chief Complaint: Altered Mental Status


Time Seen by Provider: 06/01/18 00:00





- HPI


HPI: 


76-year-old  male presents to the emergency department by EMS 

for some altered mental status or possibly intoxication.  The patient does 

appear altered but is arousable and admits to drinking "a couple of beers."  

However he is currently a poor historian.  There is some report that the 

patient appeared not to be breathing and that he had some low oxygen of about 

80 prior to getting supplemental oxygen for EMS.  He appears to have some 

history of diabetes and his blood sugar was 122 in route with EMS.








ED Past Medical Hx





- Past Medical History


Previous Medical History?: No





- Surgical History


Past Surgical History?: No





- Social History


Smoking Status: Never Smoker


Substance Use Type: Alcohol





- Medications


Home Medications: 


 Home Medications











 Medication  Instructions  Recorded  Confirmed  Last Taken  Type


 


No Known Home Medications [No  06/01/18 06/01/18 Unknown History





Reported Home Medications]     














ED Review of Systems


ROS: 


Stated complaint: MT


Other details as noted in HPI





Comment: Unobtainable due to pts medical conditions





Physical Exam





- Physical Exam


Vital Signs: 


 Vital Signs











  05/31/18 05/31/18 05/31/18





  23:43 23:44 23:46


 


Temperature   


 


Pulse Rate 83 82 79


 


Respiratory 26 H 14 18





Rate   


 


Blood Pressure   108/56


 


Blood Pressure   





[Left]   


 


O2 Sat by Pulse 90 90 97





Oximetry   














  05/31/18 05/31/18 05/31/18





  23:47 23:48 23:50


 


Temperature 97.8 F  


 


Pulse Rate 79 79 77


 


Respiratory 10 L 13 15





Rate   


 


Blood Pressure  108/56 108/56


 


Blood Pressure 108/56  





[Left]   


 


O2 Sat by Pulse 97 97 97





Oximetry   














  05/31/18





  23:53


 


Temperature 


 


Pulse Rate 


 


Respiratory 20





Rate 


 


Blood Pressure 


 


Blood Pressure 





[Left] 


 


O2 Sat by Pulse 97





Oximetry 











Physical Exam: 


GENERAL: The patient is well-developed well-nourished.


HENT: Normocephalic.  Atraumatic.    Patient has moist mucous membranes.


EYES: Extraocular motions are intact.  Pupils equal reactive to light 

bilaterally.


NECK: Supple.  Trachea is midline.


CHEST/LUNGS: Clear to auscultation.  There is no respiratory distress noted.


HEART/CARDIOVASCULAR: Regular.  There is no tachycardia.  There is no murmur.


ABDOMEN: Abdomen is soft, nontender.  Patient has normal bowel sounds.  There 

is no abdominal distention.


SKIN: Skin is warm and dry.


NEURO: The patient is awake, alert but does appear intoxicated.  The patient is 

cooperative.  The patient has no focal neurologic deficits.  The patient has 

normal speech.


MUSCULOSKELETAL: There is no tenderness or deformity.  There is no limitation 

range of motion.  There is no evidence of acute injury.








ED Course


 Vital Signs











  05/31/18 05/31/18 05/31/18





  23:43 23:44 23:46


 


Temperature   


 


Pulse Rate 83 82 79


 


Respiratory 26 H 14 18





Rate   


 


Blood Pressure   108/56


 


Blood Pressure   





[Left]   


 


O2 Sat by Pulse 90 90 97





Oximetry   














  05/31/18 05/31/18 05/31/18





  23:47 23:48 23:50


 


Temperature 97.8 F  


 


Pulse Rate 79 79 77


 


Respiratory 10 L 13 15





Rate   


 


Blood Pressure  108/56 108/56


 


Blood Pressure 108/56  





[Left]   


 


O2 Sat by Pulse 97 97 97





Oximetry   














  05/31/18





  23:53


 


Temperature 


 


Pulse Rate 


 


Respiratory 20





Rate 


 


Blood Pressure 


 


Blood Pressure 





[Left] 


 


O2 Sat by Pulse 97





Oximetry 














ED Medical Decision Making





- Lab Data


Result diagrams: 


 06/01/18 00:16





 06/01/18 00:16





- EKG Data


-: EKG Interpreted by Me


EKG shows normal: sinus rhythm, axis, intervals, QRS complexes, ST-T waves


Rate: normal





- EKG Data


When compared to previous EKG there are: previous EKG unavailable


Interpretation: normal EKG





- Radiology Data


Radiology results: report reviewed





CT of the head does not show any acute intracranial process including no 

ischemia, shift, mass, bleeding or skull fracture.





- Medical Decision Making





This 76-year-old patient presented after he appeared unresponsive with possibly 

some breathing issues but overall he was very intoxicated secondary to alcohol 

abuse with a blood level of 0.31.  I reevaluated and monitored this patient 

throughout my entire shift and the patient remained awake and oriented, however 

the patient had a Segura catheter placed at the beginning of his visit without 

much complaint.  Overall he appears to have at least 1700 mL of urine.  Most of 

his labs were mostly unremarkable except for the blood alcohol level, positive 

benzodiazepines on the urine drug screen and possibly some signs of mild 

dehydration.  In the morning, the patient's daughter came to bedside and stated 

her concern for the fact that the patient continuously abuses alcohol and that 

he has had an overall decline in his health and mentation.  I spoke with the 

daughter and told her that I would give him an evaluation from [facility] but 

that if the patient does not want help or detox and we cannot make him do so 

and he has not a criteria for 1013 or 2013.  I left my shift after presenting 

the patient to my colleague for continued monitoring and with the patient be 

set up for an evaluation by [facility].  Looking at the notes now, it appears 

that the patient was seen by [facility] but refuses detox help.  He was awake 

and alert and not intoxicated when his daughter came to get him in late 

afternoon early evening.


Critical Care Time: No


Critical care attestation.: 


If time is entered above; I have spent that time in minutes in the direct care 

of this critically ill patient, excluding procedure time.








ED Disposition


Clinical Impression: 


 Alcohol abuse





Alcohol intoxication


Qualifiers:


 Complication of substance-induced condition: uncomplicated Qualified Code(s): 

F10.920 - Alcohol use, unspecified with intoxication, uncomplicated





Disposition: DC-01 TO HOME OR SELFCARE


Is pt being admited?: No


Condition: Stable


Instructions:  Alcohol Intoxication (ED), Abuse of Alcohol (ED)


Additional Instructions: 


Please try and quit drinking.  I have given him some referrals to help with 

your addictions.  Return to the emergency Department with any worsening of your 

symptoms or any acute distress.


Referrals: 


[facility] [Outside] - 3-5 Days


[facility] [Outside] - 3-5 Days


Time of Disposition: 06:35

## 2024-05-23 NOTE — CHART NOTE - NSCHARTNOTEFT_GEN_A_CORE
Registered Dietitian Follow-Up     Patient Profile Reviewed                           Yes [x]   No []     Nutrition History Previously Obtained        Yes [x]  No []       Pertinent Medical Interventions: Presented w/ SOB x weeks, cough, wheezing, O2 sat 70% at home placed on NRB. Pt admitted to MICU for acute respiratory failure, was found to have lungs mass and postobstructive PNA. Noted pt clinically improved and was downgraded to SDU. New lung mass noted concerned for malignancy.    PMH includes COPD not on home oxygen, chronic smoker >60ppy, hypertension      Diet order: Regular diet with Magic Cup 2x/day (290 kcal, 9 g protein per serving) & Ensure Plus High Protein 2x/day (350 kcal, 20 g protein per serving).    Anthropometrics:  Ht: 154.9 cm  Initial wt: 50.5 kg ()  BMI: 21.1  IBW: 47.7 kg.    Daily Weight in k.8 (-24), Weight in k.7 (-23), Weight in k.4 (-22), Weight in k.8 (-21), Weight in k.3 (-20), Weight in k.7 (-19), Weight in k.2 (05-18), Weight in k.5 (05-15), Weight in k (-14), Weight in k.5 (-09)     wt suspected to be documentation error (possibly intended to be noted as 96 lbs/43.6 kg instead of 96 kg).    MEDICATIONS  (STANDING):  albuterol    90 MICROgram(s) HFA Inhaler 2 Puff(s) Inhalation every 6 hours  albuterol/ipratropium for Nebulization 3 milliLiter(s) Nebulizer every 6 hours  carbamide peroxide Otic Solution 1 Drop(s) Both Ears two times a day  chlorhexidine 2% Cloths 1 Application(s) Topical <User Schedule>  enoxaparin Injectable 40 milliGRAM(s) SubCutaneous every 24 hours  methylPREDNISolone sodium succinate Injectable 20 milliGRAM(s) IV Push daily  pantoprazole    Tablet 40 milliGRAM(s) Oral before breakfast  polyethylene glycol 3350 17 Gram(s) Oral two times a day  saccharomyces boulardii 250 milliGRAM(s) Oral two times a day  senna 2 Tablet(s) Oral at bedtime  sodium chloride 3%  Inhalation 4 milliLiter(s) Inhalation every 6 hours    MEDICATIONS  (PRN):  bisacodyl Suppository 10 milliGRAM(s) Rectal daily PRN Constipation  hydrOXYzine hydrochloride 25 milliGRAM(s) Oral two times a day PRN Anxiety  lactulose Syrup 10 Gram(s) Oral every 6 hours PRN constipation  ondansetron Injectable 4 milliGRAM(s) IV Push once PRN Nausea and/or Vomiting  sodium chloride 0.65% Nasal 1 Spray(s) Both Nostrils three times a day PRN Nasal Congestion    Pertinent Labs: : Na-136, K-3.5, CL-97, BUN-17, creat- <0.5, gluc-105, Mg-1.8  : PO4-2.7    Physical Findings:  - Appearance: alert  - GI function: last BM ; no GI issues reported at this time  - Tubes: no feeding tubes  - Oral/Mouth cavity: seen by SLP services;  SLP eval notes "+ toleration observed without overt symptoms of penetration/aspiration for Regular/thins" Recommends regular diet with thin liquids.  - Skin: stage 1 pressure injury to B/L buttocks  - Edema: no edema noted in flowsheet documentation     Nutrition Requirements:  Weight Used: 43.5 kg. as per  assessment     Estimated Energy Needs    Continue [x]  Adjust []  6786-2455 kcal/day (30-35 kcal/kg ABW)        Estimated Protein Needs    Continue [x]  Adjust []  54-65 g/day (1.25-1.5 g/kg ABW)        Estimated Fluid Needs        Continue [x]  Adjust []  7084-2088 mL/day (30-35 mL/kg ABW)     Nutrient Intake: Pt reportedly continues to demonstrate tolerance to diet order, with % of meals & po supplements at this time per report.    [x] Previous Nutrition Diagnosis: Severe protein-calorie malnutrition            [x] Ongoing          [] Resolved    Pt receiving recommended nutrition intervention at this time; has been tolerating diet order. No new nutrition interventions at this time.    Nutrition Education: Reviewed diet order & po supplements; encouraged adequate po intake.     Goal/Expected Outcome: Pt to maintain tolerance to diet order, with at least 75% po intake maintained over next 7-10 days.    Pt at low nutrition risk; RD to follow-up in 7-10 days.     Indicator/Monitoring: Skin, labs, BM, wt, nutrition focused physical findings, body composition, GI, swallow function, po intake, diet order.    Recommendation: Continue providing Regular diet with Magic Cup 2x/day (290 kcal, 9 g protein per serving) & Ensure Plus High Protein 2x/day (350 kcal, 20 g protein per serving) as tolerated.

## 2024-05-23 NOTE — PROGRESS NOTE ADULT - SUBJECTIVE AND OBJECTIVE BOX
CC: SOB    HPI:  73-year-old female with past medical history of COPD not on home oxygen, chronic smoker >60ppy, hypertension who presents for shortness of breath x few weeks.  Pt reports worsening productive cough and shortness of breath with wheezing for the past 1 week.  Increased work of breathing today, was satting 70% on room air.  Was placed on nonrebreather by EMS.  As per son at the bedside pt was confused in the morning, wasn't aware of the surrounding.  She also admits to hemoptysis  along with weight loss over the past few months.  Admits to fever and chills, and denies chest pain, palpitations, nausea, vomiting, diarrhea, abdominal pain, urinary symptoms, weakness, numbness, falls, recent travel, recent surgeries.  Not on anticoagulation.  Denies substance use and alcohol use.    In ED  VT bp 143/80, , RR 28, T 99.4 F, SpO2 98% on NRB 15 L  Labs showed Lac 2.3, trop 26, BNP 2044, VBG  pH 7.18 PCo2 102 HCO3 38, O2 sat 46%    s/p IV solumedrol, IV Mag, Nebs and Albuterol in ED.  Pt was placed on BIPAP and admitted to MICU for further managements.   (06 May 2024 23:30)    PERTINENT PM/SXH:   COPD not on home oxygen, chronic smoker >60ppy, hypertension     FAMILY HISTORY:    None pertinent    ITEMS NOT CHECKED ARE NOT PRESENT    SOCIAL HISTORY:   Significant other/partner[ ]  Children[ ]  Catholic/Spirituality:  Substance hx:  [ ]   Tobacco hx:  [ ]   Alcohol hx: [ ]   Living Situation: [x ]Home  [ ]Long term care  [ ]Rehab [ ]Other  Home Services: [ ] HHA [ ] Visting RN [ ] Hospice  Occupation:  Home Opioid hx:  [ ] Y [ ] N [x ] I-Stop Reference No:    Reference #: [id number] - no meds     ADVANCE DIRECTIVES:     [ ] Full Code [x ] DNR  MOLST  [ ]  Living Will  [ ]   DECISION MAKER(s):  [ ] Health Care Proxy(s)  [x ] Surrogate(s)  [ ] Guardian           Name(s): Phone Number(s):  Children      BASELINE (I)ADL(s) (prior to admission):    Lamar: [ ]Total  [ ] Moderate [ ]Dependent  Palliative Performance Status Version 2:         %    http://npcrc.org/files/news/palliative_performance_scale_[email or web address].pdf    Allergies    No Known Allergies    Intolerances    MEDICATIONS  (STANDING):  albuterol    90 MICROgram(s) HFA Inhaler 2 Puff(s) Inhalation every 6 hours  albuterol/ipratropium for Nebulization 3 milliLiter(s) Nebulizer every 6 hours  carbamide peroxide Otic Solution 1 Drop(s) Both Ears two times a day  chlorhexidine 2% Cloths 1 Application(s) Topical <User Schedule>  enoxaparin Injectable 40 milliGRAM(s) SubCutaneous every 24 hours  methylPREDNISolone sodium succinate Injectable 20 milliGRAM(s) IV Push daily  pantoprazole    Tablet 40 milliGRAM(s) Oral before breakfast  polyethylene glycol 3350 17 Gram(s) Oral two times a day  saccharomyces boulardii 250 milliGRAM(s) Oral two times a day  senna 2 Tablet(s) Oral at bedtime  sodium chloride 3%  Inhalation 4 milliLiter(s) Inhalation every 6 hours    MEDICATIONS  (PRN):  bisacodyl Suppository 10 milliGRAM(s) Rectal daily PRN Constipation  hydrOXYzine hydrochloride 25 milliGRAM(s) Oral two times a day PRN Anxiety  lactulose Syrup 10 Gram(s) Oral every 6 hours PRN constipation  ondansetron Injectable 4 milliGRAM(s) IV Push once PRN Nausea and/or Vomiting  sodium chloride 0.65% Nasal 1 Spray(s) Both Nostrils three times a day PRN Nasal Congestion      PRESENT SYMPTOMS: [ ]Unable to obtain due to poor mentation   Source if other than patient:  [ ]Family   [ ]Team     Pain: [ ]yes [x ]no  ALL PAIN ASSESSMENT COMPONENTS WERE ASKED ABOUT UNLESS OTHERWISE NOTED  QOL impact -   Location -   Aggravating factors -  Quality -   Radiation -   Timing-   Severity (0-10 scale):  Minimal acceptable level (0-10 scale):     CPOT:    https://www.sccm.org/getattachment/chh18k12-1o2t-5c4o-1j9i-0385b5841u3c/Critical-Care-Pain-Observation-Tool-(CPOT)    PAIN AD Score:   [email or web address].[email or web address]/painad.pdf (press ctrl +  left click to view)    Dyspnea:                           [x ]None[ ]Mild [ ]Moderate [ ]Severe     Respiratory Distress Observation Scale (RDOS): 0  A score of 0 to 2 signifies little or no respiratory distress, 3 signifies mild distress, scores 4 to 6 indicate moderate distress, and scores greater than 7 signify severe distress  [email or web address]-ymffkwgvjsz-xlpygycg-ktxjnxmpbcr-vgszp.[email or web address]    Anxiety:                             [ ]None[ ]Mild [ x]Moderate [ ]Severe   Fatigue:                             [ ]None[ x]Mild [ ]Moderate [ ]Severe   Nausea:                             [ x]None[ ]Mild [ ]Moderate [ ]Severe   Loss of appetite:              [x ]None[ ]Mild [ ]Moderate [ ]Severe   Constipation:                    [x ]None[ ]Mild [ ]Moderate [ ]Severe    Other Symptoms:  [x ]All other review of systems negative     Palliative Performance Status Version 2:         30%    [email or web address]/[email or web address]_performance_scale_[email or web address].pdf    PHYSICAL EXAM:    ICU Vital Signs Last 24 Hrs  T(C): 36.5 (23 May 2024 08:00), Max: 36.5 (22 May 2024 16:00)  T(F): 97.7 (23 May 2024 08:00), Max: 97.7 (22 May 2024 16:00)  HR: 91 (23 May 2024 12:00) (80 - 100)  BP: 111/68 (23 May 2024 11:00) (98/58 - 134/70)  BP(mean): 85 (23 May 2024 11:00) (73 - 95)  ABP: --  ABP(mean): --  RR: 31 (23 May 2024 12:00) (19 - 37)  SpO2: 97% (23 May 2024 12:00) (91% - 98%)    O2 Parameters below as of 23 May 2024 12:00  Patient On (Oxygen Delivery Method): nasal cannula, high flow  O2 Flow (L/min): 50  O2 Concentration (%): 50          GENERAL:  [x ] No acute distress [ ]Lethargic  [ ]Unarousable  [ ]Verbal  [ ]Non-Verbal [ ]Cachexia    BEHAVIORAL/PSYCH:  [ x]Alert and Oriented x3  [ x] Anxiety [ ] Delirium [ ] Agitation [ ] Calm   EYES: [x ] No scleral icterus [ ] Scleral icterus [ ] Closed  ENMT:  [ ]Dry mouth  [ x]No external oral lesions [ ] No external ear or nose lesions  CARDIOVASCULAR:  [ ]Regular [ ]Irregular [ ]Tachy [ x]Not Tachy  [ ]Kalpesh [ ] Edema [ ] No edema  PULMONARY:  [x ]Tachypnea  [ ]Audible excessive secretions [x ] No labored breathing [ ] labored breathing  GASTROINTESTINAL: [ ]Soft  [ ]Distended  [x ]Not distended [ ]Non tender [ ]Tender  MUSCULOSKELETAL: [ ]No clubbing [ ] clubbing  [ x] No cyanosis [ ] cyanosis  NEUROLOGIC: [ ]No focal deficits  [ x]Follows commands  [ ]Does not follow commands  [ ]Cognitive impairment  [ ]Dysphagia  [ ]Dysarthria  [ ]Paresis   SKIN: [ ] Jaundiced [x ] Non-jaundiced [ ]Rash [ ]No Rash [ ] Warm [ ] Dry  MISC/LINES: [ ] ET tube [ ] Trach [ ]NGT/OGT [ ]PEG [ ]Paulino    LABS: reviewed by me                                     8.6    12.17 )-----------( 244      ( 23 May 2024 04:43 )             27.4     05-23    136  |  97<L>  |  17  ----------------------------<  105<H>  3.5   |  35<H>  |  <0.5<L>    Ca    8.2<L>      23 May 2024 04:43  Phos  2.7     05-22  Mg     1.8     05-23    TPro  4.5<L>  /  Alb  3.0<L>  /  TBili  0.3  /  DBili  x   /  AST  8   /  ALT  38  /  AlkPhos  80  05-23      Urinalysis Basic - ( 23 May 2024 04:43 )    Color: x / Appearance: x / SG: x / pH: x  Gluc: 105 mg/dL / Ketone: x  / Bili: x / Urobili: x   Blood: x / Protein: x / Nitrite: x   Leuk Esterase: x / RBC: x / WBC x   Sq Epi: x / Non Sq Epi: x / Bacteria: x                RADIOLOGY & ADDITIONAL STUDIES: reviewed by me    < from: Xray Chest 1 View- PORTABLE-Urgent (Xray Chest 1 View- PORTABLE-Urgent .) (05.09.24 @ 12:23) >  Impression:    Left lung masslike consolidation. Decreased left effusion. No   pneumothorax.    Improving right basilar nodular opacities.    < end of copied text >      EKG: reviewed by me    < from: 12 Lead ECG (05.06.24 @ 19:52) >  Ventricular Rate 90 BPM    Atrial Rate 90 BPM    P-R Interval 170 ms    QRS Duration 80 ms    Q-T Interval 360 ms    QTC Calculation(Bazett) 440 ms    P Axis 84 degrees    R Axis -70 degrees    T Axis 77 degrees    Diagnosis Line Normal sinus rhythm  Left anterior fascicular block  Septal infarct , age undetermined  Abnormal ECG    < end of copied text >      PROTEIN CALORIE MALNUTRITION PRESENT: [ ]mild [ ]moderate [ ]severe [ ]underweight [ ]morbid obesity  https://www.andeal.org/vault/2440/web/files/ONC/Table_Clinical%20Characteristics%20to%20Document%20Malnutrition-White%20JV%20et%20al%545663.pdf    Height (cm): 154.9 (05-07-24 @ 00:20)  Weight (kg): 47 (05-07-24 @ 00:20)  BMI (kg/m2): 19.6 (05-07-24 @ 00:20)  [ ]PPSV2 < or = to 30% [ ]significant weight loss  [ ]poor nutritional intake  [ ]anasarca      [ ]Artificial Nutrition      Palliative Care Spiritual/Emotional Screening Tool Question  Severity (0-4):                    OR                    [ x] Unable to determine. Will assess at later time if appropriate.  Score of 2 or greater indicates recommendation of Chaplaincy and/or SW referral  Chaplaincy Referral: [ ] Yes [ ] Refused [ ] Following     Caregiver [place]:  [ ] Yes [ ] No    OR    [x ] Unable to determine. Will assess at later time if appropriate.  Social Work Referral [ ]  Patient and Family Centered Care Referral [ ]    Anticipatory Grief Present: [ ] Yes [ ] No    OR     [ x] Unable to determine. Will assess at later time if appropriate.  Social Work Referral [ ]  Patient and Family Centered Care Referral [ ]    Patient discussed with primary medical team MD  Palliative care education provided to patient and/or family

## 2024-05-23 NOTE — PROGRESS NOTE ADULT - SUBJECTIVE AND OBJECTIVE BOX
Patient is a 73y old  Female who presents with a chief complaint of COPD exacerbation (22 May 2024 15:06)        Over Night Events:    Is on 50/50  Did not adhere to NIV overnight despite encouragement.   afebrile    ROS:  See HPI    PHYSICAL EXAM    ICU Vital Signs Last 24 Hrs  T(C): 36.5 (23 May 2024 04:00), Max: 36.7 (22 May 2024 12:00)  T(F): 97.7 (23 May 2024 04:00), Max: 98.1 (22 May 2024 12:00)  HR: 90 (23 May 2024 07:00) (80 - 98)  BP: 128/69 (23 May 2024 07:00) (103/56 - 131/69)  BP(mean): 93 (23 May 2024 07:00) (73 - 95)  ABP: --  ABP(mean): --  RR: 37 (23 May 2024 07:30) (19 - 37)  SpO2: 93% (23 May 2024 07:30) (91% - 98%)    O2 Parameters below as of 23 May 2024 07:30  Patient On (Oxygen Delivery Method): nasal cannula, high flow  O2 Flow (L/min): 50  O2 Concentration (%): 50        05-22-24 @ 07:01  -  05-23-24 @ 07:00  --------------------------------------------------------  IN:    IV PiggyBack: 200 mL    Oral Fluid: 1730 mL  Total IN: 1930 mL    OUT:    Voided (mL): 1750 mL  Total OUT: 1750 mL    Total NET: 180 mL            CONSTITUTIONAL:  in NAD    ENT:   Airway patent,   No thrush    EYES:   Clear bilaterally,   pupils equal,   round and reactive to light.    CARDIAC:   Normal rate,   regular rhythm.    no edema      CAROTID:   normal systolic impulse  no bruits    RESPIRATORY:   decreased left breath sounds  No wheezing  Normal chest expansion  Not tachypneic,  No use of accessory muscles    GASTROINTESTINAL:  Abdomen soft,   non-tender,   no guarding,   + BS    MUSCULOSKELETAL:   range of motion is not limited,  no clubbing, cyanosis    NEUROLOGICAL:   Alert and oriented   no motor deficits.        LABS:                            8.6    12.17 )-----------( 244      ( 23 May 2024 04:43 )             27.4                                               05-23    136  |  97<L>  |  17  ----------------------------<  105<H>  3.5   |  35<H>  |  <0.5<L>    Ca    8.2<L>      23 May 2024 04:43  Phos  2.7     05-22  Mg     1.8     05-23    TPro  4.5<L>  /  Alb  3.0<L>  /  TBili  0.3  /  DBili  x   /  AST  8   /  ALT  38  /  AlkPhos  80  05-23                                             Urinalysis Basic - ( 23 May 2024 04:43 )    Color: x / Appearance: x / SG: x / pH: x  Gluc: 105 mg/dL / Ketone: x  / Bili: x / Urobili: x   Blood: x / Protein: x / Nitrite: x   Leuk Esterase: x / RBC: x / WBC x   Sq Epi: x / Non Sq Epi: x / Bacteria: x                                                  LIVER FUNCTIONS - ( 23 May 2024 04:43 )  Alb: 3.0 g/dL / Pro: 4.5 g/dL / ALK PHOS: 80 U/L / ALT: 38 U/L / AST: 8 U/L / GGT: x                    Procalcitonin: 0.11 ng/mL (05-17-24 @ 10:45)  D-Dimer Assay, Quantitative: 402 ng/mL DDU (05-14-24 @ 11:50)  Procalcitonin: 0.10 ng/mL (05-14-24 @ 10:06)  D-Dimer Assay, Quantitative: 1459 ng/mL DDU (05-07-24 @ 04:19)                                                                                                                             MEDICATIONS  (STANDING):  albuterol    90 MICROgram(s) HFA Inhaler 2 Puff(s) Inhalation every 6 hours  albuterol/ipratropium for Nebulization 3 milliLiter(s) Nebulizer every 6 hours  carbamide peroxide Otic Solution 1 Drop(s) Both Ears two times a day  chlorhexidine 2% Cloths 1 Application(s) Topical <User Schedule>  enoxaparin Injectable 40 milliGRAM(s) SubCutaneous every 24 hours  methylPREDNISolone sodium succinate Injectable 20 milliGRAM(s) IV Push daily  pantoprazole    Tablet 40 milliGRAM(s) Oral before breakfast  polyethylene glycol 3350 17 Gram(s) Oral two times a day  saccharomyces boulardii 250 milliGRAM(s) Oral two times a day  senna 2 Tablet(s) Oral at bedtime  sodium chloride 3%  Inhalation 4 milliLiter(s) Inhalation every 6 hours    MEDICATIONS  (PRN):  bisacodyl Suppository 10 milliGRAM(s) Rectal daily PRN Constipation  hydrOXYzine hydrochloride 25 milliGRAM(s) Oral two times a day PRN Anxiety  lactulose Syrup 10 Gram(s) Oral every 6 hours PRN constipation  ondansetron Injectable 4 milliGRAM(s) IV Push once PRN Nausea and/or Vomiting  sodium chloride 0.65% Nasal 1 Spray(s) Both Nostrils three times a day PRN Nasal Congestion      Xrays:                        chest XRAY reviewed, improvement in the left upper lobe consolidation, same left lower lobe whiteout.                                                             ECHO

## 2024-05-23 NOTE — PROGRESS NOTE ADULT - ASSESSMENT
IMPRESSION:    Acute hypoxemic respiratory failure improving  Acute on chronic hypercapnic resp failure  COPD exacerbation   Left lung atelectasis   Lung mass possibly malignant    Superimposed pneumonia  Left sided effusion s/p thoracentesis negative for malignant cells    PLAN:    CNS: Avoid CNS depressant.  Pain control PRN.  Palliative reporting full code, but our team's discussion shows DNR/DNI, discussed with palliative that their documentation is incorrect.    HEENT: oral care.    PULMONARY:   keep pox 88 to 92%M, Albuterol Q6 hrs and prn , Solumedrol 20 daily for now, wean in coming days.   Pulmonary toilet: albuterol/saline nebs, IPV, incentive spirometry, ambulate as tolerated, chest PT, frequent suctioning  follow BAL, (-) so far    CARDIOVASCULAR: TTE noted with 70-75%. Keep is = os  . BNP noted    GI: GI prophylaxis while on high-dose steroid.     Advance diet as tolerated.    not adherent to bowel regimen today, had BM last night, simethicone for abdominal cramps.    RENAL: follow lytes.  Correct as needed     INFECTIOUS DISEASE: continue abx.  Procal noted.    HEMATOLOGICAL:  DVT prophylaxis. Dimer noted. LE duplex negative.       ENDOCRINE:  Follow up FS.  Insulin protocol if needed.      MSK: OOBAT    SDU   DNR/DNI, continue palliative follow-up  PIV

## 2024-05-23 NOTE — PROGRESS NOTE ADULT - ASSESSMENT
IMPRESSION:    Acute hypoxemic respiratory failure improving  Acute on chronic hypercapnic resp failure  COPD exacerbation   Left lung atelectasis   Lung mass possibly malignant    Superimposed pneumonia  Left sided effusion s/p thoracentesis negative for malignant cells    PLAN:    CNS: Avoid CNS depressant.  Pain control PRN.    HEENT: oral care.    PULMONARY:   keep pox 88 to 92%M, Albuterol Q6 hrs and prn , Solumedrol 20 daily for now, wean to 10mg in 2 more days.  Pulmonary toilet: albuterol/saline nebs, IPV, incentive spirometry, ambulate as tolerated, chest PT, frequent suctioning  follow BAL, (-) so far  Despite therapeutic bronch, patient had repeat plug immediately, will discuss possible repeat with family    CARDIOVASCULAR: TTE noted with 70-75%. Keep is = os  . BNP noted    GI: GI prophylaxis while on high-dose steroid.     Advance diet as tolerated.    not adherent to bowel regimen today, had BM last night, simethicone for abdominal cramps.    RENAL: follow lytes.  Correct as needed     INFECTIOUS DISEASE: Completed 5-day course of abx.  Monitor off Abx.    HEMATOLOGICAL:  DVT prophylaxis. Dimer noted. LE duplex negative.       ENDOCRINE:  Follow up FS.  Insulin protocol if needed.      MSK: OOBAT    SDU   DNR/DNI, continue palliative follow-up  PIV

## 2024-05-23 NOTE — PROGRESS NOTE ADULT - ASSESSMENT
IMPRESSION:  Acute hypoxemic respiratory failure   Acute on chronic hypercapnic resp failure  COPD exacerbation   Left lung atelectasis   Lung mass possibly malignant    Superimposed pneumonia  Left sided effusion s/p thoracentesis negative for malignant cells    PLAN:    CNS: Avoid CNS depressant.  Sedation for comfort as needed.    HEENT: oral care. ETT care.  Patient intubated for 4 days between  05/17 and 05/20 --> patient is day 2 post extubation  ENT evaluated for hearing loss --> impacted cerumen --> patient refused Debrox    PULMONARY:   Keep pox 88 to 92%M, Albuterol Q6 hrs and PRN  Solumedrol 40 daily --> decreased to 20 mg daily starting 05/23   Chest pt   frequent suctioning --> blood clots removed  follow BAL, (-) so far   New complete opacification of the left hemithorax, possibly reflecting bronchial obstruction related to mucous plug.    CARDIOVASCULAR: TTE noted with 70-75%. Keep is = os  . BNP noted    GI: GI prophylaxis. Bowel regimen increased with Lactulose. Will resume oral feeds    RENAL: follow lytes.  Correct as needed     INFECTIOUS DISEASE:   On Zosyn since 05/17 - continue for a total of 7 days .  Procal noted.   F/U BAL cx  F/U cytology     HEMATOLOGICAL:  DVT prophylaxis. Dimer noted. LE duplex negative.       ENDOCRINE:  Follow up FS.  Insulin protocol if needed.    MSK: offloading    CODE: DNR/DNI TRIAL NIV with palliative follow-up    nirmal GUSMAN    Disposition: Downgrade to SDU

## 2024-05-23 NOTE — PROGRESS NOTE ADULT - ASSESSMENT
73yFemale with history of COPD, HTN, active smoker presents with SOB and hypoxia. Patient found to have likely COPD exacerbation on arrival with new lung mass concerned for malignancy. Palliative care consulted for GOC.    Spoke with patient and family at bedside. Patient denied any pain, shortness of breath, or discomfort at this time. States was able to walk around in the room a little and felt a lot better after that.     Education about palliative care provided to patient/family.  See Recs below.    Please call [phone] with questions or concerns 24/7.   We will continue to follow.

## 2024-05-23 NOTE — PROGRESS NOTE ADULT - SUBJECTIVE AND OBJECTIVE BOX
Patient is a 73y old  Female who presents with a chief complaint of COPD exacerbation (23 May 2024 13:22)        Over Night Events:    HFNC decreased to 40%/50L      ROS:     All ROS are negative except HPI         PHYSICAL EXAM    ICU Vital Signs Last 24 Hrs  T(C): 36.8 (24 May 2024 04:00), Max: 36.8 (23 May 2024 20:00)  T(F): 98.2 (24 May 2024 04:00), Max: 98.2 (23 May 2024 20:00)  HR: 92 (24 May 2024 07:00) (85 - 105)  BP: 109/63 (24 May 2024 07:00) (98/58 - 127/67)  BP(mean): 80 (24 May 2024 07:00) (73 - 92)  ABP: --  ABP(mean): --  RR: 26 (24 May 2024 07:00) (21 - 42)  SpO2: 95% (24 May 2024 07:00) (92% - 98%)    O2 Parameters below as of 24 May 2024 07:00  Patient On (Oxygen Delivery Method): nasal cannula, high flow  O2 Flow (L/min): 50  O2 Concentration (%): 40        Constitutional: no acute distress, well nourished well developed  Neuro: moving all 4 limbs spontaneously, no facial droop or dysarthria  HEENT: NCAT, anicteric  Neck: no visible lymphadenopathy or goiter  Pulm: no respiratory distress. clear to auscultation bilaterally  Cardiac: extremities appear pink and well-perfused.  regular rhythm and rate, no murmur detected  Abdomen: non-distended  Extremities: no peripheral edema      05-23-24 @ 07:01  -  05-24-24 @ 07:00  --------------------------------------------------------  IN:    Oral Fluid: 1850 mL  Total IN: 1850 mL    OUT:    Incontinent per Collection Bag (mL): 1 mL    Voided (mL): 850 mL  Total OUT: 851 mL    Total NET: 999 mL          LABS:                            8.7    12.59 )-----------( 248      ( 24 May 2024 04:43 )             28.8                                               05-24    139  |  99  |  19  ----------------------------<  107<H>  3.9   |  35<H>  |  <0.5<L>    Ca    8.1<L>      24 May 2024 04:43  Mg     1.8     05-24    TPro  4.6<L>  /  Alb  2.9<L>  /  TBili  0.3  /  DBili  x   /  AST  7   /  ALT  31  /  AlkPhos  82  05-24                                             Urinalysis Basic - ( 24 May 2024 04:43 )    Color: x / Appearance: x / SG: x / pH: x  Gluc: 107 mg/dL / Ketone: x  / Bili: x / Urobili: x   Blood: x / Protein: x / Nitrite: x   Leuk Esterase: x / RBC: x / WBC x   Sq Epi: x / Non Sq Epi: x / Bacteria: x                                                  LIVER FUNCTIONS - ( 24 May 2024 04:43 )  Alb: 2.9 g/dL / Pro: 4.6 g/dL / ALK PHOS: 82 U/L / ALT: 31 U/L / AST: 7 U/L / GGT: x                                                                                                                                       MEDICATIONS  (STANDING):  albuterol    90 MICROgram(s) HFA Inhaler 2 Puff(s) Inhalation every 6 hours  albuterol/ipratropium for Nebulization 3 milliLiter(s) Nebulizer every 6 hours  carbamide peroxide Otic Solution 1 Drop(s) Both Ears two times a day  chlorhexidine 2% Cloths 1 Application(s) Topical <User Schedule>  enoxaparin Injectable 40 milliGRAM(s) SubCutaneous every 24 hours  methylPREDNISolone sodium succinate Injectable 20 milliGRAM(s) IV Push daily  pantoprazole    Tablet 40 milliGRAM(s) Oral before breakfast  polyethylene glycol 3350 17 Gram(s) Oral two times a day  saccharomyces boulardii 250 milliGRAM(s) Oral two times a day  senna 2 Tablet(s) Oral at bedtime  sodium chloride 3%  Inhalation 4 milliLiter(s) Inhalation every 6 hours    MEDICATIONS  (PRN):  bisacodyl Suppository 10 milliGRAM(s) Rectal daily PRN Constipation  hydrOXYzine hydrochloride 25 milliGRAM(s) Oral two times a day PRN Anxiety  lactulose Syrup 10 Gram(s) Oral every 6 hours PRN constipation  ondansetron Injectable 4 milliGRAM(s) IV Push once PRN Nausea and/or Vomiting  sodium chloride 0.65% Nasal 1 Spray(s) Both Nostrils three times a day PRN Nasal Congestion      New X-rays reviewed:       ECHO reviewed    CXR interpreted by me:    5/24  images and radiologist read reviewed, by my read demonstrating worsened left sided unilateral whiteout with volume loss

## 2024-05-24 NOTE — PROGRESS NOTE ADULT - SUBJECTIVE AND OBJECTIVE BOX
Patient is a 73y old  Female who presents with a chief complaint of COPD exacerbation (23 May 2024 13:22)    HPI:  73-year-old female with past medical history of COPD not on home oxygen, chronic smoker >60ppy, hypertension who presents for shortness of breath x few weeks.  Pt reports worsening productive cough and shortness of breath with wheezing for the past 1 week.  Increased work of breathing today, was satting 70% on room air.  Was placed on nonrebreather by EMS.  As per son at the bedside pt was confused in the morning, wasn't aware of the surrounding.  She also admits to hemoptysis  along with weight loss over the past few months.  Admits to fever and chills, and denies chest pain, palpitations, nausea, vomiting, diarrhea, abdominal pain, urinary symptoms, weakness, numbness, falls, recent travel, recent surgeries.  Not on anticoagulation.  Denies substance use and alcohol use.    In ED  VT bp 143/80, , RR 28, T 99.4 F, SpO2 98% on NRB 15 L  Labs showed Lac 2.3, trop 26, BNP 2044, VBG  pH 7.18 PCo2 102 HCO3 38, O2 sat 46%    s/p IV solumedrol, IV Mag, Nebs and Albuterol in ED.  Pt was placed on BIPAP and admitted to MICU for further managements.   (06 May 2024 23:30)     INTERVAL HPI/OVERNIGHT EVENTS:   No overnight events   Afebrile, hemodynamically stable   Adequate appetite. Not tolerating BiPAP at night    Subjective:    ICU Vital Signs Last 24 Hrs  T(C): 36.8 (24 May 2024 04:00), Max: 36.8 (23 May 2024 20:00)  T(F): 98.2 (24 May 2024 04:00), Max: 98.2 (23 May 2024 20:00)  HR: 100 (24 May 2024 10:00) (87 - 105)  BP: 118/62 (24 May 2024 09:00) (102/72 - 118/62)  BP(mean): 84 (24 May 2024 09:00) (74 - 86)  RR: 28 (24 May 2024 10:00) (21 - 42)  SpO2: 94% (24 May 2024 09:00) (92% - 98%)    O2 Parameters below as of 24 May 2024 08:00  Patient On (Oxygen Delivery Method): nasal cannula, high flow  O2 Flow (L/min): 50  O2 Concentration (%): 40      I&O's Summary    23 May 2024 07:01  -  24 May 2024 07:00  --------------------------------------------------------  IN: 1850 mL / OUT: 851 mL / NET: 999 mL    MEDICATIONS  (STANDING):  albuterol    90 MICROgram(s) HFA Inhaler 2 Puff(s) Inhalation every 6 hours  albuterol/ipratropium for Nebulization 3 milliLiter(s) Nebulizer every 6 hours  carbamide peroxide Otic Solution 1 Drop(s) Both Ears two times a day  chlorhexidine 2% Cloths 1 Application(s) Topical <User Schedule>  enoxaparin Injectable 40 milliGRAM(s) SubCutaneous every 24 hours  pantoprazole    Tablet 40 milliGRAM(s) Oral before breakfast  polyethylene glycol 3350 17 Gram(s) Oral two times a day  saccharomyces boulardii 250 milliGRAM(s) Oral two times a day  senna 2 Tablet(s) Oral at bedtime  sodium chloride 3%  Inhalation 4 milliLiter(s) Inhalation every 6 hours    MEDICATIONS  (PRN):  bisacodyl Suppository 10 milliGRAM(s) Rectal daily PRN Constipation  hydrOXYzine hydrochloride 25 milliGRAM(s) Oral two times a day PRN Anxiety  lactulose Syrup 10 Gram(s) Oral every 6 hours PRN constipation  ondansetron Injectable 4 milliGRAM(s) IV Push once PRN Nausea and/or Vomiting  sodium chloride 0.65% Nasal 1 Spray(s) Both Nostrils three times a day PRN Nasal Congestion    PHYSICAL EXAM:  GENERAL: Atraumatic, Normocephalic    EYES: EOMI, PERRLA   NECK: Supple, No JVD   CHEST/LUNG: Decreased lung sounds on the left  HEART: S1S2 normal, no S3   ABDOMEN: Soft, Nontender, Nondistended   EXTREMITIES:  2+ Peripheral Pulses, No edema  SKIN: No rashes or lesions    LABS:                        8.7    12.59 )-----------( 248      ( 24 May 2024 04:43 )             28.8     05-24    139  |  99  |  19  ----------------------------<  107<H>  3.9   |  35<H>  |  <0.5<L>    Ca    8.1<L>      24 May 2024 04:43  Mg     1.8     05-24    TPro  4.6<L>  /  Alb  2.9<L>  /  TBili  0.3  /  DBili  x   /  AST  7   /  ALT  31  /  AlkPhos  82  05-24    LIVER FUNCTIONS - ( 24 May 2024 04:43 )  Alb: 2.9 g/dL / Pro: 4.6 g/dL / ALK PHOS: 82 U/L / ALT: 31 U/L / AST: 7 U/L / GGT: x           Urinalysis Basic - ( 24 May 2024 04:43 )    Color: x / Appearance: x / SG: x / pH: x  Gluc: 107 mg/dL / Ketone: x  / Bili: x / Urobili: x   Blood: x / Protein: x / Nitrite: x   Leuk Esterase: x / RBC: x / WBC x   Sq Epi: x / Non Sq Epi: x / Bacteria: x

## 2024-05-24 NOTE — PROGRESS NOTE ADULT - ASSESSMENT
IMPRESSION:  Acute hypoxemic respiratory failure   Acute on chronic hypercapnic respiratory failure  COPD exacerbation   Left lung atelectasis   Lung mass possibly malignant    Superimposed pneumonia  Left sided effusion s/p thoracentesis negative for malignant cells    PLAN:    CNS: Avoid CNS depressant.  Sedation for comfort as needed.    HEENT: oral care. ETT care.  Patient intubated for 4 days between  05/17 and 05/20 --> patient is day 2 post extubation  ENT evaluated for hearing loss --> impacted cerumen --> patient refused Debrox    PULMONARY:   Keep pox 88 to 92%M, Albuterol Q6 hrs and PRN  Solumedrol 40 daily --> decreased to 20 mg daily starting 05/23   Chest pt   frequent suctioning --> blood clots removed  follow BAL, (-) so far   New complete opacification of the left hemithorax, possibly reflecting bronchial obstruction related to mucous plug.    CARDIOVASCULAR: TTE noted with 70-75%. Keep is = os  . BNP noted    GI: GI prophylaxis. Bowel regimen increased with Lactulose. Will resume oral feeds    RENAL: follow lytes.  Correct as needed     INFECTIOUS DISEASE:   On Zosyn since 05/17 - continue for a total of 7 days .  Procal noted.   F/U BAL cx  F/U cytology     HEMATOLOGICAL:  DVT prophylaxis. Dimer noted. LE duplex negative.       ENDOCRINE:  Follow up FS.  Insulin protocol if needed.    MSK: offloading    CODE: DNR/DNI TRIAL NIV with palliative follow-up    nirmal GUSMAN    Disposition: Downgrade to SDU

## 2024-05-25 NOTE — PROGRESS NOTE ADULT - SUBJECTIVE AND OBJECTIVE BOX
SUBJECTIVE:    Patient is a 73y old Female who presents with a chief complaint of COPD exacerbation (25 May 2024 07:28)    Currently admitted to medicine with the primary diagnosis of COPD exacerbation       Today is hospital day 19d. This morning she is resting comfortably in bed and reports no new issues or overnight events.     PAST MEDICAL & SURGICAL HISTORY  Chronic obstructive pulmonary disease, unspecified COPD type    Hypertension    No significant past surgical history      SOCIAL HISTORY:  Negative for smoking/alcohol/drug use.     ALLERGIES:  No Known Allergies    MEDICATIONS:  STANDING MEDICATIONS  albuterol    90 MICROgram(s) HFA Inhaler 2 Puff(s) Inhalation every 6 hours  albuterol/ipratropium for Nebulization 3 milliLiter(s) Nebulizer every 6 hours  carbamide peroxide Otic Solution 1 Drop(s) Both Ears two times a day  chlorhexidine 2% Cloths 1 Application(s) Topical <User Schedule>  enoxaparin Injectable 40 milliGRAM(s) SubCutaneous every 24 hours  pantoprazole    Tablet 40 milliGRAM(s) Oral before breakfast  polyethylene glycol 3350 17 Gram(s) Oral two times a day  saccharomyces boulardii 250 milliGRAM(s) Oral two times a day  senna 2 Tablet(s) Oral at bedtime  sodium chloride 3%  Inhalation 4 milliLiter(s) Inhalation every 6 hours    PRN MEDICATIONS  bisacodyl Suppository 10 milliGRAM(s) Rectal daily PRN  hydrOXYzine hydrochloride 25 milliGRAM(s) Oral two times a day PRN  lactulose Syrup 10 Gram(s) Oral every 6 hours PRN  ondansetron Injectable 4 milliGRAM(s) IV Push once PRN  sodium chloride 0.65% Nasal 1 Spray(s) Both Nostrils three times a day PRN    VITALS:   T(F): 98.2  HR: 87  BP: 105/59  RR: 20  SpO2: 99%    LABS:                        8.2    13.41 )-----------( 306      ( 25 May 2024 04:45 )             27.4     05-25    141  |  100  |  19  ----------------------------<  99  4.0   |  35<H>  |  <0.5<L>    Ca    8.3<L>      25 May 2024 04:45  Phos  2.6     05-25  Mg     1.8     05-25    TPro  4.7<L>  /  Alb  3.0<L>  /  TBili  0.2  /  DBili  x   /  AST  6   /  ALT  27  /  AlkPhos  94  05-25      Urinalysis Basic - ( 25 May 2024 04:45 )    Color: x / Appearance: x / SG: x / pH: x  Gluc: 99 mg/dL / Ketone: x  / Bili: x / Urobili: x   Blood: x / Protein: x / Nitrite: x   Leuk Esterase: x / RBC: x / WBC x   Sq Epi: x / Non Sq Epi: x / Bacteria: x                RADIOLOGY:    PHYSICAL EXAM:  GEN: No acute distress  LUNGS: Clear to auscultation bilaterally   HEART: S1/S2 present. RRR.   ABD: Soft, non-tender, non-distended. Bowel sounds present  EXT: NC/NC/NE/2+PP/ACEVES/Skin Intact.   NEURO: AAOX3

## 2024-05-25 NOTE — PROGRESS NOTE ADULT - ASSESSMENT
73-year-old female with past medical history of COPD not on home oxygen, chronic smoker >60ppy, hypertension who presents for shortness of breath x few weeks.  Pt reports worsening productive cough and shortness of breath with wheezing for the past 1 week.  Increased work of breathing , was satting 70% on room air.  Was placed on nonrebreather by EMS.  As per son at the bedside pt was confused in the morning, wasn't aware of the surrounding.  She also admits to hemoptysis  along with weight loss over the past few months.  Admits to fever and chills, and denies chest pain, palpitations, nausea, vomiting, diarrhea, abdominal pain, urinary symptoms, weakness, numbness, falls, recent travel, recent surgeries.  Not on anticoagulation.  Denies substance use and alcohol use.  Pt was placed on BIPAP and admitted to CEU Course:  Patient often refuses BiPAP despite extensive counseling and encouragement (gets anxious, refuses antianxiety or pain medication), however no changes in mental status. Solumedrol 60mg q12h.   S/P Diagnostic thoracentesis done on 5/10, f/u cytology. ID is following, on cefepime and levaquin for post-obstructive pneumonia.   -Patient defers conversations regarding possible cancer diagnosis to her children. They state there is a possibility she may decline cancer treatment and opt for hospice.   Pt was downgraded to medical floor, became  tachypneic, anxious appearing.  O2 sat ranged from 78%-82% on 6L nasal cannula, patient was placed on non rebreather mask 15L and was still satting in the mid 80's.  Patient declines BIPAP as she feels she cannot breath with the mask on.  High Flow nasal cannula placed with improvement in oxygenation to 90%.  Chest x-ray demonstrated worsening left sided pneumonia and ABG pH 7.4, CO2 77, lactate 1.2.  Discussed goals of care with patient's children who stated that this time patient is full code. Discussed with crit fellow who will see patient and recommended upgrade to stepdown, but shortly after her  Chest xray now shows complete opacification of left lung, with increasing HFNC requirements. Patient was upgraded to ICU for intubation and bronch mohinder. Low threshold for intubation if she decompensates.  Patient intubated on 05/17 for bronchoscopy: multiple blood clots, BAL showed rare yeast  Extubated on 05/20, on high flow  Patient on high flow since, poor compliance with BiPAP  Pt is now  DNR/DNI.     A/P   # Acute on chronic hypoxemic respiratory failure improving/ COPD exacerbation Left lung atelectasis /Lung mass possibly malignant /Superimposed pneumonia/ Left sided effusion  - clinically improved stable on high flow oxygen now, will taper off as tolerated   - supportive care, pulmonary toilet  - nebs Q 6 hours, aspiration precautions   -s/p thoracentesis negative for malignant cells, lung mass is highly suspicious for advanced malignancy , pt is not considering clinical investigation or cancer directed treatement considering hospice   - completed course of Abx for PNA  - pulmonary is following, recommendations noted   - s/p bronch , aspirate growing few yeasts, pt needs ID follow up     # clogged ears  - start Debrox and call ENT for vax removal     # HTN  - DASH diet   - start Lisinopril 5 mg Q 24 hours     # h/o Hemoptysis/ Anemia   - hemoptysis resolved now   - monitor H/H, keep Hb above 7.5   - send anemia work up   - no active bleeding noted now     # Anxiety  - I offered medications , pt declined     # Severe malnutrition   - Ensure with meals   - high calories diet   - consult dietitian     # contraption   - high fiber diet   - c/w Laxatives     # GI/ DVT prophylaxis     Overall prognosis is very poor, pt is DNR/ DNI, palliative care is following     # dispo: SDU

## 2024-05-25 NOTE — PROGRESS NOTE ADULT - SUBJECTIVE AND OBJECTIVE BOX
73-year-old female with past medical history of COPD not on home oxygen, chronic smoker >60ppy, hypertension who presents for shortness of breath x few weeks.  Pt reports worsening productive cough and shortness of breath with wheezing for the past 1 week.  Increased work of breathing , was satting 70% on room air.  Was placed on nonrebreather by EMS.  As per son at the bedside pt was confused in the morning, wasn't aware of the surrounding.  She also admits to hemoptysis  along with weight loss over the past few months.  Admits to fever and chills, and denies chest pain, palpitations, nausea, vomiting, diarrhea, abdominal pain, urinary symptoms, weakness, numbness, falls, recent travel, recent surgeries.  Not on anticoagulation.  Denies substance use and alcohol use.  Pt was placed on BIPAP and admitted to CEU Course:  Patient often refuses BiPAP despite extensive counseling and encouragement (gets anxious, refuses antianxiety or pain medication), however no changes in mental status. Solumedrol 60mg q12h.   S/P Diagnostic thoracentesis done on 5/10, f/u cytology. ID is following, on cefepime and levaquin for post-obstructive pneumonia.   -Patient defers conversations regarding possible cancer diagnosis to her children. They state there is a possibility she may decline cancer treatment and opt for hospice.   Pt was downgraded to medical floor, became  tachypneic, anxious appearing.  O2 sat ranged from 78%-82% on 6L nasal cannula, patient was placed on non rebreather mask 15L and was still satting in the mid 80's.  Patient declines BIPAP as she feels she cannot breath with the mask on.  High Flow nasal cannula placed with improvement in oxygenation to 90%.  Chest x-ray demonstrated worsening left sided pneumonia and ABG pH 7.4, CO2 77, lactate 1.2.  Discussed goals of care with patient's children who stated that this time patient is full code. Discussed with crit fellow who will see patient and recommended upgrade to stepdown, but shortly after her  Chest xray now shows complete opacification of left lung, with increasing HFNC requirements. Patient was upgraded to ICU for intubation and bronch mohinder. Low threshold for intubation if she decompensates.  Patient intubated on 05/17 for bronchoscopy: multiple blood clots, BAL showed rare yeast  Extubated on 05/20, on high flow  Patient on high flow since, poor compliance with BiPAP  Pt is now  DNR/DNI.   Today pt is comfortable on high flow oxygen, speaking full sentences denies chest pain, has no specific complaints.     PAST MEDICAL & SURGICAL HISTORY:  Chronic obstructive pulmonary disease, unspecified COPD type      Hypertension      No significant past surgical history      Vital Signs Last 24 Hrs  T(C): 36.8 (25 May 2024 12:18), Max: 37.3 (25 May 2024 00:06)  T(F): 98.2 (25 May 2024 12:18), Max: 99.1 (25 May 2024 00:06)  HR: 87 (25 May 2024 12:18) (74 - 109)  BP: 105/59 (25 May 2024 12:18) (97/60 - 121/66)  BP(mean): 77 (25 May 2024 12:18) (74 - 88)  RR: 20 (25 May 2024 12:18) (20 - 33)  SpO2: 99% (25 May 2024 12:18) (94% - 99%)    Parameters below as of 25 May 2024 12:18  Patient On (Oxygen Delivery Method): nasal cannula, high flow      PHYSICAL EXAM:  GENERAL: NAD, frail elderly female   HEAD:  Atraumatic, Normocephalic  NECK: Supple, No JVD, Normal thyroid  NERVOUS SYSTEM:  Alert & Oriented X3, Good concentration; Motor Strength 5/5 B/L upper and lower extremities; DTRs 2+ intact and symmetric  CHEST/LUNG: decreased BS, poor air entry, shallow breathing , no wheezing   HEART: Regular rate and rhythm; No murmurs, rubs, or gallops  ABDOMEN: Soft, Nontender, Nondistended; Bowel sounds present  EXTREMITIES:  muscle atrophy noted on LE       LABS:                        8.2    13.41 )-----------( 306      ( 25 May 2024 04:45 )             27.4     05-25    141  |  100  |  19  ----------------------------<  99  4.0   |  35<H>  |  <0.5<L>    Ca    8.3<L>      25 May 2024 04:45  Phos  2.6     05-25  Mg     1.8     05-25    TPro  4.7<L>  /  Alb  3.0<L>  /  TBili  0.2  /  DBili  x   /  AST  6   /  ALT  27  /  AlkPhos  94  05-25      Urinalysis Basic - ( 25 May 2024 04:45 )    Color: x / Appearance: x / SG: x / pH: x  Gluc: 99 mg/dL / Ketone: x  / Bili: x / Urobili: x   Blood: x / Protein: x / Nitrite: x   Leuk Esterase: x / RBC: x / WBC x   Sq Epi: x / Non Sq Epi: x / Bacteria: x      Culture - Fungal, Bronchial (05.17.24 @ 12:06)   Specimen Source: .Bronchial None  Culture Results:   Rare Yeast    Final Diagnosis   PLEURAL FLUID (THINPREP AND CELL BLOCK):   - NEGATIVE FOR MALIGNANT CELLS   - Rare benign mesothelial cells in proteinaceous material with   scattered chronicinflammatory cells       RADIOLOGY & ADDITIONAL TESTS:    < from: Xray Chest 1 View- PORTABLE-Routine (Xray Chest 1 View- PORTABLE-Routine in AM.) (05.25.24 @ 06:14) >  Findings/  impression:      Support devices: None    Cardiac/ Mediastinum: Shifted to the left due to left lung atelectasis.    Lungs/ Pleura: Stable complete opacification left hemithorax.   Hyperinflation right lung with increased peribronchial opacities.    Skeletal/ soft tissues: Stable      < from: Xray Kidney Ureter Bladder (05.22.24 @ 07:58) >  IMPRESSION:  No significant interval change.      < from: TTE Echo Complete w/o Contrast w/ Doppler (05.07.24 @ 09:58) >  Summary:   1. Hyperdynamic global left ventricular systolic function with a   visually estimated EF of 70-75%. No regional wall motion abnormalities.   2. Normal right ventricular size with mildly reduced RV systolic   function.   3. Normal left atrial size.   4. Sclerotic aortic valve with normal opening.   5. Mild tricuspid regurgitation.   6. No echocardiographic evidence of pulmonary hypertension. The IVC is   normal in size with <50% respiratory variability indicating mildly   increased right atrial pressure.   7. Trivial pericardial effusion.   8. No prior TTE is available for comparison.    MEDICATIONS  (STANDING):  albuterol    90 MICROgram(s) HFA Inhaler 2 Puff(s) Inhalation every 6 hours  albuterol/ipratropium for Nebulization 3 milliLiter(s) Nebulizer every 6 hours  carbamide peroxide Otic Solution 1 Drop(s) Both Ears two times a day  chlorhexidine 2% Cloths 1 Application(s) Topical <User Schedule>  enoxaparin Injectable 40 milliGRAM(s) SubCutaneous every 24 hours  pantoprazole    Tablet 40 milliGRAM(s) Oral before breakfast  polyethylene glycol 3350 17 Gram(s) Oral two times a day  saccharomyces boulardii 250 milliGRAM(s) Oral two times a day  senna 2 Tablet(s) Oral at bedtime  sodium chloride 3%  Inhalation 4 milliLiter(s) Inhalation every 6 hours    MEDICATIONS  (PRN):  bisacodyl Suppository 10 milliGRAM(s) Rectal daily PRN Constipation  hydrOXYzine hydrochloride 25 milliGRAM(s) Oral two times a day PRN Anxiety  lactulose Syrup 10 Gram(s) Oral every 6 hours PRN constipation  ondansetron Injectable 4 milliGRAM(s) IV Push once PRN Nausea and/or Vomiting  sodium chloride 0.65% Nasal 1 Spray(s) Both Nostrils three times a day PRN Nasal Congestion

## 2024-05-25 NOTE — PROGRESS NOTE ADULT - ASSESSMENT
IMPRESSION:  Acute hypoxemic respiratory failure on HHFNC  Acute on chronic hypercapnic resp failure  COPD exacerbation   Left lung atelectasis   Lung mass possibly malignant    Superimposed pneumonia  Left sided effusion s/p thoracentesis negative for malignant cells    PLAN:    CNS: Avoid CNS depressant.      HEENT: oral care.   Patient intubated for 4 days between  05/17 and 05/20       PULMONARY:   Keep pox 88 to 92%M, Albuterol Q6 hrs and PRN  Taper steroids  Chest pt   frequent suctioning --> blood clots removed  follow BAL, (-) so far   chest PT, IPv    CARDIOVASCULAR: TTE noted with 70-75%. Keep is = os  . BNP noted    GI: GI prophylaxis. Bowel regimen increased with Lactulose. W oral feeds    RENAL: follow lytes.  Correct as needed     INFECTIOUS DISEASE: finish abx course    HEMATOLOGICAL:  DVT prophylaxis. LE duplex negative.       ENDOCRINE:  Follow up FS.  Insulin protocol if needed.    MSK: offloading    CODE: DNR/DNI TRIAL NIV with palliative follow-up    nirmal GUSMAN     SDU  very poor prognosis

## 2024-05-25 NOTE — PROGRESS NOTE ADULT - ASSESSMENT
IMPRESSION:  Acute hypoxemic respiratory failure   Acute on chronic hypercapnic respiratory failure  COPD exacerbation   Left lung atelectasis   Lung mass possibly malignant    Superimposed pneumonia  Left sided effusion s/p thoracentesis negative for malignant cells    PLAN:    CNS: Avoid CNS depressant.  Sedation for comfort as needed.    HEENT: oral care. ETT care.  Patient intubated for 4 days between  05/17 and 05/20 --> patient is day 2 post extubation  ENT evaluated for hearing loss --> impacted cerumen --> patient refused Debrox. Recalled ENT and ENT states patient needs to take the Debrox first to soften up cerumen. Removing cerumen beforehand increase risk of trauma and infection.     PULMONARY:   Keep pox 88 to 92%M, Albuterol Q6 hrs and PRN  Solumedrol 40 daily --> decreased to 20 mg daily starting 05/23   Chest pt   frequent suctioning --> blood clots removed  follow BAL, (-) so far   New complete opacification of the left hemithorax, possibly reflecting bronchial obstruction related to mucous plug.    CARDIOVASCULAR: TTE noted with 70-75%. Keep is = os  . BNP noted    GI: GI prophylaxis. Bowel regimen increased with Lactulose. Will resume oral feeds    RENAL: follow lytes.  Correct as needed     INFECTIOUS DISEASE:   completed Zosyn. Procal noted.   BAL cx NGTD  Cytology - no malignant cell    HEMATOLOGICAL:  DVT prophylaxis. Dimer noted. LE duplex negative.       ENDOCRINE:  Follow up FS.  Insulin protocol if needed.    MSK: offloading    CODE: DNR/DNI TRIAL NIV with palliative follow-up    nirmal GUSMAN    Disposition: Downgrade to SDU

## 2024-05-25 NOTE — PROGRESS NOTE ADULT - SUBJECTIVE AND OBJECTIVE BOX
Over Night Events: events noted, transferred from CCU, complete whiteout l lung sp bronch, thora, on HHFNC 45%    PHYSICAL EXAM    ICU Vital Signs Last 24 Hrs  T(C): 37.1 (25 May 2024 04:00), Max: 37.3 (25 May 2024 00:06)  T(F): 98.7 (25 May 2024 04:00), Max: 99.1 (25 May 2024 00:06)  HR: 74 (25 May 2024 04:00) (74 - 111)  BP: 120/58 (25 May 2024 04:00) (90/56 - 127/64)  BP(mean): 83 (25 May 2024 04:00) (66 - 88)-  RR: 22 (25 May 2024 04:00) (22 - 36)  SpO2: 97% (25 May 2024 04:00) (93% - 99%)    O2 Parameters below as of 25 May 2024 04:00  Patient On (Oxygen Delivery Method): nasal cannula, high flow  O2 Flow (L/min): 40  O2 Concentration (%): 45        General: Ill looking  Lungs: dec bs l side  Cardiovascular: Regular   Abdomen: Soft, Positive BS  Extremities: No clubbing   Neurological: Non focal       05-24-24 @ 07:01  -  05-25-24 @ 07:00  --------------------------------------------------------  IN:  Total IN: 0 mL    OUT:    Voided (mL): 1150 mL  Total OUT: 1150 mL    Total NET: -1150 mL          LABS:                          8.2    13.41 )-----------( 306      ( 25 May 2024 04:45 )             27.4                                               05-25    141  |  100  |  19  ----------------------------<  99  4.0   |  35<H>  |  <0.5<L>    Ca    8.3<L>      25 May 2024 04:45  Phos  2.6     05-25  Mg     1.8     05-25    TPro  4.7<L>  /  Alb  3.0<L>  /  TBili  0.2  /  DBili  x   /  AST  6   /  ALT  27  /  AlkPhos  94  05-25                                             Urinalysis Basic - ( 25 May 2024 04:45 )    Color: x / Appearance: x / SG: x / pH: x  Gluc: 99 mg/dL / Ketone: x  / Bili: x / Urobili: x   Blood: x / Protein: x / Nitrite: x   Leuk Esterase: x / RBC: x / WBC x   Sq Epi: x / Non Sq Epi: x / Bacteria: x                                                  LIVER FUNCTIONS - ( 25 May 2024 04:45 )  Alb: 3.0 g/dL / Pro: 4.7 g/dL / ALK PHOS: 94 U/L / ALT: 27 U/L / AST: 6 U/L / GGT: x                                                                                                                                       MEDICATIONS  (STANDING):  albuterol    90 MICROgram(s) HFA Inhaler 2 Puff(s) Inhalation every 6 hours  albuterol/ipratropium for Nebulization 3 milliLiter(s) Nebulizer every 6 hours  carbamide peroxide Otic Solution 1 Drop(s) Both Ears two times a day  chlorhexidine 2% Cloths 1 Application(s) Topical <User Schedule>  enoxaparin Injectable 40 milliGRAM(s) SubCutaneous every 24 hours  pantoprazole    Tablet 40 milliGRAM(s) Oral before breakfast  polyethylene glycol 3350 17 Gram(s) Oral two times a day  saccharomyces boulardii 250 milliGRAM(s) Oral two times a day  senna 2 Tablet(s) Oral at bedtime  sodium chloride 3%  Inhalation 4 milliLiter(s) Inhalation every 6 hours    MEDICATIONS  (PRN):  bisacodyl Suppository 10 milliGRAM(s) Rectal daily PRN Constipation  hydrOXYzine hydrochloride 25 milliGRAM(s) Oral two times a day PRN Anxiety  lactulose Syrup 10 Gram(s) Oral every 6 hours PRN constipation  ondansetron Injectable 4 milliGRAM(s) IV Push once PRN Nausea and/or Vomiting  sodium chloride 0.65% Nasal 1 Spray(s) Both Nostrils three times a day PRN Nasal Congestion

## 2024-05-26 NOTE — PROGRESS NOTE ADULT - SUBJECTIVE AND OBJECTIVE BOX
MARTHA RITESH  73y, Female  Allergy: No Known Allergies      LOS  20d    CHIEF COMPLAINT: COPD exacerbation (26 May 2024 13:36)      INTERVAL EVENTS/HPI  - T(F): , Max: 100.7 (05-26-24 @ 04:12)  - Reconsulted for isolated fever this AM   - remains on HFNC   - denies any worsening dyspnea, URI symptoms, dysuria, nausea, abdominal pain   - WBC Count: 12.52 (05-26-24 @ 04:30)  WBC Count: 13.41 (05-25-24 @ 04:45)     - Creatinine: <0.5 (05-26-24 @ 04:30)  Creatinine: <0.5 (05-25-24 @ 04:45)       ROS  General: Denies rigors, nightsweats  HEENT: Denies headache, rhinorrhea, sore throat, eye pain  CV: Denies CP, palpitations  PULM: Denies wheezing, hemoptysis  GI: Denies hematemesis, hematochezia, melena  : Denies discharge, hematuria  MSK: Denies arthralgias, myalgias  SKIN: Denies rash, lesions  NEURO: Denies paresthesias, weakness  PSYCH: Denies depression, anxiety    VITALS:  T(F): 98.4, Max: 100.7 (05-26-24 @ 04:12)  HR: 98  BP: 106/56  RR: 20Vital Signs Last 24 Hrs  T(C): 36.9 (26 May 2024 12:06), Max: 38.2 (26 May 2024 04:12)  T(F): 98.4 (26 May 2024 12:06), Max: 100.7 (26 May 2024 04:12)  HR: 98 (26 May 2024 12:06) (86 - 132)  BP: 106/56 (26 May 2024 12:06) (101/63 - 124/66)  BP(mean): 71 (26 May 2024 12:06) (71 - 90)  RR: 20 (26 May 2024 12:06) (20 - 20)  SpO2: 94% (26 May 2024 12:06) (94% - 97%)    Parameters below as of 26 May 2024 08:16  Patient On (Oxygen Delivery Method): nasal cannula, high flow        PHYSICAL EXAM:  Gen: NAD, resting in bed  HEENT: Normocephalic, atraumatic  Neck: supple, no lymphadenopathy  CV: Regular rate & regular rhythm  Lungs: decreased BS at bases, no fremitus  Abdomen: Soft, BS present  Ext: Warm, well perfused  Neuro: non focal, awake  Skin: no rash, no erythema  Lines: no phlebitis    FH: Non-contributory  Social Hx: Non-contributory    TESTS & MEASUREMENTS:                        8.3    12.52 )-----------( 355      ( 26 May 2024 04:30 )             28.2     05-26    142  |  101  |  20  ----------------------------<  94  4.7   |  37<H>  |  <0.5<L>    Ca    8.5      26 May 2024 04:30  Phos  3.3     05-26  Mg     1.9     05-26    TPro  4.9<L>  /  Alb  3.1<L>  /  TBili  0.2  /  DBili  x   /  AST  7   /  ALT  22  /  AlkPhos  87  05-26      LIVER FUNCTIONS - ( 26 May 2024 04:30 )  Alb: 3.1 g/dL / Pro: 4.9 g/dL / ALK PHOS: 87 U/L / ALT: 22 U/L / AST: 7 U/L / GGT: x           Urinalysis Basic - ( 26 May 2024 04:30 )    Color: x / Appearance: x / SG: x / pH: x  Gluc: 94 mg/dL / Ketone: x  / Bili: x / Urobili: x   Blood: x / Protein: x / Nitrite: x   Leuk Esterase: x / RBC: x / WBC x   Sq Epi: x / Non Sq Epi: x / Bacteria: x        Culture - Fungal, Bronchial (collected 05-17-24 @ 12:06)  Source: .Bronchial None  Preliminary Report (05-20-24 @ 11:25):    Rare Yeast    Culture - Acid Fast - Bronchial w/Smear (collected 05-17-24 @ 12:06)  Source: .Bronchial None  Preliminary Report (05-25-24 @ 23:08):    No acid-fast bacilli isolated at 1 week. ****Acid-fast cultures are held    for 6 weeks.****    Culture - Bronchial (collected 05-17-24 @ 12:06)  Source: Bronch Wash None  Gram Stain (05-18-24 @ 11:28):    No polymorphonuclear leukocytes seen per low power field    No Squamous epithelial cells seen per low power field    No organisms seen per oil power field  Final Report (05-19-24 @ 20:15):    Normal Respiratory Shana present    Culture - Blood (collected 05-08-24 @ 00:23)  Source: .Blood None  Final Report (05-13-24 @ 09:01):    No growth at 5 days            INFECTIOUS DISEASES TESTING  Procalcitonin: 0.11 (05-17-24 @ 10:45)  Procalcitonin: 0.10 (05-14-24 @ 10:06)  MRSA PCR Result.: Negative (05-11-24 @ 18:09)  Procalcitonin: 0.09 (05-07-24 @ 04:19)      INFLAMMATORY MARKERS      RADIOLOGY & ADDITIONAL TESTS:  I have personally reviewed the last available Chest xray  CXR      CT      CARDIOLOGY TESTING  12 Lead ECG:   Systolic  mmHg    Diastolic BP 63 mmHg    Ventricular Rate 65 BPM    Atrial Rate 65 BPM    P-R Interval 154 ms    QRS Duration 74 ms    Q-T Interval 426 ms    QTC Calculation(Bazett) 443 ms    P Axis 68 degrees    R Axis 77 degrees    T Axis 88 degrees    Diagnosis Line *** Poor data quality, interpretation may be adversely affected  Normal sinus rhythm with sinus arrhythmia  Septal infarct , age undetermined  T wave abnormality, consider anterior ischemia  Abnormal ECG    Confirmed by Beckie Vasquez MD ([id number]) on 5/19/2024 5:34:31 PM (05-17-24 @ 15:25)      MEDICATIONS  albuterol    90 MICROgram(s) HFA Inhaler 2 Inhalation every 6 hours  albuterol/ipratropium for Nebulization 3 Nebulizer every 6 hours  chlorhexidine 2% Cloths 1 Topical <User Schedule>  enoxaparin Injectable 40 SubCutaneous every 24 hours  methylPREDNISolone sodium succinate Injectable 10 IV Push daily  polyethylene glycol 3350 17 Oral two times a day  saccharomyces boulardii 250 Oral two times a day  senna 2 Oral at bedtime  sodium chloride 3%  Inhalation 4 Inhalation every 6 hours      WEIGHT  Weight (kg): 50.5 (05-16-24 @ 18:55)  Creatinine: <0.5 mg/dL (05-26-24 @ 04:30)      ANTIBIOTICS:      All available historical records have been reviewed

## 2024-05-26 NOTE — PROGRESS NOTE ADULT - ASSESSMENT
· Assessment	  73-year-old female with past medical history of COPD not on home oxygen, chronic smoker >60ppy, hypertension who presents for shortness of breath x few weeks.  Pt reports worsening productive cough and shortness of breath with wheezing for the past 1 week.        IMPRESSION/RECOMMENDATIONS  Immunosuppression/Immunosenescence ( above age 60 yrs there is a exponential decline in immunity which could result in poor clinical outcomes.   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   Suspected GNR post obstructive PNA left   -- received cefriaxone 5/7-5/9, cefepime 5/10-5/15, Levofloxacin  5/7-5/14,  -- received zosyn 5/17-5/22 5/11 Nares ORSA NG  5/9 s/p thoracocentesis  5/8 BCx NG  COVID 19/ Influenza/ RSV NG.     Recommendations   - isolated fever -- multifactorial -- would monitor off antibiotics for now   - follow-up blood cx, procalcitonin, and fungitell   - trend WBC   - very poor prognosis     Please call or message on [employer] Teams if with any questions.  Spectra [phone]   · Assessment	  73-year-old female with past medical history of COPD not on home oxygen, chronic smoker >60ppy, hypertension who presents for shortness of breath x few weeks.  Pt reports worsening productive cough and shortness of breath with wheezing for the past 1 week.        IMPRESSION/RECOMMENDATIONS  Immunosuppression/Immunosenescence ( above age 60 yrs there is a exponential decline in immunity which could result in poor clinical outcomes.   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   Suspected GNR post obstructive PNA left   -- received cefriaxone 5/7-5/9, cefepime 5/10-5/15, Levofloxacin  5/7-5/14,  -- received zosyn 5/17-5/22 5/11 Nares ORSA NG  5/9 s/p thoracocentesis  5/8 BCx NG  COVID 19/ Influenza/ RSV NG.     Recommendations   - isolated fever -- multifactorial -- would monitor off antibiotics for now -- yeast in bronch cx may be colonizers   - follow-up blood cx, procalcitonin, and fungitell   - trend WBC   - very poor prognosis     Please call or message on [employer] Teams if with any questions.  Spectra [phone]

## 2024-05-26 NOTE — PROGRESS NOTE ADULT - SUBJECTIVE AND OBJECTIVE BOX
Over Night Events: events noted, CXR unchanged, on HHFNC, low grade fever    PHYSICAL EXAM    ICU Vital Signs Last 24 Hrs  T(C): 38.2 (26 May 2024 04:12), Max: 38.2 (26 May 2024 04:12)  T(F): 100.7 (26 May 2024 04:12), Max: 100.7 (26 May 2024 04:12)  HR: 86 (26 May 2024 04:12) (86 - 101)  BP: 122/67 (26 May 2024 04:12) (101/63 - 122/67)  BP(mean): 89 (26 May 2024 04:12) (74 - 89)  RR: 20 (26 May 2024 04:12) (20 - 20)  SpO2: 97% (26 May 2024 04:12) (96% - 99%)    O2 Parameters below as of 25 May 2024 12:18  Patient On (Oxygen Delivery Method): nasal cannula, high flow            General: ill looking  Lungs: dec bs l side  Cardiovascular: Regular   Abdomen: Soft, Positive BS  Extremities: No clubbing   Neurological: Non focal       05-25-24 @ 07:01  -  05-26-24 @ 07:00  --------------------------------------------------------  IN:    Oral Fluid: 100 mL  Total IN: 100 mL    OUT:  Total OUT: 0 mL    Total NET: 100 mL          LABS:                          8.2    13.41 )-----------( 306      ( 25 May 2024 04:45 )             27.4                                               05-25    141  |  100  |  19  ----------------------------<  99  4.0   |  35<H>  |  <0.5<L>    Ca    8.3<L>      25 May 2024 04:45  Phos  2.6     05-25  Mg     1.8     05-25    TPro  4.7<L>  /  Alb  3.0<L>  /  TBili  0.2  /  DBili  x   /  AST  6   /  ALT  27  /  AlkPhos  94  05-25                                             Urinalysis Basic - ( 25 May 2024 04:45 )    Color: x / Appearance: x / SG: x / pH: x  Gluc: 99 mg/dL / Ketone: x  / Bili: x / Urobili: x   Blood: x / Protein: x / Nitrite: x   Leuk Esterase: x / RBC: x / WBC x   Sq Epi: x / Non Sq Epi: x / Bacteria: x                                                  LIVER FUNCTIONS - ( 25 May 2024 04:45 )  Alb: 3.0 g/dL / Pro: 4.7 g/dL / ALK PHOS: 94 U/L / ALT: 27 U/L / AST: 6 U/L / GGT: x                                                                                                                                       MEDICATIONS  (STANDING):  albuterol    90 MICROgram(s) HFA Inhaler 2 Puff(s) Inhalation every 6 hours  albuterol/ipratropium for Nebulization 3 milliLiter(s) Nebulizer every 6 hours  chlorhexidine 2% Cloths 1 Application(s) Topical <User Schedule>  enoxaparin Injectable 40 milliGRAM(s) SubCutaneous every 24 hours  methylPREDNISolone sodium succinate Injectable 10 milliGRAM(s) IV Push daily  polyethylene glycol 3350 17 Gram(s) Oral two times a day  saccharomyces boulardii 250 milliGRAM(s) Oral two times a day  senna 2 Tablet(s) Oral at bedtime  sodium chloride 3%  Inhalation 4 milliLiter(s) Inhalation every 6 hours    MEDICATIONS  (PRN):  bisacodyl Suppository 10 milliGRAM(s) Rectal daily PRN Constipation  hydrOXYzine hydrochloride 25 milliGRAM(s) Oral two times a day PRN Anxiety  lactulose Syrup 10 Gram(s) Oral every 6 hours PRN constipation  ondansetron Injectable 4 milliGRAM(s) IV Push once PRN Nausea and/or Vomiting  sodium chloride 0.65% Nasal 1 Spray(s) Both Nostrils three times a day PRN Nasal Congestion

## 2024-05-26 NOTE — PROGRESS NOTE ADULT - SUBJECTIVE AND OBJECTIVE BOX
73-year-old female with past medical history of COPD not on home oxygen, chronic smoker >60ppy, hypertension who presents for shortness of breath x few weeks.  Pt reports worsening productive cough and shortness of breath with wheezing for the past 1 week.  Increased work of breathing , was satting 70% on room air.  Was placed on nonrebreather by EMS.  As per son at the bedside pt was confused in the morning, wasn't aware of the surrounding.  She also admits to hemoptysis  along with weight loss over the past few months.  Admits to fever and chills, and denies chest pain, palpitations, nausea, vomiting, diarrhea, abdominal pain, urinary symptoms, weakness, numbness, falls, recent travel, recent surgeries.  Not on anticoagulation.  Denies substance use and alcohol use.  Pt was placed on BIPAP and admitted to CEU Course:  Patient often refuses BiPAP despite extensive counseling and encouragement (gets anxious, refuses antianxiety or pain medication), however no changes in mental status. Solumedrol 60mg q12h.   S/P Diagnostic thoracentesis done on 5/10, f/u cytology. ID is following, on cefepime and levaquin for post-obstructive pneumonia.   -Patient defers conversations regarding possible cancer diagnosis to her children. They state there is a possibility she may decline cancer treatment and opt for hospice.   Pt was downgraded to medical floor, became  tachypneic, anxious appearing.  O2 sat ranged from 78%-82% on 6L nasal cannula, patient was placed on non rebreather mask 15L and was still satting in the mid 80's.  Patient declines BIPAP as she feels she cannot breath with the mask on.  High Flow nasal cannula placed with improvement in oxygenation to 90%.  Chest x-ray demonstrated worsening left sided pneumonia and ABG pH 7.4, CO2 77, lactate 1.2.  Discussed goals of care with patient's children who stated that this time patient is full code. Discussed with crit fellow who will see patient and recommended upgrade to stepdown, but shortly after her  Chest xray now shows complete opacification of left lung, with increasing HFNC requirements. Patient was upgraded to ICU for intubation and bronch mohinder. Low threshold for intubation if she decompensates.  Patient intubated on 05/17 for bronchoscopy: multiple blood clots, BAL showed rare yeast  Extubated on 05/20, on high flow  Patient on high flow since, poor compliance with BiPAP  Pt is now  DNR/DNI.   Today pt is comfortable on high flow oxygen, c/o difficulty sleeping due to constant noise and visitors, has no other complaints, asking to reach out to her kids for any discussions regarding medical related issues.     PAST MEDICAL & SURGICAL HISTORY:  Chronic obstructive pulmonary disease, unspecified COPD type      Hypertension      No significant past surgical history      Vital Signs Last 24 Hrs  T(C): 36.9 (26 May 2024 12:06), Max: 38.2 (26 May 2024 04:12)  T(F): 98.4 (26 May 2024 12:06), Max: 100.7 (26 May 2024 04:12)  HR: 98 (26 May 2024 12:06) (86 - 132)  BP: 106/56 (26 May 2024 12:06) (101/63 - 124/66)  BP(mean): 71 (26 May 2024 12:06) (71 - 90)  RR: 20 (26 May 2024 12:06) (20 - 20)  SpO2: 94% (26 May 2024 12:06) (94% - 97%)    Parameters below as of 26 May 2024 08:16  Patient On (Oxygen Delivery Method): nasal cannula, high flow          PHYSICAL EXAM:  GENERAL: NAD, frail elderly female   HEAD:  Atraumatic, Normocephalic  NECK: Supple, No JVD, Normal thyroid  NERVOUS SYSTEM:  Alert & Oriented X3, Good concentration; Motor Strength 5/5 B/L upper and lower extremities; DTRs 2+ intact and symmetric  CHEST/LUNG: decreased BS on the right,  poor air entry, shallow breathing , no wheezing , no BS on the left   HEART: Regular rate and rhythm; No murmurs, rubs, or gallops  ABDOMEN: Soft, Nontender, Nondistended; Bowel sounds present  EXTREMITIES:  muscle atrophy noted on LE       LABS:                                   8.3    12.52 )-----------( 355      ( 26 May 2024 04:30 )             28.2   05-26    142  |  101  |  20  ----------------------------<  94  4.7   |  37<H>  |  <0.5<L>    Ca    8.5      26 May 2024 04:30  Phos  3.3     05-26  Mg     1.9     05-26    TPro  4.9<L>  /  Alb  3.1<L>  /  TBili  0.2  /  DBili  x   /  AST  7   /  ALT  22  /  AlkPhos  87  05-26        Urinalysis Basic - ( 25 May 2024 04:45 )    Color: x / Appearance: x / SG: x / pH: x  Gluc: 99 mg/dL / Ketone: x  / Bili: x / Urobili: x   Blood: x / Protein: x / Nitrite: x   Leuk Esterase: x / RBC: x / WBC x   Sq Epi: x / Non Sq Epi: x / Bacteria: x      Culture - Fungal, Bronchial (05.17.24 @ 12:06)   Specimen Source: .Bronchial None  Culture Results:   Rare Yeast    Final Diagnosis   PLEURAL FLUID (THINPREP AND CELL BLOCK):   - NEGATIVE FOR MALIGNANT CELLS   - Rare benign mesothelial cells in proteinaceous material with   scattered chronicinflammatory cells       RADIOLOGY & ADDITIONAL TESTS:    < from: Xray Chest 1 View- PORTABLE-Routine (Xray Chest 1 View- PORTABLE-Routine in AM.) (05.25.24 @ 06:14) >  Findings/  impression:      Support devices: None    Cardiac/ Mediastinum: Shifted to the left due to left lung atelectasis.    Lungs/ Pleura: Stable complete opacification left hemithorax.   Hyperinflation right lung with increased peribronchial opacities.    Skeletal/ soft tissues: Stable      < from: Xray Kidney Ureter Bladder (05.22.24 @ 07:58) >  IMPRESSION:  No significant interval change.      < from: TTE Echo Complete w/o Contrast w/ Doppler (05.07.24 @ 09:58) >  Summary:   1. Hyperdynamic global left ventricular systolic function with a   visually estimated EF of 70-75%. No regional wall motion abnormalities.   2. Normal right ventricular size with mildly reduced RV systolic   function.   3. Normal left atrial size.   4. Sclerotic aortic valve with normal opening.   5. Mild tricuspid regurgitation.   6. No echocardiographic evidence of pulmonary hypertension. The IVC is   normal in size with <50% respiratory variability indicating mildly   increased right atrial pressure.   7. Trivial pericardial effusion.   8. No prior TTE is available for comparison.      < from: Xray Chest 1 View- PORTABLE-Routine (Xray Chest 1 View- PORTABLE-Routine in AM.) (05.25.24 @ 06:14) >    Findings/  impression:        Support devices: None    Cardiac/ Mediastinum: Shifted to the left due to left lung atelectasis.    Lungs/ Pleura: Stable complete opacification left hemithorax.   Hyperinflation right lung with increased peribronchial opacities.    Skeletal/ soft tissues: Stable    < end of copied text >    MEDICATIONS  (STANDING):  albuterol    90 MICROgram(s) HFA Inhaler 2 Puff(s) Inhalation every 6 hours  albuterol/ipratropium for Nebulization 3 milliLiter(s) Nebulizer every 6 hours  chlorhexidine 2% Cloths 1 Application(s) Topical <User Schedule>  enoxaparin Injectable 40 milliGRAM(s) SubCutaneous every 24 hours  methylPREDNISolone sodium succinate Injectable 10 milliGRAM(s) IV Push daily  polyethylene glycol 3350 17 Gram(s) Oral two times a day  saccharomyces boulardii 250 milliGRAM(s) Oral two times a day  senna 2 Tablet(s) Oral at bedtime  sodium chloride 3%  Inhalation 4 milliLiter(s) Inhalation every 6 hours    MEDICATIONS  (PRN):  bisacodyl Suppository 10 milliGRAM(s) Rectal daily PRN Constipation  hydrOXYzine hydrochloride 25 milliGRAM(s) Oral two times a day PRN Anxiety  lactulose Syrup 10 Gram(s) Oral every 6 hours PRN constipation  ondansetron Injectable 4 milliGRAM(s) IV Push once PRN Nausea and/or Vomiting  sodium chloride 0.65% Nasal 1 Spray(s) Both Nostrils three times a day PRN Nasal Congestion

## 2024-05-26 NOTE — PROGRESS NOTE ADULT - ASSESSMENT
IMPRESSION:    Acute hypoxemic respiratory failure on HHFNC  Acute on chronic hypercapnic resp failure  COPD exacerbation   Left lung atelectasis   Lung mass possibly malignant    Superimposed pneumonia  Left sided effusion s/p thoracentesis negative for malignant cells    PLAN:    CNS: Avoid CNS depressant.      HEENT: oral care.   Patient intubated for 4 days between  05/17 and 05/20       PULMONARY:   Keep pox 88 to 92%M, Albuterol Q6 hrs and PRN  Taper steroids  Chest pt   frequent suctioning --> blood clots removed  follow BAL, (-) , will need reintubation for bronch  chest PT, IPv    CARDIOVASCULAR: TTE noted with 70-75%. Keep is = os  . BNP noted    GI: GI prophylaxis. Bowel regimen     RENAL: follow lytes.  Correct as needed     INFECTIOUS DISEASE: sp abx, cx reviewed    HEMATOLOGICAL:  DVT prophylaxis. LE duplex negative.       ENDOCRINE:  Follow up FS.  Insulin protocol if needed.    MSK: offloading    CODE: DNR/DNI TRIAL NIV with palliative follow-up       SDU  very poor prognosis

## 2024-05-26 NOTE — PROGRESS NOTE ADULT - ASSESSMENT
73-year-old female with past medical history of COPD not on home oxygen, chronic smoker >60ppy, hypertension who presents for shortness of breath x few weeks.  Pt reports worsening productive cough and shortness of breath with wheezing for the past 1 week.  Increased work of breathing , was satting 70% on room air.  Was placed on nonrebreather by EMS.  As per son at the bedside pt was confused in the morning, wasn't aware of the surrounding.  She also admits to hemoptysis  along with weight loss over the past few months.  Admits to fever and chills, and denies chest pain, palpitations, nausea, vomiting, diarrhea, abdominal pain, urinary symptoms, weakness, numbness, falls, recent travel, recent surgeries.  Not on anticoagulation.  Denies substance use and alcohol use.  Pt was placed on BIPAP and admitted to CEU Course:  Patient often refuses BiPAP despite extensive counseling and encouragement (gets anxious, refuses antianxiety or pain medication), however no changes in mental status. Solumedrol 60mg q12h.   S/P Diagnostic thoracentesis done on 5/10, f/u cytology. ID is following, on cefepime and levaquin for post-obstructive pneumonia.   -Patient defers conversations regarding possible cancer diagnosis to her children. They state there is a possibility she may decline cancer treatment and opt for hospice.   Pt was downgraded to medical floor, became  tachypneic, anxious appearing.  O2 sat ranged from 78%-82% on 6L nasal cannula, patient was placed on non rebreather mask 15L and was still satting in the mid 80's.  Patient declines BIPAP as she feels she cannot breath with the mask on.  High Flow nasal cannula placed with improvement in oxygenation to 90%.  Chest x-ray demonstrated worsening left sided pneumonia and ABG pH 7.4, CO2 77, lactate 1.2.  Discussed goals of care with patient's children who stated that this time patient is full code. Discussed with crit fellow who will see patient and recommended upgrade to stepdown, but shortly after her  Chest xray now shows complete opacification of left lung, with increasing HFNC requirements. Patient was upgraded to ICU for intubation and bronch mohinder. Low threshold for intubation if she decompensates.  Patient intubated on 05/17 for bronchoscopy: multiple blood clots, BAL showed rare yeast  Extubated on 05/20, on high flow  Patient on high flow since, poor compliance with BiPAP  Pt is now  DNR/DNI.     A/P   # Acute on chronic hypoxemic respiratory failure improving/ COPD exacerbation Left lung atelectasis /Lung mass possibly malignant /Superimposed pneumonia/ Left sided effusion  - clinically improved stable on high flow oxygen now, will taper off as tolerated   - CXR showed complete opacification of left hemothorax, stable   - supportive care, pulmonary toilet  - nebs Q 6 hours, aspiration precautions   -s/p thoracentesis negative for malignant cells, lung mass is highly suspicious for advanced malignancy , pt is not considering clinical investigation or cancer directed treatement considering hospice   - completed course of Abx for PNA, had low grade fever today, needs ID follow up   - pulmonary is following, recommendations noted   - s/p bronch , aspirate growing few yeasts, pt needs ID follow up     # clogged ears  -  Debrox and call ENT for vax removal     # HTN  - DASH diet   - start Lisinopril 5 mg Q 24 hours     # h/o Hemoptysis/ Anemia   - hemoptysis resolved now   - monitor H/H, keep Hb above 7.5   - send anemia work up   - no active bleeding noted now     # Anxiety  - I offered medications , pt declined     # Severe malnutrition   - Ensure with meals   - high calories diet   - consult dietitian     # contraption   - high fiber diet   - c/w Laxatives     # GI/ DVT prophylaxis     Overall prognosis is very poor, pt is DNR/ DNI, palliative care is following       #Progress Note Handoff  Pending (specify): pulmonary toilet, supportive care, taper off high flow oxygen as tolerated, call ID for f/u ( pt has low grade fever, has yeast in tracheal aspirate) , monitor fever curve, send anemia work up, palliative care follow up on Monday.   Family discussion: I called two phone numbers ( pt's sons), message left, will f/u   Disposition: SDU

## 2024-05-27 NOTE — CHART NOTE - NSCHARTNOTESELECT_GEN_ALL_CORE
Dietitian Follow-Up/Nutrition Services
Dietitian Follow-Up/Nutrition Services
Event Note
Off Service Note
Palliative Care - Social Work/Event Note
Transfer Note
Event Note
Event Note
Transfer Note

## 2024-05-27 NOTE — PROGRESS NOTE ADULT - ASSESSMENT
IMPRESSION:    Acute hypoxemic respiratory failure on HHFNC  Acute on chronic hypercapnic resp failure  COPD exacerbation   Left lung atelectasis no improvement  Lung mass possibly malignant    Superimposed pneumonia  Left sided effusion s/p thoracentesis negative for malignant cells    PLAN:    CNS: Avoid CNS depressant.      HEENT: oral care.   Patient intubated for 4 days between  05/17 and 05/20       PULMONARY:   Keep pox 88 to 92%M, Albuterol Q6 hrs and PRN  prednisone 20 daily for 5 dasy  Chest pt   frequent suctioning --> blood clots removed  follow BAL, (-) , will need reintubation for bronch  chest PT, IPv      CARDIOVASCULAR: TTE noted with 70-75%. Keep is = os  . BNP noted    GI: GI prophylaxis. Bowel regimen     RENAL: follow lytes.  Correct as needed     INFECTIOUS DISEASE: sp abx, cx reviewed    HEMATOLOGICAL:  DVT prophylaxis. LE duplex negative.       ENDOCRINE:  Follow up FS.  Insulin protocol if needed.    MSK: offloading    CODE: DNR/DNI TRIAL NIV with palliative follow-up  SDU     SDU  very poor prognosis

## 2024-05-27 NOTE — PROGRESS NOTE ADULT - ASSESSMENT
73-year-old female with past medical history of COPD not on home oxygen, chronic smoker >60ppy, hypertension who presents for shortness of breath x few weeks.  Pt reports worsening productive cough and shortness of breath with wheezing for the past 1 week.  Increased work of breathing , was satting 70% on room air.  Was placed on nonrebreather by EMS.  As per son at the bedside pt was confused in the morning, wasn't aware of the surrounding.  She also admits to hemoptysis  along with weight loss over the past few months.  Admits to fever and chills, and denies chest pain, palpitations, nausea, vomiting, diarrhea, abdominal pain, urinary symptoms, weakness, numbness, falls, recent travel, recent surgeries.  Not on anticoagulation.  Denies substance use and alcohol use.  Pt was placed on BIPAP and admitted to CEU Course:  Patient often refuses BiPAP despite extensive counseling and encouragement (gets anxious, refuses antianxiety or pain medication), however no changes in mental status. Solumedrol 60mg q12h.   S/P Diagnostic thoracentesis done on 5/10, f/u cytology. ID is following, on cefepime and levaquin for post-obstructive pneumonia.   -Patient defers conversations regarding possible cancer diagnosis to her children. They state there is a possibility she may decline cancer treatment and opt for hospice.   Pt was downgraded to medical floor, became  tachypneic, anxious appearing.  O2 sat ranged from 78%-82% on 6L nasal cannula, patient was placed on non rebreather mask 15L and was still satting in the mid 80's.  Patient declines BIPAP as she feels she cannot breath with the mask on.  High Flow nasal cannula placed with improvement in oxygenation to 90%.  Chest x-ray demonstrated worsening left sided pneumonia and ABG pH 7.4, CO2 77, lactate 1.2.  Discussed goals of care with patient's children who stated that this time patient is full code. Discussed with crit fellow who will see patient and recommended upgrade to stepdown, but shortly after her  Chest xray now shows complete opacification of left lung, with increasing HFNC requirements. Patient was upgraded to ICU for intubation and bronch mohinder. Low threshold for intubation if she decompensates.  Patient intubated on 05/17 for bronchoscopy: multiple blood clots, BAL showed rare yeast  Extubated on 05/20, on high flow  Patient on high flow since, poor compliance with BiPAP  Pt is now  DNR/DNI.     A/P   # Acute on chronic hypoxemic respiratory failure improving/ COPD exacerbation Left lung atelectasis /Lung mass possibly malignant /Superimposed pneumonia/ Left sided effusion  - clinically improved, pt was weaned off high flow oxygen, on NC now   - CXR showed complete opacification of left hemothorax, stable   - supportive care, pulmonary toilet  - nebs Q 6 hours, aspiration precautions   -s/p thoracentesis negative for malignant cells, lung mass is highly suspicious for advanced malignancy , pt is not considering clinical investigation or cancer directed treatement considering hospice   - completed course of Abx for PNA, had low grade fever on 5/26 , D followed  up , recommendations noted, will monitor off Abx, check procalcitonin, and fungitell   - pulmonary is following, recommendations noted   - s/p bronch , aspirate growing few yeasts, likely colonization     # clogged ears  -  Debrox and call ENT for vax removal     # HTN  - DASH diet   - start Lisinopril 5 mg Q 24 hours     # h/o Hemoptysis/ Anemia   - hemoptysis resolved now   - monitor H/H, keep Hb above 7.5   - send anemia work up   - no active bleeding noted now     # Anxiety  - I offered medications , pt declined     # Severe malnutrition   - Ensure with meals   - high calories diet   - consult dietitian     # contraption   - high fiber diet   - c/w Laxatives     # GI/ DVT prophylaxis     Overall prognosis is very poor, pt is DNR/ DNI, palliative care is following       #Progress Note Handoff  Pending (specify): pulmonary toilet, supportive care, monitor off Abx,  check procalcitonin, and fungitell  , monitor fever curve, send anemia work up, palliative care follow up, avoid frequent blood draws   Family discussion: case d/w Isaías, pt's son [phone], he agreed with a plan of care   Disposition: if pt is stable on NC DGTF in the afternoon.

## 2024-05-27 NOTE — PROGRESS NOTE ADULT - SUBJECTIVE AND OBJECTIVE BOX
73-year-old female with past medical history of COPD not on home oxygen, chronic smoker >60ppy, hypertension who presents for shortness of breath x few weeks.  Pt reports worsening productive cough and shortness of breath with wheezing for the past 1 week.  Increased work of breathing , was satting 70% on room air.  Was placed on nonrebreather by EMS.  As per son at the bedside pt was confused in the morning, wasn't aware of the surrounding.  She also admits to hemoptysis  along with weight loss over the past few months.  Admits to fever and chills, and denies chest pain, palpitations, nausea, vomiting, diarrhea, abdominal pain, urinary symptoms, weakness, numbness, falls, recent travel, recent surgeries.  Not on anticoagulation.  Denies substance use and alcohol use.  Pt was placed on BIPAP and admitted to CEU Course:  Patient often refuses BiPAP despite extensive counseling and encouragement (gets anxious, refuses antianxiety or pain medication), however no changes in mental status. Solumedrol 60mg q12h.   S/P Diagnostic thoracentesis done on 5/10, f/u cytology. ID is following, on cefepime and levaquin for post-obstructive pneumonia.   -Patient defers conversations regarding possible cancer diagnosis to her children. They state there is a possibility she may decline cancer treatment and opt for hospice.   Pt was downgraded to medical floor, became  tachypneic, anxious appearing.  O2 sat ranged from 78%-82% on 6L nasal cannula, patient was placed on non rebreather mask 15L and was still satting in the mid 80's.  Patient declines BIPAP as she feels she cannot breath with the mask on.  High Flow nasal cannula placed with improvement in oxygenation to 90%.  Chest x-ray demonstrated worsening left sided pneumonia and ABG pH 7.4, CO2 77, lactate 1.2.  Discussed goals of care with patient's children who stated that this time patient is full code. Discussed with crit fellow who will see patient and recommended upgrade to stepdown, but shortly after her  Chest xray now shows complete opacification of left lung, with increasing HFNC requirements. Patient was upgraded to ICU for intubation and bronch mohinder. Low threshold for intubation if she decompensates.  Patient intubated on 05/17 for bronchoscopy: multiple blood clots, BAL showed rare yeast  Extubated on 05/20, on high flow  Patient on high flow since, poor compliance with BiPAP  Pt is now  DNR/DNI.   Today pt is comfortable at rest, on NC since this morning, denies any new complaints.     PAST MEDICAL & SURGICAL HISTORY:  Chronic obstructive pulmonary disease, unspecified COPD type      Hypertension      No significant past surgical history      Vital Signs Last 24 Hrs  T(C): 36.4 (27 May 2024 07:35), Max: 36.7 (26 May 2024 21:00)  T(F): 97.6 (27 May 2024 07:35), Max: 98.1 (26 May 2024 21:00)  HR: 103 (27 May 2024 12:00) (86 - 103)  BP: 105/58 (27 May 2024 12:00) (98/57 - 115/62)  BP(mean): 75 (27 May 2024 12:00) (75 - 79)  RR: 18 (27 May 2024 12:00) (18 - 20)  SpO2: 95% (27 May 2024 12:00) (95% - 98%)    Parameters below as of 27 May 2024 12:00  Patient On (Oxygen Delivery Method): nasal cannula, high flow      PHYSICAL EXAM:  GENERAL: NAD, frail elderly female   HEAD:  Atraumatic, Normocephalic  NECK: Supple, No JVD, Normal thyroid  NERVOUS SYSTEM:  Alert & Oriented X3, Good concentration; Motor Strength 5/5 B/L upper and lower extremities; DTRs 2+ intact and symmetric  CHEST/LUNG: decreased BS on the right,  poor air entry, shallow breathing , no wheezing , no BS on the left   HEART: Regular rate and rhythm; No murmurs, rubs, or gallops  ABDOMEN: Soft, Nontender, Nondistended; Bowel sounds present  EXTREMITIES:  muscle atrophy noted on LE       LABS:                                   7.9    9.53  )-----------( 385      ( 27 May 2024 05:54 )             26.1   05-27    140  |  99  |  16  ----------------------------<  99  4.5   |  35<H>  |  <0.5<L>    Ca    8.2<L>      27 May 2024 05:54  Phos  3.1     05-27  Mg     1.7     05-27    TPro  4.6<L>  /  Alb  3.0<L>  /  TBili  0.3  /  DBili  x   /  AST  9   /  ALT  25  /  AlkPhos  81  05-27      Urinalysis Basic - ( 25 May 2024 04:45 )    Color: x / Appearance: x / SG: x / pH: x  Gluc: 99 mg/dL / Ketone: x  / Bili: x / Urobili: x   Blood: x / Protein: x / Nitrite: x   Leuk Esterase: x / RBC: x / WBC x   Sq Epi: x / Non Sq Epi: x / Bacteria: x      Culture - Fungal, Bronchial (05.17.24 @ 12:06)   Specimen Source: .Bronchial None  Culture Results:   Rare Yeast    Final Diagnosis   PLEURAL FLUID (THINPREP AND CELL BLOCK):   - NEGATIVE FOR MALIGNANT CELLS   - Rare benign mesothelial cells in proteinaceous material with   scattered chronicinflammatory cells       RADIOLOGY & ADDITIONAL TESTS:    < from: Xray Chest 1 View- PORTABLE-Routine (Xray Chest 1 View- PORTABLE-Routine in AM.) (05.25.24 @ 06:14) >  Findings/  impression:      Support devices: None    Cardiac/ Mediastinum: Shifted to the left due to left lung atelectasis.    Lungs/ Pleura: Stable complete opacification left hemithorax.   Hyperinflation right lung with increased peribronchial opacities.    Skeletal/ soft tissues: Stable      < from: Xray Kidney Ureter Bladder (05.22.24 @ 07:58) >  IMPRESSION:  No significant interval change.      < from: TTE Echo Complete w/o Contrast w/ Doppler (05.07.24 @ 09:58) >  Summary:   1. Hyperdynamic global left ventricular systolic function with a   visually estimated EF of 70-75%. No regional wall motion abnormalities.   2. Normal right ventricular size with mildly reduced RV systolic   function.   3. Normal left atrial size.   4. Sclerotic aortic valve with normal opening.   5. Mild tricuspid regurgitation.   6. No echocardiographic evidence of pulmonary hypertension. The IVC is   normal in size with <50% respiratory variability indicating mildly   increased right atrial pressure.   7. Trivial pericardial effusion.   8. No prior TTE is available for comparison.      < from: Xray Chest 1 View- PORTABLE-Routine (Xray Chest 1 View- PORTABLE-Routine in AM.) (05.25.24 @ 06:14) >    Findings/  impression:        Support devices: None    Cardiac/ Mediastinum: Shifted to the left due to left lung atelectasis.    Lungs/ Pleura: Stable complete opacification left hemithorax.   Hyperinflation right lung with increased peribronchial opacities.    Skeletal/ soft tissues: Stable    < end of copied text >    MEDICATIONS  (STANDING):  albuterol    90 MICROgram(s) HFA Inhaler 2 Puff(s) Inhalation every 6 hours  albuterol/ipratropium for Nebulization 3 milliLiter(s) Nebulizer every 6 hours  chlorhexidine 2% Cloths 1 Application(s) Topical <User Schedule>  enoxaparin Injectable 40 milliGRAM(s) SubCutaneous every 24 hours  methylPREDNISolone sodium succinate Injectable 10 milliGRAM(s) IV Push daily  polyethylene glycol 3350 17 Gram(s) Oral two times a day  saccharomyces boulardii 250 milliGRAM(s) Oral two times a day  senna 2 Tablet(s) Oral at bedtime  sodium chloride 3%  Inhalation 4 milliLiter(s) Inhalation every 6 hours    MEDICATIONS  (PRN):  bisacodyl Suppository 10 milliGRAM(s) Rectal daily PRN Constipation  hydrOXYzine hydrochloride 25 milliGRAM(s) Oral two times a day PRN Anxiety  lactulose Syrup 10 Gram(s) Oral every 6 hours PRN constipation  ondansetron Injectable 4 milliGRAM(s) IV Push once PRN Nausea and/or Vomiting  sodium chloride 0.65% Nasal 1 Spray(s) Both Nostrils three times a day PRN Nasal Congestion

## 2024-05-27 NOTE — PROGRESS NOTE ADULT - SUBJECTIVE AND OBJECTIVE BOX
Over Night Events: events noted, on HHFNC, CXR/ ID noted    PHYSICAL EXAM    ICU Vital Signs Last 24 Hrs  T(C): 36.4 (27 May 2024 04:00), Max: 36.9 (26 May 2024 12:06)  T(F): 97.5 (27 May 2024 04:00), Max: 98.4 (26 May 2024 12:06)  HR: 86 (27 May 2024 04:00) (86 - 101)  BP: 106/57 (27 May 2024 04:00) (102/57 - 115/62)  BP(mean): 76 (27 May 2024 04:00) (71 - 79)  RR: 18 (27 May 2024 04:00) (18 - 20)  SpO2: 96% (27 May 2024 04:00) (94% - 98%)    O2 Parameters below as of 26 May 2024 21:00  Patient On (Oxygen Delivery Method): nasal cannula, high flow            General: ill looking  Lungs: dec bs l side  Cardiovascular: Regular   Abdomen: Soft, Positive BS  Extremities: No clubbing   Skin: Warm  Neurological: Non focal       05-26-24 @ 07:01  -  05-27-24 @ 07:00  --------------------------------------------------------  IN:    Oral Fluid: 200 mL  Total IN: 200 mL    OUT:  Total OUT: 0 mL    Total NET: 200 mL          LABS:                          7.9    9.53  )-----------( 385      ( 27 May 2024 05:54 )             26.1                                               05-27    140  |  99  |  16  ----------------------------<  99  4.5   |  35<H>  |  <0.5<L>    Ca    8.2<L>      27 May 2024 05:54  Phos  3.1     05-27  Mg     1.7     05-27    TPro  4.6<L>  /  Alb  3.0<L>  /  TBili  0.3  /  DBili  x   /  AST  9   /  ALT  25  /  AlkPhos  81  05-27                                             Urinalysis Basic - ( 27 May 2024 05:54 )    Color: x / Appearance: x / SG: x / pH: x  Gluc: 99 mg/dL / Ketone: x  / Bili: x / Urobili: x   Blood: x / Protein: x / Nitrite: x   Leuk Esterase: x / RBC: x / WBC x   Sq Epi: x / Non Sq Epi: x / Bacteria: x                                                  LIVER FUNCTIONS - ( 27 May 2024 05:54 )  Alb: 3.0 g/dL / Pro: 4.6 g/dL / ALK PHOS: 81 U/L / ALT: 25 U/L / AST: 9 U/L / GGT: x                                                                                                                                       MEDICATIONS  (STANDING):  albuterol    90 MICROgram(s) HFA Inhaler 2 Puff(s) Inhalation every 6 hours  albuterol/ipratropium for Nebulization 3 milliLiter(s) Nebulizer every 6 hours  chlorhexidine 2% Cloths 1 Application(s) Topical <User Schedule>  enoxaparin Injectable 40 milliGRAM(s) SubCutaneous every 24 hours  methylPREDNISolone sodium succinate Injectable 10 milliGRAM(s) IV Push daily  polyethylene glycol 3350 17 Gram(s) Oral two times a day  saccharomyces boulardii 250 milliGRAM(s) Oral two times a day  senna 2 Tablet(s) Oral at bedtime  sodium chloride 3%  Inhalation 4 milliLiter(s) Inhalation every 6 hours    MEDICATIONS  (PRN):  bisacodyl Suppository 10 milliGRAM(s) Rectal daily PRN Constipation  hydrOXYzine hydrochloride 25 milliGRAM(s) Oral two times a day PRN Anxiety  lactulose Syrup 10 Gram(s) Oral every 6 hours PRN constipation  ondansetron Injectable 4 milliGRAM(s) IV Push once PRN Nausea and/or Vomiting  sodium chloride 0.65% Nasal 1 Spray(s) Both Nostrils three times a day PRN Nasal Congestion

## 2024-05-27 NOTE — CHART NOTE - NSCHARTNOTEFT_GEN_A_CORE
Transfer from CEU  Transfer to Floor    73-year-old female with past medical history of COPD not on home oxygen, chronic smoker >60ppy, hypertension who presents for shortness of breath x few weeks.  Pt reports worsening productive cough and shortness of breath with wheezing for the past 1 week.  Increased work of breathing , was satting 70% on room air.  Was placed on nonrebreather by EMS.  As per son at the bedside pt was confused in the morning, wasn't aware of the surrounding.  She also admits to hemoptysis  along with weight loss over the past few months.  Admits to fever and chills, and denies chest pain, palpitations, nausea, vomiting, diarrhea, abdominal pain, urinary symptoms, weakness, numbness, falls, recent travel, recent surgeries.  Not on anticoagulation.  Denies substance use and alcohol use.  Pt was placed on BIPAP and admitted to CEU Course:  Patient often refuses BiPAP despite extensive counseling and encouragement (gets anxious, refuses antianxiety or pain medication), however no changes in mental status. Solumedrol 60mg q12h.   S/P Diagnostic thoracentesis done on 5/10, f/u cytology. ID is following, on cefepime and levaquin for post-obstructive pneumonia.   -Patient defers conversations regarding possible cancer diagnosis to her children. They state there is a possibility she may decline cancer treatment and opt for hospice.   Pt was downgraded to medical floor, became  tachypneic, anxious appearing.  O2 sat ranged from 78%-82% on 6L nasal cannula, patient was placed on non rebreather mask 15L and was still satting in the mid 80's.  Patient declines BIPAP as she feels she cannot breath with the mask on.  High Flow nasal cannula placed with improvement in oxygenation to 90%.  Chest x-ray demonstrated worsening left sided pneumonia and ABG pH 7.4, CO2 77, lactate 1.2.  Discussed goals of care with patient's children who stated that this time patient is full code. Discussed with crit fellow who will see patient and recommended upgrade to stepdown, but shortly after her  Chest xray now shows complete opacification of left lung, with increasing HFNC requirements. Patient was upgraded to ICU for intubation and bronch mohinder. Low threshold for intubation if she decompensates.  Patient intubated on 05/17 for bronchoscopy: multiple blood clots, BAL showed rare yeast  Extubated on 05/20, on high flow  Patient on high flow since, poor compliance with BiPAP  Pt is now  DNR/DNI.   Today pt is comfortable on high flow oxygen, c/o difficulty sleeping due to constant noise and visitors, has no other complaints, asking to reach out to her kids for any discussions regarding medical related issues.       24H events:    Patient is a 73y old Female who presents with a chief complaint of COPD exacerbation (27 May 2024 08:30)    Primary diagnosis of COPD exacerbation       Today is hospital day 21d.      PAST MEDICAL & SURGICAL HISTORY  Chronic obstructive pulmonary disease, unspecified COPD type    Hypertension    No significant past surgical history      SOCIAL HISTORY:  Negative for smoking/alcohol/drug use.     ALLERGIES:  No Known Allergies    MEDICATIONS:  STANDING MEDICATIONS  albuterol    90 MICROgram(s) HFA Inhaler 2 Puff(s) Inhalation every 6 hours  albuterol/ipratropium for Nebulization 3 milliLiter(s) Nebulizer every 6 hours  chlorhexidine 2% Cloths 1 Application(s) Topical <User Schedule>  enoxaparin Injectable 40 milliGRAM(s) SubCutaneous every 24 hours  methylPREDNISolone sodium succinate Injectable 10 milliGRAM(s) IV Push daily  polyethylene glycol 3350 17 Gram(s) Oral two times a day  saccharomyces boulardii 250 milliGRAM(s) Oral two times a day  senna 2 Tablet(s) Oral at bedtime  sodium chloride 3%  Inhalation 4 milliLiter(s) Inhalation every 6 hours    PRN MEDICATIONS  bisacodyl Suppository 10 milliGRAM(s) Rectal daily PRN  hydrOXYzine hydrochloride 25 milliGRAM(s) Oral two times a day PRN  lactulose Syrup 10 Gram(s) Oral every 6 hours PRN  ondansetron Injectable 4 milliGRAM(s) IV Push once PRN  sodium chloride 0.65% Nasal 1 Spray(s) Both Nostrils three times a day PRN    VITALS:   T(F): 97.6  HR: 93  BP: 112/58  RR: 18  SpO2: 96%    LABS:                        7.9    9.53  )-----------( 385      ( 27 May 2024 05:54 )             26.1     05-27    140  |  99  |  16  ----------------------------<  99  4.5   |  35<H>  |  <0.5<L>    Ca    8.2<L>      27 May 2024 05:54  Phos  3.1     05-27  Mg     1.7     05-27    TPro  4.6<L>  /  Alb  3.0<L>  /  TBili  0.3  /  DBili  x   /  AST  9   /  ALT  25  /  AlkPhos  81  05-27      Urinalysis Basic - ( 27 May 2024 05:54 )    Color: x / Appearance: x / SG: x / pH: x  Gluc: 99 mg/dL / Ketone: x  / Bili: x / Urobili: x   Blood: x / Protein: x / Nitrite: x   Leuk Esterase: x / RBC: x / WBC x   Sq Epi: x / Non Sq Epi: x / Bacteria: x                RADIOLOGY:    PHYSICAL EXAM:  GENERAL: NAD  NECK: Supple, No JVD  NERVOUS SYSTEM:  Alert & Oriented X3  CHEST/LUNG: Clear to auscultation bilaterally  HEART: Regular rate and rhythm  ABDOMEN: Soft, Nontender  EXTREMITIES:  + Peripheral Pulses    A/P   # Acute on chronic hypoxemic respiratory failure improving/ COPD exacerbation Left lung atelectasis /Lung mass possibly malignant /Superimposed pneumonia/ Left sided effusion  - clinically improved stable on NC at 5 LPM  - CXR showed complete opacification of left hemothorax, stable   - supportive care, pulmonary toilet  - nebs Q 6 hours, aspiration precautions   -s/p thoracentesis negative for malignant cells, lung mass is highly suspicious for advanced malignancy , pt is not considering clinical investigation or cancer directed treatement considering hospice   - completed course of Abx for PNA, Monitor off Abx for now as per ID, F/U procal  - pulmonary is following, recommendations noted   - s/p bronch , aspirate growing few yeasts, likely colonisers    # clogged ears  -  Debrox and ENT for wax removal     # HTN  - DASH diet     # h/o Hemoptysis/ Anemia   - hemoptysis resolved now   - monitor H/H, keep Hb above 7.5   - F/U anemia work up   - no active bleeding noted now     # Severe malnutrition   - Ensure with meals   - high calories diet   - consult dietitian     # contraption   - high fiber diet   - c/w Laxatives     # GI/ DVT prophylaxis     Overall prognosis is very poor, pt is DNR/ DNI, palliative care is following  DGTF

## 2024-05-27 NOTE — PROGRESS NOTE ADULT - TIME BILLING
Counseled patient about diagnostic testing and treatment plan. All questions answered. Abnormal lab/radiographical/microbiological data reviewed.
time spent on review of labs, imaging studies, old records, obtaining history, personally examining patient, counselling and communicating with patient, entering orders for medications/tests/etc, discussions with other health care providers, documentation in electronic health records, independent interpretation of labs, imaging/procedure results and care coordination.
I have personally seen and examined this patient.    I have reviewed all pertinent clinical information and reviewed all relevant imaging and diagnostic studies personally.   I counseled the patient about diagnostic testing and treatment plan. All questions were answered.   I discussed recommendations with the primary team.
time spent on review of labs, imaging studies, old records, obtaining history, personally examining patient, counselling and communicating with patient, entering orders for medications/tests/etc, discussions with other health care providers, documentation in electronic health records, independent interpretation of labs, imaging/procedure results and care coordination.

## 2024-05-28 NOTE — PROGRESS NOTE ADULT - ATTENDING COMMENTS
Patient is a 73-year-old female with PMH of COPD not on home oxygen, chronic smoker >60ppy, hypertension who c/w acute hypoxic respiratory failure due to pneumonia , newly dx. lung mas suspected for malignancy, left lung effusion . Patient was admitted to CEU, post medical stabilization downgraded to medical unit with the following course of therapy:    # Acute on chronic hypoxemic respiratory failure improving/ COPD exacerbation Left lung atelectasis /Lung mass possibly malignant /Superimposed pneumonia/ Left sided effusion  - clinically improved,  currently on NC 3 to 4 L sat 93%   - CXR showed complete opacification of left hemothorax, stable   - supportive care, pulmonary toilet  - nebs Q 6 hours, aspiration precautions   -s/p thoracentesis negative for malignant cells, lung mass is highly suspicious for advanced malignancy , pt is not considering clinical investigation or cancer directed treatement considering hospice   - completed course of Abx for PNA  - pulmonary is following, recommendations noted   - s/p bronch , aspirate growing few yeasts, likely colonization     # clogged ears  -  Debrox and call ENT for vax removal     # HTN- continue current tx.         # h/o Hemoptysis/ Anemia - hemoptysis resolved now   - monitor H/H, keep Hb above 7.5   daily MVI tx.    # Anxiety- outpatient Behavioral tx. f/up       # Severe malnutrition - ensure supplement tx    # Constipation- laxatives regimen tx.       Overall prognosis is very poor, pt is DNR/ DNI, palliative care is following       #Progress Note Handoff: Patient was medically optimized, stable for d/c to STR once bed is available , pending PT f/up to assess functional status   Family discussion: yes, medical team Disposition: possible STR    Total time spent to complete patient's bedside assessment, review medical chart, discuss medical plan of care with covering medical team was more than 35 minutes with >50% of time spent face to face with patient, discussion with patient/family and/or coordination of care

## 2024-05-28 NOTE — DISCHARGE NOTE PROVIDER - NSDCMRMEDTOKEN_GEN_ALL_CORE_FT
Albuterol (Eqv-Ventolin HFA) 90 mcg/inh inhalation aerosol: 2 puff(s) inhaled every 6 hours as needed for  shortness of breath and/or wheezing  amLODIPine 5 mg oral tablet: 1 tab(s) orally once a day  ipratropium-albuterol 18 mcg-90 mcg/inh inhalation aerosol with adapter: by nebulizer every 6 hours as needed for  shortness of breath and/or wheezing   Albuterol (Eqv-Ventolin HFA) 90 mcg/inh inhalation aerosol: 2 puff(s) inhaled every 6 hours as needed for  shortness of breath and/or wheezing  amLODIPine 5 mg oral tablet: 1 tab(s) orally once a day  ferrous sulfate 325 mg (65 mg elemental iron) oral tablet: 1 tab(s) orally once a day  hydrOXYzine hydrochloride 25 mg oral tablet: 1 tab(s) orally 2 times a day As needed Anxiety  ipratropium-albuterol 18 mcg-90 mcg/inh inhalation aerosol with adapter: by nebulizer every 6 hours as needed for  shortness of breath and/or wheezing  lactulose 10 g/15 mL oral syrup: 15 milliliter(s) orally every 6 hours As needed constipation  polyethylene glycol 3350 oral powder for reconstitution: 17 gram(s) orally 2 times a day  saccharomyces boulardii lyo 250 mg oral capsule: 1 cap(s) orally 2 times a day  senna leaf extract oral tablet: 2 tab(s) orally once a day (at bedtime)   Albuterol (Eqv-Ventolin HFA) 90 mcg/inh inhalation aerosol: 2 puff(s) inhaled every 6 hours as needed for  shortness of breath and/or wheezing  amLODIPine 5 mg oral tablet: 1 tab(s) orally once a day  ferrous sulfate 325 mg (65 mg elemental iron) oral tablet: 1 tab(s) orally once a day  hydrOXYzine hydrochloride 25 mg oral tablet: 1 tab(s) orally 2 times a day As needed Anxiety  ipratropium-albuterol 18 mcg-90 mcg/inh inhalation aerosol with adapter: by nebulizer every 6 hours as needed for  shortness of breath and/or wheezing  lactulose 10 g/15 mL oral syrup: 15 milliliter(s) orally every 6 hours As needed constipation  polyethylene glycol 3350 oral powder for reconstitution: 17 gram(s) orally 2 times a day  predniSONE 10 mg oral tablet: 1 tab(s) orally once a day  saccharomyces boulardii lyo 250 mg oral capsule: 1 cap(s) orally 2 times a day  senna leaf extract oral tablet: 2 tab(s) orally once a day (at bedtime)

## 2024-05-28 NOTE — DISCHARGE NOTE PROVIDER - CARE PROVIDERS DIRECT ADDRESSES
,[email or web address]

## 2024-05-28 NOTE — DISCHARGE NOTE PROVIDER - HOSPITAL COURSE
73-year-old female with past medical history of COPD not on home oxygen, chronic smoker >60ppy, hypertension who presents for shortness of breath x few weeks.  Pt reports worsening productive cough and shortness of breath with wheezing for the past 1 week.  Increased work of breathing today, was satting 70% on room air.  Was placed on nonrebreather by EMS.  As per son at the bedside pt was confused in the morning, wasn't aware of the surrounding.  She also admits to hemoptysis  along with weight loss over the past few months.  Admits to fever and chills, and denies chest pain, palpitations, nausea, vomiting, diarrhea, abdominal pain, urinary symptoms, weakness, numbness, falls, recent travel, recent surgeries.  Not on anticoagulation.  Denies substance use and alcohol use.  In ED, VT bp 143/80, , RR 28, T 99.4 F, SpO2 98% on NRB 15 L  Labs showed Lac 2.3, trop 26, BNP 2044, VBG  pH 7.18 PCo2 102 HCO3 38, O2 sat 46%  s/p IV solumedrol, IV Mag, Nebs and Albuterol in ED.  Pt was placed on BIPAP and admitted to CEU Course:  Patient often refuses BiPAP despite extensive counseling and encouragement (gets anxious, refuses antianxiety or pain medication), however no changes in mental status. Solumedrol 60mg q12h.   S/P Diagnostic thoracentesis done on 5/10, f/u cytology. ID is following, on cefepime and levaquin for post-obstructive pneumonia.   -Patient defers conversations regarding possible cancer diagnosis to her children. They state there is a possibility she may decline cancer treatment and opt for hospice.   Pt was downgraded to medical floor, became  tachypneic, anxious appearing.  O2 sat ranged from 78%-82% on 6L nasal cannula, patient was placed on non rebreather mask 15L and was still satting in the mid 80's.  Patient declines BIPAP as she feels she cannot breath with the mask on.  High Flow nasal cannula placed with improvement in oxygenation to 90%.  Chest x-ray demonstrated worsening left sided pneumonia and ABG pH 7.4, CO2 77, lactate 1.2.  Discussed goals of care with patient's children who stated that this time patient is full code. Discussed with crit fellow who will see patient and recommended upgrade to stepdown, but shortly after her  Chest xray now shows complete opacification of left lung, with increasing HFNC requirements. Patient was upgraded to ICU for intubation and bronch mohinder. Low threshold for intubation if she decompensates.  Patient intubated on 05/17 for bronchoscopy: multiple blood clots, BAL showed rare yeast  Extubated on 05/20, on high flow  Patient on high flow since, poor compliance with BiPAP  Pt is now  DNR/DNI.     Acute on chronic hypoxemic respiratory failure improving/ COPD exacerbation Left lung atelectasis /Lung mass possibly malignant /Superimposed pneumonia/ Left sided effusion with clinically improved, pt was weaned off high flow oxygen, on NC now, CXR showed complete opacification of left hemothorax, stable, supportive care, pulmonary toilet, nebs Q 6 hours, aspiration precautions, s/p thoracentesis negative for malignant cells, lung mass is highly suspicious for advanced malignancy , pt is not considering clinical investigation or cancer directed treatement considering hospice, completed course of Abx for PNA, had low grade fever on 5/26 , ID followed  up , recommendations noted, will monitor off Abx, procal noted 1.48, and fungitell pending, pulmonary is following, recommendations noted, s/p bronch , aspirate growing few yeasts, likely colonization   Clogged ears managed with Debrox and OP ENT f/u for ear wax removal.  HTN managed with DASH diet & starting Lisinopril 5 mg Q 24 hours   H/o Hemoptysis/ Anemia with hemoptysis resolved now managed by monitoring H/H, keep Hb above 7.5, send anemia work up and no active bleeding noted.  Anxiety managed by offering medications which pt declined   Severe malnutrition managed with Ensure with meals, high calories diet, and consult dietitian   Constipation managed with high fiber diet and c/w laxatives.     Patient deemed stable and cleared for discharge.     73-year-old female with past medical history of COPD not on home oxygen, chronic smoker >60ppy, hypertension who presents for shortness of breath x few weeks.  Pt reports worsening productive cough and shortness of breath with wheezing for the past 1 week.  Increased work of breathing today, was satting 70% on room air.  Was placed on nonrebreather by EMS.  As per son at the bedside pt was confused in the morning, wasn't aware of the surrounding.  She also admits to hemoptysis  along with weight loss over the past few months.  Admits to fever and chills, and denies chest pain, palpitations, nausea, vomiting, diarrhea, abdominal pain, urinary symptoms, weakness, numbness, falls, recent travel, recent surgeries.  Not on anticoagulation.  Denies substance use and alcohol use.  In ED, VT bp 143/80, , RR 28, T 99.4 F, SpO2 98% on NRB 15 L  Labs showed Lac 2.3, trop 26, BNP 2044, VBG  pH 7.18 PCo2 102 HCO3 38, O2 sat 46%  s/p IV solumedrol, IV Mag, Nebs and Albuterol in ED.  Pt was placed on BIPAP and admitted to CEU Course:  Patient often refuses BiPAP despite extensive counseling and encouragement (gets anxious, refuses antianxiety or pain medication), however no changes in mental status. Solumedrol 60mg q12h.   S/P Diagnostic thoracentesis done on 5/10, f/u cytology. ID is following, on cefepime and levaquin for post-obstructive pneumonia.   -Patient defers conversations regarding possible cancer diagnosis to her children. They state there is a possibility she may decline cancer treatment and opt for hospice.   Pt was downgraded to medical floor, became  tachypneic, anxious appearing.  O2 sat ranged from 78%-82% on 6L nasal cannula, patient was placed on non rebreather mask 15L and was still satting in the mid 80's.  Patient declines BIPAP as she feels she cannot breath with the mask on.  High Flow nasal cannula placed with improvement in oxygenation to 90%.  Chest x-ray demonstrated worsening left sided pneumonia and ABG pH 7.4, CO2 77, lactate 1.2.  Discussed goals of care with patient's children who stated that this time patient is full code. Discussed with crit fellow who will see patient and recommended upgrade to stepdown, but shortly after her  Chest xray now shows complete opacification of left lung, with increasing HFNC requirements. Patient was upgraded to ICU for intubation and bronch mohinder. Low threshold for intubation if she decompensates.  Patient intubated on 05/17 for bronchoscopy: multiple blood clots, BAL showed rare yeast  Extubated on 05/20, on high flow  Patient on high flow since, poor compliance with BiPAP  Pt is now  DNR/DNI.     Acute on chronic hypoxemic respiratory failure improving/ COPD exacerbation Left lung atelectasis /Lung mass possibly malignant /Superimposed pneumonia/ Left sided effusion with clinically improved, pt was weaned off high flow oxygen, on NC now, CXR showed complete opacification of left hemothorax, stable, supportive care, pulmonary toilet, nebs Q 6 hours, aspiration precautions, s/p thoracentesis negative for malignant cells, lung mass is highly suspicious for advanced malignancy , pt is not considering clinical investigation or cancer directed treatement considering hospice, completed course of Abx for PNA, had low grade fever on 5/26 , ID followed  up , recommendations noted, will monitor off Abx, procal noted 1.48, and fungitell pending, pulmonary is following, recommendations noted, s/p bronch , aspirate growing few yeasts, likely colonization   Clogged ears managed with Debrox and OP ENT f/u for ear wax removal.  HTN managed with DASH diet & starting Lisinopril 5 mg Q 24 hours   H/o Hemoptysis/ Anemia with hemoptysis resolved now managed by monitoring H/H, keep Hb above 7, send anemia work up and no active bleeding noted.  - anemia, needs workup as outpt   Hemoglobin: 7.6 g/dL (05-30-24 @ 06:29)  Hemoglobin: 7.7 g/dL (05-29-24 @ 07:09)  Hemoglobin: 7.5 g/dL (05-28-24 @ 22:09)  Hemoglobin: 7.8 g/dL (05-28-24 @ 06:35)  Hemoglobin: 7.9 g/dL (05-27-24 @ 05:54)    Anxiety managed by offering medications which pt declined   Severe malnutrition managed with Ensure with meals, high calories diet, and consult dietitian   Constipation managed with high fiber diet and c/w laxatives.     pt has a possible mass on the left lung, pt does not want to know what it is and is refusing workup, she understands that this might be malignancy.     Patient comfortable and hemodynamically stable and cleared for discharge.

## 2024-05-28 NOTE — PROGRESS NOTE ADULT - SUBJECTIVE AND OBJECTIVE BOX
24H events:    Patient is a 73y old Female who presents with a chief complaint of COPD exacerbation (27 May 2024 13:42)    Primary diagnosis of COPD exacerbation        Today is hospital day 22d. This morning patient was seen and examined at bedside, resting comfortably in bed.    No acute or major events overnight.    Code Status:    Family communication:  Contact date:  Name of person contacted:  Relationship to patient:  Communication details:  What matters most:    PAST MEDICAL & SURGICAL HISTORY  Chronic obstructive pulmonary disease, unspecified COPD type    Hypertension    No significant past surgical history      SOCIAL HISTORY:  Social History:      ALLERGIES:  No Known Allergies    MEDICATIONS:  STANDING MEDICATIONS  albuterol    90 MICROgram(s) HFA Inhaler 2 Puff(s) Inhalation every 6 hours  albuterol/ipratropium for Nebulization 3 milliLiter(s) Nebulizer every 6 hours  chlorhexidine 2% Cloths 1 Application(s) Topical <User Schedule>  enoxaparin Injectable 40 milliGRAM(s) SubCutaneous every 24 hours  methylPREDNISolone sodium succinate Injectable 10 milliGRAM(s) IV Push daily  polyethylene glycol 3350 17 Gram(s) Oral two times a day  saccharomyces boulardii 250 milliGRAM(s) Oral two times a day  senna 2 Tablet(s) Oral at bedtime  sodium chloride 3%  Inhalation 4 milliLiter(s) Inhalation every 6 hours    PRN MEDICATIONS  bisacodyl Suppository 10 milliGRAM(s) Rectal daily PRN  hydrOXYzine hydrochloride 25 milliGRAM(s) Oral two times a day PRN  lactulose Syrup 10 Gram(s) Oral every 6 hours PRN  ondansetron Injectable 4 milliGRAM(s) IV Push once PRN  sodium chloride 0.65% Nasal 1 Spray(s) Both Nostrils three times a day PRN    VITALS:   T(F): 97.7  HR: 100  BP: 118/57  RR: 20  SpO2: 95%    PHYSICAL EXAM:  GENERAL:   ( x ) NAD, lying in bed comfortably     (  ) obtunded     (  ) lethargic     (  ) somnolent    HEAD:   ( x ) Atraumatic     (  ) hematoma     (  ) laceration (specify location:       )     NECK:  ( x ) Supple     (  ) neck stiffness     (  ) nuchal rigidity     (  )  no JVD     (  ) JVD present ( -- cm)    HEART:  Rate -->     (  ) normal rate     (  ) bradycardic     (  ) tachycardic  Rhythm -->     ( x ) regular     (  ) regularly irregular     (  ) irregularly irregular  Murmurs -->     (  ) normal s1s2     (  ) systolic murmur     (  ) diastolic murmur     (  ) continuous murmur      (  ) S3 present     (  ) S4 present    LUNGS:   (x  )Unlabored respirations     (  ) tachypnea  ( x ) B/L air entry     (  ) decreased breath sounds in:  (location     )    (  ) no adventitious sound     (  ) crackles     (  ) wheezing      (  ) rhonchi      (specify location:       )  (  ) chest wall tenderness (specify location:       )    ABDOMEN:   ( x ) Soft     (  ) tense   |   (  x) nondistended     (  ) distended   |   (  ) +BS     (  ) hypoactive bowel sounds     (  ) hyperactive bowel sounds  ( x ) nontender     (  ) RUQ tenderness     (  ) RLQ tenderness     (  ) LLQ tenderness     (  ) epigastric tenderness     (  ) diffuse tenderness  (  ) Splenomegaly      (  ) Hepatomegaly      (  ) Jaundice     (  ) ecchymosis     EXTREMITIES:  (  ) Normal     (  ) Rash     (  ) ecchymosis     (  ) varicose veins      (  ) pitting edema     (  ) non-pitting edema   (  ) ulceration     (  ) gangrene:     (location:     )    NERVOUS SYSTEM:    (  ) A&Ox3     (  ) confused     (  ) lethargic  CN II-XII:     (  ) Intact     (  ) deficits found     (Specify:     )   Upper extremities:     (  ) no sensorimotor deficits     (  ) weakness     (  ) loss of proprioception/vibration     (  ) loss of touch/temperature (specify:    )  Lower extremities:     (  ) no sensorimotor deficits     (  ) weakness     (  ) loss of proprioception/vibration     (  ) loss of touch/temperature (specify:    )    SKIN:   (  ) No rashes or lesions     (  ) maculopapular rash     (  ) pustules     (  ) vesicles     (  ) ulcer     (  ) ecchymosis     (specify location:     )    AMPAC score:    (  ) Indwelling Paulino Catheter:   Date insterted:    Reason (  ) Critical illness     (  ) urinary retention    (  ) Accurate Ins/Outs Monitoring     (  ) CMO patient    (  ) Central Line:   Date inserted:  Location: (  ) Right IJ     (  ) Left IJ     (  ) Right Fem     (  ) Left Fem    (  ) SPC        (  ) pigtail       (  ) PEG tube       (  ) colostomy       (  ) jejunostomy  (  ) U-Dall    LABS:                        7.8    10.04 )-----------( 421      ( 28 May 2024 06:35 )             26.7     05-27    140  |  99  |  16  ----------------------------<  99  4.5   |  35<H>  |  <0.5<L>    Ca    8.2<L>      27 May 2024 05:54  Phos  3.1     05-27  Mg     1.7     05-27    TPro  4.6<L>  /  Alb  3.0<L>  /  TBili  0.3  /  DBili  x   /  AST  9   /  ALT  25  /  AlkPhos  81  05-27      Urinalysis Basic - ( 27 May 2024 05:54 )    Color: x / Appearance: x / SG: x / pH: x  Gluc: 99 mg/dL / Ketone: x  / Bili: x / Urobili: x   Blood: x / Protein: x / Nitrite: x   Leuk Esterase: x / RBC: x / WBC x   Sq Epi: x / Non Sq Epi: x / Bacteria: x            Culture - Blood (collected 26 May 2024 11:49)  Source: .Blood None  Preliminary Report (27 May 2024 18:02):    No growth at 24 hours          RADIOLOGY:      < from: Xray Chest 1 View- PORTABLE-Routine (Xray Chest 1 View- PORTABLE-Routine in AM.) (05.27.24 @ 02:22) >    Impression:    Complete opacification left hemithorax, unchanged      < end of copied text >  < from: Xray Kidney Ureter Bladder (05.22.24 @ 07:58) >  IMPRESSION:  No significant interval change.    < end of copied text >  < from: Xray Kidney Ureter Bladder (05.21.24 @ 08:15) >  FINDINGS/  IMPRESSION:    Calcified lucent structure in the right upper quadrant may represent a   gallstone. Correlate with ultrasound if clinically indicated.   Nonobstructive bowel gas pattern. Stool seen in the pelvis. Marked   degenerative changes of the spine as well as the bilateral hip joints.      < end of copied text >          ASSESSMENT/PLAN:        24H events:    Patient is a 73y old Female who presents with a chief complaint of COPD exacerbation (27 May 2024 13:42)    Primary diagnosis of COPD exacerbation        Today is hospital day 22d. This morning patient was seen and examined at bedside, resting comfortably in bed.    No acute or major events overnight.    Code Status:    Family communication:  Contact date:  Name of person contacted:  Relationship to patient:  Communication details:  What matters most:    PAST MEDICAL & SURGICAL HISTORY  Chronic obstructive pulmonary disease, unspecified COPD type    Hypertension    No significant past surgical history      SOCIAL HISTORY:  Social History:      ALLERGIES:  No Known Allergies    MEDICATIONS:  STANDING MEDICATIONS  albuterol    90 MICROgram(s) HFA Inhaler 2 Puff(s) Inhalation every 6 hours  albuterol/ipratropium for Nebulization 3 milliLiter(s) Nebulizer every 6 hours  chlorhexidine 2% Cloths 1 Application(s) Topical <User Schedule>  enoxaparin Injectable 40 milliGRAM(s) SubCutaneous every 24 hours  methylPREDNISolone sodium succinate Injectable 10 milliGRAM(s) IV Push daily  polyethylene glycol 3350 17 Gram(s) Oral two times a day  saccharomyces boulardii 250 milliGRAM(s) Oral two times a day  senna 2 Tablet(s) Oral at bedtime  sodium chloride 3%  Inhalation 4 milliLiter(s) Inhalation every 6 hours    PRN MEDICATIONS  bisacodyl Suppository 10 milliGRAM(s) Rectal daily PRN  hydrOXYzine hydrochloride 25 milliGRAM(s) Oral two times a day PRN  lactulose Syrup 10 Gram(s) Oral every 6 hours PRN  ondansetron Injectable 4 milliGRAM(s) IV Push once PRN  sodium chloride 0.65% Nasal 1 Spray(s) Both Nostrils three times a day PRN    VITALS:   T(F): 97.7  HR: 100  BP: 118/57  RR: 20  SpO2: 95%    PHYSICAL EXAM:  GENERAL:   ( x ) NAD, lying in bed comfortably     (  ) obtunded     (  ) lethargic     (  ) somnolent    HEAD:   ( x ) Atraumatic     (  ) hematoma     (  ) laceration (specify location:       )     NECK:  ( x ) Supple     (  ) neck stiffness     (  ) nuchal rigidity     (  )  no JVD     (  ) JVD present ( -- cm)    HEART:  Rate -->     (  ) normal rate     (  ) bradycardic     (  ) tachycardic  Rhythm -->     ( x ) regular     (  ) regularly irregular     (  ) irregularly irregular  Murmurs -->     (  ) normal s1s2     (  ) systolic murmur     (  ) diastolic murmur     (  ) continuous murmur      (  ) S3 present     (  ) S4 present    LUNGS:   (x  )Unlabored respirations     (  ) tachypnea  ( x ) B/L air entry     (  ) decreased breath sounds in:  (location     )    (  ) no adventitious sound     (  ) crackles     (  ) wheezing      (  ) rhonchi      (specify location:       )  (  ) chest wall tenderness (specify location:       )    ABDOMEN:   ( x ) Soft     (  ) tense   |   (  x) nondistended     (  ) distended   |   (  ) +BS     (  ) hypoactive bowel sounds     (  ) hyperactive bowel sounds  ( x ) nontender     (  ) RUQ tenderness     (  ) RLQ tenderness     (  ) LLQ tenderness     (  ) epigastric tenderness     (  ) diffuse tenderness  (  ) Splenomegaly      (  ) Hepatomegaly      (  ) Jaundice     (  ) ecchymosis     EXTREMITIES:  (  ) Normal     (  ) Rash     (  ) ecchymosis     (  ) varicose veins      (  ) pitting edema     (  ) non-pitting edema   (  ) ulceration     (  ) gangrene:     (location:     )    NERVOUS SYSTEM:    (  ) A&Ox3     (  ) confused     (  ) lethargic  CN II-XII:     (  ) Intact     (  ) deficits found     (Specify:     )   Upper extremities:     (  ) no sensorimotor deficits     (  ) weakness     (  ) loss of proprioception/vibration     (  ) loss of touch/temperature (specify:    )  Lower extremities:     (  ) no sensorimotor deficits     (  ) weakness     (  ) loss of proprioception/vibration     (  ) loss of touch/temperature (specify:    )    SKIN:   (  ) No rashes or lesions     (  ) maculopapular rash     (  ) pustules     (  ) vesicles     (  ) ulcer     (  ) ecchymosis     (specify location:     )    AMPAC score:    (  ) Indwelling Paulino Catheter:   Date insterted:    Reason (  ) Critical illness     (  ) urinary retention    (  ) Accurate Ins/Outs Monitoring     (  ) CMO patient    (  ) Central Line:   Date inserted:  Location: (  ) Right IJ     (  ) Left IJ     (  ) Right Fem     (  ) Left Fem    (  ) SPC        (  ) pigtail       (  ) PEG tube       (  ) colostomy       (  ) jejunostomy  (  ) U-Dall    LABS:                        7.8    10.04 )-----------( 421      ( 28 May 2024 06:35 )             26.7     05-27    140  |  99  |  16  ----------------------------<  99  4.5   |  35<H>  |  <0.5<L>    Ca    8.2<L>      27 May 2024 05:54  Phos  3.1       Mg     1.7         TPro  4.6<L>  /  Alb  3.0<L>  /  TBili  0.3  /  DBili  x   /  AST  9   /  ALT  25  /  AlkPhos  81        Urinalysis Basic - ( 27 May 2024 05:54 )    Color: x / Appearance: x / SG: x / pH: x  Gluc: 99 mg/dL / Ketone: x  / Bili: x / Urobili: x   Blood: x / Protein: x / Nitrite: x   Leuk Esterase: x / RBC: x / WBC x   Sq Epi: x / Non Sq Epi: x / Bacteria: x            Culture - Blood (collected 26 May 2024 11:49)  Source: .Blood None  Preliminary Report (27 May 2024 18:02):    No growth at 24 hours          RADIOLOGY:      < from: Xray Chest 1 View- PORTABLE-Routine (Xray Chest 1 View- PORTABLE-Routine in AM.) (24 @ 02:22) >    Impression:    Complete opacification left hemithorax, unchanged      < end of copied text >  < from: Xray Kidney Ureter Bladder (24 @ 07:58) >  IMPRESSION:  No significant interval change.    < end of copied text >  < from: Xray Kidney Ureter Bladder (24 @ 08:15) >  FINDINGS/  IMPRESSION:    Calcified lucent structure in the right upper quadrant may represent a   gallstone. Correlate with ultrasound if clinically indicated.   Nonobstructive bowel gas pattern. Stool seen in the pelvis. Marked   degenerative changes of the spine as well as the bilateral hip joints.      < end of copied text >          ASSESSMENT/PLAN:     73 year old female with PMH of COPD not on home oxygen, chronic smoker > 60 ppy, HTN who presented fro SOB x few weeks. Pt reported worsening productive cough and SOB with wheezing x  1 week. Increased WOB, satting 70% on RA. Was placed on NRB by EMS. Per son at bedside, pt confused in morning, unaware of her surroundings. She also endorsed hemoptysis along with weight loss for few months. , fever chills.   Placed on BiPAP and admitted to CEU.   Patient often refuses BiPAP despite extensive counseling. No changes in mental status. On Solumedrol 60 mg q12h. S/p diagnostic thoracentesis on 5/10 with cytology. ID on board, on cefepime & levaquine for obstructive pneumonia.   Patient defers conversations about possible cancer diagnosis to her children and she may decline treatment and opt for hospice.   Patient downgraded to medical floor, became tachypneic anxious appearing with O2 sat ranging 78-82$ on 6L NC, was placed on NRB 15 L and still saturating to mid-80s. Patient declined biPAP and placed on HFNC with improvement of O2 sat to 90%. CXR worsening l-sided pneumonia & ABG pH 7.4, CO2 77, lactate 1.2. Patient remained full code. Patient upgraded to ICU for intubation and bronchoscopy. Intubated on  for bronchoscopy with multiple blood clots, BAL showing rare yeast. Patient extubated on  to HFNC.   Patient now DNR/DNI.    #Acute on Chronic Hypoxemic Respiratory Failure   #COPD Exacerbation  #Left Lung Atelectasis  #Possible Lung Mass Possibly Malignant with Superimprosed pneumonia/L-sided effusion  - clinical improved, pt was weaned off HFNC to NC  - CXR complete opacification of left hemihtorax, stable  - supportive care, pulmonary toilet  - duonebs q6h, aspiration precautions  - s/p thoracentesis negative for malignant cells  - lung mass is highyl suspicious for advanced maligannce  - pt not considering clinical investigations and considering hospice  - complete course abx for PNA  - ID recommendations noted, will monitor off abx, check procalcitonin, fungitell  - s/p bronch, aspirate growing few yeasts likely colonization    #Clogged Ears  - c/w debrox   - call ENT for wax removal    #HTN  - DASH diet  - c/w lisinopril 5 mg qd    #H/o Hemoptysis/Anemia  - hemoptysis resolved  - monitor H/H, Keep HgB > 7.5  - send anemia work up  - no active bleeding noted    #Anxiety  - medications offered patient declined    #Severe Malnutrition  - ensure with meals  - high calorie diet  - dietician consult     #Constipation  - high fiber diet  - c/w laxatives                                           --------------------------------------------------------------    # DVT prophylaxis: Lovenox  # GI prophylaxis: PPI  # Diet:   # Activity:   # Code status: Full Code  # Disposition:    Pendin) ENT for ear wax removal  2) Dietician consult  3) Procal  4) Fungitell  5) Iron Studies      24H events:    Patient is a 73y old Female who presents with a chief complaint of COPD exacerbation (27 May 2024 13:42)    Primary diagnosis of COPD exacerbation        Today is hospital day 22d. This morning patient was seen and examined at bedside, resting comfortably in bed.    No acute or major events overnight.    Code Status:    Family communication:  Contact date:  Name of person contacted:  Relationship to patient:  Communication details:  What matters most:    PAST MEDICAL & SURGICAL HISTORY  Chronic obstructive pulmonary disease, unspecified COPD type    Hypertension    No significant past surgical history      SOCIAL HISTORY:  Social History:      ALLERGIES:  No Known Allergies    MEDICATIONS:  STANDING MEDICATIONS  albuterol    90 MICROgram(s) HFA Inhaler 2 Puff(s) Inhalation every 6 hours  albuterol/ipratropium for Nebulization 3 milliLiter(s) Nebulizer every 6 hours  chlorhexidine 2% Cloths 1 Application(s) Topical <User Schedule>  enoxaparin Injectable 40 milliGRAM(s) SubCutaneous every 24 hours  methylPREDNISolone sodium succinate Injectable 10 milliGRAM(s) IV Push daily  polyethylene glycol 3350 17 Gram(s) Oral two times a day  saccharomyces boulardii 250 milliGRAM(s) Oral two times a day  senna 2 Tablet(s) Oral at bedtime  sodium chloride 3%  Inhalation 4 milliLiter(s) Inhalation every 6 hours    PRN MEDICATIONS  bisacodyl Suppository 10 milliGRAM(s) Rectal daily PRN  hydrOXYzine hydrochloride 25 milliGRAM(s) Oral two times a day PRN  lactulose Syrup 10 Gram(s) Oral every 6 hours PRN  ondansetron Injectable 4 milliGRAM(s) IV Push once PRN  sodium chloride 0.65% Nasal 1 Spray(s) Both Nostrils three times a day PRN    VITALS:   T(F): 97.7  HR: 100  BP: 118/57  RR: 20  SpO2: 95%    PHYSICAL EXAM:  GENERAL:   ( x ) NAD, lying in bed comfortably     (  ) obtunded     (  ) lethargic     (  ) somnolent    HEAD:   ( x ) Atraumatic     (  ) hematoma     (  ) laceration (specify location:       )     NECK:  ( x ) Supple     (  ) neck stiffness     (  ) nuchal rigidity     (  )  no JVD     (  ) JVD present ( -- cm)    HEART:  Rate -->     (  ) normal rate     (  ) bradycardic     (  ) tachycardic  Rhythm -->     ( x ) regular     (  ) regularly irregular     (  ) irregularly irregular  Murmurs -->     (  ) normal s1s2     (  ) systolic murmur     (  ) diastolic murmur     (  ) continuous murmur      (  ) S3 present     (  ) S4 present    LUNGS:   (x  )Unlabored respirations     (  ) tachypnea  ( x ) B/L air entry     (  ) decreased breath sounds in:  (location     )    (  ) no adventitious sound     (  ) crackles     (  ) wheezing      (  ) rhonchi      (specify location:       )  (  ) chest wall tenderness (specify location:       )    ABDOMEN:   ( x ) Soft     (  ) tense   |   (  x) nondistended     (  ) distended   |   (  ) +BS     (  ) hypoactive bowel sounds     (  ) hyperactive bowel sounds  ( x ) nontender     (  ) RUQ tenderness     (  ) RLQ tenderness     (  ) LLQ tenderness     (  ) epigastric tenderness     (  ) diffuse tenderness  (  ) Splenomegaly      (  ) Hepatomegaly      (  ) Jaundice     (  ) ecchymosis     EXTREMITIES:  (  ) Normal     (  ) Rash     (  ) ecchymosis     (  ) varicose veins      (  ) pitting edema     (  ) non-pitting edema   (  ) ulceration     (  ) gangrene:     (location:     )    NERVOUS SYSTEM:    (  ) A&Ox3     (  ) confused     (  ) lethargic  CN II-XII:     (  ) Intact     (  ) deficits found     (Specify:     )   Upper extremities:     (  ) no sensorimotor deficits     (  ) weakness     (  ) loss of proprioception/vibration     (  ) loss of touch/temperature (specify:    )  Lower extremities:     (  ) no sensorimotor deficits     (  ) weakness     (  ) loss of proprioception/vibration     (  ) loss of touch/temperature (specify:    )    SKIN:   (  ) No rashes or lesions     (  ) maculopapular rash     (  ) pustules     (  ) vesicles     (  ) ulcer     (  ) ecchymosis     (specify location:     )    AMPAC score:    (  ) Indwelling Paulino Catheter:   Date insterted:    Reason (  ) Critical illness     (  ) urinary retention    (  ) Accurate Ins/Outs Monitoring     (  ) CMO patient    (  ) Central Line:   Date inserted:  Location: (  ) Right IJ     (  ) Left IJ     (  ) Right Fem     (  ) Left Fem    (  ) SPC        (  ) pigtail       (  ) PEG tube       (  ) colostomy       (  ) jejunostomy  (  ) U-Dall    LABS:                        7.8    10.04 )-----------( 421      ( 28 May 2024 06:35 )             26.7     05-27    140  |  99  |  16  ----------------------------<  99  4.5   |  35<H>  |  <0.5<L>    Ca    8.2<L>      27 May 2024 05:54  Phos  3.1       Mg     1.7         TPro  4.6<L>  /  Alb  3.0<L>  /  TBili  0.3  /  DBili  x   /  AST  9   /  ALT  25  /  AlkPhos  81        Urinalysis Basic - ( 27 May 2024 05:54 )    Color: x / Appearance: x / SG: x / pH: x  Gluc: 99 mg/dL / Ketone: x  / Bili: x / Urobili: x   Blood: x / Protein: x / Nitrite: x   Leuk Esterase: x / RBC: x / WBC x   Sq Epi: x / Non Sq Epi: x / Bacteria: x            Culture - Blood (collected 26 May 2024 11:49)  Source: .Blood None  Preliminary Report (27 May 2024 18:02):    No growth at 24 hours          RADIOLOGY:      < from: Xray Chest 1 View- PORTABLE-Routine (Xray Chest 1 View- PORTABLE-Routine in AM.) (24 @ 02:22) >    Impression:    Complete opacification left hemithorax, unchanged      < end of copied text >  < from: Xray Kidney Ureter Bladder (24 @ 07:58) >  IMPRESSION:  No significant interval change.    < end of copied text >  < from: Xray Kidney Ureter Bladder (24 @ 08:15) >  FINDINGS/  IMPRESSION:    Calcified lucent structure in the right upper quadrant may represent a   gallstone. Correlate with ultrasound if clinically indicated.   Nonobstructive bowel gas pattern. Stool seen in the pelvis. Marked   degenerative changes of the spine as well as the bilateral hip joints.      < end of copied text >          ASSESSMENT/PLAN:     73 year old female with PMH of COPD not on home oxygen, chronic smoker > 60 ppy, HTN who presented fro SOB x few weeks. Pt reported worsening productive cough and SOB with wheezing x  1 week. Increased WOB, satting 70% on RA. Was placed on NRB by EMS. Per son at bedside, pt confused in morning, unaware of her surroundings. She also endorsed hemoptysis along with weight loss for few months. , fever chills.   Placed on BiPAP and admitted to CEU.   Patient often refuses BiPAP despite extensive counseling. No changes in mental status. On Solumedrol 60 mg q12h. S/p diagnostic thoracentesis on 5/10 with cytology. ID on board, on cefepime & levaquine for obstructive pneumonia.   Patient defers conversations about possible cancer diagnosis to her children and she may decline treatment and opt for hospice.   Patient downgraded to medical floor, became tachypneic anxious appearing with O2 sat ranging 78-82$ on 6L NC, was placed on NRB 15 L and still saturating to mid-80s. Patient declined biPAP and placed on HFNC with improvement of O2 sat to 90%. CXR worsening l-sided pneumonia & ABG pH 7.4, CO2 77, lactate 1.2. Patient remained full code. Patient upgraded to ICU for intubation and bronchoscopy. Intubated on  for bronchoscopy with multiple blood clots, BAL showing rare yeast. Patient extubated on  to HFNC.   Patient now DNR/DNI.    #Acute on Chronic Hypoxemic Respiratory Failure   #COPD Exacerbation  #Left Lung Atelectasis  #Possible Lung Mass Possibly Malignant with Superimprosed pneumonia/L-sided effusion  - clinical improved, pt was weaned off HFNC to NC  - CXR complete opacification of left hemihtorax, stable  - supportive care, pulmonary toilet  - duonebs q6h, aspiration precautions  - s/p thoracentesis negative for malignant cells  - lung mass is highyl suspicious for advanced maligannce  - pt not considering clinical investigations and considering hospice  - complete course abx for PNA  - ID recommendations noted, will monitor off abx, check procalcitonin, fungitell  - s/p bronch, aspirate growing few yeasts likely colonization    #Clogged Ears  - c/w debrox   - call ENT for wax removal    #HTN  - DASH diet  - c/w lisinopril 5 mg qd    #H/o Hemoptysis/Anemia  - hemoptysis resolved  - monitor H/H, Keep HgB > 7.5  - no active bleeding noted  - iron studies noted  - started on ferrous sulfate 325 mg PO qd    #Anxiety  - medications offered patient declined    #Severe Malnutrition  - ensure with meals  - high calorie diet  - dietician consult     #Constipation  - high fiber diet  - c/w laxatives                                           --------------------------------------------------------------    # DVT prophylaxis: Lovenox  # GI prophylaxis: PPI  # Diet:   # Activity:   # Code status: Full Code  # Disposition:    Pendin) ENT for ear wax removal  2) Dietician consult  3) Procal  4) Fungitell  5) Iron Studies

## 2024-05-28 NOTE — DISCHARGE NOTE PROVIDER - CARE PROVIDER_API CALL
Vinita Lynne  Internal Medicine  [address] [place] [address]  Phone: [phone]  Fax: [phone]  Follow Up Time:    Vinita Lynne  Internal Medicine  [address] [place] [address]  Phone: [phone]  Fax: [phone]  Follow Up Time:     Serafin Ch  Critical Care Medicine  [address] [place] [id number]  Phone: [phone]  Fax: [phone]  Follow Up Time: 2 weeks

## 2024-05-28 NOTE — DISCHARGE NOTE PROVIDER - DETAILS OF MALNUTRITION DIAGNOSIS/DIAGNOSES
This patient has been assessed with a concern for Malnutrition and was treated during this hospitalization for the following Nutrition diagnosis/diagnoses:     -  05/11/2024: Severe protein-calorie malnutrition   -  05/11/2024: Underweight (BMI < 19)

## 2024-05-28 NOTE — DISCHARGE NOTE PROVIDER - NSDCCPCAREPLAN_GEN_ALL_CORE_FT
PRINCIPAL DISCHARGE DIAGNOSIS  Diagnosis: COPD exacerbation  Assessment and Plan of Treatment: You presented for shortness of breath x few weeks.  You reported worsening productive cough and shortness of breath with wheezing for the past 1 week.  Increased work of breathing today, was satting 70% on room air.  Was placed on nonrebreather by EMS.  As per son at the bedside pt was confused in the morning, wasn't aware of the surrounding.  Also endorsed hemoptysis  along with weight loss over the past few months.  Admits to fever and chills. Not on anticoagulation. s/p IV solumedrol, IV Mag, Nebs and Albuterol in ED. You were placed on BIPAP and admitted to CEU. You often refuses BiPAP despite extensive counseling and encouragement, continued on Solumedrol 60mg q12h. S/P Diagnostic thoracentesis done on 5/10, f/u cytology. ID is following, on cefepime and levaquin for post-obstructive pneumonia. You defers conversations regarding possible cancer diagnosis to her children. You were downgraded to medical floor, became  tachypneic, anxious appearing.  O2 sat ranged from 78%-82% on 6L nasal cannula, patient was placed on non rebreather mask 15L and was still satting in the mid 80's. 60mg q12h. S/P Diagnostic thoracentesis done on 5/10, f/u cytology. ID is following, on cefepime and levaquin for post-obstructive pneumonia. Patient defers conversations regarding possible cancer diagnosis to her children. They state there is a possibility she may decline cancer treatment and opt for hospice.  High Flow nasal cannula placed with improvement in oxygenation to 90%.  Chest x-ray demonstrated worsening left sided pneumonia and ABG pH 7.4, CO2 77, lactate 1.2. Chest xray now shows complete opacification of left lung, with increasing HFNC requirements. Patient was upgraded to ICU for intubation and bronch mohinder. Low threshold for intubation if she decompensates. You were intubated on 05/17 for bronchoscopy: multiple blood clots, BAL showed rare yeast. Extubated on 05/20, on high flow. Now DNR/DNI. Please follow up with your PCP and pulmonologist outpatient      SECONDARY DISCHARGE DIAGNOSES  Diagnosis: Acute hypoxic respiratory failure  Assessment and Plan of Treatment:     Diagnosis: Pleural effusion  Assessment and Plan of Treatment:      PRINCIPAL DISCHARGE DIAGNOSIS  Diagnosis: COPD exacerbation  Assessment and Plan of Treatment: You presented for shortness of breath x few weeks. you have lung mass, pleural fluid and copd exac, you declined workup for mass , this mass might be due to malignancy, if you decide that you want workup ( including biopsy) please contact your doctor asap.   please cont meds, nebulizers and oxygen as needed. you may participate in limited physical activity as long as you tolerate.    Please follow up with your PCP and pulmonologist outpatient  if you have increased shortness of breath or any other concereing symptoms pls go to the nearest emergency room

## 2024-05-28 NOTE — PROGRESS NOTE ADULT - ASSESSMENT
73yFemale with history of COPD, HTN, active smoker presents with SOB and hypoxia. Patient found to have likely COPD exacerbation on arrival with new lung mass concerned for malignancy. Palliative care consulted for GOC.    Spoke with patient at bedside. Patient denied any pain, shortness of breath, or discomfort at this time. States that she is feeling much better and that her only concern was that she was downgraded from ICU and now on NC and not hi-flow anymore but reassured patient that her numbers were doing well and that she felt better were important.     Education about palliative care provided to patient/family.  See Recs below.    Please call [phone] with questions or concerns 24/7.   We will continue to follow.

## 2024-05-28 NOTE — PROGRESS NOTE ADULT - SUBJECTIVE AND OBJECTIVE BOX
CC: SOB    HPI:  73-year-old female with past medical history of COPD not on home oxygen, chronic smoker >60ppy, hypertension who presents for shortness of breath x few weeks.  Pt reports worsening productive cough and shortness of breath with wheezing for the past 1 week.  Increased work of breathing today, was satting 70% on room air.  Was placed on nonrebreather by EMS.  As per son at the bedside pt was confused in the morning, wasn't aware of the surrounding.  She also admits to hemoptysis  along with weight loss over the past few months.  Admits to fever and chills, and denies chest pain, palpitations, nausea, vomiting, diarrhea, abdominal pain, urinary symptoms, weakness, numbness, falls, recent travel, recent surgeries.  Not on anticoagulation.  Denies substance use and alcohol use.    In ED  VT bp 143/80, , RR 28, T 99.4 F, SpO2 98% on NRB 15 L  Labs showed Lac 2.3, trop 26, BNP 2044, VBG  pH 7.18 PCo2 102 HCO3 38, O2 sat 46%    s/p IV solumedrol, IV Mag, Nebs and Albuterol in ED.  Pt was placed on BIPAP and admitted to MICU for further managements.   (06 May 2024 23:30)    PERTINENT PM/SXH:   COPD not on home oxygen, chronic smoker >60ppy, hypertension     FAMILY HISTORY:    None pertinent    ITEMS NOT CHECKED ARE NOT PRESENT    SOCIAL HISTORY:   Significant other/partner[ ]  Children[ ]  Mormonism/Spirituality:  Substance hx:  [ ]   Tobacco hx:  [ ]   Alcohol hx: [ ]   Living Situation: [x ]Home  [ ]Long term care  [ ]Rehab [ ]Other  Home Services: [ ] HHA [ ] Visting RN [ ] Hospice  Occupation:  Home Opioid hx:  [ ] Y [ ] N [x ] I-Stop Reference No:    Reference #: [id number] - no meds     ADVANCE DIRECTIVES:     [ ] Full Code [x ] DNR  MOLST  [ ]  Living Will  [ ]   DECISION MAKER(s):  [ ] Health Care Proxy(s)  [x ] Surrogate(s)  [ ] Guardian           Name(s): Phone Number(s):  Children      BASELINE (I)ADL(s) (prior to admission):    [place]: [ ]Total  [ ] Moderate [ ]Dependent  Palliative Performance Status Version 2:         %    http://npcrc.org/files/news/palliative_performance_scale_ppsv2.pdf    Allergies    No Known Allergies    Intolerances    MEDICATIONS  (STANDING):  albuterol    90 MICROgram(s) HFA Inhaler 2 Puff(s) Inhalation every 6 hours  albuterol/ipratropium for Nebulization 3 milliLiter(s) Nebulizer every 6 hours  chlorhexidine 2% Cloths 1 Application(s) Topical <User Schedule>  enoxaparin Injectable 40 milliGRAM(s) SubCutaneous every 24 hours  methylPREDNISolone sodium succinate Injectable 10 milliGRAM(s) IV Push daily  polyethylene glycol 3350 17 Gram(s) Oral two times a day  saccharomyces boulardii 250 milliGRAM(s) Oral two times a day  senna 2 Tablet(s) Oral at bedtime  sodium chloride 3%  Inhalation 4 milliLiter(s) Inhalation every 6 hours    MEDICATIONS  (PRN):  bisacodyl Suppository 10 milliGRAM(s) Rectal daily PRN Constipation  hydrOXYzine hydrochloride 25 milliGRAM(s) Oral two times a day PRN Anxiety  lactulose Syrup 10 Gram(s) Oral every 6 hours PRN constipation  ondansetron Injectable 4 milliGRAM(s) IV Push once PRN Nausea and/or Vomiting  sodium chloride 0.65% Nasal 1 Spray(s) Both Nostrils three times a day PRN Nasal Congestion      PRESENT SYMPTOMS: [ ]Unable to obtain due to poor mentation   Source if other than patient:  [ ]Family   [ ]Team     Pain: [ ]yes [x ]no  ALL PAIN ASSESSMENT COMPONENTS WERE ASKED ABOUT UNLESS OTHERWISE NOTED  QOL impact -   Location -   Aggravating factors -  Quality -   Radiation -   Timing-   Severity (0-10 scale):  Minimal acceptable level (0-10 scale):     CPOT:    https://www.sccm.org/getattachment/gqp27y78-2n5d-4z9w-7k6j-7606m2125q6l/Critical-Care-Pain-Observation-Tool-(CPOT)    PAIN AD Score:   http://geriatrictoolkit.[place].[facility]/cog/painad.pdf (press ctrl +  left click to view)    Dyspnea:                           [x ]None[ ]Mild [ ]Moderate [ ]Severe     Respiratory Distress Observation Scale (RDOS): 0  A score of 0 to 2 signifies little or no respiratory distress, 3 signifies mild distress, scores 4 to 6 indicate moderate distress, and scores greater than 7 signify severe distress  https://www.[facility].ca/sites/default/files/PDFS/277606-ihfulijicrf-hddqmlwa-kgutsuttblb-dimmw.pdf    Anxiety:                             [ ]None[ ]Mild [ x]Moderate [ ]Severe   Fatigue:                             [ ]None[ x]Mild [ ]Moderate [ ]Severe   Nausea:                             [ x]None[ ]Mild [ ]Moderate [ ]Severe   Loss of appetite:              [x ]None[ ]Mild [ ]Moderate [ ]Severe   Constipation:                    [x ]None[ ]Mild [ ]Moderate [ ]Severe    Other Symptoms:  [x ]All other review of systems negative     Palliative Performance Status Version 2:         30%    http://[facility].org/files/news/palliative_performance_scale_ppsv2.pdf    PHYSICAL EXAM:    Vital Signs Last 24 Hrs  T(C): 36.5 (28 May 2024 05:00), Max: 37.2 (27 May 2024 16:00)  T(F): 97.7 (28 May 2024 05:00), Max: 99 (27 May 2024 16:00)  HR: 100 (28 May 2024 05:00) (93 - 106)  BP: 118/57 (28 May 2024 05:00) (95/67 - 118/57)  BP(mean): 78 (27 May 2024 16:00) (78 - 78)  RR: 20 (28 May 2024 05:00) (18 - 20)  SpO2: 95% (28 May 2024 05:00) (91% - 99%)    Parameters below as of 28 May 2024 05:00  Patient On (Oxygen Delivery Method): nasal cannula  O2 Flow (L/min): 4        GENERAL:  [x ] No acute distress [ ]Lethargic  [ ]Unarousable  [ ]Verbal  [ ]Non-Verbal [ ]Cachexia    BEHAVIORAL/PSYCH:  [ x]Alert and Oriented x3  [ x] Anxiety [ ] Delirium [ ] Agitation [ ] Calm   EYES: [x ] No scleral icterus [ ] Scleral icterus [ ] Closed  ENMT:  [ ]Dry mouth  [ x]No external oral lesions [ ] No external ear or nose lesions  CARDIOVASCULAR:  [ ]Regular [ ]Irregular [ ]Tachy [ x]Not Tachy  [ ]Kalpesh [ ] Edema [ ] No edema  PULMONARY:  [x ]Tachypnea  [ ]Audible excessive secretions [x ] No labored breathing [ ] labored breathing  GASTROINTESTINAL: [ ]Soft  [ ]Distended  [x ]Not distended [ ]Non tender [ ]Tender  MUSCULOSKELETAL: [ ]No clubbing [ ] clubbing  [ x] No cyanosis [ ] cyanosis  NEUROLOGIC: [ ]No focal deficits  [ x]Follows commands  [ ]Does not follow commands  [ ]Cognitive impairment  [ ]Dysphagia  [ ]Dysarthria  [ ]Paresis   SKIN: [ ] Jaundiced [x ] Non-jaundiced [ ]Rash [ ]No Rash [ ] Warm [ ] Dry  MISC/LINES: [ ] ET tube [ ] Trach [ ]NGT/OGT [ ]PEG [ ]Paulino    LABS: reviewed by me                                   7.8    10.04 )-----------( 421      ( 28 May 2024 06:35 )             26.7     05-28    141  |  99  |  14  ----------------------------<  114<H>  4.6   |  39<H>  |  <0.5<L>    Ca    8.3<L>      28 May 2024 06:35  Phos  3.1     05-27  Mg     1.7     05-27    TPro  4.6<L>  /  Alb  3.0<L>  /  TBili  0.3  /  DBili  x   /  AST  9   /  ALT  25  /  AlkPhos  81  05-27      Urinalysis Basic - ( 28 May 2024 06:35 )    Color: x / Appearance: x / SG: x / pH: x  Gluc: 114 mg/dL / Ketone: x  / Bili: x / Urobili: x   Blood: x / Protein: x / Nitrite: x   Leuk Esterase: x / RBC: x / WBC x   Sq Epi: x / Non Sq Epi: x / Bacteria: x                    RADIOLOGY & ADDITIONAL STUDIES: reviewed by me    < from: Xray Chest 1 View- PORTABLE-Urgent (Xray Chest 1 View- PORTABLE-Urgent .) (05.09.24 @ 12:23) >  Impression:    Left lung masslike consolidation. Decreased left effusion. No   pneumothorax.    Improving right basilar nodular opacities.    < end of copied text >      EKG: reviewed by me    < from: 12 Lead ECG (05.06.24 @ 19:52) >  Ventricular Rate 90 BPM    Atrial Rate 90 BPM    P-R Interval 170 ms    QRS Duration 80 ms    Q-T Interval 360 ms    QTC Calculation(Bazett) 440 ms    P Axis 84 degrees    R Axis -70 degrees    T Axis 77 degrees    Diagnosis Line Normal sinus rhythm  Left anterior fascicular block  Septal infarct , age undetermined  Abnormal ECG    < end of copied text >      PROTEIN CALORIE MALNUTRITION PRESENT: [ ]mild [ ]moderate [ ]severe [ ]underweight [ ]morbid obesity  https://www.andeal.org/vault/2440/web/files/ONC/Table_Clinical%20Characteristics%20to%20Document%20Malnutrition-White%20JV%20et%20al%202012.pdf    Height (cm): 154.9 (05-07-24 @ 00:20)  Weight (kg): 47 (05-07-24 @ 00:20)  BMI (kg/m2): 19.6 (05-07-24 @ 00:20)  [ ]PPSV2 < or = to 30% [ ]significant weight loss  [ ]poor nutritional intake  [ ]anasarca      [ ]Artificial Nutrition      Palliative Care Spiritual/Emotional Screening Tool Question  Severity (0-4):                    OR                    [ x] Unable to determine. Will assess at later time if appropriate.  Score of 2 or greater indicates recommendation of Chaplaincy and/or SW referral  Chaplaincy Referral: [ ] Yes [ ] Refused [ ] Following     Caregiver [place]:  [ ] Yes [ ] No    OR    [x ] Unable to determine. Will assess at later time if appropriate.  Social Work Referral [ ]  Patient and Family Centered Care Referral [ ]    Anticipatory Grief Present: [ ] Yes [ ] No    OR     [ x] Unable to determine. Will assess at later time if appropriate.  Social Work Referral [ ]  Patient and Family Centered Care Referral [ ]    Patient discussed with primary medical team MD  Palliative care education provided to patient and/or family

## 2024-05-28 NOTE — DISCHARGE NOTE PROVIDER - PROVIDER TOKENS
PROVIDER:[TOKEN:[[id number]]] PROVIDER:[TOKEN:[[id number]]],PROVIDER:[TOKEN:[[id number]],FOLLOWUP:[2 weeks]]

## 2024-05-28 NOTE — DISCHARGE NOTE PROVIDER - ATTENDING DISCHARGE PHYSICAL EXAMINATION:
T(F): 98 (05-30-24 @ 12:17), Max: 98.8 (05-29-24 @ 21:37)  HR: 97 (05-30-24 @ 12:17) (97 - 107)  BP: 121/63 (05-30-24 @ 12:17) (121/63 - 154/77)  RR: 18 (05-30-24 @ 12:17) (18 - 19)  SpO2: 95% (05-30-24 @ 12:17) (94% - 95%)  Physical exam:   constitutional NAD, AAOX3, Respiratory  lungs left lung no breath sounds, + normal air entery, CVS heart RRR, GI: abdomen Soft NT, ND, BS+, skin: intact  neuro exam Motor, sensory and CN normal, no deficit

## 2024-05-29 NOTE — PROGRESS NOTE ADULT - SUBJECTIVE AND OBJECTIVE BOX
CC: SOB    HPI:  73-year-old female with past medical history of COPD not on home oxygen, chronic smoker >60ppy, hypertension who presents for shortness of breath x few weeks.  Pt reports worsening productive cough and shortness of breath with wheezing for the past 1 week.  Increased work of breathing today, was satting 70% on room air.  Was placed on nonrebreather by EMS.  As per son at the bedside pt was confused in the morning, wasn't aware of the surrounding.  She also admits to hemoptysis  along with weight loss over the past few months.  Admits to fever and chills, and denies chest pain, palpitations, nausea, vomiting, diarrhea, abdominal pain, urinary symptoms, weakness, numbness, falls, recent travel, recent surgeries.  Not on anticoagulation.  Denies substance use and alcohol use.    In ED  VT bp 143/80, , RR 28, T 99.4 F, SpO2 98% on NRB 15 L  Labs showed Lac 2.3, trop 26, BNP 2044, VBG  pH 7.18 PCo2 102 HCO3 38, O2 sat 46%    s/p IV solumedrol, IV Mag, Nebs and Albuterol in ED.  Pt was placed on BIPAP and admitted to MICU for further managements.   (06 May 2024 23:30)    PERTINENT PM/SXH:   COPD not on home oxygen, chronic smoker >60ppy, hypertension     FAMILY HISTORY:    None pertinent    ITEMS NOT CHECKED ARE NOT PRESENT    SOCIAL HISTORY:   Significant other/partner[ ]  Children[ ]  Mandaeism/Spirituality:  Substance hx:  [ ]   Tobacco hx:  [ ]   Alcohol hx: [ ]   Living Situation: [x ]Home  [ ]Long term care  [ ]Rehab [ ]Other  Home Services: [ ] HHA [ ] Visting RN [ ] Hospice  Occupation:  Home Opioid hx:  [ ] Y [ ] N [x ] I-Stop Reference No:    Reference #: [id number] - no meds     ADVANCE DIRECTIVES:     [ ] Full Code [x ] DNR  MOLST  [ ]  Living Will  [ ]   DECISION MAKER(s):  [ ] Health Care Proxy(s)  [x ] Surrogate(s)  [ ] Guardian           Name(s): Phone Number(s):  Children      BASELINE (I)ADL(s) (prior to admission):    [place]: [ ]Total  [ ] Moderate [ ]Dependent  Palliative Performance Status Version 2:         %    http://npcrc.org/files/news/palliative_performance_scale_ppsv2.pdf    Allergies    No Known Allergies    Intolerances    MEDICATIONS  (STANDING):  albuterol    90 MICROgram(s) HFA Inhaler 2 Puff(s) Inhalation every 6 hours  albuterol/ipratropium for Nebulization 3 milliLiter(s) Nebulizer every 6 hours  chlorhexidine 2% Cloths 1 Application(s) Topical <User Schedule>  enoxaparin Injectable 40 milliGRAM(s) SubCutaneous every 24 hours  methylPREDNISolone sodium succinate Injectable 10 milliGRAM(s) IV Push daily  polyethylene glycol 3350 17 Gram(s) Oral two times a day  saccharomyces boulardii 250 milliGRAM(s) Oral two times a day  senna 2 Tablet(s) Oral at bedtime  sodium chloride 3%  Inhalation 4 milliLiter(s) Inhalation every 6 hours    MEDICATIONS  (PRN):  bisacodyl Suppository 10 milliGRAM(s) Rectal daily PRN Constipation  hydrOXYzine hydrochloride 25 milliGRAM(s) Oral two times a day PRN Anxiety  lactulose Syrup 10 Gram(s) Oral every 6 hours PRN constipation  ondansetron Injectable 4 milliGRAM(s) IV Push once PRN Nausea and/or Vomiting  sodium chloride 0.65% Nasal 1 Spray(s) Both Nostrils three times a day PRN Nasal Congestion      PRESENT SYMPTOMS: [ ]Unable to obtain due to poor mentation   Source if other than patient:  [ ]Family   [ ]Team     Pain: [ ]yes [x ]no  ALL PAIN ASSESSMENT COMPONENTS WERE ASKED ABOUT UNLESS OTHERWISE NOTED  QOL impact -   Location -   Aggravating factors -  Quality -   Radiation -   Timing-   Severity (0-10 scale):  Minimal acceptable level (0-10 scale):     CPOT:    https://www.sccm.org/getattachment/olz87p91-8f8i-3e8r-7l7m-4513a9070x7c/Critical-Care-Pain-Observation-Tool-(CPOT)    PAIN AD Score:   http://geriatrictoolkit.[place].[facility]/cog/painad.pdf (press ctrl +  left click to view)    Dyspnea:                           [x ]None[ ]Mild [ ]Moderate [ ]Severe     Respiratory Distress Observation Scale (RDOS): 0  A score of 0 to 2 signifies little or no respiratory distress, 3 signifies mild distress, scores 4 to 6 indicate moderate distress, and scores greater than 7 signify severe distress  https://www.[facility].ca/sites/default/files/PDFS/328815-scpwghqphzu-rrccywjn-kxkcfivuvvs-nndao.pdf    Anxiety:                             [ ]None[ ]Mild [ x]Moderate [ ]Severe   Fatigue:                             [ ]None[ x]Mild [ ]Moderate [ ]Severe   Nausea:                             [ x]None[ ]Mild [ ]Moderate [ ]Severe   Loss of appetite:              [x ]None[ ]Mild [ ]Moderate [ ]Severe   Constipation:                    [x ]None[ ]Mild [ ]Moderate [ ]Severe    Other Symptoms:  [x ]All other review of systems negative     Palliative Performance Status Version 2:         30%    http://[facility].org/files/news/palliative_performance_scale_ppsv2.pdf    PHYSICAL EXAM:    Vital Signs Last 24 Hrs  T(C): 36.9 (29 May 2024 12:34), Max: 37.1 (28 May 2024 21:50)  T(F): 98.4 (29 May 2024 12:34), Max: 98.7 (28 May 2024 21:50)  HR: 106 (29 May 2024 12:34) (90 - 106)  BP: 121/70 (29 May 2024 12:34) (98/59 - 154/60)  BP(mean): --  RR: 20 (29 May 2024 12:34) (18 - 20)  SpO2: 95% (29 May 2024 12:34) (95% - 97%)    Parameters below as of 29 May 2024 05:00  Patient On (Oxygen Delivery Method): nasal cannula  O2 Flow (L/min): 4      GENERAL:  [x ] No acute distress [ ]Lethargic  [ ]Unarousable  [ ]Verbal  [ ]Non-Verbal [ ]Cachexia    BEHAVIORAL/PSYCH:  [ x]Alert and Oriented x3  [ x] Anxiety [ ] Delirium [ ] Agitation [ ] Calm   EYES: [x ] No scleral icterus [ ] Scleral icterus [ ] Closed  ENMT:  [ ]Dry mouth  [ x]No external oral lesions [ ] No external ear or nose lesions  CARDIOVASCULAR:  [ ]Regular [ ]Irregular [ ]Tachy [ x]Not Tachy  [ ]Kalpesh [ ] Edema [ ] No edema  PULMONARY:  [x ]Tachypnea  [ ]Audible excessive secretions [x ] No labored breathing [ ] labored breathing  GASTROINTESTINAL: [ ]Soft  [ ]Distended  [x ]Not distended [ ]Non tender [ ]Tender  MUSCULOSKELETAL: [ ]No clubbing [ ] clubbing  [ x] No cyanosis [ ] cyanosis  NEUROLOGIC: [ ]No focal deficits  [ x]Follows commands  [ ]Does not follow commands  [ ]Cognitive impairment  [ ]Dysphagia  [ ]Dysarthria  [ ]Paresis   SKIN: [ ] Jaundiced [x ] Non-jaundiced [ ]Rash [ ]No Rash [ ] Warm [ ] Dry  MISC/LINES: [ ] ET tube [ ] Trach [ ]NGT/OGT [ ]PEG [ ]Paulino    LABS: reviewed by me                                   7.7    9.94  )-----------( 426      ( 29 May 2024 07:09 )             26.1     05-29    140  |  98  |  14  ----------------------------<  100<H>  5.1<H>   |  39<H>  |  <0.5<L>    Ca    8.4      29 May 2024 07:09  Mg     1.8     05-29    TPro  4.7<L>  /  Alb  3.0<L>  /  TBili  <0.2  /  DBili  x   /  AST  12  /  ALT  37  /  AlkPhos  84  05-29      Urinalysis Basic - ( 29 May 2024 07:09 )    Color: x / Appearance: x / SG: x / pH: x  Gluc: 100 mg/dL / Ketone: x  / Bili: x / Urobili: x   Blood: x / Protein: x / Nitrite: x   Leuk Esterase: x / RBC: x / WBC x   Sq Epi: x / Non Sq Epi: x / Bacteria: x              RADIOLOGY & ADDITIONAL STUDIES: reviewed by me    < from: Xray Chest 1 View- PORTABLE-Urgent (Xray Chest 1 View- PORTABLE-Urgent .) (05.09.24 @ 12:23) >  Impression:    Left lung masslike consolidation. Decreased left effusion. No   pneumothorax.    Improving right basilar nodular opacities.    < end of copied text >      EKG: reviewed by me    < from: 12 Lead ECG (05.06.24 @ 19:52) >  Ventricular Rate 90 BPM    Atrial Rate 90 BPM    P-R Interval 170 ms    QRS Duration 80 ms    Q-T Interval 360 ms    QTC Calculation(Bazett) 440 ms    P Axis 84 degrees    R Axis -70 degrees    T Axis 77 degrees    Diagnosis Line Normal sinus rhythm  Left anterior fascicular block  Septal infarct , age undetermined  Abnormal ECG    < end of copied text >      PROTEIN CALORIE MALNUTRITION PRESENT: [ ]mild [ ]moderate [ ]severe [ ]underweight [ ]morbid obesity  https://www.andeal.org/vault/2440/web/files/ONC/Table_Clinical%20Characteristics%20to%20Document%20Malnutrition-White%20JV%20et%20al%202012.pdf    Height (cm): 154.9 (05-07-24 @ 00:20)  Weight (kg): 47 (05-07-24 @ 00:20)  BMI (kg/m2): 19.6 (05-07-24 @ 00:20)  [ ]PPSV2 < or = to 30% [ ]significant weight loss  [ ]poor nutritional intake  [ ]anasarca      [ ]Artificial Nutrition      Palliative Care Spiritual/Emotional Screening Tool Question  Severity (0-4):                    OR                    [ x] Unable to determine. Will assess at later time if appropriate.  Score of 2 or greater indicates recommendation of Chaplaincy and/or SW referral  Chaplaincy Referral: [ ] Yes [ ] Refused [ ] Following     Caregiver [place]:  [ ] Yes [ ] No    OR    [x ] Unable to determine. Will assess at later time if appropriate.  Social Work Referral [ ]  Patient and Family Centered Care Referral [ ]    Anticipatory Grief Present: [ ] Yes [ ] No    OR     [ x] Unable to determine. Will assess at later time if appropriate.  Social Work Referral [ ]  Patient and Family Centered Care Referral [ ]    Patient discussed with primary medical team MD  Palliative care education provided to patient and/or family

## 2024-05-29 NOTE — PROGRESS NOTE ADULT - PROBLEM SELECTOR PROBLEM 2
Lung mass

## 2024-05-29 NOTE — PROGRESS NOTE ADULT - ATTENDING COMMENTS
Patient is a 73-year-old female with PMH of COPD not on home oxygen, chronic smoker >60ppy, hypertension who c/w acute hypoxic respiratory failure due to pneumonia , newly dx. lung mas suspected for malignancy, left lung effusion . Patient was admitted to CEU, post medical stabilization downgraded to medical unit with the following course of therapy:    # Acute on chronic hypoxemic respiratory failure improving/ COPD exacerbation Left lung atelectasis /Lung mass possibly malignant /Superimposed pneumonia/ Left sided effusion  - clinically improved,  currently on NC 3 to 4 L sat 93%   - CXR showed complete opacification of left hemothorax, stable   - supportive care, pulmonary toilet  - nebs Q 6 hours, aspiration precautions   -s/p thoracentesis negative for malignant cells, lung mass is highly suspicious for advanced malignancy , pt is not considering clinical investigation or cancer directed treatement considering hospice   - completed course of Abx for PNA  - pulmonary is following, recommendations noted   - s/p bronch , aspirate growing few yeasts, likely colonization   5/29: As per conversation with Palliative, patient wants to get better with this episode. willling for SNF. Not interested in hospice any time soon. Also Not interested in [facility] for now.   to treat current episode, c/w Steroid taper (From 5/30, Prednisone 10mg x 3 days). PT eval to see how she does.     # clogged ears  -  Debrox and call ENT for vax removal     # HTN- continue current tx.         # h/o Hemoptysis/ Anemia - hemoptysis resolved now   - monitor H/H, keep Hb above 7.5   daily MVI tx.    # Anxiety- outpatient Behavioral tx. f/up       # Severe malnutrition - ensure supplement tx    # Constipation- laxatives regimen tx.       Overall prognosis is very poor, pt is DNR/ DNI, palliative care is following       #Progress Note Handoff: , pending PT f/up to assess functional status   Family discussion: yes, medical team Disposition: possible STR in 24-48 hours . Has auth.

## 2024-05-29 NOTE — PROGRESS NOTE ADULT - SUBJECTIVE AND OBJECTIVE BOX
24H events:    Patient is a 73y old Female who presents with a chief complaint of COPD exacerbation (28 May 2024 13:00)    Primary diagnosis of COPD exacerbation        Today is hospital day 23. This morning patient was seen and examined at bedside, resting comfortably in bed.    No acute or major events overnight.    Code Status:    Family communication:  Contact date:  Name of person contacted:  Relationship to patient:  Communication details:  What matters most:    PAST MEDICAL & SURGICAL HISTORY  Chronic obstructive pulmonary disease, unspecified COPD type    Hypertension    No significant past surgical history      SOCIAL HISTORY:  Social History:      ALLERGIES:  No Known Allergies    MEDICATIONS:  STANDING MEDICATIONS  albuterol    90 MICROgram(s) HFA Inhaler 2 Puff(s) Inhalation every 6 hours  albuterol/ipratropium for Nebulization 3 milliLiter(s) Nebulizer every 6 hours  chlorhexidine 2% Cloths 1 Application(s) Topical <User Schedule>  enoxaparin Injectable 40 milliGRAM(s) SubCutaneous every 24 hours  ferrous    sulfate 325 milliGRAM(s) Oral daily  polyethylene glycol 3350 17 Gram(s) Oral two times a day  saccharomyces boulardii 250 milliGRAM(s) Oral two times a day  senna 2 Tablet(s) Oral at bedtime  sodium chloride 3%  Inhalation 4 milliLiter(s) Inhalation every 6 hours    PRN MEDICATIONS  bisacodyl Suppository 10 milliGRAM(s) Rectal daily PRN  hydrOXYzine hydrochloride 25 milliGRAM(s) Oral two times a day PRN  lactulose Syrup 10 Gram(s) Oral every 6 hours PRN  ondansetron Injectable 4 milliGRAM(s) IV Push once PRN  sodium chloride 0.65% Nasal 1 Spray(s) Both Nostrils three times a day PRN    VITALS:   T(F): 98.2  HR: 90  BP: 117/69  RR: 18  SpO2: 97%    PHYSICAL EXAM:  GENERAL:   ( x ) NAD, lying in bed comfortably     (  ) obtunded     (  ) lethargic     (  ) somnolent    HEAD:   ( x ) Atraumatic     (  ) hematoma     (  ) laceration (specify location:       )     NECK:  ( x ) Supple     (  ) neck stiffness     (  ) nuchal rigidity     (  )  no JVD     (  ) JVD present ( -- cm)    HEART:  Rate -->     (  ) normal rate     (  ) bradycardic     (  ) tachycardic  Rhythm -->     ( x ) regular     (  ) regularly irregular     (  ) irregularly irregular  Murmurs -->     (  ) normal s1s2     (  ) systolic murmur     (  ) diastolic murmur     (  ) continuous murmur      (  ) S3 present     (  ) S4 present    LUNGS:   (x  )Unlabored respirations     (  ) tachypnea  ( x ) B/L air entry     (  ) decreased breath sounds in:  (location     )    (  ) no adventitious sound     (  ) crackles     (  ) wheezing      (  ) rhonchi      (specify location:       )  (  ) chest wall tenderness (specify location:       )    ABDOMEN:   ( x ) Soft     (  ) tense   |   (  x) nondistended     (  ) distended   |   (  ) +BS     (  ) hypoactive bowel sounds     (  ) hyperactive bowel sounds  ( x ) nontender     (  ) RUQ tenderness     (  ) RLQ tenderness     (  ) LLQ tenderness     (  ) epigastric tenderness     (  ) diffuse tenderness  (  ) Splenomegaly      (  ) Hepatomegaly      (  ) Jaundice     (  ) ecchymosis     EXTREMITIES:  (  ) Normal     (  ) Rash     (  ) ecchymosis     (  ) varicose veins      (  ) pitting edema     (  ) non-pitting edema   (  ) ulceration     (  ) gangrene:     (location:     )    NERVOUS SYSTEM:    (  ) A&Ox3     (  ) confused     (  ) lethargic  CN II-XII:     (  ) Intact     (  ) deficits found     (Specify:     )   Upper extremities:     (  ) no sensorimotor deficits     (  ) weakness     (  ) loss of proprioception/vibration     (  ) loss of touch/temperature (specify:    )  Lower extremities:     (  ) no sensorimotor deficits     (  ) weakness     (  ) loss of proprioception/vibration     (  ) loss of touch/temperature (specify:    )    SKIN:   (  ) No rashes or lesions     (  ) maculopapular rash     (  ) pustules     (  ) vesicles     (  ) ulcer     (  ) ecchymosis     (specify location:     )    AMPAC score:    (  ) Indwelling Paulino Catheter:   Date insterted:    Reason (  ) Critical illness     (  ) urinary retention    (  ) Accurate Ins/Outs Monitoring     (  ) CMO patient    (  ) Central Line:   Date inserted:  Location: (  ) Right IJ     (  ) Left IJ     (  ) Right Fem     (  ) Left Fem    (  ) SPC        (  ) pigtail       (  ) PEG tube       (  ) colostomy       (  ) jejunostomy  (  ) U-Dall    LABS:                        7.7    9.94  )-----------( 426      ( 29 May 2024 07:09 )             26.1     05-    140  |  98  |  14  ----------------------------<  100<H>  5.1<H>   |  39<H>  |  <0.5<L>    Ca    8.4      29 May 2024 07:09  Mg     1.8         TPro  4.7<L>  /  Alb  3.0<L>  /  TBili  <0.2  /  DBili  x   /  AST  12  /  ALT  37  /  AlkPhos  84        Urinalysis Basic - ( 29 May 2024 07:09 )    Color: x / Appearance: x / SG: x / pH: x  Gluc: 100 mg/dL / Ketone: x  / Bili: x / Urobili: x   Blood: x / Protein: x / Nitrite: x   Leuk Esterase: x / RBC: x / WBC x   Sq Epi: x / Non Sq Epi: x / Bacteria: x            Culture - Blood (collected 26 May 2024 11:49)  Source: .Blood None  Preliminary Report (28 May 2024 18:01):    No growth at 48 Hours          RADIOLOGY:          < from: Xray Chest 1 View- PORTABLE-Routine (Xray Chest 1 View- PORTABLE-Routine in AM.) (24 @ 02:22) >    Impression:    Complete opacification left hemithorax, unchanged      < end of copied text >  < from: Xray Kidney Ureter Bladder (24 @ 07:58) >  IMPRESSION:  No significant interval change.    < end of copied text >  < from: Xray Kidney Ureter Bladder (24 @ 08:15) >  FINDINGS/  IMPRESSION:    Calcified lucent structure in the right upper quadrant may represent a   gallstone. Correlate with ultrasound if clinically indicated.   Nonobstructive bowel gas pattern. Stool seen in the pelvis. Marked   degenerative changes of the spine as well as the bilateral hip joints.      < end of copied text >          ASSESSMENT/PLAN:     73 year old female with PMH of COPD not on home oxygen, chronic smoker > 60 ppy, HTN who presented fro SOB x few weeks. Pt reported worsening productive cough and SOB with wheezing x  1 week. Increased WOB, satting 70% on RA. Was placed on NRB by EMS. Per son at bedside, pt confused in morning, unaware of her surroundings. She also endorsed hemoptysis along with weight loss for few months. , fever chills.   Placed on BiPAP and admitted to CEU.   Patient often refuses BiPAP despite extensive counseling. No changes in mental status. On Solumedrol 60 mg q12h. S/p diagnostic thoracentesis on 5/10 with cytology. ID on board, on cefepime & levaquine for obstructive pneumonia.   Patient defers conversations about possible cancer diagnosis to her children and she may decline treatment and opt for hospice.   Patient downgraded to medical floor, became tachypneic anxious appearing with O2 sat ranging 78-82$ on 6L NC, was placed on NRB 15 L and still saturating to mid-80s. Patient declined biPAP and placed on HFNC with improvement of O2 sat to 90%. CXR worsening l-sided pneumonia & ABG pH 7.4, CO2 77, lactate 1.2. Patient remained full code. Patient upgraded to ICU for intubation and bronchoscopy. Intubated on  for bronchoscopy with multiple blood clots, BAL showing rare yeast. Patient extubated on  to HFNC.   Patient now DNR/DNI.    #Acute on Chronic Hypoxemic Respiratory Failure   #COPD Exacerbation  #Left Lung Atelectasis  #Possible Lung Mass Possibly Malignant with Superimprosed pneumonia/L-sided effusion  - clinical improved, pt was weaned off HFNC to NC  - CXR complete opacification of left hemihtorax, stable  - supportive care, pulmonary toilet  - duonebs q6h, aspiration precautions  - s/p thoracentesis negative for malignant cells  - lung mass is highyl suspicious for advanced maligannce  - pt not considering clinical investigations and considering hospice  - complete course abx for PNA  - ID recommendations noted, will monitor off abx, check procalcitonin, fungitell  - s/p bronch, aspirate growing few yeasts likely colonization    #Clogged Ears  - c/w debrox   - call ENT for wax removal    #HTN  - DASH diet  - c/w lisinopril 5 mg qd    #H/o Hemoptysis/Anemia  - hemoptysis resolved  - monitor H/H, Keep HgB > 7.5  - no active bleeding noted  - iron studies noted  - started on ferrous sulfate 325 mg PO qd    #Anxiety  - medications offered patient declined    #Severe Malnutrition  - ensure with meals  - high calorie diet  - dietician consult     #Constipation  - high fiber diet  - c/w laxatives                                           --------------------------------------------------------------    # DVT prophylaxis: Lovenox  # GI prophylaxis: PPI  # Diet:   # Activity:   # Code status: Full Code  # Disposition:    Pendin) D/c planning

## 2024-05-29 NOTE — PROGRESS NOTE ADULT - PROBLEM SELECTOR PLAN 2
High concern for malignancy  -follow fluid cytology from thoracentesis done 5/9  -[facility].
High concern for malignancy  -follow fluid cytology from thoracentesis done 5/9  -[facility].
High concern for malignancy  -follow fluid cytology from thoracentesis done 5/9, negative for malignancy  -[facility].
High concern for malignancy  -follow fluid cytology from thoracentesis done 5/9  -[facility].
High concern for malignancy  -follow fluid cytology from thoracentesis done 5/9, negative for malignancy  -[facility].
High concern for malignancy  -follow fluid cytology from thoracentesis done 5/9  -[facility].
High concern for malignancy  -follow fluid cytology from thoracentesis done 5/9, negative for malignancy  -[facility].
High concern for malignancy  -follow fluid cytology from thoracentesis done 5/9  -[facility].

## 2024-05-29 NOTE — PROGRESS NOTE ADULT - PROBLEM SELECTOR PLAN 1
In the setting of COPD exacerbation, possible lung mass  -continue solumedrol  -continue IV antibiotics  -continue nebs  -BIPAP PRN and qhs  -follow up pleural fluid workup.
In the setting of COPD exacerbation, possible lung mass  -continue solumedrol  -continue IV antibiotics  -continue nebs  -BIPAP PRN and qhs  -follow up pleural fluid workup.
In the setting of COPD exacerbation, possible lung mass  -continue solumedrol  -continue IV antibiotics  -continue nebs  -intubated in CCU for bronchoscopy  -now on NC  -pleural fluid workup negative for malignancy
In the setting of COPD exacerbation, possible lung mass  -continue solumedrol  -continue IV antibiotics  -continue nebs  -intubated in CCU for bronchoscopy  -follow up pleural fluid workup.
In the setting of COPD exacerbation, possible lung mass  -continue solumedrol  -continue IV antibiotics  -continue nebs  -intubated in CCU for bronchoscopy  -extubated to [facility]  -pleural fluid workup negative for malignancy
In the setting of COPD exacerbation, possible lung mass  -continue solumedrol  -continue IV antibiotics  -continue nebs  -intubated in CCU for bronchoscopy  -now on NC  -pleural fluid workup negative for malignancy
In the setting of COPD exacerbation, possible lung mass  -continue solumedrol  -continue IV antibiotics  -continue nebs  -BIPAP PRN and qhs  -follow up pleural fluid workup.
In the setting of COPD exacerbation, possible lung mass  -continue solumedrol  -continue IV antibiotics  -continue nebs  -BIPAP PRN and qhs  -follow up pleural fluid workup.
In the setting of COPD exacerbation, possible lung mass  -continue solumedrol  -continue IV antibiotics  -continue nebs  -intubated in CCU for bronchoscopy  -to undergo SBT to assess for eligibility for extubation  -pleural fluid workup negative for malignancy
In the setting of COPD exacerbation, possible lung mass  -continue solumedrol  -continue IV antibiotics  -continue nebs  -intubated in CCU for bronchoscopy  -extubated to [facility]  -pleural fluid workup negative for malignancy
In the setting of COPD exacerbation, possible lung mass  -continue solumedrol  -continue IV antibiotics  -continue nebs  -intubated in CCU for bronchoscopy  -extubated to [facility]  -pleural fluid workup negative for malignancy

## 2024-05-29 NOTE — PROGRESS NOTE ADULT - PROBLEM SELECTOR PROBLEM 1
Acute respiratory failure with hypercapnia

## 2024-05-29 NOTE — PROGRESS NOTE ADULT - ASSESSMENT
73yFemale with history of COPD, HTN, active smoker presents with SOB and hypoxia. Patient found to have likely COPD exacerbation on arrival with new lung mass concerned for malignancy. Palliative care consulted for [facility].    Spoke with patient at bedside. Patient denied any pain, shortness of breath, or discomfort at this time. States that she is feeling much better overall.    Education about palliative care provided to patient/family.  See Recs below.    Please call [phone] with questions or concerns 24/7.   We will continue to follow.

## 2024-05-30 NOTE — PROGRESS NOTE ADULT - SUBJECTIVE AND OBJECTIVE BOX
24H events:    Patient is a 73y old Female who presents with a chief complaint of COPD exacerbation (29 May 2024 12:51)    Primary diagnosis of COPD exacerbation        Today is hospital day 24d. This morning patient was seen and examined at bedside, resting comfortably in bed.    No acute or major events overnight.    Code Status:    Family communication:  Contact date:  Name of person contacted:  Relationship to patient:  Communication details:  What matters most:    PAST MEDICAL & SURGICAL HISTORY  Chronic obstructive pulmonary disease, unspecified COPD type    Hypertension    No significant past surgical history      SOCIAL HISTORY:  Social History:      ALLERGIES:  No Known Allergies    MEDICATIONS:  STANDING MEDICATIONS  albuterol    90 MICROgram(s) HFA Inhaler 2 Puff(s) Inhalation every 6 hours  albuterol/ipratropium for Nebulization 3 milliLiter(s) Nebulizer every 6 hours  chlorhexidine 2% Cloths 1 Application(s) Topical <User Schedule>  enoxaparin Injectable 40 milliGRAM(s) SubCutaneous every 24 hours  ferrous    sulfate 325 milliGRAM(s) Oral daily  polyethylene glycol 3350 17 Gram(s) Oral two times a day  predniSONE   Tablet 10 milliGRAM(s) Oral daily  saccharomyces boulardii 250 milliGRAM(s) Oral two times a day  senna 2 Tablet(s) Oral at bedtime  sodium chloride 3%  Inhalation 4 milliLiter(s) Inhalation every 6 hours    PRN MEDICATIONS  bisacodyl Suppository 10 milliGRAM(s) Rectal daily PRN  hydrOXYzine hydrochloride 25 milliGRAM(s) Oral two times a day PRN  lactulose Syrup 10 Gram(s) Oral every 6 hours PRN  ondansetron Injectable 4 milliGRAM(s) IV Push once PRN  sodium chloride 0.65% Nasal 1 Spray(s) Both Nostrils three times a day PRN    VITALS:   T(F): 98.1  HR: 98  BP: 154/77  RR: 19  SpO2: 94%    PHYSICAL EXAM:  GENERAL:   ( x ) NAD, lying in bed comfortably     (  ) obtunded     (  ) lethargic     (  ) somnolent    HEAD:   ( x ) Atraumatic     (  ) hematoma     (  ) laceration (specify location:       )     NECK:  ( x ) Supple     (  ) neck stiffness     (  ) nuchal rigidity     (  )  no JVD     (  ) JVD present ( -- cm)    HEART:  Rate -->     (  ) normal rate     (  ) bradycardic     (  ) tachycardic  Rhythm -->     ( x ) regular     (  ) regularly irregular     (  ) irregularly irregular  Murmurs -->     (  ) normal s1s2     (  ) systolic murmur     (  ) diastolic murmur     (  ) continuous murmur      (  ) S3 present     (  ) S4 present    LUNGS:   (x  )Unlabored respirations     (  ) tachypnea  ( x ) B/L air entry     (  ) decreased breath sounds in:  (location     )    (  ) no adventitious sound     (  ) crackles     (  ) wheezing      (  ) rhonchi      (specify location:       )  (  ) chest wall tenderness (specify location:       )    ABDOMEN:   ( x ) Soft     (  ) tense   |   (  x) nondistended     (  ) distended   |   (  ) +BS     (  ) hypoactive bowel sounds     (  ) hyperactive bowel sounds  ( x ) nontender     (  ) RUQ tenderness     (  ) RLQ tenderness     (  ) LLQ tenderness     (  ) epigastric tenderness     (  ) diffuse tenderness  (  ) Splenomegaly      (  ) Hepatomegaly      (  ) Jaundice     (  ) ecchymosis     EXTREMITIES:  (  ) Normal     (  ) Rash     (  ) ecchymosis     (  ) varicose veins      (  ) pitting edema     (  ) non-pitting edema   (  ) ulceration     (  ) gangrene:     (location:     )    NERVOUS SYSTEM:    (  ) A&Ox3     (  ) confused     (  ) lethargic  CN II-XII:     (  ) Intact     (  ) deficits found     (Specify:     )   Upper extremities:     (  ) no sensorimotor deficits     (  ) weakness     (  ) loss of proprioception/vibration     (  ) loss of touch/temperature (specify:    )  Lower extremities:     (  ) no sensorimotor deficits     (  ) weakness     (  ) loss of proprioception/vibration     (  ) loss of touch/temperature (specify:    )    SKIN:   (  ) No rashes or lesions     (  ) maculopapular rash     (  ) pustules     (  ) vesicles     (  ) ulcer     (  ) ecchymosis     (specify location:     )    AMPAC score:    (  ) Indwelling Paulino Catheter:   Date insterted:    Reason (  ) Critical illness     (  ) urinary retention    (  ) Accurate Ins/Outs Monitoring     (  ) CMO patient    (  ) Central Line:   Date inserted:  Location: (  ) Right IJ     (  ) Left IJ     (  ) Right Fem     (  ) Left Fem    (  ) SPC        (  ) pigtail       (  ) PEG tube       (  ) colostomy       (  ) jejunostomy  (  ) U-Dall    LABS:                        7.6    8.76  )-----------( 465      ( 30 May 2024 06:29 )             26.1     05-30    143  |  99  |  14  ----------------------------<  111<H>  4.8   |  42<HH>  |  <0.5<L>    Ca    8.5      30 May 2024 06:29  Mg     1.8         TPro  4.7<L>  /  Alb  3.0<L>  /  TBili  <0.2  /  DBili  x   /  AST  12  /  ALT  37  /  AlkPhos  84        Urinalysis Basic - ( 30 May 2024 06:29 )    Color: x / Appearance: x / SG: x / pH: x  Gluc: 111 mg/dL / Ketone: x  / Bili: x / Urobili: x   Blood: x / Protein: x / Nitrite: x   Leuk Esterase: x / RBC: x / WBC x   Sq Epi: x / Non Sq Epi: x / Bacteria: x                RADIOLOGY:              < from: Xray Chest 1 View- PORTABLE-Routine (Xray Chest 1 View- PORTABLE-Routine in AM.) (24 @ 02:22) >    Impression:    Complete opacification left hemithorax, unchanged      < end of copied text >  < from: Xray Kidney Ureter Bladder (24 @ 07:58) >  IMPRESSION:  No significant interval change.    < end of copied text >  < from: Xray Kidney Ureter Bladder (24 @ 08:15) >  FINDINGS/  IMPRESSION:    Calcified lucent structure in the right upper quadrant may represent a   gallstone. Correlate with ultrasound if clinically indicated.   Nonobstructive bowel gas pattern. Stool seen in the pelvis. Marked   degenerative changes of the spine as well as the bilateral hip joints.      < end of copied text >          ASSESSMENT/PLAN:     73 year old female with PMH of COPD not on home oxygen, chronic smoker > 60 ppy, HTN who presented fro SOB x few weeks. Pt reported worsening productive cough and SOB with wheezing x  1 week. Increased WOB, satting 70% on RA. Was placed on NRB by EMS. Per son at bedside, pt confused in morning, unaware of her surroundings. She also endorsed hemoptysis along with weight loss for few months. , fever chills.   Placed on BiPAP and admitted to CEU.   Patient often refuses BiPAP despite extensive counseling. No changes in mental status. On Solumedrol 60 mg q12h. S/p diagnostic thoracentesis on 5/10 with cytology. ID on board, on cefepime & levaquine for obstructive pneumonia.   Patient defers conversations about possible cancer diagnosis to her children and she may decline treatment and opt for hospice.   Patient downgraded to medical floor, became tachypneic anxious appearing with O2 sat ranging 78-82$ on 6L NC, was placed on NRB 15 L and still saturating to mid-80s. Patient declined biPAP and placed on HFNC with improvement of O2 sat to 90%. CXR worsening l-sided pneumonia & ABG pH 7.4, CO2 77, lactate 1.2. Patient remained full code. Patient upgraded to ICU for intubation and bronchoscopy. Intubated on  for bronchoscopy with multiple blood clots, BAL showing rare yeast. Patient extubated on  to HFNC.   Patient now DNR/DNI.    #Acute on Chronic Hypoxemic Respiratory Failure   #COPD Exacerbation  #Left Lung Atelectasis  #Possible Lung Mass Possibly Malignant with Superimprosed pneumonia/L-sided effusion  - clinical improved, pt was weaned off HFNC to NC  - CXR complete opacification of left hemihtorax, stable  - supportive care, pulmonary toilet  - duonebs q6h, aspiration precautions  - s/p thoracentesis negative for malignant cells  - lung mass is highyl suspicious for advanced maligannce  - pt not considering clinical investigations and considering hospice  - complete course abx for PNA  - ID recommendations noted, will monitor off abx, check procalcitonin, fungitell  - s/p bronch, aspirate growing few yeasts likely colonization    #Clogged Ears  - c/w debrox   - call ENT for wax removal    #HTN  - DASH diet  - c/w lisinopril 5 mg qd    #H/o Hemoptysis/Anemia  - hemoptysis resolved  - monitor H/H, Keep HgB > 7.5  - no active bleeding noted  - iron studies noted  - started on ferrous sulfate 325 mg PO qd    #Anxiety  - medications offered patient declined    #Severe Malnutrition  - ensure with meals  - high calorie diet  - dietician consult     #Constipation  - high fiber diet  - c/w laxatives                                           --------------------------------------------------------------    # DVT prophylaxis: Lovenox  # GI prophylaxis: PPI  # Diet:   # Activity:   # Code status: Full Code  # Disposition:    Pendin) D/c planning

## 2024-05-30 NOTE — PROGRESS NOTE ADULT - REASON FOR ADMISSION
COPD exacerbation

## 2024-05-30 NOTE — PROGRESS NOTE ADULT - PROVIDER SPECIALTY LIST ADULT
CCU
Critical Care
Hospitalist
Hospitalist
Internal Medicine
Internal Medicine
MICU
Palliative Care
Critical Care
Hospitalist
Hospitalist
Internal Medicine
CCU
Critical Care
Critical Care
Internal Medicine
Palliative Care
Pulmonology
Pulmonology
CCU
CCU
Critical Care
Internal Medicine
MICU
Palliative Care
Pulmonology
Hospitalist
Internal Medicine
Palliative Care
Hospitalist
Infectious Disease
Infectious Disease
Palliative Care
Pulmonology
Hospitalist
Hospitalist
Palliative Care
Hospitalist
Palliative Care

## 2024-05-30 NOTE — PROGRESS NOTE ADULT - NUTRITIONAL ASSESSMENT
This patient has been assessed with a concern for Malnutrition and has been determined to have a diagnosis/diagnoses of Severe protein-calorie malnutrition and Underweight (BMI < 19).    This patient is being managed with:   Diet DASH/TLC-  Sodium & Cholesterol Restricted  Entered: May  7 2024  7:58AM  
This patient has been assessed with a concern for Malnutrition and has been determined to have a diagnosis/diagnoses of Severe protein-calorie malnutrition and Underweight (BMI < 19).    This patient is being managed with:   Diet DASH/TLC-  Sodium & Cholesterol Restricted  Entered: May  7 2024  7:58AM    The following pending diet order is being considered for treatment of Severe protein-calorie malnutrition and Underweight (BMI < 19):  Diet Regular-     Qty per Day:  Magic Cup 2x/day  Supplement Feeding Modality:  Oral  Ensure Plus High Protein Cans or Servings Per Day:  1       Frequency:  Two Times a day  Entered: May 15 2024 10:13PM  
This patient has been assessed with a concern for Malnutrition and has been determined to have a diagnosis/diagnoses of Severe protein-calorie malnutrition and Underweight (BMI < 19).    This patient is being managed with:   Diet NPO with Tube Feed-  Tube Feeding Modality: Orogastric  Jevity 1.2 Will (JEVITY1.2RTH)  Total Volume for 24 Hours (mL): 1080  Continuous  Starting Tube Feed Rate {mL per Hour}: 20  Increase Tube Feed Rate by (mL): 10     Every 4 hours  Until Goal Tube Feed Rate (mL per Hour): 60  Tube Feed Duration (in Hours): 18  Tube Feed Start Time: 12:00  Tube Feed Stop Time: 06:00  Free Water Flush   Total Volume per Flush (mL): 150   Frequency: Every 6 Hours  Entered: May 20 2024  9:50AM  
This patient has been assessed with a concern for Malnutrition and has been determined to have a diagnosis/diagnoses of Severe protein-calorie malnutrition and Underweight (BMI < 19).    This patient is being managed with:   Diet Regular-     Qty per Day:  Magic Cup 2x/day  Supplement Feeding Modality:  Oral  Ensure Plus High Protein Cans or Servings Per Day:  1       Frequency:  Two Times a day  Entered: May 22 2024  6:53PM  
This patient has been assessed with a concern for Malnutrition and has been determined to have a diagnosis/diagnoses of Severe protein-calorie malnutrition and Underweight (BMI < 19).    This patient is being managed with:   Diet Regular-     Qty per Day:  Magic Cup 2x/day  Supplement Feeding Modality:  Oral  Ensure Plus High Protein Cans or Servings Per Day:  1       Frequency:  Two Times a day  Entered: May 22 2024  6:53PM  
This patient has been assessed with a concern for Malnutrition and has been determined to have a diagnosis/diagnoses of Severe protein-calorie malnutrition and Underweight (BMI < 19).    This patient is being managed with:   Diet NPO with Tube Feed-  Tube Feeding Modality: Orogastric  Jevity 1.2 Will (JEVITY1.2RTH)  Total Volume for 24 Hours (mL): 1080  Continuous  Starting Tube Feed Rate {mL per Hour}: 20  Increase Tube Feed Rate by (mL): 10     Every 4 hours  Until Goal Tube Feed Rate (mL per Hour): 60  Tube Feed Duration (in Hours): 18  Tube Feed Start Time: 12:00  Tube Feed Stop Time: 06:00  Free Water Flush   Total Volume per Flush (mL): 150   Frequency: Every 4 Hours  Entered: May 18 2024  1:46PM  
This patient has been assessed with a concern for Malnutrition and has been determined to have a diagnosis/diagnoses of Severe protein-calorie malnutrition and Underweight (BMI < 19).    This patient is being managed with:   Diet NPO with Tube Feed-  Tube Feeding Modality: Orogastric  Jevity 1.2 Will (JEVITY1.2RTH)  Total Volume for 24 Hours (mL): 360  Continuous  Starting Tube Feed Rate {mL per Hour}: 5  Increase Tube Feed Rate by (mL): 5     Every 4 hours  Until Goal Tube Feed Rate (mL per Hour): 20  Tube Feed Duration (in Hours): 18  Tube Feed Start Time: 12:00  Tube Feed Stop Time: 06:00  Entered: May 17 2024  5:20PM  
This patient has been assessed with a concern for Malnutrition and has been determined to have a diagnosis/diagnoses of Severe protein-calorie malnutrition and Underweight (BMI < 19).    This patient is being managed with:   Diet Regular-     Qty per Day:  Magic Cup 2x/day  Supplement Feeding Modality:  Oral  Ensure Plus High Protein Cans or Servings Per Day:  1       Frequency:  Two Times a day  Entered: May 15 2024 10:13PM  
This patient has been assessed with a concern for Malnutrition and has been determined to have a diagnosis/diagnoses of Severe protein-calorie malnutrition and Underweight (BMI < 19).    This patient is being managed with:   Diet Regular-     Qty per Day:  Magic Cup 2x/day  Supplement Feeding Modality:  Oral  Ensure Plus High Protein Cans or Servings Per Day:  1       Frequency:  Two Times a day  Entered: May 22 2024  6:53PM  
This patient has been assessed with a concern for Malnutrition and has been determined to have a diagnosis/diagnoses of Severe protein-calorie malnutrition and Underweight (BMI < 19).    This patient is being managed with:   Diet Regular-  Soft and Bite Sized (SOFTBTSZ)     Qty per Day:  Magic Cup 2x/day  Supplement Feeding Modality:  Oral  Ensure Plus High Protein Cans or Servings Per Day:  1       Frequency:  Two Times a day  Entered: May 21 2024  8:21AM  
This patient has been assessed with a concern for Malnutrition and has been determined to have a diagnosis/diagnoses of Severe protein-calorie malnutrition and Underweight (BMI < 19).    This patient is being managed with:   Diet DASH/TLC-  Sodium & Cholesterol Restricted  Entered: May  7 2024  7:58AM  
This patient has been assessed with a concern for Malnutrition and has been determined to have a diagnosis/diagnoses of Severe protein-calorie malnutrition and Underweight (BMI < 19).    This patient is being managed with:   Diet Regular-     Qty per Day:  Magic Cup 2x/day  Supplement Feeding Modality:  Oral  Ensure Plus High Protein Cans or Servings Per Day:  1       Frequency:  Two Times a day  Entered: May 22 2024  6:53PM  
This patient has been assessed with a concern for Malnutrition and has been determined to have a diagnosis/diagnoses of Severe protein-calorie malnutrition and Underweight (BMI < 19).    This patient is being managed with:   Diet Regular-     Qty per Day:  Magic Cup 2x/day  Supplement Feeding Modality:  Oral  Ensure Plus High Protein Cans or Servings Per Day:  1       Frequency:  Two Times a day  Entered: May 22 2024  6:53PM  
This patient has been assessed with a concern for Malnutrition and has been determined to have a diagnosis/diagnoses of Underweight (BMI < 19) and Severe protein-calorie malnutrition.    This patient is being managed with:   Diet Regular-     Qty per Day:  Magic Cup 2x/day  Supplement Feeding Modality:  Oral  Ensure Plus High Protein Cans or Servings Per Day:  1       Frequency:  Two Times a day  Entered: May 22 2024  6:53PM  
This patient has been assessed with a concern for Malnutrition and has been determined to have a diagnosis/diagnoses of Severe protein-calorie malnutrition and Underweight (BMI < 19).    This patient is being managed with:   Diet DASH/TLC-  Sodium & Cholesterol Restricted  Entered: May  7 2024  7:58AM  
This patient has been assessed with a concern for Malnutrition and has been determined to have a diagnosis/diagnoses of Severe protein-calorie malnutrition and Underweight (BMI < 19).    This patient is being managed with:   Diet Regular-     Qty per Day:  Magic Cup 2x/day  Supplement Feeding Modality:  Oral  Ensure Plus High Protein Cans or Servings Per Day:  1       Frequency:  Two Times a day  Entered: May 22 2024  6:53PM  
This patient has been assessed with a concern for Malnutrition and has been determined to have a diagnosis/diagnoses of Severe protein-calorie malnutrition and Underweight (BMI < 19).    This patient is being managed with:   Diet Regular-     Qty per Day:  Magic Cup 2x/day  Supplement Feeding Modality:  Oral  Ensure Plus High Protein Cans or Servings Per Day:  1       Frequency:  Two Times a day  Entered: May 22 2024  6:53PM  
This patient has been assessed with a concern for Malnutrition and has been determined to have a diagnosis/diagnoses of Underweight (BMI < 19) and Severe protein-calorie malnutrition.    This patient is being managed with:   Diet Regular-     Qty per Day:  Magic Cup 2x/day  Supplement Feeding Modality:  Oral  Ensure Plus High Protein Cans or Servings Per Day:  1       Frequency:  Two Times a day  Entered: May 22 2024  6:53PM  
This patient has been assessed with a concern for Malnutrition and has been determined to have a diagnosis/diagnoses of Severe protein-calorie malnutrition and Underweight (BMI < 19).    This patient is being managed with:   Diet DASH/TLC-  Sodium & Cholesterol Restricted  Entered: May  7 2024  7:58AM  
This patient has been assessed with a concern for Malnutrition and has been determined to have a diagnosis/diagnoses of Severe protein-calorie malnutrition and Underweight (BMI < 19).    This patient is being managed with:   Diet DASH/TLC-  Sodium & Cholesterol Restricted  Entered: May  7 2024  7:58AM  
This patient has been assessed with a concern for Malnutrition and has been determined to have a diagnosis/diagnoses of Severe protein-calorie malnutrition and Underweight (BMI < 19).    This patient is being managed with:   Diet DASH/TLC-  Sodium & Cholesterol Restricted  Entered: May  7 2024  7:58AM    The following pending diet order is being considered for treatment of Severe protein-calorie malnutrition and Underweight (BMI < 19):  Diet Regular-     Qty per Day:  Magic Cup 2x/day  Supplement Feeding Modality:  Oral  Ensure Plus High Protein Cans or Servings Per Day:  1       Frequency:  Two Times a day  Entered: May 15 2024 10:13PM  
This patient has been assessed with a concern for Malnutrition and has been determined to have a diagnosis/diagnoses of Severe protein-calorie malnutrition and Underweight (BMI < 19).    This patient is being managed with:   Diet Regular-     Qty per Day:  Magic Cup 2x/day  Supplement Feeding Modality:  Oral  Ensure Plus High Protein Cans or Servings Per Day:  1       Frequency:  Two Times a day  Entered: May 22 2024  6:53PM  
This patient has been assessed with a concern for Malnutrition and has been determined to have a diagnosis/diagnoses of Severe protein-calorie malnutrition and Underweight (BMI < 19).    This patient is being managed with:   Diet Regular-     Qty per Day:  Magic Cup 2x/day  Supplement Feeding Modality:  Oral  Ensure Plus High Protein Cans or Servings Per Day:  1       Frequency:  Two Times a day  Entered: May 22 2024  6:53PM  
This patient has been assessed with a concern for Malnutrition and has been determined to have a diagnosis/diagnoses of Severe protein-calorie malnutrition and Underweight (BMI < 19).    This patient is being managed with:   Diet Regular-  Soft and Bite Sized (SOFTBTSZ)     Qty per Day:  Magic Cup 2x/day  Supplement Feeding Modality:  Oral  Ensure Plus High Protein Cans or Servings Per Day:  1       Frequency:  Two Times a day  Entered: May 21 2024  8:21AM  
This patient has been assessed with a concern for Malnutrition and has been determined to have a diagnosis/diagnoses of Severe protein-calorie malnutrition and Underweight (BMI < 19).    This patient is being managed with:   Diet Regular-     Qty per Day:  Magic Cup 2x/day  Supplement Feeding Modality:  Oral  Ensure Plus High Protein Cans or Servings Per Day:  1       Frequency:  Two Times a day  Entered: May 22 2024  6:53PM  
This patient has been assessed with a concern for Malnutrition and has been determined to have a diagnosis/diagnoses of Severe protein-calorie malnutrition and Underweight (BMI < 19).    This patient is being managed with:   Diet DASH/TLC-  Sodium & Cholesterol Restricted  Entered: May  7 2024  7:58AM    The following pending diet order is being considered for treatment of Severe protein-calorie malnutrition and Underweight (BMI < 19):  Diet Regular-     Qty per Day:  Magic Cup 2x/day  Supplement Feeding Modality:  Oral  Ensure Plus High Protein Cans or Servings Per Day:  1       Frequency:  Two Times a day  Entered: May 15 2024 10:13PM  
This patient has been assessed with a concern for Malnutrition and has been determined to have a diagnosis/diagnoses of Severe protein-calorie malnutrition and Underweight (BMI < 19).    This patient is being managed with:   Diet DASH/TLC-  Sodium & Cholesterol Restricted  Entered: May  7 2024  7:58AM

## 2024-05-30 NOTE — PROGRESS NOTE ADULT - ATTENDING COMMENTS
73-year-old female with past medical history of COPD not on home oxygen, chronic smoker >60ppy, hypertension who presents for shortness of breath x few weeks.  Pt reports worsening productive cough and shortness of breath with wheezing for the past 1 week.  Increased work of breathing today, was satting 70% on room air.  Was placed on nonrebreather by EMS.  As per son at the bedside pt was confused in the morning, wasn't aware of the surrounding.  She also admits to hemoptysis  along with weight loss over the past few months.  Admits to fever and chills, and denies chest pain, palpitations, nausea, vomiting, diarrhea, abdominal pain, urinary symptoms, weakness, numbness, falls, recent travel, recent surgeries.  Not on anticoagulation.  Denies substance use and alcohol use.  In ED, VT bp 143/80, , RR 28, T 99.4 F, SpO2 98% on NRB 15 L  Labs showed Lac 2.3, trop 26, BNP 2044, VBG  pH 7.18 PCo2 102 HCO3 38, O2 sat 46%  s/p IV solumedrol, IV Mag, Nebs and Albuterol in ED.  Pt was placed on BIPAP and admitted to CEU Course:  Patient often refuses BiPAP despite extensive counseling and encouragement (gets anxious, refuses antianxiety or pain medication), however no changes in mental status. Solumedrol 60mg q12h.   S/P Diagnostic thoracentesis done on 5/10, f/u cytology. ID is following, on cefepime and levaquin for post-obstructive pneumonia.   -Patient defers conversations regarding possible cancer diagnosis to her children. They state there is a possibility she may decline cancer treatment and opt for hospice.   Pt was downgraded to medical floor, became  tachypneic, anxious appearing.  O2 sat ranged from 78%-82% on 6L nasal cannula, patient was placed on non rebreather mask 15L and was still satting in the mid 80's.  Patient declines BIPAP as she feels she cannot breath with the mask on.  High Flow nasal cannula placed with improvement in oxygenation to 90%.  Chest x-ray demonstrated worsening left sided pneumonia and ABG pH 7.4, CO2 77, lactate 1.2.  Discussed goals of care with patient's children who stated that this time patient is full code. Discussed with crit fellow who will see patient and recommended upgrade to stepdown, but shortly after her  Chest xray now shows complete opacification of left lung, with increasing HFNC requirements. Patient was upgraded to ICU for intubation and bronch mohinder. Low threshold for intubation if she decompensates.  Patient intubated on 05/17 for bronchoscopy: multiple blood clots, BAL showed rare yeast  Extubated on 05/20, on high flow  Patient on high flow since, poor compliance with BiPAP  Pt is now  DNR/DNI.     Acute on chronic hypoxemic respiratory failure improving/ COPD exacerbation Left lung atelectasis /Lung mass possibly malignant /Superimposed pneumonia/ Left sided effusion with clinically improved, pt was weaned off high flow oxygen, on NC now, CXR showed complete opacification of left hemothorax, stable, supportive care, pulmonary toilet, nebs Q 6 hours, aspiration precautions, s/p thoracentesis negative for malignant cells, lung mass is highly suspicious for advanced malignancy , pt is not considering clinical investigation or cancer directed treatement considering hospice, completed course of Abx for PNA, had low grade fever on 5/26 , ID followed  up , recommendations noted, will monitor off Abx, procal noted 1.48, and fungitell pending, pulmonary is following, recommendations noted, s/p bronch , aspirate growing few yeasts, likely colonization   Clogged ears managed with Debrox and OP ENT f/u for ear wax removal.  HTN managed with DASH diet & starting Lisinopril 5 mg Q 24 hours   H/o Hemoptysis/ Anemia with hemoptysis resolved now managed by monitoring H/H, keep Hb above 7, send anemia work up and no active bleeding noted.  - anemia, needs workup as outpt   Hemoglobin: 7.6 g/dL (05-30-24 @ 06:29)  Hemoglobin: 7.7 g/dL (05-29-24 @ 07:09)  Hemoglobin: 7.5 g/dL (05-28-24 @ 22:09)  Hemoglobin: 7.8 g/dL (05-28-24 @ 06:35)  Hemoglobin: 7.9 g/dL (05-27-24 @ 05:54)    Anxiety managed by offering medications which pt declined   Severe malnutrition managed with Ensure with meals, high calories diet, and consult dietitian   Constipation managed with high fiber diet and c/w laxatives.     pt has a possible mass on the left lung, pt does not want to know what it is and is refusing workup, she understands that this might be malignancy.   poor overall prognosis and high risk for readmission     palliative care notes appreciated   Patient comfortable and hemodynamically stable and cleared for discharge.    dc to sn[facility] today   time spent 35 min

## 2024-05-30 NOTE — DISCHARGE NOTE NURSING/CASE MANAGEMENT/SOCIAL WORK - PATIENT PORTAL LINK FT
You can access the FollowMyHealth Patient Portal offered by [facility] by registering at the following website: [email or web address]. By joining [facility]’s FollowMyHealth portal, you will also be able to view your health information using other applications (apps) compatible with our system.

## 2024-06-02 PROBLEM — J44.9 CHRONIC OBSTRUCTIVE PULMONARY DISEASE, UNSPECIFIED: Chronic | Status: ACTIVE | Noted: 2024-01-01

## 2024-06-02 PROBLEM — I10 ESSENTIAL (PRIMARY) HYPERTENSION: Chronic | Status: ACTIVE | Noted: 2024-01-01

## 2024-06-02 NOTE — H&P ADULT - ASSESSMENT
73-year-old female with PMHx of COPD on home 3L NC, chronic smoker >60ppy, HTN, recently discharged from [facility] 5/30. hospitalized for sob hypoxia and hemoptysis weight loss found to have copd exacerbation post obstructive PNA and possible malignancy (patient refused workup), S/P Diagnostic thoracentesis on 5/10, -ve cytology (no fluid analysis? only 10mL drained). Intubated 5/17 --> 5/20. Then became DNR DNI. Underwent bronchoscopy on 05/17 for bronchoscopy showing multiple blood clots, BAL showed rare yeast likely colonizers.   --> presented today from NH brought in by son and daughter for concerns of lethargy, confusion, poor appetite, decreased hearing.   Per son and daughter patient has been confused and not making sense since discharge, and refusing to walk or eat. They also note patient has been complaining of bilateral ear pain and pressure and diminished hearing.  Patient is AAO x 3 and only complains of ear pain.  Patient states she is not eating or walking because she has been feeling tired.    admitted to sdu.     #Hypercapnia  #hx of copd on home 3L NC  #Recent admission with the above details intubated for 4 days  #Possible lung malignancy   #chronic smoker  ED vitals normal. placed on bipap for hypercapnia on vbg ( pco2 100, bicarb 52, ph 7.32). d-dimer 450  CXR: Complete opacification of the left hemithorax.  CT chest PE and abd pelv with IV: No evidence of PE. the right lung apex is excluded from the imaging series. Upper lobe predominant, centrilobular emphysematous   changes. Debris within the left main bronchus with complete atelectasis of the left upper and lower lobe. Cystic with solid component lesion within the right upper lobe measuring approximately 3.1 cm. Large left-sided pleural effusion, precluding visualization of known left lung mass. Right lower lobe spiculated nodule measuring 1.8 x 1.9 cm.  -->  f/u ABG after bipap  possible TAP or Bronch per pulm  cw home inhalers and steroids    #Bilateral ear pain  CTH: No evidence of acute intracranial pathology. Bilateral middle ear and mastoid opacification, correlate clinically.  --> ENT eval  debrox otic solution bid     #Chronic constipation  mod stool burden on CT  cw home senna, miralax bid, and lactulose prn  monitor BMs     #HTN: controlled. cw home amlodipine 5mg qd    #MISC  - DVT PPx: lovenox  - GI PPx: low risk  - Diet: regular with ensure and magic cup  - Activity: iat  - Labs: ordered  - Code: dnr dni  - Dispo: sdu    - Medrec: confirmed using NH chart. 73-year-old female with PMHx of COPD on home 3L NC, chronic smoker >60ppy, HTN, recently discharged from [facility] 5/30. hospitalized for sob hypoxia and hemoptysis weight loss found to have copd exacerbation post obstructive PNA and possible malignancy (patient refused workup), S/P Diagnostic thoracentesis on 5/10, -ve cytology (no fluid analysis? only 10mL drained). Intubated 5/17 --> 5/20. Then became DNR DNI. Underwent bronchoscopy on 05/17 for bronchoscopy showing multiple blood clots, BAL showed rare yeast likely colonizers.   --> presented today from NH brought in by sons and daughter for concerns of lethargy, confusion, poor appetite for 3 days, and decreased hearing with fullness in her ears worse since discharge. She has been refusing to eat or walk for the past 3 days as well. She was refusing to use the bipap at [facility].  Now patient is AAOx3 and wants to eat. She is on BIPAP. Denying other complaint. no increased cough or sputum or change in color of sputum. no fever or hemoptysis.   admitted to SDU.     #Hypercapnia 2/2 severe copd and refusing bipap at NH  #hx of copd discharged recently on home 3L NC   #Recent admission with the above details intubated for 4 days  #Possible lung malignancy   #chronic smoker  ED vitals normal. placed on bipap for hypercapnia on vbg ( pco2 100, bicarb 52, ph 7.32). d-dimer 450  CXR: Complete opacification of the left hemithorax.  CT chest PE and abd pelv with IV: No evidence of PE. the right lung apex is excluded from the imaging series. Upper lobe predominant, centrilobular emphysematous   changes. Debris within the left main bronchus with complete atelectasis of the left upper and lower lobe. Cystic with solid component lesion within the right upper lobe measuring approximately 3.1 cm. Large left-sided pleural effusion, precluding visualization of known left lung mass. Right lower lobe spiculated nodule measuring 1.8 x 1.9 cm.  -->  f/u ABG after bipap  possible TAP or Bronch per pulm  cw home inhalers     #Bilateral ear pain  CTH: No evidence of acute intracranial pathology. Bilateral middle ear and mastoid opacification, correlate clinically.  --> ENT eval  debrox otic solution bid     #Chronic constipation  mod stool burden on CT  cw home senna, miralax bid, and lactulose prn  monitor BMs     #HTN: controlled. cw home amlodipine 5mg qd    #MISC  - DVT PPx: lovenox  - GI PPx: low risk  - Diet: regular with ensure and magic cup  - Activity: iat  - Labs: ordered  - Code: dnr dni. confirmed 6/2/2024   - Dispo: sdu    - Medrec: confirmed using NH chart.

## 2024-06-02 NOTE — ED ADULT NURSE REASSESSMENT NOTE - NS ED NURSE REASSESS COMMENT FT1
Pt continuously removed bipap and loosens straps. Educated pt multiple times on benefit of bipap/risk of removing bipap and vbg results. RT and Family at bedside also encouraging pt to keep bipap on.  Dr Rodriguez aware of pt removing bipap. Continuous monitoring is ongoing.

## 2024-06-02 NOTE — ED ADULT TRIAGE NOTE - CHIEF COMPLAINT QUOTE
pt biba from [facility] with generalized weakness and poor appetite after recent dc on thursday for copd exacerbation - c/o pain in right ear - pt home o2 3L n/c - was 91%

## 2024-06-02 NOTE — CONSULT NOTE ADULT - SUBJECTIVE AND OBJECTIVE BOX
73-year-old female with PMHx of COPD on home 3L NC, chronic smoker >60ppy, HTN, recently discharged from [facility] 5/30. hospitalized for sob hypoxia and hemoptysis weight loss found to have copd exacerbation post obstructive PNA and possible malignancy (patient refused workup), S/P Diagnostic thoracentesis on 5/10, -ve cytology (no fluid analysis? only 10mL drained). Intubated 5/17 --> 5/20. Then became DNR DNI. Underwent bronchoscopy on 05/17 for bronchoscopy showing multiple blood clots, BAL showed rare yeast likely colonizers.   --> presented today from NH brought in by sons and daughter for concerns of lethargy, confusion, poor appetite for 3 days, and decreased hearing with fullness in her ears worse since discharge. She has been refusing to eat or walk for the past 3 days as well. She was refusing to use the bipap at [facility].  Now patient is AAOx3 and wants to eat. She is on BIPAP. Denying other complaint. no increased cough or sputum or change in color of sputum. no fever or hemoptysis.   PAST MEDICAL & SURGICAL HISTORY:  Chronic obstructive pulmonary disease, unspecified COPD type  Pt complains of ear pain, right greater than left, for past 10 days or so. Symptoms began after being removed from ventilator. Pt also says she feels stuffy in nose and face. Denies any nasal or facial pain. No fevers/chills    Hypertension      Lung mass      Chronic hypoxic respiratory failure      No significant past surgical history        Allergies    tetracycline (Unknown)  penicillin (Unknown)    Intolerances      MEDICATIONS  (STANDING):  amLODIPine   Tablet 5 milliGRAM(s) Oral daily  carbamide peroxide Otic Solution 5 Drop(s) Both Ears every 12 hours  ferrous    sulfate 325 milliGRAM(s) Oral daily  polyethylene glycol 3350 17 Gram(s) Oral every 12 hours  saccharomyces boulardii 250 milliGRAM(s) Oral two times a day  senna 2 Tablet(s) Oral at bedtime  tiotropium 2.5 MICROgram(s) Inhaler 2 Puff(s) Inhalation daily    MEDICATIONS  (PRN):  albuterol    90 MICROgram(s) HFA Inhaler 2 Puff(s) Inhalation every 6 hours PRN for shortness of breath and/or wheezing  hydrOXYzine hydrochloride 25 milliGRAM(s) Oral every 12 hours PRN Anxiety  lactulose Syrup 10 Gram(s) Oral every 6 hours PRN constipation      ROS: ENT, GI, , CV, Pulm, Neuro, Psych, MS, Heme, Endo, Constitional; all negative except as noted in HPI    Vital Signs Last 24 Hrs  T(C): 37.1 (02 Jun 2024 12:38), Max: 37.1 (02 Jun 2024 12:38)  T(F): 98.8 (02 Jun 2024 12:38), Max: 98.8 (02 Jun 2024 12:38)  HR: 92 (02 Jun 2024 17:30) (92 - 97)  BP: 126/62 (02 Jun 2024 17:30) (126/62 - 134/72)  BP(mean): --  RR: --  SpO2: 91% (02 Jun 2024 18:35) (91% - 97%)    Parameters below as of 02 Jun 2024 17:30  Patient On (Oxygen Delivery Method): BiPAP/CPAP                              9.1    7.49  )-----------( 524      ( 02 Jun 2024 14:53 )             31.0    06-02    142  |  95<L>  |  13  ----------------------------<  124<H>  5.0   |  42<HH>  |  <0.5<L>    Ca    9.1      02 Jun 2024 14:53    TPro  5.6<L>  /  Alb  3.4<L>  /  TBili  <0.2  /  DBili  x   /  AST  13  /  ALT  37  /  AlkPhos  100  06-02       PHYSICAL EXAM:  Gen: NAD, presently on BIPAP  Head: Normocephalic, Atraumatic  Face: no edema/erythema/fluctuance, parotid glands soft without mass  Eyes: PERRL, EOMI, no scleral injection  Ears: Right - ear canal clear, TM intact with mild effusion, no cerumen, no purulence, no drainage            Left - ear canal clear, TM intact without any effusion, no erythema of drainage  Nose: Nares bilaterally patent, no discharge  Mouth: Mucosa moist, tongue/uvula midline, oropharynx clear  Neck: Flat, supple, no lymphadenopathy, trachea midline, no masses  Resp: breathing easily  CV: no peripheral edema/cyanosis        RADIOLOGY:  < from: CT Head No Cont (06.02.24 @ 15:45) >    ACC: [id number] EXAM:  CT BRAIN   ORDERED BY: LALO MAR HEENAJULIUS     PROCEDURE DATE:  06/02/2024          INTERPRETATION:  Clinical History / Reason for exam: Altered mental status    Technique: Noncontrast head CT.  Contiguous unenhanced CT axial images of   the head from the base to the vertex with coronal and sagittal reformats.    Comparison: None available    Findings:    The study is limited due to patient positioning, the patient was unable   to lay flat on the scanner.    The ventricles and cortical sulci are within normal limits for age.    There are patchy hypodensities throughout the hemispheric white matter   without mass effect compatible with mild chronic microvascular changes.    There is no acute intracranial hemorrhage, extra-axial fluid collection   or midline shift.  Gray white matter differentiation is maintained.    Vascular calcifications are noted.    The visualized paranasal sinuses are clear. There is bilateral mastoid   and middle ear opacification.    IMPRESSION:    1.  No evidence of acute intracranial pathology.    2.  Bilateral middle ear and mastoid opacification, correlate clinically.    --- End of Report ---            WILFRED ELIAS MD; Attending Radiologist  This document has been electronically signed.Jun 2 2024  3:48PM    < end of copied text >   73-year-old female with PMHx of COPD on home 3L NC, chronic smoker >60ppy, HTN, recently discharged from [facility] 5/30. hospitalized for sob hypoxia and hemoptysis weight loss found to have copd exacerbation post obstructive PNA and possible malignancy (patient refused workup), S/P Diagnostic thoracentesis on 5/10, -ve cytology (no fluid analysis? only 10mL drained). Intubated 5/17 --> 5/20. Then became DNR DNI. Underwent bronchoscopy on 05/17 for bronchoscopy showing multiple blood clots, BAL showed rare yeast likely colonizers.   --> presented today from NH brought in by sons and daughter for concerns of lethargy, confusion, poor appetite for 3 days, and decreased hearing with fullness in her ears worse since discharge. She has been refusing to eat or walk for the past 3 days as well. She was refusing to use the bipap at [facility].  Now patient is AAOx3 and wants to eat. She is on BIPAP. Denying other complaint. no increased cough or sputum or change in color of sputum. no fever or hemoptysis.     Pt complains of ear pain, right greater than left, for past 10 days or so. Symptoms began after being removed from ventilator. Pt also says she feels stuffy in nose and face. Denies any nasal or facial pain. No fevers/chills    PAST MEDICAL & SURGICAL HISTORY:  Chronic obstructive pulmonary disease, unspecified COPD type  Hypertension  Lung mass  Chronic hypoxic respiratory failure  No significant past surgical history    Allergies  tetracycline (Unknown)  penicillin (Unknown)    MEDICATIONS  (STANDING):  amLODIPine   Tablet 5 milliGRAM(s) Oral daily  carbamide peroxide Otic Solution 5 Drop(s) Both Ears every 12 hours  ferrous    sulfate 325 milliGRAM(s) Oral daily  polyethylene glycol 3350 17 Gram(s) Oral every 12 hours  saccharomyces boulardii 250 milliGRAM(s) Oral two times a day  senna 2 Tablet(s) Oral at bedtime  tiotropium 2.5 MICROgram(s) Inhaler 2 Puff(s) Inhalation daily    MEDICATIONS  (PRN):  albuterol    90 MICROgram(s) HFA Inhaler 2 Puff(s) Inhalation every 6 hours PRN for shortness of breath and/or wheezing  hydrOXYzine hydrochloride 25 milliGRAM(s) Oral every 12 hours PRN Anxiety  lactulose Syrup 10 Gram(s) Oral every 6 hours PRN constipation    ROS: ENT, GI, , CV, Pulm, Neuro, Psych, MS, Heme, Endo, Constitional; all negative except as noted in HPI    Vital Signs Last 24 Hrs  T(C): 37.1 (02 Jun 2024 12:38), Max: 37.1 (02 Jun 2024 12:38)  T(F): 98.8 (02 Jun 2024 12:38), Max: 98.8 (02 Jun 2024 12:38)  HR: 92 (02 Jun 2024 17:30) (92 - 97)  BP: 126/62 (02 Jun 2024 17:30) (126/62 - 134/72)  SpO2: 91% (02 Jun 2024 18:35) (91% - 97%)    Parameters below as of 02 Jun 2024 17:30  Patient On (Oxygen Delivery Method): BiPAP/CPAP               9.1    7.49  )-----------( 524      ( 02 Jun 2024 14:53 )             31.0     06-02  142  |  95<L>  |  13  ----------------------------<  124<H>  5.0   |  42<HH>  |  <0.5<L>    Ca    9.1      02 Jun 2024 14:53    TPro  5.6<L>  /  Alb  3.4<L>  /  TBili  <0.2  /  DBili  x   /  AST  13  /  ALT  37  /  AlkPhos  100  06-02     PHYSICAL EXAM:  Gen: NAD, presently on BIPAP  Head: Normocephalic, Atraumatic  Face: no edema/erythema/fluctuance, parotid glands soft without mass  Eyes: PERRL, EOMI, no scleral injection  Ears: Right - ear canal clear, TM intact with mild effusion, no cerumen, no purulence, no drainage            Left - ear canal clear, TM intact without any effusion, no erythema of drainage  Nose: Nares bilaterally patent, no discharge  Mouth: Mucosa moist, tongue/uvula midline, oropharynx clear  Neck: Flat, supple, no lymphadenopathy, trachea midline, no masses  Resp: breathing easily  CV: no peripheral edema/cyanosis    RADIOLOGY:  < from: CT Head No Cont (06.02.24 @ 15:45) >  Findings:    The study is limited due to patient positioning, the patient was unable   to lay flat on the scanner.    The ventricles and cortical sulci are within normal limits for age.    There are patchy hypodensities throughout the hemispheric white matter   without mass effect compatible with mild chronic microvascular changes.    There is no acute intracranial hemorrhage, extra-axial fluid collection   or midline shift.  Gray white matter differentiation is maintained.    Vascular calcifications are noted.    The visualized paranasal sinuses are clear. There is bilateral mastoid   and middle ear opacification.    IMPRESSION:    1.  No evidence of acute intracranial pathology.    2.  Bilateral middle ear and mastoid opacification, correlate clinically.    --- End of Report ---  < end of copied text >

## 2024-06-02 NOTE — H&P ADULT - ATTENDING COMMENTS
73-year-old female with PMHx of COPD on home 3L NC, chronic smoker >60ppy, HTN, recently discharged from [facility] 5/30 after being hospitalized for sob hypoxia and hemoptysis weight loss found to have copd exacerbation post obstructive PNA and possible malignancy (patient refused workup), S/P Diagnostic thoracentesis on 5/10, -ve cytology (no fluid analysis? only 10mL drained). Intubated 5/17 --> 5/20. Then became DNR DNI. Underwent bronchoscopy on 05/17 for bronchoscopy showing multiple blood clots, BAL showed rare yeast likely colonizers.   --> presented today from NH brought in by sons and daughter for concerns of lethargy, confusion, poor appetite for 3 days, and decreased hearing with fullness in her ears worse since discharge. She has been refusing to eat or walk for the past 3 days as well. She was refusing to use the bipap at [facility].  In the ED, mental status improved s/p bipap    Acute Hypercapnic respiratory failure s/p BIPAP  h/o Advanced  copd discharged recently on home 3L NC   Possible lung malignancy   L lung white out  Chronic smoker  CXR: Complete opacification of the left hemithorax.  CT chest PE and abd pelv with IV: No evidence of PE. the right lung apex is excluded from the imaging series. Upper lobe predominant, centrilobular emphysematous changes. Debris within the left main bronchus with complete atelectasis of the left upper and lower lobe. Cystic with solid component lesion within the right upper lobe measuring approximately 3.1 cm. Large left-sided pleural effusion, precluding visualization of known left lung mass. Right lower lobe spiculated nodule measuring 1.8 x 1.9 cm.  - c/w NIV on and off and PRN, currently on 5L NC  - start solumedrol 40 Q12H  - monitor off abx  - palliative eval pending       Bilateral ear pain  Middle ear effusion   CTH: No evidence of acute intracranial pathology. Bilateral middle ear and mastoid opacification, correlate clinically.  s/p ENT fu recs Flonase Q12H      Chronic constipation  mod stool burden on CT  cw home senna, miralax bid, and lactulose prn  monitor BMs   Enema if no BM today      HTN: controlled. cw home amlodipine 5mg qd    DNR/DNI, overall prognosis is very poor, high risk of decompensation, continue monitoring in SDU     Patient seen at bedside, total time spent to evaluate and treat the patient's acute illness and chronic medical conditions as well as time spent reviewing prior records, labs, radiology, documenting in electronic medical records,  discussing medical plan with   medical team was more than 70 minutes with >50% of time spent face to face with patient, discussing with patient/family as well as coordination of care

## 2024-06-02 NOTE — ED PROVIDER NOTE - CARE PLAN
Principal Discharge DX:	Acute respiratory failure with hypoxia and hypercapnia  Secondary Diagnosis:	Pleural effusion, left  Secondary Diagnosis:	Hearing loss   1 Principal Discharge DX:	Acute respiratory failure with hypoxia and hypercapnia  Secondary Diagnosis:	Pleural effusion, left  Secondary Diagnosis:	Hearing loss  Secondary Diagnosis:	Elevated troponin

## 2024-06-02 NOTE — H&P ADULT - NSHPLABSRESULTS_GEN_ALL_CORE
9.1    7.49  )-----------( 524      ( 02 Jun 2024 14:53 )             31.0       06-02    142  |  95<L>  |  13  ----------------------------<  124<H>  5.0   |  42<HH>  |  <0.5<L>    Ca    9.1      02 Jun 2024 14:53    TPro  5.6<L>  /  Alb  3.4<L>  /  TBili  <0.2  /  DBili  x   /  AST  13  /  ALT  37  /  AlkPhos  100  06-02              Urinalysis Basic - ( 02 Jun 2024 14:53 )    Color: x / Appearance: x / SG: x / pH: x  Gluc: 124 mg/dL / Ketone: x  / Bili: x / Urobili: x   Blood: x / Protein: x / Nitrite: x   Leuk Esterase: x / RBC: x / WBC x   Sq Epi: x / Non Sq Epi: x / Bacteria: x            Lactate Trend            CAPILLARY BLOOD GLUCOSE              Culture Results:   No growth at 5 days (05-26 @ 11:49)  Culture Results:   Normal Respiratory Shana present (05-17 @ 12:06)  Culture Results:   No acid-fast bacilli isolated at 2 weeks. (05-17 @ 12:06)  Culture Results:   Rare Yeast (05-17 @ 12:06)  Culture Results:   No growth at 5 days (05-08 @ 00:23) 9.1    7.49  )-----------( 524      ( 02 Jun 2024 14:53 )             31.0       06-02    142  |  95<L>  |  13  ----------------------------<  124<H>  5.0   |  42<HH>  |  <0.5<L>    Ca    9.1      02 Jun 2024 14:53    TPro  5.6<L>  /  Alb  3.4<L>  /  TBili  <0.2  /  DBili  x   /  AST  13  /  ALT  37  /  AlkPhos  100  06-02              Urinalysis Basic - ( 02 Jun 2024 14:53 )    Color: x / Appearance: x / SG: x / pH: x  Gluc: 124 mg/dL / Ketone: x  / Bili: x / Urobili: x   Blood: x / Protein: x / Nitrite: x   Leuk Esterase: x / RBC: x / WBC x   Sq Epi: x / Non Sq Epi: x / Bacteria: x      Culture Results:   No growth at 5 days (05-26 @ 11:49)  Culture Results:   Normal Respiratory Shana present (05-17 @ 12:06)  Culture Results:   No acid-fast bacilli isolated at 2 weeks. (05-17 @ 12:06)  Culture Results:   Rare Yeast (05-17 @ 12:06)  Culture Results:   No growth at 5 days (05-08 @ 00:23) 9.1    7.49  )-----------( 524      ( 02 Jun 2024 14:53 )             31.0     142  |  95<L>  |  13  ----------------------------<  124<H>  5.0   |  42<HH>  |  <0.5<L>    Ca    9.1      02 Jun 2024 14:53    TPro  5.6<L>  /  Alb  3.4<L>  /  TBili  <0.2  /  DBili  x   /  AST  13  /  ALT  37  /  AlkPhos  100  06-02      Urinalysis Basic - ( 02 Jun 2024 14:53 )    Color: x / Appearance: x / SG: x / pH: x  Gluc: 124 mg/dL / Ketone: x  / Bili: x / Urobili: x   Blood: x / Protein: x / Nitrite: x   Leuk Esterase: x / RBC: x / WBC x   Sq Epi: x / Non Sq Epi: x / Bacteria: x      Culture Results:   No growth at 5 days (05-26 @ 11:49)  Culture Results:   Normal Respiratory Shana present (05-17 @ 12:06)  Culture Results:   No acid-fast bacilli isolated at 2 weeks. (05-17 @ 12:06)  Culture Results:   Rare Yeast (05-17 @ 12:06)  Culture Results:   No growth at 5 days (05-08 @ 00:23)

## 2024-06-02 NOTE — H&P ADULT - NSICDXPASTMEDICALHX_GEN_ALL_CORE_FT
PAST MEDICAL HISTORY:  Chronic hypoxic respiratory failure     Chronic obstructive pulmonary disease, unspecified COPD type     Hypertension     Lung mass

## 2024-06-02 NOTE — PATIENT PROFILE ADULT - FALL HARM RISK - HARM RISK INTERVENTIONS

## 2024-06-02 NOTE — ED PROVIDER NOTE - OBJECTIVE STATEMENT
73-year-old female with PMH COPD on 3 L NC at home, HTN, recently intubated and admitted for hypoxic hypercarbic respiratory failure, downgraded on BiPAP and discharged on 5/30 on baseline nasal cannula O2 to NH brought in by son and daughter for concerns of lethargy, confusion, poor appetite, decreased hearing.  Per son and daughter patient has been confused and not making sense since discharge, and refusing to walk or eat which she had been doing normally prior to admission.  They also note patient has been complaining of bilateral ear pain and pressure and diminished hearing.  Patient is AAO x 3 and only complains of ear pain.  Patient states she is not eating or walking because she has been feeling tired.  Denies chest pain, SOB, abdominal pain, N/V/D.

## 2024-06-02 NOTE — ED ADULT NURSE NOTE - OBJECTIVE STATEMENT
Pt send from NH with generalized weakness and poor appetite. Pt also c/o ear pain  On arrival to ED, pt tachypneic and labored breathing o2 sat 88% on O2 via NC   Pt started on high flow with improvement

## 2024-06-02 NOTE — PATIENT PROFILE ADULT - NSTOBACCOQUITATTEMPT_GEN_A_CORE_SD
Contacted patient today with regards to her urinary tract infection results from August 17, 2018 ordered by patient's PCP.  Patient stated that she underwent a procedure (transurethral resection of bladder tumor) with Dr. Abdullahi recently and had a catheter in for approximately 4 days.  She did have the catheter removed last Tuesday at the urology office.  She did state that she feels better since her procedure however she does still have pain and burning with urination.  At this time patient is scheduled to see urology next Tuesday, August 28th and is following up with Dr. Pelaez next Wednesday, August 29.  Patient stated that her PCP recommended possibly IV antibiotics for UTI, however patient was uncertain if that would be the plan.  In the meantime she has been given a prescription for Levaquin 500 mg to take for approximately 10 days by her PCP which patient did  today.    I did discuss with patient and asked her to follow-up with Dr. Abdullahi and discuss her recent urinalysis results and discuss further plan of care and management of frequent and recurrent UTIs.  Patient did verbalize understanding of the plan.   none

## 2024-06-02 NOTE — ED PROVIDER NOTE - ATTENDING CONTRIBUTION TO CARE
73-year-old female with history of COPD, now on 3L home [place] O2, HTN, discharged 3 days ago after respiratory failure requiring intubation s/p extubation, supposed to be on BiPAP, possibly malignant lung mass/pneumonia/COPD, severe malnutrition, brought in by family due to generalized weakness and poor oral intake over the past 3 days since discharge.  Also notes her ears have been muffled ever since she was extubated, was attributed to ear clogging and told to follow-up as an outpatient with ENT.  Patient reports some shortness of breath with movement only.  Denies chest pain, change in chronic cough, fever, vomiting, abdominal pain, and all other symptoms.  On exam, afebrile, hemodynamically stable, saturating well on 3L, NAD, cachectic, nontoxic appearing, sitting comfortably in bed, mild occasional, tachypnea no WOB, speaking full sentences, head NCAT, EOMI grossly, anicteric, MMM, no JVD, TMs clear with sharp reflex bilaterally, no canal edema, no mastoid tenderness or swelling, RRR, nml S1/S2, no m/r/g, lungs decreased left-sided lung sounds, no w/r/r, abd soft, NT, ND, nml BS, no rebound or guarding, AAO, CN's 3-12 grossly intact, ACEVES spontaneously, no leg cyanosis or edema, skin warm, dry, no rashes or hives.  No evidence of otitis or mastoiditis on exam to warrant antibiotics, furthermore patient's for muffled hearing has been present since the prior admission and is not acute.  Low suspicion for CVA, and CT head _______. No abdominal pain or tenderness, with low suspicion for acute intra-abdominal process to warrant imaging.  Character low suspicion for PE and D-dimer ______ and does not warrant CT angio imaging to rule this out. Character low suspicion for ACS and ECG/trop [__________] and does not warrant cardiology consult or admission for cardiac work-up at this time. No e/o PTX or fluid overload on exam or CXR, and does not warrant antibiotics or diuretics at this time.  Patient with her chronic unchanged left hemithorax.  No significant arrhythmia to warrant admission for cardiac monitoring. No significant anemia to warrant blood transfusion at this time.  Initially mild tachypnea, patient not using her BiPAP at home due to concern for ears being clogged, placed on high flow nasal cannula 73-year-old female with history of COPD, now on 3L home [place] O2, HTN, discharged 3 days ago after respiratory failure requiring intubation s/p extubation, supposed to be on BiPAP, possibly malignant lung mass/pneumonia/COPD, severe malnutrition, brought in by family due to generalized weakness and poor oral intake over the past 3 days since discharge.  Also notes her ears have been muffled ever since she was extubated, was attributed to ear clogging and told to follow-up as an outpatient with ENT.  Patient reports some shortness of breath with movement only.  Denies chest pain, change in chronic cough, fever, vomiting, abdominal pain, and all other symptoms.  On exam, afebrile, hemodynamically stable, saturating well on 3L, NAD, cachectic, nontoxic appearing, sitting comfortably in bed, mild occasional, tachypnea no WOB, speaking full sentences, head NCAT, EOMI grossly, anicteric, MMM, no JVD, TMs clear with sharp reflex bilaterally, no canal edema, no mastoid tenderness or swelling, RRR, nml S1/S2, no m/r/g, lungs decreased left-sided lung sounds, no w/r/r, abd soft, NT, ND, nml BS, no rebound or guarding, AAO, CN's 3-12 grossly intact, ACEVES spontaneously, no leg cyanosis or edema, skin warm, dry, no rashes or hives.  Although noted concern for bilateral ear and mastoid opacification on CT, clinically no evidence of otitis or mastoiditis on exam to warrant antibiotics, furthermore patient's for muffled hearing has been present since the prior admission and is not acute.  Consulted ENT and pending recs.  Low suspicion for CVA, and CT head negative. No abdominal pain or tenderness, with low suspicion for acute intra-abdominal process to warrant imaging.  Character low suspicion for PE and CTA PE negative. Character low suspicion for ACS and ECG unremarkable, trop trending pending- does not warrant cardiology consult or admission for cardiac work-up at this time. No e/o PTX or fluid overload on exam or CXR, and does not warrant antibiotics or diuretics at this time.  Patient with her chronic unchanged left hemithorax pleural effusion.  Family states this was due to clogging of her bronchi and fully worked up by pulmonology and had thoracentesis with pleural effusion recurring 24 hours later, and opted against doing further testing or biopsy to determine etiology due to her medical conditions. No significant arrhythmia to warrant admission for cardiac monitoring. No significant anemia to warrant blood transfusion at this time.  Initially mild tachypnea, patient not using her BiPAP at home due to concern for ears being clogged, placed on high flow nasal cannula then agreed to BiPAP due to hypercapnia. Intermittently compliant with the BiPAP, though each time able to be redirected and returned it. Discussed with ICU and admitted to stepdown for further management.  NAD, nontoxic appearing, no WOB at this time. 73-year-old female with history of COPD, now on 3L home [place] O2, HTN, discharged 3 days ago after respiratory failure requiring intubation s/p extubation, supposed to be on BiPAP, possibly malignant lung mass/pneumonia/COPD, severe malnutrition, brought in by family due to generalized weakness and poor oral intake over the past 3 days since discharge.  Also notes her ears have been muffled ever since she was extubated, was attributed to ear clogging and told to follow-up as an outpatient with ENT.  Patient reports some shortness of breath with movement only.  Denies chest pain, change in chronic cough, fever, vomiting, abdominal pain, and all other symptoms.  On exam, afebrile, hemodynamically stable, saturating well on 3L, NAD, cachectic, nontoxic appearing, sitting comfortably in bed, mild occasional, tachypnea no WOB, speaking full sentences, head NCAT, EOMI grossly, anicteric, MMM, no JVD, TMs clear with sharp reflex bilaterally, no canal edema, no mastoid tenderness or swelling, RRR, nml S1/S2, no m/r/g, lungs decreased left-sided lung sounds, no w/r/r, abd soft, NT, ND, nml BS, no rebound or guarding, AAO, CN's 3-12 grossly intact, ACEVES spontaneously, no leg cyanosis or edema, skin warm, dry, no rashes or hives.  Although noted concern for bilateral ear and mastoid opacification on CT, clinically no evidence of otitis or mastoiditis on exam to warrant antibiotics, furthermore patient's for muffled hearing has been present since the prior admission and is not acute.  Consulted ENT and pending recs.  Low suspicion for CVA, and CT head negative. No abdominal pain or tenderness, with low suspicion for acute intra-abdominal process to warrant imaging.  Character low suspicion for PE and CTA PE negative. Character low suspicion for ACS and ECG unremarkable, and trop downtrended and does not warrant cardiology consult or admission for cardiac work-up at this time. No e/o PTX or fluid overload on exam or CXR, and does not warrant antibiotics or diuretics at this time.  Patient with her chronic unchanged left hemithorax pleural effusion.  Family states this was due to clogging of her bronchi and fully worked up by pulmonology and had thoracentesis with pleural effusion recurring 24 hours later, and opted against doing further testing or biopsy to determine etiology due to her medical conditions. No significant arrhythmia to warrant admission for cardiac monitoring. No significant anemia to warrant blood transfusion at this time.  Initially mild tachypnea, patient not using her BiPAP at home due to concern for ears being clogged, placed on high flow nasal cannula then agreed to BiPAP due to hypercapnia. Intermittently compliant with the BiPAP, though each time able to be redirected and returned it. Discussed with ICU and admitted to stepdown for further management.  NAD, nontoxic appearing, no WOB at this time.

## 2024-06-02 NOTE — H&P ADULT - NSHPPHYSICALEXAM_GEN_ALL_CORE
GENERAL:   HEAD:  Atraumatic, Normocephalic.  EYES: conjunctiva and sclera clear  CHEST/LUNG: GBAE. No wheezing or crackles   HEART: regular rate and rhythm; S1/S2.  ABDOMEN: Soft, Nontender, Nondistended  EXTREMITIES: No edema.   PSYCH: AAOx3.  NEUROLOGY: non-focal; moves all extremities GENERAL: cachectic  HEAD:  Atraumatic, Normocephalic.  EYES: conjunctiva and sclera clear  CHEST/LUNG: GBAE. No wheezing or crackles. on BIPAP. speaking in full sentences   HEART: regular rate and rhythm; S1/S2.  ABDOMEN: Soft, Nontender, Nondistended  EXTREMITIES: No edema.   PSYCH: AAOx3.  NEUROLOGY: non-focal; moves all extremities GENERAL: cachectic, chronically ill appearing   HEAD:  Atraumatic, Normocephalic.  EYES: conjunctiva and sclera clear  CHEST/LUNG: decreased breath sounds l>R. No wheezing or crackles. on BIPAP. speaking in full sentences   HEART: regular rate and rhythm; S1/S2.  ABDOMEN: Soft, Nontender, Nondistended  EXTREMITIES: No edema.   PSYCH: AAOx3.  NEUROLOGY: non-focal; moves all extremities

## 2024-06-02 NOTE — ED PROVIDER NOTE - PHYSICAL EXAMINATION
CONSTITUTIONAL: No acute distress.   SKIN: Warm, dry  HEAD: Normocephalic; atraumatic  EYES: EOMI, normal sclera and conjunctiva   ENT: No nasal discharge; airway clear. B/l TM and canal normal.  CARD:  Regular rate and rhythm. Normal S1, S2  RESP: Increased WOB; tachypneic with supraclavicular retractions. CTA; no wheezing or crackles.   ABD: Soft, nontender, nondistended.  EXT: Normal ROM.   NEURO: Alert, oriented, grossly unremarkable  PSYCH: Cooperative, appropriate.

## 2024-06-02 NOTE — H&P ADULT - HISTORY OF PRESENT ILLNESS
73-year-old female with PMHx of COPD on home 3L NC, chronic smoker >60ppy, HTN, recently discharged from [facility] 5/30. hospitalized for sob hypoxia and hemoptysis weight loss found to have copd exacerbation post obstructive PNA and possible malignancy (patient refused workup), S/P Diagnostic thoracentesis on 5/10, -ve cytology (no fluid analysis? only 10mL drained). Intubated 5/17 --> 5/20. Then became DNR DNI. Underwent bronchoscopy on 05/17 for bronchoscopy showing multiple blood clots, BAL showed rare yeast likely colonizers.   --> presented today from NH brought in by son and daughter for concerns of lethargy, confusion, poor appetite, decreased hearing.   Per son and daughter patient has been confused and not making sense since discharge, and refusing to walk or eat. They also note patient has been complaining of bilateral ear pain and pressure and diminished hearing.  Patient is AAO x 3 and only complains of ear pain.  Patient states she is not eating or walking because she has been feeling tired.      ED vitals normal. placed on bipap for hypercapnia on vbg ( pco2 100, bicarb 52, ph 7.32). d-dimer 450  abg ordered    CXR: Complete opacification of the left hemithorax.  CT chest PE and abd pelv with IV: No evidence of PE. the right lung apex is excluded from the imaging series. Upper lobe predominant, centrilobular emphysematous   changes. Debris within the left main bronchus with complete atelectasis of the left upper and lower lobe. Cystic with solid component lesion within the right upper lobe measuring approximately 3.1 cm. Large left-sided pleural effusion, precluding visualization of known left lung mass.  Right lower lobe spiculated nodule measuring 1.8 x 1.9 cm.  HEPATOBILIARY: Scattered hepatic cysts and other subcentimeter hypodensities, incompletely characterized.  KIDNEYS: Left renal hypodensity measuring 3.2 cm. Calcified structure adjacent to the right kidney.  PELVIC ORGANS: Distended urinary bladder.  PERITONEUM/MESENTERY/BOWEL: Moderate colonic fecal stool burden. No bowel obstruction or intraperitoneal free air.    CTH: No evidence of acute intracranial pathology. Bilateral middle ear and mastoid opacification, correlate clinically.    patient received  admitted to SDU.    73-year-old female with PMHx of COPD on home 3L NC, chronic smoker >60ppy, HTN, recently discharged from [facility] 5/30. hospitalized for sob hypoxia and hemoptysis weight loss found to have copd exacerbation post obstructive PNA and possible malignancy (patient refused workup), S/P Diagnostic thoracentesis on 5/10, -ve cytology (no fluid analysis? only 10mL drained). Intubated 5/17 --> 5/20. Then became DNR DNI. Underwent bronchoscopy on 05/17 for bronchoscopy showing multiple blood clots, BAL showed rare yeast likely colonizers.   --> presented today from NH brought in by son and daughter for concerns of lethargy, confusion, poor appetite, decreased hearing.   Per son and daughter patient has been confused and not making sense since discharge, and refusing to walk or eat. They also note patient has been complaining of bilateral ear pain and pressure and diminished hearing.  Patient is AAO x 3 and only complains of ear pain.  Patient states she is not eating or walking because she has been feeling tired.      ED vitals normal. placed on bipap for hypercapnia on vbg ( pco2 100, bicarb 52, ph 7.32). d-dimer 450  abg ordered    CXR: Complete opacification of the left hemithorax.  CT chest PE and abd pelv with IV: No evidence of PE. the right lung apex is excluded from the imaging series. Upper lobe predominant, centrilobular emphysematous   changes. Debris within the left main bronchus with complete atelectasis of the left upper and lower lobe. Cystic with solid component lesion within the right upper lobe measuring approximately 3.1 cm. Large left-sided pleural effusion, precluding visualization of known left lung mass.  Right lower lobe spiculated nodule measuring 1.8 x 1.9 cm.  HEPATOBILIARY: Scattered hepatic cysts and other subcentimeter hypodensities, incompletely characterized.  KIDNEYS: Left renal hypodensity measuring 3.2 cm. Calcified structure adjacent to the right kidney.  PELVIC ORGANS: Distended urinary bladder.  PERITONEUM/MESENTERY/BOWEL: Moderate colonic fecal stool burden. No bowel obstruction or intraperitoneal free air.    CTH: No evidence of acute intracranial pathology. Bilateral middle ear and mastoid opacification, correlate clinically.    admitted to SDU.    73-year-old female with PMHx of COPD on home 3L NC, chronic smoker >60ppy, HTN, recently discharged from [facility] 5/30. hospitalized for sob hypoxia and hemoptysis weight loss found to have copd exacerbation post obstructive PNA and possible malignancy (patient refused workup), S/P Diagnostic thoracentesis on 5/10, -ve cytology (no fluid analysis? only 10mL drained). Intubated 5/17 --> 5/20. Then became DNR DNI. Underwent bronchoscopy on 05/17 for bronchoscopy showing multiple blood clots, BAL showed rare yeast likely colonizers.   --> presented today from NH brought in by sons and daughter for concerns of lethargy, confusion, poor appetite for 3 days, and decreased hearing with fullness in her ears worse since discharge. She has been refusing to eat or walk for the past 3 days as well. She was refusing to use the bipap at [facility].  Now patient is AAOx3 and wants to eat. She is on BIPAP. Denying other complaint. no increased cough or sputum or change in color of sputum. no fever or hemoptysis.     ED vitals normal. placed on bipap for hypercapnia on vbg ( pco2 100, bicarb 52, ph 7.32). d-dimer 450  abg ordered    CXR: Complete opacification of the left hemithorax.  CT chest PE and abd pelv with IV: No evidence of PE. the right lung apex is excluded from the imaging series. Upper lobe predominant, centrilobular emphysematous   changes. Debris within the left main bronchus with complete atelectasis of the left upper and lower lobe. Cystic with solid component lesion within the right upper lobe measuring approximately 3.1 cm. Large left-sided pleural effusion, precluding visualization of known left lung mass.  Right lower lobe spiculated nodule measuring 1.8 x 1.9 cm.  HEPATOBILIARY: Scattered hepatic cysts and other subcentimeter hypodensities, incompletely characterized.  KIDNEYS: Left renal hypodensity measuring 3.2 cm. Calcified structure adjacent to the right kidney.  PELVIC ORGANS: Distended urinary bladder.  PERITONEUM/MESENTERY/BOWEL: Moderate colonic fecal stool burden. No bowel obstruction or intraperitoneal free air.    CTH: No evidence of acute intracranial pathology. Bilateral middle ear and mastoid opacification, correlate clinically.    admitted to SDU.

## 2024-06-02 NOTE — ED PROVIDER NOTE - CLINICAL SUMMARY MEDICAL DECISION MAKING FREE TEXT BOX
73-year-old female with history of COPD, now on 3L home NC O2, HTN, discharged 3 days ago after respiratory failure requiring intubation s/p extubation, supposed to be on BiPAP, possibly malignant lung mass/pneumonia/COPD, severe malnutrition, brought in by family due to generalized weakness and poor oral intake over the past 3 days since discharge.  Also notes her ears have been muffled ever since she was extubated, was attributed to ear clogging and told to follow-up as an outpatient with ENT.  Patient reports some shortness of breath with movement only.  Denies chest pain, change in chronic cough, fever, vomiting, abdominal pain, and all other symptoms.  On exam, afebrile, hemodynamically stable, saturating well on 3L, NAD, cachectic, nontoxic appearing, sitting comfortably in bed, mild occasional, tachypnea no WOB, speaking full sentences, head NCAT, EOMI grossly, anicteric, MMM, no JVD, TMs clear with sharp reflex bilaterally, no canal edema, no mastoid tenderness or swelling, RRR, nml S1/S2, no m/r/g, lungs decreased left-sided lung sounds, no w/r/r, abd soft, NT, ND, nml BS, no rebound or guarding, AAO, CN's 3-12 grossly intact, ACEVES spontaneously, no leg cyanosis or edema, skin warm, dry, no rashes or hives.  Although noted concern for bilateral ear and mastoid opacification on CT, clinically no evidence of otitis or mastoiditis on exam to warrant antibiotics, furthermore patient's for muffled hearing has been present since the prior admission and is not acute.  Consulted ENT and pending recs.  Low suspicion for CVA, and CT head negative. No abdominal pain or tenderness, with low suspicion for acute intra-abdominal process to warrant imaging.  Character low suspicion for PE and CTA PE negative. Character low suspicion for ACS and ECG unremarkable, trop trending pending- does not warrant cardiology consult or admission for cardiac work-up at this time. No e/o PTX or fluid overload on exam or CXR, and does not warrant antibiotics or diuretics at this time.  Patient with her chronic unchanged left hemithorax pleural effusion.  Family states this was due to clogging of her bronchi and fully worked up by pulmonology and had thoracentesis with pleural effusion recurring 24 hours later, and opted against doing further testing or biopsy to determine etiology due to her medical conditions. No significant arrhythmia to warrant admission for cardiac monitoring. No significant anemia to warrant blood transfusion at this time.  Initially mild tachypnea, patient not using her BiPAP at home due to concern for ears being clogged, placed on high flow nasal cannula then agreed to BiPAP due to hypercapnia. Intermittently compliant with the BiPAP, though each time able to be redirected and returned it. Discussed with ICU and admitted to stepdown for further management.  NAD, nontoxic appearing, no WOB at this time. 73-year-old female with history of COPD, now on 3L home NC O2, HTN, discharged 3 days ago after respiratory failure requiring intubation s/p extubation, supposed to be on BiPAP, possibly malignant lung mass/pneumonia/COPD, severe malnutrition, brought in by family due to generalized weakness and poor oral intake over the past 3 days since discharge.  Also notes her ears have been muffled ever since she was extubated, was attributed to ear clogging and told to follow-up as an outpatient with ENT.  Patient reports some shortness of breath with movement only.  Denies chest pain, change in chronic cough, fever, vomiting, abdominal pain, and all other symptoms.  On exam, afebrile, hemodynamically stable, saturating well on 3L, NAD, cachectic, nontoxic appearing, sitting comfortably in bed, mild occasional, tachypnea no WOB, speaking full sentences, head NCAT, EOMI grossly, anicteric, MMM, no JVD, TMs clear with sharp reflex bilaterally, no canal edema, no mastoid tenderness or swelling, RRR, nml S1/S2, no m/r/g, lungs decreased left-sided lung sounds, no w/r/r, abd soft, NT, ND, nml BS, no rebound or guarding, AAO, CN's 3-12 grossly intact, ACEVES spontaneously, no leg cyanosis or edema, skin warm, dry, no rashes or hives.  Although noted concern for bilateral ear and mastoid opacification on CT, clinically no evidence of otitis or mastoiditis on exam to warrant antibiotics, furthermore patient's for muffled hearing has been present since the prior admission and is not acute.  Consulted ENT and pending recs.  Low suspicion for CVA, and CT head negative. No abdominal pain or tenderness, with low suspicion for acute intra-abdominal process to warrant imaging.  Character low suspicion for PE and CTA PE negative. Character low suspicion for ACS and ECG unremarkable, and trop downtrended and does not warrant cardiology consult or admission for cardiac work-up at this time. No e/o PTX or fluid overload on exam or CXR, and does not warrant antibiotics or diuretics at this time.  Patient with her chronic unchanged left hemithorax pleural effusion.  Family states this was due to clogging of her bronchi and fully worked up by pulmonology and had thoracentesis with pleural effusion recurring 24 hours later, and opted against doing further testing or biopsy to determine etiology due to her medical conditions. No significant arrhythmia to warrant admission for cardiac monitoring. No significant anemia to warrant blood transfusion at this time.  Initially mild tachypnea, patient not using her BiPAP at home due to concern for ears being clogged, placed on high flow nasal cannula then agreed to BiPAP due to hypercapnia. Intermittently compliant with the BiPAP, though each time able to be redirected and returned it. Discussed with ICU and admitted to stepdown for further management.  NAD, nontoxic appearing, no WOB at this time.

## 2024-06-03 NOTE — HOSPICE CARE NOTE - CONVESATION DETAILS
Received hospice referral. Phone call to patient's son Grayson [phone], reviewed hospice services and philosophy. Grayson requesting hospice services at a SNF, first choice is [facility]. Please send out LORRIE.

## 2024-06-03 NOTE — CONSULT NOTE ADULT - CONVERSATION DETAILS
Spoke with patient at bedside. Palliative care introduced. She noted that she didn't want to hear anything about her clinical course or health, and she didn't wish to make decisions. She asked that I call her son Grayson. Called and spoke with Grayson on the phone. Palliative care reintroduced.  He was able to provide a medical history and hospital course. He discussed the patient's recent hospitalization and her refusal of treatment/wish to not know about her treatment. We discussed hospice at length.  He notes he is interested in hospice on discharge. All questions answered. Hospice consult to be placed.

## 2024-06-03 NOTE — CONSULT NOTE ADULT - SUBJECTIVE AND OBJECTIVE BOX
CC: lethargy and confusion    HPI:  73-year-old female with PMHx of COPD on home 3L NC, chronic smoker >60ppy, HTN, recently discharged from [facility] 5/30. hospitalized for sob hypoxia and hemoptysis weight loss found to have copd exacerbation post obstructive PNA and possible malignancy (patient refused workup), S/P Diagnostic thoracentesis on 5/10, -ve cytology (no fluid analysis? only 10mL drained). Intubated 5/17 --> 5/20. Then became DNR DNI. Underwent bronchoscopy on 05/17 for bronchoscopy showing multiple blood clots, BAL showed rare yeast likely colonizers.   --> presented today from NH brought in by sons and daughter for concerns of lethargy, confusion, poor appetite for 3 days, and decreased hearing with fullness in her ears worse since discharge. She has been refusing to eat or walk for the past 3 days as well. She was refusing to use the bipap at [facility].  Now patient is AAOx3 and wants to eat. She is on BIPAP. Denying other complaint. no increased cough or sputum or change in color of sputum. no fever or hemoptysis.     ED vitals normal. placed on bipap for hypercapnia on vbg ( pco2 100, bicarb 52, ph 7.32). d-dimer 450  abg ordered    CXR: Complete opacification of the left hemithorax.  CT chest PE and abd pelv with IV: No evidence of PE. the right lung apex is excluded from the imaging series. Upper lobe predominant, centrilobular emphysematous   changes. Debris within the left main bronchus with complete atelectasis of the left upper and lower lobe. Cystic with solid component lesion within the right upper lobe measuring approximately 3.1 cm. Large left-sided pleural effusion, precluding visualization of known left lung mass.  Right lower lobe spiculated nodule measuring 1.8 x 1.9 cm.  HEPATOBILIARY: Scattered hepatic cysts and other subcentimeter hypodensities, incompletely characterized.  KIDNEYS: Left renal hypodensity measuring 3.2 cm. Calcified structure adjacent to the right kidney.  PELVIC ORGANS: Distended urinary bladder.  PERITONEUM/MESENTERY/BOWEL: Moderate colonic fecal stool burden. No bowel obstruction or intraperitoneal free air.    CTH: No evidence of acute intracranial pathology. Bilateral middle ear and mastoid opacification, correlate clinically.    admitted to SDU.    (02 Jun 2024 18:28)    PERTINENT PM/SXH:   Chronic obstructive pulmonary disease, unspecified COPD type    Hypertension    Lung mass    Chronic hypoxic respiratory failure      No significant past surgical history      FAMILY HISTORY:    None pertinent    ITEMS NOT CHECKED ARE NOT PRESENT    SOCIAL HISTORY:   Significant other/partner[ ]  Children[ ]  Yarsanism/Spirituality:  Substance hx:  [ ]   Tobacco hx:  [ ]   Alcohol hx: [ ]   Living Situation: [ x]Home  [ ]Long term care  [ ]Rehab [ ]Other  Home Services: [ ] HHA [ ] Visting RN [ ] Hospice  Occupation:  Home Opioid hx:  [ ] Y [ ] N [ x] I-Stop Reference No:    Reference #: [id number] - no meds     ADVANCE DIRECTIVES:     [ ] Full Code [ x] DNR  MOLST  [ ]  Living Will  [ ]   DECISION MAKER(s):  [x ] Health Care Proxy(s)  [ ] Surrogate(s)  [ ] Guardian           Name(s): Phone Number(s):  Cecil Koch      BASELINE (I)ADL(s) (prior to admission):    [place]: [ ]Total  [ ] Moderate [ ]Dependent  Palliative Performance Status Version 2:         %    http://npcrc.org/files/news/palliative_performance_scale_ppsv2.pdf    Allergies    tetracycline (Unknown)  penicillin (Unknown)    Intolerances    MEDICATIONS  (STANDING):  acetylcysteine 20%  Inhalation 4 milliLiter(s) Inhalation daily  amLODIPine   Tablet 5 milliGRAM(s) Oral daily  carbamide peroxide Otic Solution 5 Drop(s) Both Ears every 12 hours  chlorhexidine 2% Cloths 1 Application(s) Topical <User Schedule>  enoxaparin Injectable 40 milliGRAM(s) SubCutaneous every 24 hours  ferrous    sulfate 325 milliGRAM(s) Oral daily  fluticasone propionate 50 MICROgram(s)/spray Nasal Spray 1 Spray(s) Both Nostrils two times a day  methylPREDNISolone sodium succinate Injectable 40 milliGRAM(s) IV Push two times a day  polyethylene glycol 3350 17 Gram(s) Oral every 12 hours  saccharomyces boulardii 250 milliGRAM(s) Oral two times a day  senna 2 Tablet(s) Oral at bedtime  sodium chloride 3%  Inhalation 4 milliLiter(s) Inhalation every 12 hours  tiotropium 2.5 MICROgram(s) Inhaler 2 Puff(s) Inhalation daily    MEDICATIONS  (PRN):  albuterol    90 MICROgram(s) HFA Inhaler 2 Puff(s) Inhalation every 6 hours PRN for shortness of breath and/or wheezing  hydrOXYzine hydrochloride 25 milliGRAM(s) Oral every 12 hours PRN Anxiety  lactulose Syrup 10 Gram(s) Oral every 6 hours PRN constipation    PRESENT SYMPTOMS: [ ]Unable to obtain due to poor mentation   Source if other than patient:  [ ]Family   [ ]Team     Pain: [ ]yes [ ]no  ALL PAIN ASSESSMENT COMPONENTS WERE ASKED ABOUT UNLESS OTHERWISE NOTED  QOL impact -   Location -                    Aggravating factors -  Quality -  Radiation -  Timing-  Severity (0-10 scale):  Minimal acceptable level (0-10 scale):     CPOT:    https://www.[facility].org/getattachment/[email or web address]/Critical-Care-Pain-Observation-Tool-(CPOT)    PAIN AD Score:   http://geriatrictoolkit.[facility]/cog/painad.pdf (press ctrl +  left click to view)    Dyspnea:                           [ ]None[ ]Mild [ ]Moderate [ ]Severe     Respiratory Distress Observation Scale (RDOS):   A score of 0 to 2 signifies little or no respiratory distress, 3 signifies mild distress, scores 4 to 6 indicate moderate distress, and scores greater than 7 signify severe distress  https://www.[facility].ca/sites/default/files/PDFS/006691-ruibkcgzlut-egwgkxzm-tzkytwdeanv-ojfmu.pdf    Anxiety:                             [ ]None[ ]Mild [ ]Moderate [ ]Severe   Fatigue:                             [ ]None[ ]Mild [ ]Moderate [ ]Severe   Nausea:                             [ ]None[ ]Mild [ ]Moderate [ ]Severe   Loss of appetite:              [ ]None[ ]Mild [ ]Moderate [ ]Severe   Constipation:                    [ ]None[ ]Mild [ ]Moderate [ ]Severe    Other Symptoms:  [ ]All other review of systems negative     Palliative Performance Status Version 2:         %    http://npcrc.org/files/news/palliative_performance_scale_ppsv2.pdf    PHYSICAL EXAM:  Vital Signs Last 24 Hrs  T(C): 36.8 (03 Jun 2024 08:10), Max: 37.1 (02 Jun 2024 12:38)  T(F): 98.3 (03 Jun 2024 08:10), Max: 98.8 (02 Jun 2024 12:38)  HR: 89 (03 Jun 2024 08:10) (89 - 99)  BP: 141/67 (03 Jun 2024 08:10) (126/62 - 162/75)  BP(mean): 95 (03 Jun 2024 08:10) (95 - 109)  RR: 20 (03 Jun 2024 08:10) (20 - 22)  SpO2: 97% (03 Jun 2024 08:10) (91% - 97%)    Parameters below as of 03 Jun 2024 08:10  Patient On (Oxygen Delivery Method): nasal cannula, high flow     I&O's Summary    02 Jun 2024 07:01  -  03 Jun 2024 07:00  --------------------------------------------------------  IN: 240 mL / OUT: 600 mL / NET: -360 mL        GENERAL:  [ ] No acute distress [ ]Lethargic  [ ]Unarousable  [ ]Verbal  [ ]Non-Verbal [ ]Cachexia    BEHAVIORAL/PSYCH:  [ ]Alert and Oriented x  [ ] Anxiety [ ] Delirium [ ] Agitation [ ] Calm   EYES: [ ] No scleral icterus [ ] Scleral icterus [ ] Closed  ENMT:  [ ]Dry mouth  [ ]No external oral lesions [ ] No external ear or nose lesions  CARDIOVASCULAR:  [ ]Regular [ ]Irregular [ ]Tachy [ ]Not Tachy  [ ]Kalpesh [ ] Edema [ ] No edema  PULMONARY:  [ ]Tachypnea  [ ]Audible excessive secretions [ ] No labored breathing [ ] labored breathing  GASTROINTESTINAL: [ ]Soft  [ ]Distended  [ ]Not distended [ ]Non tender [ ]Tender  MUSCULOSKELETAL: [ ]No clubbing [ ] clubbing  [ ] No cyanosis [ ] cyanosis  NEUROLOGIC: [ ]No focal deficits  [ ]Follows commands  [ ]Does not follow commands  [ ]Cognitive impairment  [ ]Dysphagia  [ ]Dysarthria  [ ]Paresis   SKIN: [ ] Jaundiced [ ] Non-jaundiced [ ]Rash [ ]No Rash [ ] Warm [ ] Dry  MISC/LINES: [ ] ET tube [ ] Trach [ ]NGT/OGT [ ]PEG [ ]Paulino    LABS: reviewed by me                        8.6    11.97 )-----------( 454      ( 03 Jun 2024 04:42 )             29.1   06-03    142  |  95<L>  |  12  ----------------------------<  125<H>  4.9   |  46<HH>  |  <0.5<L>    Ca    9.1      03 Jun 2024 04:42  Phos  3.4     06-03  Mg     1.9     06-03    TPro  5.3<L>  /  Alb  3.1<L>  /  TBili  <0.2  /  DBili  x   /  AST  12  /  ALT  35  /  AlkPhos  94  06-03  PT/INR - ( 03 Jun 2024 04:42 )   PT: 11.30 sec;   INR: 0.99 ratio         PTT - ( 03 Jun 2024 04:42 )  PTT:28.5 sec    Urinalysis Basic - ( 03 Jun 2024 04:42 )    Color: x / Appearance: x / SG: x / pH: x  Gluc: 125 mg/dL / Ketone: x  / Bili: x / Urobili: x   Blood: x / Protein: x / Nitrite: x   Leuk Esterase: x / RBC: x / WBC x   Sq Epi: x / Non Sq Epi: x / Bacteria: x      RADIOLOGY & ADDITIONAL STUDIES: reviewed by me    EKG: reviewed by me      PROTEIN CALORIE MALNUTRITION PRESENT: [ ]mild [ ]moderate [ ]severe [ ]underweight [ ]morbid obesity  https://www.andeal.org/vault/2440/web/files/ONC/Table_Clinical%20Characteristics%20to%20Document%20Malnutrition-White%20JV%20et%20al%202012.pdf    Height (cm): 154.9 (06-02-24 @ 12:38), 154.9 (05-16-24 @ 18:55)  Weight (kg): 54.4 (06-02-24 @ 12:38), 50.5 (05-16-24 @ 18:55)  BMI (kg/m2): 22.7 (06-02-24 @ 12:38), 21 (05-16-24 @ 18:55)  [ ]PPSV2 < or = to 30% [ ]significant weight loss  [ ]poor nutritional intake  [ ]anasarca      [ ]Artificial Nutrition      Palliative Care Spiritual/Emotional Screening Tool Question  Severity (0-4):                    OR                    [ x] Unable to determine. Will assess at later time if appropriate.  Score of 2 or greater indicates recommendation of Chaplaincy and/or SW referral  Chaplaincy Referral: [ ] Yes [ ] Refused [ ] Following     Caregiver [place]:  [ ] Yes [ ] No    OR    [x ] Unable to determine. Will assess at later time if appropriate.  Social Work Referral [ ]  Patient and Family Centered Care Referral [ ]    Anticipatory Grief Present: [ ] Yes [ ] No    OR     [ x] Unable to determine. Will assess at later time if appropriate.  Social Work Referral [ ]  Patient and Family Centered Care Referral [ ]    Patient discussed with primary medical team MD  Palliative care education provided to patient and/or family   CC: lethargy and confusion    HPI:  73-year-old female with PMHx of COPD on home 3L NC, chronic smoker >60ppy, HTN, recently discharged from [facility] 5/30. hospitalized for sob hypoxia and hemoptysis weight loss found to have copd exacerbation post obstructive PNA and possible malignancy (patient refused workup), S/P Diagnostic thoracentesis on 5/10, -ve cytology (no fluid analysis? only 10mL drained). Intubated 5/17 --> 5/20. Then became DNR DNI. Underwent bronchoscopy on 05/17 for bronchoscopy showing multiple blood clots, BAL showed rare yeast likely colonizers.   --> presented today from NH brought in by sons and daughter for concerns of lethargy, confusion, poor appetite for 3 days, and decreased hearing with fullness in her ears worse since discharge. She has been refusing to eat or walk for the past 3 days as well. She was refusing to use the bipap at [facility].  Now patient is AAOx3 and wants to eat. She is on BIPAP. Denying other complaint. no increased cough or sputum or change in color of sputum. no fever or hemoptysis.     ED vitals normal. placed on bipap for hypercapnia on vbg ( pco2 100, bicarb 52, ph 7.32). d-dimer 450  abg ordered    CXR: Complete opacification of the left hemithorax.  CT chest PE and abd pelv with IV: No evidence of PE. the right lung apex is excluded from the imaging series. Upper lobe predominant, centrilobular emphysematous   changes. Debris within the left main bronchus with complete atelectasis of the left upper and lower lobe. Cystic with solid component lesion within the right upper lobe measuring approximately 3.1 cm. Large left-sided pleural effusion, precluding visualization of known left lung mass.  Right lower lobe spiculated nodule measuring 1.8 x 1.9 cm.  HEPATOBILIARY: Scattered hepatic cysts and other subcentimeter hypodensities, incompletely characterized.  KIDNEYS: Left renal hypodensity measuring 3.2 cm. Calcified structure adjacent to the right kidney.  PELVIC ORGANS: Distended urinary bladder.  PERITONEUM/MESENTERY/BOWEL: Moderate colonic fecal stool burden. No bowel obstruction or intraperitoneal free air.    CTH: No evidence of acute intracranial pathology. Bilateral middle ear and mastoid opacification, correlate clinically.    admitted to SDU.    (02 Jun 2024 18:28)    PERTINENT PM/SXH:   Chronic obstructive pulmonary disease, unspecified COPD type    Hypertension    Lung mass    Chronic hypoxic respiratory failure      No significant past surgical history      FAMILY HISTORY:    None pertinent    ITEMS NOT CHECKED ARE NOT PRESENT    SOCIAL HISTORY:   Significant other/partner[ ]  Children[ ]  Cheondoism/Spirituality:  Substance hx:  [ ]   Tobacco hx:  [ ]   Alcohol hx: [ ]   Living Situation: [ x]Home  [ ]Long term care  [ ]Rehab [ ]Other  Home Services: [ ] HHA [ ] Visting RN [ ] Hospice  Occupation:  Home Opioid hx:  [ ] Y [ ] N [ x] I-Stop Reference No:    Reference #: [id number] - no meds     ADVANCE DIRECTIVES:     [ ] Full Code [ x] DNR  MOLST  [ ]  Living Will  [ ]   DECISION MAKER(s):  [x ] Health Care Proxy(s)  [ ] Surrogate(s)  [ ] Guardian           Name(s): Phone Number(s):  Cecil Koch      BASELINE (I)ADL(s) (prior to admission):    [place]: [ ]Total  [ ] Moderate [ ]Dependent  Palliative Performance Status Version 2:         %    http://npcrc.org/files/news/palliative_performance_scale_ppsv2.pdf    Allergies    tetracycline (Unknown)  penicillin (Unknown)    Intolerances    MEDICATIONS  (STANDING):  acetylcysteine 20%  Inhalation 4 milliLiter(s) Inhalation daily  amLODIPine   Tablet 5 milliGRAM(s) Oral daily  carbamide peroxide Otic Solution 5 Drop(s) Both Ears every 12 hours  chlorhexidine 2% Cloths 1 Application(s) Topical <User Schedule>  enoxaparin Injectable 40 milliGRAM(s) SubCutaneous every 24 hours  ferrous    sulfate 325 milliGRAM(s) Oral daily  fluticasone propionate 50 MICROgram(s)/spray Nasal Spray 1 Spray(s) Both Nostrils two times a day  methylPREDNISolone sodium succinate Injectable 40 milliGRAM(s) IV Push two times a day  polyethylene glycol 3350 17 Gram(s) Oral every 12 hours  saccharomyces boulardii 250 milliGRAM(s) Oral two times a day  senna 2 Tablet(s) Oral at bedtime  sodium chloride 3%  Inhalation 4 milliLiter(s) Inhalation every 12 hours  tiotropium 2.5 MICROgram(s) Inhaler 2 Puff(s) Inhalation daily    MEDICATIONS  (PRN):  albuterol    90 MICROgram(s) HFA Inhaler 2 Puff(s) Inhalation every 6 hours PRN for shortness of breath and/or wheezing  hydrOXYzine hydrochloride 25 milliGRAM(s) Oral every 12 hours PRN Anxiety  lactulose Syrup 10 Gram(s) Oral every 6 hours PRN constipation    PRESENT SYMPTOMS: [ ]Unable to obtain due to poor mentation   Source if other than patient:  [ ]Family   [ ]Team     Pain: [ ]yes [x ]no  ALL PAIN ASSESSMENT COMPONENTS WERE ASKED ABOUT UNLESS OTHERWISE NOTED  QOL impact -   Location -                    Aggravating factors -  Quality -  Radiation -  Timing-  Severity (0-10 scale):  Minimal acceptable level (0-10 scale):     CPOT:    https://www.[facility].org/getattachment/[email or web address]/Critical-Care-Pain-Observation-Tool-(CPOT)    PAIN AD Score:   http://geriatrictoolkit.[facility]/cog/painad.pdf (press ctrl +  left click to view)    Dyspnea:                           [ ]None[ ]Mild [x ]Moderate [ ]Severe     Respiratory Distress Observation Scale (RDOS):   A score of 0 to 2 signifies little or no respiratory distress, 3 signifies mild distress, scores 4 to 6 indicate moderate distress, and scores greater than 7 signify severe distress  https://www.[facility].ca/sites/default/files/PDFS/459128-rqiulcmtxlm-ydxoannl-edggighiwag-yxaac.pdf    Anxiety:                             [ x]None[ ]Mild [ ]Moderate [ ]Severe   Fatigue:                             [ x]None[ ]Mild [ ]Moderate [ ]Severe   Nausea:                             [ x]None[ ]Mild [ ]Moderate [ ]Severe   Loss of appetite:              [x ]None[ ]Mild [ ]Moderate [ ]Severe   Constipation:                    [x ]None[ ]Mild [ ]Moderate [ ]Severe    Other Symptoms:  [ x]All other review of systems negative     Palliative Performance Status Version 2:         30%    http://npcrc.org/files/news/palliative_performance_scale_ppsv2.pdf    PHYSICAL EXAM:  Vital Signs Last 24 Hrs  T(C): 36.8 (03 Jun 2024 08:10), Max: 37.1 (02 Jun 2024 12:38)  T(F): 98.3 (03 Jun 2024 08:10), Max: 98.8 (02 Jun 2024 12:38)  HR: 89 (03 Jun 2024 08:10) (89 - 99)  BP: 141/67 (03 Jun 2024 08:10) (126/62 - 162/75)  BP(mean): 95 (03 Jun 2024 08:10) (95 - 109)  RR: 20 (03 Jun 2024 08:10) (20 - 22)  SpO2: 97% (03 Jun 2024 08:10) (91% - 97%)    Parameters below as of 03 Jun 2024 08:10  Patient On (Oxygen Delivery Method): nasal cannula, high flow     I&O's Summary    02 Jun 2024 07:01  -  03 Jun 2024 07:00  --------------------------------------------------------  IN: 240 mL / OUT: 600 mL / NET: -360 mL        GENERAL:  [ ] No acute distress [ ]Lethargic  [ ]Unarousable  [x ]Verbal  [ ]Non-Verbal [ ]Cachexia    BEHAVIORAL/PSYCH:  [ x]Alert and Oriented x3  [ ] Anxiety [ ] Delirium [ ] Agitation [ ] Calm   EYES: [ ] No scleral icterus [ ] Scleral icterus [ ] Closed  ENMT:  [ ]Dry mouth  [ x]No external oral lesions [ ] No external ear or nose lesions  CARDIOVASCULAR:  [ ]Regular [ ]Irregular [ ]Tachy [ ]Not Tachy  [ ]Kalpesh [ ] Edema [ ] No edema  PULMONARY:  [x ]Tachypnea  [ ]Audible excessive secretions [ ] No labored breathing [ ] labored breathing  GASTROINTESTINAL: [ ]Soft  [ ]Distended  [ ]Not distended [ ]Non tender [ ]Tender  MUSCULOSKELETAL: [ ]No clubbing [ ] clubbing  [ ] No cyanosis [ ] cyanosis  NEUROLOGIC: [ ]No focal deficits  [ x]Follows commands  [ ]Does not follow commands  [ ]Cognitive impairment  [ ]Dysphagia  [ ]Dysarthria  [ ]Paresis   SKIN: [ ] Jaundiced [ x] Non-jaundiced [ ]Rash [ ]No Rash [ ] Warm [ ] Dry  MISC/LINES: [ ] ET tube [ ] Trach [ ]NGT/OGT [ ]PEG [ ]Paulino    LABS: reviewed by me                        8.6    11.97 )-----------( 454      ( 03 Jun 2024 04:42 )             29.1   06-03    142  |  95<L>  |  12  ----------------------------<  125<H>  4.9   |  46<HH>  |  <0.5<L>    Ca    9.1      03 Jun 2024 04:42  Phos  3.4     06-03  Mg     1.9     06-03    TPro  5.3<L>  /  Alb  3.1<L>  /  TBili  <0.2  /  DBili  x   /  AST  12  /  ALT  35  /  AlkPhos  94  06-03  PT/INR - ( 03 Jun 2024 04:42 )   PT: 11.30 sec;   INR: 0.99 ratio         PTT - ( 03 Jun 2024 04:42 )  PTT:28.5 sec    Urinalysis Basic - ( 03 Jun 2024 04:42 )    Color: x / Appearance: x / SG: x / pH: x  Gluc: 125 mg/dL / Ketone: x  / Bili: x / Urobili: x   Blood: x / Protein: x / Nitrite: x   Leuk Esterase: x / RBC: x / WBC x   Sq Epi: x / Non Sq Epi: x / Bacteria: x      RADIOLOGY & ADDITIONAL STUDIES: reviewed by me    < from: Xray Chest 1 View- PORTABLE-Routine (Xray Chest 1 View- PORTABLE-Routine in AM.) (06.03.24 @ 06:38) >  IMPRESSION:    Stable exam since one day earlier.    < end of copied text >      EKG: reviewed by me    < from: 12 Lead ECG (06.03.24 @ 07:49) >  Ventricular Rate 90 BPM    Atrial Rate 90 BPM    P-R Interval 154 ms    QRS Duration 84 ms    Q-T Interval 360 ms    QTC Calculation(Bazett) 440 ms    P Axis 86 degrees    R Axis 81 degrees    T Axis 83 degrees    Diagnosis Line Normal sinus rhythm  Anterolateral infarct , age undetermined  Abnormal ECG    < end of copied text >      PROTEIN CALORIE MALNUTRITION PRESENT: [ ]mild [ ]moderate [ ]severe [ ]underweight [ ]morbid obesity  https://www.andeal.org/vault/8763/web/files/ONC/Table_Clinical%20Characteristics%20to%20Document%20Malnutrition-White%20JV%20et%20al%969829.pdf    Height (cm): 154.9 (06-02-24 @ 12:38), 154.9 (05-16-24 @ 18:55)  Weight (kg): 54.4 (06-02-24 @ 12:38), 50.5 (05-16-24 @ 18:55)  BMI (kg/m2): 22.7 (06-02-24 @ 12:38), 21 (05-16-24 @ 18:55)  [ ]PPSV2 < or = to 30% [ ]significant weight loss  [ ]poor nutritional intake  [ ]anasarca      [ ]Artificial Nutrition      Palliative Care Spiritual/Emotional Screening Tool Question  Severity (0-4):                    OR                    [ x] Unable to determine. Will assess at later time if appropriate.  Score of 2 or greater indicates recommendation of Chaplaincy and/or SW referral  Chaplaincy Referral: [ ] Yes [ ] Refused [ ] Following     Caregiver [place]:  [ ] Yes [ ] No    OR    [x ] Unable to determine. Will assess at later time if appropriate.  Social Work Referral [ ]  Patient and Family Centered Care Referral [ ]    Anticipatory Grief Present: [ ] Yes [ ] No    OR     [ x] Unable to determine. Will assess at later time if appropriate.  Social Work Referral [ ]  Patient and Family Centered Care Referral [ ]    Patient discussed with primary medical team MD  Palliative care education provided to patient and/or family

## 2024-06-03 NOTE — PROGRESS NOTE ADULT - SUBJECTIVE AND OBJECTIVE BOX
24H events:    Patient is a 73y old Female who presents with a chief complaint of hypercapnia (03 Jun 2024 10:51)    Primary diagnosis of Acute respiratory failure with hypoxia and hypercapnia      Day 1:    Today is 1d of hospitalization. This morning patient was seen and examined at bedside, resting comfortably in bed.    No acute or major events overnight.  Family reports being interested in possible hospice.    PAST MEDICAL & SURGICAL HISTORY  Chronic obstructive pulmonary disease, unspecified COPD type  Hypertension  Lung mass  Chronic hypoxic respiratory failure    No significant past surgical history      SOCIAL HISTORY:  Social History:      ALLERGIES:  tetracycline (Unknown)  penicillin (Unknown)    MEDICATIONS:  STANDING MEDICATIONS  acetylcysteine 20%  Inhalation 4 milliLiter(s) Inhalation daily  amLODIPine   Tablet 5 milliGRAM(s) Oral daily  carbamide peroxide Otic Solution 5 Drop(s) Both Ears every 12 hours  chlorhexidine 2% Cloths 1 Application(s) Topical <User Schedule>  enoxaparin Injectable 40 milliGRAM(s) SubCutaneous every 24 hours  ferrous    sulfate 325 milliGRAM(s) Oral daily  fluticasone propionate 50 MICROgram(s)/spray Nasal Spray 1 Spray(s) Both Nostrils two times a day  methylPREDNISolone sodium succinate Injectable 40 milliGRAM(s) IV Push two times a day  polyethylene glycol 3350 17 Gram(s) Oral every 12 hours  saccharomyces boulardii 250 milliGRAM(s) Oral two times a day  senna 2 Tablet(s) Oral at bedtime  sodium chloride 3%  Inhalation 4 milliLiter(s) Inhalation every 12 hours  tiotropium 2.5 MICROgram(s) Inhaler 2 Puff(s) Inhalation daily    PRN MEDICATIONS  albuterol    90 MICROgram(s) HFA Inhaler 2 Puff(s) Inhalation every 6 hours PRN  hydrOXYzine hydrochloride 25 milliGRAM(s) Oral every 12 hours PRN  lactulose Syrup 10 Gram(s) Oral every 6 hours PRN    VITALS:   T(F): 96.7  HR: 92  BP: 138/61  RR: 20  SpO2: 93%    PHYSICAL EXAM:  GENERAL:   ( x) NAD, lying in bed comfortably     (  ) obtunded     (  ) lethargic     (  ) somnolent    HEAD:   ( x) Atraumatic     (  ) hematoma     (  ) laceration (specify location:       )     NECK:  (x) Supple     (  ) neck stiffness     (  ) nuchal rigidity     (  ) JVD present ( -- cm)    HEART:  Rate -->     (x) normal rate     (  ) bradycardic     (  ) tachycardic  Rhythm -->     (x) regular     (  ) regularly irregular     (  ) irregularly irregular  Murmurs -->     (x) normal s1s2     (  ) systolic murmur     (  ) diastolic murmur     (  ) continuous murmur      (  ) S3 present     (  ) S4 present    LUNGS:   ( x)Unlabored respirations     (  ) tachypnea  ( ) B/L air entry     ( x ) decreased breath sounds in:  (location L lung    )    ( x) no adventitious sound     (  ) crackles     (  ) wheezing      (  ) rhonchi      (specify location:       )  (  ) chest wall tenderness (specify location:       )    ABDOMEN:   ( x) Soft     (  ) tense   |   (  ) nondistended     (  ) distended   |   (  ) +BS     (  ) hypoactive bowel sounds     (  ) hyperactive bowel sounds  ( x) nontender     (  ) RUQ tenderness     (  ) RLQ tenderness     (  ) LLQ tenderness     (  ) epigastric tenderness     (  ) diffuse tenderness  (  ) Splenomegaly      (  ) Hepatomegaly      (  ) Jaundice     (  ) ecchymosis     EXTREMITIES:  ( x) Normal     (  ) Rash     (  ) ecchymosis     (  ) varicose veins      (  ) pitting edema     (  ) non-pitting edema   (  ) ulceration     (  ) gangrene:     (location:     )    NERVOUS SYSTEM:    ( ) A&Ox3     (  ) confused     (  ) lethargic  CN II-XII:     ( x) Intact     (  ) deficits found     (Specify:     )   Upper extremities:     (  ) no sensorimotor deficits     (  ) weakness     (  ) loss of proprioception/vibration     (  ) loss of touch/temperature (specify:    )  Lower extremities:     (  ) no sensorimotor deficits     (  ) weakness     (  ) loss of proprioception/vibration     (  ) loss of touch/temperature (specify:    )    SKIN:   (  ) No rashes or lesions     (  ) maculopapular rash     (  ) pustules     (  ) vesicles     (  ) ulcer     (  ) ecchymosis     (specify location:     )    LABS:                        8.6    11.97 )-----------( 454      ( 03 Jun 2024 04:42 )             29.1     06-03    142  |  95<L>  |  12  ----------------------------<  125<H>  4.9   |  46<HH>  |  <0.5<L>    Ca    9.1      03 Jun 2024 04:42  Phos  3.4     06-03  Mg     1.9     06-03    TPro  5.3<L>  /  Alb  3.1<L>  /  TBili  <0.2  /  DBili  x   /  AST  12  /  ALT  35  /  AlkPhos  94  06-03    PT/INR - ( 03 Jun 2024 04:42 )   PT: 11.30 sec;   INR: 0.99 ratio         PTT - ( 03 Jun 2024 04:42 )  PTT:28.5 sec  Urinalysis Basic - ( 03 Jun 2024 04:42 )    Color: x / Appearance: x / SG: x / pH: x  Gluc: 125 mg/dL / Ketone: x  / Bili: x / Urobili: x   Blood: x / Protein: x / Nitrite: x   Leuk Esterase: x / RBC: x / WBC x   Sq Epi: x / Non Sq Epi: x / Bacteria: x      ABG - ( 02 Jun 2024 20:04 )  pH, Arterial: 7.37  pH, Blood: x     /  pCO2: 91    /  pO2: 70    / HCO3: 53    / Base Excess: 23.8  /  SaO2: 95.3                      RADIOLOGY:    ASSESSMENT AND PLAN  RITESH THOMAS is a 73y-year-old Female with a history significant for COPD on home 3L NC, chronic smoker >60ppy, HTN, being admitted for lethargy, confusion, and poor appetite. recently discharged from [facility] 5/30. hospitalized for sob hypoxia and hemoptysis weight loss found to have copd exacerbation post obstructive PNA and possible malignancy (patient refused workup), S/P Diagnostic thoracentesis on 5/10, -ve cytology (no fluid analysis? only 10mL drained). Intubated 5/17 --> 5/20. Then became DNR DNI. Underwent bronchoscopy on 05/17 for bronchoscopy showing multiple blood clots, BAL showed rare yeast likely colonizers.     Now patient is AAOx3 and wants to eat. She is on BIPAP as needed. Denying other complaint. no increased cough or sputum or change in color of sputum. no fever or hemoptysis. Family is considering hospice.    #Hypercapnia 2/2 severe copd and refusing bipap at NH  #hx of copd discharged recently on home 3L NC   #Recent admission with the above details intubated for 4 days  #Possible lung malignancy   #chronic smoker  ED vitals normal. placed on bipap for hypercapnia on vbg ( pco2 100, bicarb 52, ph 7.32). d-dimer 450  CXR: Complete opacification of the left hemithorax.  CT chest PE and abd pelv with IV: No evidence of PE. the right lung apex is excluded from the imaging series. Upper lobe predominant, centrilobular emphysematous   changes. Debris within the left main bronchus with complete atelectasis of the left upper and lower lobe. Cystic with solid component lesion within the right upper lobe measuring approximately 3.1 cm. Large left-sided pleural effusion, precluding visualization of known left lung mass. Right lower lobe spiculated nodule measuring 1.8 x 1.9 cm.  -->  possible TAP or Bronch per pulm if within pt's GOC  cw home inhalers   - per pulm, start solumedrol 40 q12  - f/u procal  - f/u palliative for possible cmo/hospice    #Bilateral ear pain  CTH: No evidence of acute intracranial pathology. Bilateral middle ear and mastoid opacification, correlate clinically.  --> ENT eval  debrox otic solution bid     #Chronic constipation  mod stool burden on CT  cw home senna, miralax bid, and lactulose prn  monitor BMs     #HTN: controlled. cw home amlodipine 5mg qd    #MISC  - DVT PPx: lovenox  - GI PPx: low risk  - Diet: regular with ensure and magic cup  - Activity: iat  - Labs: ordered  - Code: dnr dni. confirmed 6/2/2024   pt does not want to know explicit details on suspected malignancy  - Dispo: sdu      -------------------------------------------------------------------------------------------------------------------------------------  #Handoff  - f/u palliative and family GOC; possible bronch if within GOC, f/u procal

## 2024-06-03 NOTE — CONSULT NOTE ADULT - PROBLEM SELECTOR RECOMMENDATION 9
Consider starting Flonase BID x 5 days if there is no medical contraindication  Will d/w Attending
Hypercapnia in setting of severe COPD and refusing BIPAP. Concern for lung malignancy, refused workup  -DNR/DNI  -continue steroids  -hospice consult  -BIPAP PRN as patient allows

## 2024-06-03 NOTE — CONSULT NOTE ADULT - ASSESSMENT
IMPRESSION:    Acute on chronic hypoxemixc/ hypercapnic resp failure  COPD exacerbation   Left lung atelectasis/ effusion  Lung mass possibly malignant    Superimposed pneumonia  Left sided effusion s/p thoracentesis negative for malignant cells    PLAN:    CNS: Avoid CNS depressant.      HEENT: oral care.     PULMONARY:   Keep pox 88 to 92%M, Albuterol Q6 hrs and PRN  solumedrol 40 q 12  Chest pt   frequent suctioning --> blood clots removed  follow BAL, (-) , will need reintubation for bronch  chest PT, IPv      CARDIOVASCULAR: TTE noted with 70-75%. Keep is = os  . BNP noted    GI: GI prophylaxis. Bowel regimen     RENAL: follow lytes.  Correct as needed     INFECTIOUS DISEASE: fup cx    HEMATOLOGICAL:  DVT prophylaxis.     ENDOCRINE:  Follow up FS.  Insulin protocol if needed.    MSK: offloading    CODE: DNR/DNI TRIAL NIV with palliative follow-up  SDU    very poor prognosis
Right Middle ear effusion with nasal congestion- likely from prolonged intubation/BIPAP  CTscan reveals bilateral middle ear and mastoid opacification
73yFemale with history of COPD on home 3LNC, HTN, concern for malignancy (refused workup) presents after recent hospitalization for difficulty breathing. Patient with hypercapnia on arrival after refusing BIPAP at NH. Palliative care consulted for [facility].    Spoke with patient at bedside. Palliative care introduced. She noted that she didn't want to hear anything about her clinical course or health, and she didn't wish to make decisions. She asked that I call her son Grayson. Called and spoke with Grayson on the phone. Palliative care reintroduced.  He was able to provide a medical history and hospital course. He discussed the patient's recent hospitalization and her refusal of treatment/wish to not know about her treatment. We discussed hospice at length.  He notes he is interested in hospice on discharge. All questions answered. Hospice consult to be placed.       MEDD (morphine equivalent daily dose):    Education about palliative care provided to patient/family.  See Recs below.    Please call [phone] with questions or concerns 24/7.   We will continue to follow.

## 2024-06-03 NOTE — CONSULT NOTE ADULT - SUBJECTIVE AND OBJECTIVE BOX
Patient is a 73y old  Female who presents with a chief complaint of AMS (02 Jun 2024 20:03)      HPI:  73-year-old female with PMHx of COPD on home 3L NC, chronic smoker >60ppy, HTN, recently discharged from [facility] 5/30. hospitalized for sob hypoxia and hemoptysis weight loss found to have copd exacerbation post obstructive PNA and possible malignancy (patient refused workup), S/P Diagnostic thoracentesis on 5/10, -ve cytology (no fluid analysis? only 10mL drained). Intubated 5/17 --> 5/20. Then became DNR DNI. Underwent bronchoscopy on 05/17 for bronchoscopy showing multiple blood clots, BAL showed rare yeast likely colonizers.   --> presented today from NH brought in by sons and daughter for concerns of lethargy, confusion, poor appetite for 3 days, and decreased hearing with fullness in her ears worse since discharge. She has been refusing to eat or walk for the past 3 days as well. She was refusing to use the bipap at [facility].  Now patient is AAOx3 and wants to eat. She is on BIPAP. Denying other complaint. no increased cough or sputum or change in color of sputum. no fever or hemoptysis.     ED vitals normal. placed on bipap for hypercapnia on vbg ( pco2 100, bicarb 52, ph 7.32). d-dimer 450  abg ordered    CXR: Complete opacification of the left hemithorax.  CT chest PE and abd pelv with IV: No evidence of PE. the right lung apex is excluded from the imaging series. Upper lobe predominant, centrilobular emphysematous   changes. Debris within the left main bronchus with complete atelectasis of the left upper and lower lobe. Cystic with solid component lesion within the right upper lobe measuring approximately 3.1 cm. Large left-sided pleural effusion, precluding visualization of known left lung mass.  Right lower lobe spiculated nodule measuring 1.8 x 1.9 cm.  HEPATOBILIARY: Scattered hepatic cysts and other subcentimeter hypodensities, incompletely characterized.  KIDNEYS: Left renal hypodensity measuring 3.2 cm. Calcified structure adjacent to the right kidney.  PELVIC ORGANS: Distended urinary bladder.  PERITONEUM/MESENTERY/BOWEL: Moderate colonic fecal stool burden. No bowel obstruction or intraperitoneal free air.    CTH: No evidence of acute intracranial pathology. Bilateral middle ear and mastoid opacification, correlate clinically.    admitted to SDU. Called to evaluate, this am on NC      PAST MEDICAL & SURGICAL HISTORY:  Chronic obstructive pulmonary disease, unspecified COPD type      Hypertension      Lung mass      Chronic hypoxic respiratory failure      No significant past surgical history          SOCIAL HX:   Smoking +        REVIEW OF SYSTEMS: SEE HPI	    Allergies    tetracycline (Unknown)  penicillin (Unknown)    Intolerances        albuterol    90 MICROgram(s) HFA Inhaler 2 Puff(s) Inhalation every 6 hours PRN  amLODIPine   Tablet 5 milliGRAM(s) Oral daily  carbamide peroxide Otic Solution 5 Drop(s) Both Ears every 12 hours  enoxaparin Injectable 40 milliGRAM(s) SubCutaneous every 24 hours  ferrous    sulfate 325 milliGRAM(s) Oral daily  fluticasone propionate 50 MICROgram(s)/spray Nasal Spray 1 Spray(s) Both Nostrils two times a day  hydrOXYzine hydrochloride 25 milliGRAM(s) Oral every 12 hours PRN  lactulose Syrup 10 Gram(s) Oral every 6 hours PRN  polyethylene glycol 3350 17 Gram(s) Oral every 12 hours  saccharomyces boulardii 250 milliGRAM(s) Oral two times a day  senna 2 Tablet(s) Oral at bedtime  tiotropium 2.5 MICROgram(s) Inhaler 2 Puff(s) Inhalation daily  : Home Meds:      PHYSICAL EXAM    ICU Vital Signs Last 24 Hrs  T(C): 36.5 (03 Jun 2024 00:00), Max: 37.1 (02 Jun 2024 12:38)  T(F): 97.7 (03 Jun 2024 00:00), Max: 98.8 (02 Jun 2024 12:38)  HR: 99 (03 Jun 2024 00:40) (90 - 99)  BP: 162/75 (03 Jun 2024 00:00) (126/62 - 162/75)  BP(mean): 109 (02 Jun 2024 21:00) (109 - 109)  RR: 22 (03 Jun 2024 00:40) (20 - 22)  SpO2: 95% (03 Jun 2024 00:40) (91% - 97%)    O2 Parameters below as of 03 Jun 2024 00:40  Patient On (Oxygen Delivery Method): nasal cannula, high flow  O2 Flow (L/min): 50  O2 Concentration (%): 40        General: ILL looking  Lungs: dec bs l side  Cardiovascular: Regular  Abdomen: Soft, Positive BS  Extremities: No clubbing  Skin: Warm  Neurological: Non focal       06-02-24 @ 07:01  -  06-03-24 @ 06:32  --------------------------------------------------------  IN:    Oral Fluid: 240 mL  Total IN: 240 mL    OUT:    Voided (mL): 600 mL  Total OUT: 600 mL    Total NET: -360 mL          LABS:                          8.6    11.97 )-----------( 454      ( 03 Jun 2024 04:42 )             29.1                                               06-03    142  |  95<L>  |  12  ----------------------------<  125<H>  4.9   |  46<HH>  |  <0.5<L>    Ca    9.1      03 Jun 2024 04:42  Phos  3.4     06-03  Mg     1.9     06-03    TPro  5.3<L>  /  Alb  3.1<L>  /  TBili  <0.2  /  DBili  x   /  AST  12  /  ALT  35  /  AlkPhos  94  06-03      PT/INR - ( 03 Jun 2024 04:42 )   PT: 11.30 sec;   INR: 0.99 ratio         PTT - ( 03 Jun 2024 04:42 )  PTT:28.5 sec                                       Urinalysis Basic - ( 03 Jun 2024 04:42 )    Color: x / Appearance: x / SG: x / pH: x  Gluc: 125 mg/dL / Ketone: x  / Bili: x / Urobili: x   Blood: x / Protein: x / Nitrite: x   Leuk Esterase: x / RBC: x / WBC x   Sq Epi: x / Non Sq Epi: x / Bacteria: x                                                  LIVER FUNCTIONS - ( 03 Jun 2024 04:42 )  Alb: 3.1 g/dL / Pro: 5.3 g/dL / ALK PHOS: 94 U/L / ALT: 35 U/L / AST: 12 U/L / GGT: x                                                                                                                                   ABG - ( 02 Jun 2024 20:04 )  pH, Arterial: 7.37  pH, Blood: x     /  pCO2: 91    /  pO2: 70    / HCO3: 53    / Base Excess: 23.8  /  SaO2: 95.3                  MEDICATIONS  (STANDING):  amLODIPine   Tablet 5 milliGRAM(s) Oral daily  carbamide peroxide Otic Solution 5 Drop(s) Both Ears every 12 hours  enoxaparin Injectable 40 milliGRAM(s) SubCutaneous every 24 hours  ferrous    sulfate 325 milliGRAM(s) Oral daily  fluticasone propionate 50 MICROgram(s)/spray Nasal Spray 1 Spray(s) Both Nostrils two times a day  polyethylene glycol 3350 17 Gram(s) Oral every 12 hours  saccharomyces boulardii 250 milliGRAM(s) Oral two times a day  senna 2 Tablet(s) Oral at bedtime  tiotropium 2.5 MICROgram(s) Inhaler 2 Puff(s) Inhalation daily    MEDICATIONS  (PRN):  albuterol    90 MICROgram(s) HFA Inhaler 2 Puff(s) Inhalation every 6 hours PRN for shortness of breath and/or wheezing  hydrOXYzine hydrochloride 25 milliGRAM(s) Oral every 12 hours PRN Anxiety  lactulose Syrup 10 Gram(s) Oral every 6 hours PRN constipation

## 2024-06-04 NOTE — DIETITIAN INITIAL EVALUATION ADULT - PERTINENT LABORATORY DATA
06-04    144  |  94<L>  |  16  ----------------------------<  108<H>  5.1<H>   |  46<HH>  |  <0.5<L>    Ca    9.2      04 Jun 2024 05:39  Phos  3.4     06-03  Mg     1.9     06-04    TPro  5.2<L>  /  Alb  3.2<L>  /  TBili  <0.2  /  DBili  x   /  AST  7   /  ALT  29  /  AlkPhos  93  06-04

## 2024-06-04 NOTE — DIETITIAN NUTRITION RISK NOTIFICATION - TREATMENT: THE FOLLOWING DIET HAS BEEN RECOMMENDED
Diet, Easy to Chew:      Qty per Day:  Magic Cup 2x/day  Supplement Feeding Modality:  Oral  Ensure Plus High Protein Cans or Servings Per Day:  2       Frequency:  Two Times a day (06-04-24 @ 22:17) [Pending Verification By Attending]  Diet, Regular:   DASH/TLC {Sodium & Cholesterol Restricted} (DASH)  Supplement Feeding Modality:  Oral  Ensure Plus High Protein Cans or Servings Per Day:  2       Frequency:  Daily (06-02-24 @ 18:55) [Active]

## 2024-06-04 NOTE — DIETITIAN INITIAL EVALUATION ADULT - REASON INDICATOR FOR ASSESSMENT
c/s MST score of 2 or > & malnutrition c/s MST score of 2 or > & malnutrition - assessment completed by Dietitian Jose Ramon Estevez.

## 2024-06-04 NOTE — DIETITIAN INITIAL EVALUATION ADULT - ORAL INTAKE PTA/DIET HISTORY
Pt w/ persistent poor appetite & po intake over past few months in setting of worsening SOB, with <75% consumption of estimated energy needs >3 months reported. Followed a regular diet with no therapeutic diet restrictions reported. NKFA. No supplements reported. UBW unknown however suspects unintentional wt loss over past few months. No food preferences reported. No dietary restrictions related to culture/Tenriism per report.

## 2024-06-04 NOTE — DIETITIAN INITIAL EVALUATION ADULT - OTHER INFO
Pertinent Medical Information: Admitted for lethargy, confusion & poor appetite. Pt recently discharged from [facility] 5/30; hospitalized for SOB hypoxia and hemoptysis weight loss found to have copd exacerbation post obstructive PNA and possible malignancy (patient refused workup), S/P Diagnostic thoracentesis on 5/10. Intubated 5/17 --> 5/20. Now patient is AAOx3 and wants to eat. She is on BIPAP as needed. Hypercapnia 2/2 severe copd and refusing bipap at NH. Family is considering hospice. Chronic constipation; mod stool burden on CT, on home senna, miralax bid, and lactulose prn.    PMH includes COPD on home 3L NC, chronic smoker >60ppy, HTN.

## 2024-06-04 NOTE — PROGRESS NOTE ADULT - SUBJECTIVE AND OBJECTIVE BOX
24H events:    Patient is a 73y old Female who presents with a chief complaint of hypercapnia (03 Jun 2024 10:51)    Primary diagnosis of Acute respiratory failure with hypoxia and hypercapnia        Today is 2d of hospitalization. This morning patient was seen and examined at bedside, resting comfortably in bed.    No acute or major events overnight.  patient accepted for hospice    PAST MEDICAL & SURGICAL HISTORY  Chronic obstructive pulmonary disease, unspecified COPD type  Hypertension  Lung mass  Chronic hypoxic respiratory failure    No significant past surgical history      SOCIAL HISTORY:  Social History:      ALLERGIES:  tetracycline (Unknown)  penicillin (Unknown)    MEDICATIONS:  STANDING MEDICATIONS  acetylcysteine 20%  Inhalation 4 milliLiter(s) Inhalation daily  amLODIPine   Tablet 5 milliGRAM(s) Oral daily  carbamide peroxide Otic Solution 5 Drop(s) Both Ears every 12 hours  chlorhexidine 2% Cloths 1 Application(s) Topical <User Schedule>  enoxaparin Injectable 40 milliGRAM(s) SubCutaneous every 24 hours  ferrous    sulfate 325 milliGRAM(s) Oral daily  fluticasone propionate 50 MICROgram(s)/spray Nasal Spray 1 Spray(s) Both Nostrils two times a day  methylPREDNISolone sodium succinate Injectable 40 milliGRAM(s) IV Push two times a day  polyethylene glycol 3350 17 Gram(s) Oral every 12 hours  saccharomyces boulardii 250 milliGRAM(s) Oral two times a day  senna 2 Tablet(s) Oral at bedtime  sodium chloride 3%  Inhalation 4 milliLiter(s) Inhalation every 12 hours  tiotropium 2.5 MICROgram(s) Inhaler 2 Puff(s) Inhalation daily    PRN MEDICATIONS  albuterol    90 MICROgram(s) HFA Inhaler 2 Puff(s) Inhalation every 6 hours PRN  hydrOXYzine hydrochloride 25 milliGRAM(s) Oral every 12 hours PRN  lactulose Syrup 10 Gram(s) Oral every 6 hours PRN    VITALS:   T(F): 96.7  HR: 92  BP: 138/61  RR: 20  SpO2: 93%    PHYSICAL EXAM:  GENERAL:   ( x) NAD, lying in bed comfortably     (  ) obtunded     (  ) lethargic     (  ) somnolent    HEAD:   ( x) Atraumatic     (  ) hematoma     (  ) laceration (specify location:       )     NECK:  (x) Supple     (  ) neck stiffness     (  ) nuchal rigidity     (  ) JVD present ( -- cm)    HEART:  Rate -->     (x) normal rate     (  ) bradycardic     (  ) tachycardic  Rhythm -->     (x) regular     (  ) regularly irregular     (  ) irregularly irregular  Murmurs -->     (x) normal s1s2     (  ) systolic murmur     (  ) diastolic murmur     (  ) continuous murmur      (  ) S3 present     (  ) S4 present    LUNGS:   ( x)Unlabored respirations     (  ) tachypnea  ( ) B/L air entry     ( x ) decreased breath sounds in:  (location L lung    )    ( x) no adventitious sound     (  ) crackles     (  ) wheezing      (  ) rhonchi      (specify location:       )  (  ) chest wall tenderness (specify location:       )    ABDOMEN:   ( x) Soft     (  ) tense   |   (  ) nondistended     (  ) distended   |   (  ) +BS     (  ) hypoactive bowel sounds     (  ) hyperactive bowel sounds  ( x) nontender     (  ) RUQ tenderness     (  ) RLQ tenderness     (  ) LLQ tenderness     (  ) epigastric tenderness     (  ) diffuse tenderness  (  ) Splenomegaly      (  ) Hepatomegaly      (  ) Jaundice     (  ) ecchymosis     EXTREMITIES:  ( x) Normal     (  ) Rash     (  ) ecchymosis     (  ) varicose veins      (  ) pitting edema     (  ) non-pitting edema   (  ) ulceration     (  ) gangrene:     (location:     )    NERVOUS SYSTEM:    ( ) A&Ox3     (  ) confused     (  ) lethargic  CN II-XII:     ( x) Intact     (  ) deficits found     (Specify:     )   Upper extremities:     (  ) no sensorimotor deficits     (  ) weakness     (  ) loss of proprioception/vibration     (  ) loss of touch/temperature (specify:    )  Lower extremities:     (  ) no sensorimotor deficits     (  ) weakness     (  ) loss of proprioception/vibration     (  ) loss of touch/temperature (specify:    )    SKIN:   (  ) No rashes or lesions     (  ) maculopapular rash     (  ) pustules     (  ) vesicles     (  ) ulcer     (  ) ecchymosis     (specify location:     )    LABS:                        8.6    11.97 )-----------( 454      ( 03 Jun 2024 04:42 )             29.1     06-03    142  |  95<L>  |  12  ----------------------------<  125<H>  4.9   |  46<HH>  |  <0.5<L>    Ca    9.1      03 Jun 2024 04:42  Phos  3.4     06-03  Mg     1.9     06-03    TPro  5.3<L>  /  Alb  3.1<L>  /  TBili  <0.2  /  DBili  x   /  AST  12  /  ALT  35  /  AlkPhos  94  06-03    PT/INR - ( 03 Jun 2024 04:42 )   PT: 11.30 sec;   INR: 0.99 ratio         PTT - ( 03 Jun 2024 04:42 )  PTT:28.5 sec  Urinalysis Basic - ( 03 Jun 2024 04:42 )    Color: x / Appearance: x / SG: x / pH: x  Gluc: 125 mg/dL / Ketone: x  / Bili: x / Urobili: x   Blood: x / Protein: x / Nitrite: x   Leuk Esterase: x / RBC: x / WBC x   Sq Epi: x / Non Sq Epi: x / Bacteria: x      ABG - ( 02 Jun 2024 20:04 )  pH, Arterial: 7.37  pH, Blood: x     /  pCO2: 91    /  pO2: 70    / HCO3: 53    / Base Excess: 23.8  /  SaO2: 95.3                      RADIOLOGY:    ASSESSMENT AND PLAN  RITESH THOMAS is a 73y-year-old Female with a history significant for COPD on home 3L NC, chronic smoker >60ppy, HTN, being admitted for lethargy, confusion, and poor appetite. recently discharged from [facility] 5/30. hospitalized for sob hypoxia and hemoptysis weight loss found to have copd exacerbation post obstructive PNA and possible malignancy (patient refused workup), S/P Diagnostic thoracentesis on 5/10, -ve cytology (no fluid analysis? only 10mL drained). Intubated 5/17 --> 5/20. Then became DNR DNI. Underwent bronchoscopy on 05/17 for bronchoscopy showing multiple blood clots, BAL showed rare yeast likely colonizers.     Now patient is AAOx3 and wants to eat. She is on BIPAP as needed. Denying other complaint. no increased cough or sputum or change in color of sputum. no fever or hemoptysis. Family is considering hospice.    #Hypercapnia 2/2 severe copd and refusing bipap at NH  #hx of copd discharged recently on home 3L NC   #Recent admission with the above details intubated for 4 days  #Possible lung malignancy   #chronic smoker  ED vitals normal. placed on bipap for hypercapnia on vbg ( pco2 100, bicarb 52, ph 7.32). d-dimer 450  CXR: Complete opacification of the left hemithorax.  CT chest PE and abd pelv with IV: No evidence of PE. the right lung apex is excluded from the imaging series. Upper lobe predominant, centrilobular emphysematous   changes. Debris within the left main bronchus with complete atelectasis of the left upper and lower lobe. Cystic with solid component lesion within the right upper lobe measuring approximately 3.1 cm. Large left-sided pleural effusion, precluding visualization of known left lung mass. Right lower lobe spiculated nodule measuring 1.8 x 1.9 cm.  -->  possible TAP or Bronch per pulm if within pt's GOC  cw home inhalers   - c/w solumedrol 40 q12  - f/u procal  - f/u palliative/hospice    #Bilateral ear pain  CTH: No evidence of acute intracranial pathology. Bilateral middle ear and mastoid opacification, correlate clinically.  --> ENT eval  debrox otic solution bid     #Chronic constipation  mod stool burden on CT  cw home senna, miralax bid, and lactulose prn  monitor BMs     #HTN: controlled. cw home amlodipine 5mg qd    #MISC  - DVT PPx: lovenox  - GI PPx: low risk  - Diet: regular with ensure and magic cup  - Activity: iat  - Labs: ordered  - Code: dnr dni. confirmed 6/2/2024   pt does not want to know explicit details on suspected malignancy  - Dispo: hospice when oxygen recs improve      -------------------------------------------------------------------------------------------------------------------------------------  #Handoff  - hospice when off hfnc 24H events:    Patient is a 73y old Female who presents with a chief complaint of hypercapnia (03 Jun 2024 10:51)    Primary diagnosis of Acute respiratory failure with hypoxia and hypercapnia        Today is 2d of hospitalization. This morning patient was seen and examined at bedside, resting comfortably in bed.    No acute or major events overnight.  patient accepted for hospice    PAST MEDICAL & SURGICAL HISTORY  Chronic obstructive pulmonary disease, unspecified COPD type  Hypertension  Lung mass  Chronic hypoxic respiratory failure    No significant past surgical history      SOCIAL HISTORY:  Social History:      ALLERGIES:  tetracycline (Unknown)  penicillin (Unknown)    MEDICATIONS:  STANDING MEDICATIONS  acetylcysteine 20%  Inhalation 4 milliLiter(s) Inhalation daily  amLODIPine   Tablet 5 milliGRAM(s) Oral daily  carbamide peroxide Otic Solution 5 Drop(s) Both Ears every 12 hours  chlorhexidine 2% Cloths 1 Application(s) Topical <User Schedule>  enoxaparin Injectable 40 milliGRAM(s) SubCutaneous every 24 hours  ferrous    sulfate 325 milliGRAM(s) Oral daily  fluticasone propionate 50 MICROgram(s)/spray Nasal Spray 1 Spray(s) Both Nostrils two times a day  methylPREDNISolone sodium succinate Injectable 40 milliGRAM(s) IV Push two times a day  polyethylene glycol 3350 17 Gram(s) Oral every 12 hours  saccharomyces boulardii 250 milliGRAM(s) Oral two times a day  senna 2 Tablet(s) Oral at bedtime  sodium chloride 3%  Inhalation 4 milliLiter(s) Inhalation every 12 hours  tiotropium 2.5 MICROgram(s) Inhaler 2 Puff(s) Inhalation daily    PRN MEDICATIONS  albuterol    90 MICROgram(s) HFA Inhaler 2 Puff(s) Inhalation every 6 hours PRN  hydrOXYzine hydrochloride 25 milliGRAM(s) Oral every 12 hours PRN  lactulose Syrup 10 Gram(s) Oral every 6 hours PRN    VITALS:   T(F): 96.7  HR: 92  BP: 138/61  RR: 20  SpO2: 93%    PHYSICAL EXAM:  GENERAL:   ( x) NAD, lying in bed comfortably     (  ) obtunded     (  ) lethargic     (  ) somnolent    HEAD:   ( x) Atraumatic     (  ) hematoma     (  ) laceration (specify location:       )     NECK:  (x) Supple     (  ) neck stiffness     (  ) nuchal rigidity     (  ) JVD present ( -- cm)    HEART:  Rate -->     (x) normal rate     (  ) bradycardic     (  ) tachycardic  Rhythm -->     (x) regular     (  ) regularly irregular     (  ) irregularly irregular  Murmurs -->     (x) normal s1s2     (  ) systolic murmur     (  ) diastolic murmur     (  ) continuous murmur      (  ) S3 present     (  ) S4 present    LUNGS:   ( x)Unlabored respirations     (  ) tachypnea  ( ) B/L air entry     ( x ) decreased breath sounds in:  (location L lung    )    ( x) no adventitious sound     (  ) crackles     (  ) wheezing      (  ) rhonchi      (specify location:       )  (  ) chest wall tenderness (specify location:       )    ABDOMEN:   ( x) Soft     (  ) tense   |   (  ) nondistended     (  ) distended   |   (  ) +BS     (  ) hypoactive bowel sounds     (  ) hyperactive bowel sounds  ( x) nontender     (  ) RUQ tenderness     (  ) RLQ tenderness     (  ) LLQ tenderness     (  ) epigastric tenderness     (  ) diffuse tenderness  (  ) Splenomegaly      (  ) Hepatomegaly      (  ) Jaundice     (  ) ecchymosis     EXTREMITIES:  ( x) Normal     (  ) Rash     (  ) ecchymosis     (  ) varicose veins      (  ) pitting edema     (  ) non-pitting edema   (  ) ulceration     (  ) gangrene:     (location:     )    NERVOUS SYSTEM:    ( ) A&Ox3     (  ) confused     (  ) lethargic  CN II-XII:     ( x) Intact     (  ) deficits found     (Specify:     )   Upper extremities:     (  ) no sensorimotor deficits     (  ) weakness     (  ) loss of proprioception/vibration     (  ) loss of touch/temperature (specify:    )  Lower extremities:     (  ) no sensorimotor deficits     (  ) weakness     (  ) loss of proprioception/vibration     (  ) loss of touch/temperature (specify:    )    SKIN:   (  ) No rashes or lesions     (  ) maculopapular rash     (  ) pustules     (  ) vesicles     (  ) ulcer     (  ) ecchymosis     (specify location:     )    LABS:                        8.6    11.97 )-----------( 454      ( 03 Jun 2024 04:42 )             29.1     06-03    142  |  95<L>  |  12  ----------------------------<  125<H>  4.9   |  46<HH>  |  <0.5<L>    Ca    9.1      03 Jun 2024 04:42  Phos  3.4     06-03  Mg     1.9     06-03    TPro  5.3<L>  /  Alb  3.1<L>  /  TBili  <0.2  /  DBili  x   /  AST  12  /  ALT  35  /  AlkPhos  94  06-03    PT/INR - ( 03 Jun 2024 04:42 )   PT: 11.30 sec;   INR: 0.99 ratio         PTT - ( 03 Jun 2024 04:42 )  PTT:28.5 sec  Urinalysis Basic - ( 03 Jun 2024 04:42 )    Color: x / Appearance: x / SG: x / pH: x  Gluc: 125 mg/dL / Ketone: x  / Bili: x / Urobili: x   Blood: x / Protein: x / Nitrite: x   Leuk Esterase: x / RBC: x / WBC x   Sq Epi: x / Non Sq Epi: x / Bacteria: x      ABG - ( 02 Jun 2024 20:04 )  pH, Arterial: 7.37  pH, Blood: x     /  pCO2: 91    /  pO2: 70    / HCO3: 53    / Base Excess: 23.8  /  SaO2: 95.3                      RADIOLOGY:    ASSESSMENT AND PLAN  RITESH THOMAS is a 73y-year-old Female with a history significant for COPD on home 3L NC, chronic smoker >60ppy, HTN, being admitted for lethargy, confusion, and poor appetite. recently discharged from [facility] 5/30. hospitalized for sob hypoxia and hemoptysis weight loss found to have copd exacerbation post obstructive PNA and possible malignancy (patient refused workup), S/P Diagnostic thoracentesis on 5/10, -ve cytology (no fluid analysis? only 10mL drained). Intubated 5/17 --> 5/20. Then became DNR DNI. Underwent bronchoscopy on 05/17 for bronchoscopy showing multiple blood clots, BAL showed rare yeast likely colonizers.     Now patient is AAOx3 and wants to eat. She is on BIPAP as needed. Denying other complaint. no increased cough or sputum or change in color of sputum. no fever or hemoptysis. Family is considering hospice.    #Hypercapnia 2/2 severe copd and refusing bipap at NH  #hx of copd discharged recently on home 3L NC   #Recent admission with the above details intubated for 4 days  #Possible lung malignancy   #chronic smoker  ED vitals normal. placed on bipap for hypercapnia on vbg ( pco2 100, bicarb 52, ph 7.32). d-dimer 450  CXR: Complete opacification of the left hemithorax.  CT chest PE and abd pelv with IV: No evidence of PE. the right lung apex is excluded from the imaging series. Upper lobe predominant, centrilobular emphysematous   changes. Debris within the left main bronchus with complete atelectasis of the left upper and lower lobe. Cystic with solid component lesion within the right upper lobe measuring approximately 3.1 cm. Large left-sided pleural effusion, precluding visualization of known left lung mass. Right lower lobe spiculated nodule measuring 1.8 x 1.9 cm.  -->  possible TAP or Bronch per pulm if within pt's GOC  cw home inhalers   - c/w solumedrol 40 q12  - f/u procal  - f/u palliative/hospice    #acute LLE DVT  - LE doppler showed dvt  - start therapeutic lovenox    #Bilateral ear pain  CTH: No evidence of acute intracranial pathology. Bilateral middle ear and mastoid opacification, correlate clinically.  --> ENT eval  debrox otic solution bid     #Chronic constipation  mod stool burden on CT  cw home senna, miralax bid, and lactulose prn  monitor BMs     #HTN: controlled. cw home amlodipine 5mg qd    #MISC  - DVT PPx: lovenox  - GI PPx: low risk  - Diet: regular with ensure and magic cup  - Activity: iat  - Labs: ordered  - Code: dnr dni. confirmed 6/2/2024   pt does not want to know explicit details on suspected malignancy  - Dispo: hospice when oxygen recs improve      -------------------------------------------------------------------------------------------------------------------------------------  #Handoff  - hospice when off hfnc

## 2024-06-04 NOTE — PROGRESS NOTE ADULT - SUBJECTIVE AND OBJECTIVE BOX
HPI:  73-year-old female with PMHx of COPD on home 3L NC, chronic smoker >60ppy, HTN, recently discharged from [facility] 5/30. hospitalized for sob hypoxia and hemoptysis weight loss found to have copd exacerbation post obstructive PNA and possible malignancy (patient refused workup), S/P Diagnostic thoracentesis on 5/10, -ve cytology (no fluid analysis? only 10mL drained). Intubated 5/17 --> 5/20. Then became DNR DNI. Underwent bronchoscopy on 05/17 for bronchoscopy showing multiple blood clots, BAL showed rare yeast likely colonizers.   --> presented today from NH brought in by sons and daughter for concerns of lethargy, confusion, poor appetite for 3 days, and decreased hearing with fullness in her ears worse since discharge. She has been refusing to eat or walk for the past 3 days as well. She was refusing to use the bipap at [facility].  Now patient is AAOx3 and wants to eat. She is on BIPAP. Denying other complaint. no increased cough or sputum or change in color of sputum. no fever or hemoptysis.     Interval history  -Patient seen at bedside  -She was sleepy but arousable     ADVANCE DIRECTIVES:     [ ] Full Code [ x] DNR  MOLST  [ ]  Living Will  [ ]   DECISION MAKER(s):  [x ] Health Care Proxy(s)  [ ] Surrogate(s)  [ ] Guardian           Name(s): Phone Number(s):  Cecil Koch      BASELINE (I)ADL(s) (prior to admission):    [place]: [ ]Total  [ ] Moderate [ ]Dependent  Palliative Performance Status Version 2:         %    http://npcrc.org/files/news/palliative_performance_scale_[email or web address].pdf    Allergies    tetracycline (Unknown)  penicillin (Unknown)    Intolerances    MEDICATIONS  (STANDING):  acetylcysteine 20%  Inhalation 4 milliLiter(s) Inhalation daily  amLODIPine   Tablet 5 milliGRAM(s) Oral daily  carbamide peroxide Otic Solution 5 Drop(s) Both Ears every 12 hours  chlorhexidine 2% Cloths 1 Application(s) Topical <User Schedule>  enoxaparin Injectable 54 milliGRAM(s) SubCutaneous every 12 hours  ferrous    sulfate 325 milliGRAM(s) Oral daily  fluticasone propionate 50 MICROgram(s)/spray Nasal Spray 1 Spray(s) Both Nostrils two times a day  methylPREDNISolone sodium succinate Injectable 40 milliGRAM(s) IV Push two times a day  piperacillin/tazobactam IVPB.. 3.375 Gram(s) IV Intermittent every 8 hours  polyethylene glycol 3350 17 Gram(s) Oral every 12 hours  saccharomyces boulardii 250 milliGRAM(s) Oral two times a day  senna 2 Tablet(s) Oral at bedtime  sodium chloride 3%  Inhalation 4 milliLiter(s) Inhalation every 12 hours  tiotropium 2.5 MICROgram(s) Inhaler 2 Puff(s) Inhalation daily    MEDICATIONS  (PRN):  albuterol    90 MICROgram(s) HFA Inhaler 2 Puff(s) Inhalation every 6 hours PRN for shortness of breath and/or wheezing  hydrOXYzine hydrochloride 25 milliGRAM(s) Oral every 12 hours PRN Anxiety  lactulose Syrup 10 Gram(s) Oral every 6 hours PRN constipation      PRESENT SYMPTOMS: [ ]Unable to obtain due to poor mentation   Source if other than patient:  [ ]Family   [ ]Team     Pain: [ ]yes [x ]no  ALL PAIN ASSESSMENT COMPONENTS WERE ASKED ABOUT UNLESS OTHERWISE NOTED  QOL impact -   Location -                    Aggravating factors -  Quality -  Radiation -  Timing-  Severity (0-10 scale):  Minimal acceptable level (0-10 scale):     CPOT:    https://www.sccm.org/getattachment/oaz40q98-8o0r-4a9t-2o1j-1574r4559k0t/Critical-Care-Pain-Observation-Tool-(CPOT)    PAIN AD Score:   [email or web address].[place].[email or web address]/[email or web address].pdf (press ctrl +  left click to view)    Dyspnea:                           [ x]None[ ]Mild [ ]Moderate [ ]Severe     Respiratory Distress Observation Scale (RDOS):   A score of 0 to 2 signifies little or no respiratory distress, 3 signifies mild distress, scores 4 to 6 indicate moderate distress, and scores greater than 7 signify severe distress  [email or web address]-[email or web address]-[email or web address]    Anxiety:                             [ x]None[ ]Mild [ ]Moderate [ ]Severe   Fatigue:                             [ ]None[ x]Mild [ ]Moderate [ ]Severe   Nausea:                             [ x]None[ ]Mild [ ]Moderate [ ]Severe   Loss of appetite:              [x ]None[ ]Mild [ ]Moderate [ ]Severe   Constipation:                    [x ]None[ ]Mild [ ]Moderate [ ]Severe    Other Symptoms:  [ x]All other review of systems negative     Palliative Performance Status Version 2:         30%    [email or web address]/news/palliative_performance_[email or web address]_[email or web address]    PHYSICAL EXAM:  Vital Signs Last 24 Hrs  T(C): 36.8 (03 Jun 2024 08:10), Max: 37.1 (02 Jun 2024 12:38)  T(F): 98.3 (03 Jun 2024 08:10), Max: 98.8 (02 Jun 2024 12:38)  HR: 89 (03 Jun 2024 08:10) (89 - 99)  BP: 141/67 (03 Jun 2024 08:10) (126/62 - 162/75)  BP(mean): 95 (03 Jun 2024 08:10) (95 - 109)  RR: 20 (03 Jun 2024 08:10) (20 - 22)  SpO2: 97% (03 Jun 2024 08:10) (91% - 97%)    Parameters below as of 03 Jun 2024 08:10  Patient On (Oxygen Delivery Method): nasal cannula, high flow     I&O's Summary    02 Jun 2024 07:01  -  03 Jun 2024 07:00  --------------------------------------------------------  IN: 240 mL / OUT: 600 mL / NET: -360 mL        GENERAL:  [ ] No acute distress [ ]Lethargic  [ ]Unarousable  [x ]Verbal  [ ]Non-Verbal [ ]Cachexia    BEHAVIORAL/PSYCH:  [ x]Alert and Oriented x3  [ ] Anxiety [ ] Delirium [ ] Agitation [ ] Calm   EYES: [ ] No scleral icterus [ ] Scleral icterus [ ] Closed  ENMT:  [ ]Dry mouth  [ x]No external oral lesions [ ] No external ear or nose lesions  CARDIOVASCULAR:  [ ]Regular [ ]Irregular [ ]Tachy [ ]Not Tachy  [ ]Kaplesh [ ] Edema [ ] No edema  PULMONARY:  [x ]Tachypnea  [ ]Audible excessive secretions [ ] No labored breathing [ ] labored breathing  GASTROINTESTINAL: [ ]Soft  [ ]Distended  [ ]Not distended [ ]Non tender [ ]Tender  MUSCULOSKELETAL: [ ]No clubbing [ ] clubbing  [ ] No cyanosis [ ] cyanosis  NEUROLOGIC: [ ]No focal deficits  [ x]Follows commands  [ ]Does not follow commands  [ ]Cognitive impairment  [ ]Dysphagia  [ ]Dysarthria  [ ]Paresis   SKIN: [ ] Jaundiced [ x] Non-jaundiced [ ]Rash [ ]No Rash [ ] Warm [ ] Dry  MISC/LINES: [ ] ET tube [ ] Trach [ ]NGT/OGT [ ]PEG [ ]Paulino    LABS: reviewed by me                                   9.8    16.16 )-----------( 507      ( 04 Jun 2024 05:39 )             34.9       06-04    144  |  94<L>  |  16  ----------------------------<  108<H>  5.1<H>   |  46<HH>  |  <0.5<L>    Ca    9.2      04 Jun 2024 05:39  Phos  3.4     06-03  Mg     1.9     06-04    TPro  5.2<L>  /  Alb  3.2<L>  /  TBili  <0.2  /  DBili  x   /  AST  7   /  ALT  29  /  AlkPhos  93  06-04          ABG - ( 02 Jun 2024 20:04 )  pH, Arterial: 7.37  pH, Blood: x     /  pCO2: 91    /  pO2: 70    / HCO3: 53    / Base Excess: 23.8  /  SaO2: 95.3                Urinalysis Basic - ( 04 Jun 2024 05:39 )    Color: x / Appearance: x / SG: x / pH: x  Gluc: 108 mg/dL / Ketone: x  / Bili: x / Urobili: x   Blood: x / Protein: x / Nitrite: x   Leuk Esterase: x / RBC: x / WBC x   Sq Epi: x / Non Sq Epi: x / Bacteria: x        PT/INR - ( 03 Jun 2024 04:42 )   PT: 11.30 sec;   INR: 0.99 ratio         PTT - ( 03 Jun 2024 04:42 )  PTT:28.5 sec          CAPILLARY BLOOD GLUCOSE                    RADIOLOGY & ADDITIONAL STUDIES: reviewed by me    < from: [facility] Duplex Lower Ext Vein Scan, Bilat (06.04.24 @ 12:26) >    IMPRESSION:  Right lower extremity negative for DVT    DVT in left soleal and associated  vein.        < end of copied text >      EKG: reviewed by me    < from: 12 Lead ECG (06.03.24 @ 07:49) >  Ventricular Rate 90 BPM    Atrial Rate 90 BPM    P-R Interval 154 ms    QRS Duration 84 ms    Q-T Interval 360 ms    QTC Calculation(Bazett) 440 ms    P Axis 86 degrees    R Axis 81 degrees    T Axis 83 degrees    Diagnosis Line Normal sinus rhythm  Anterolateral infarct , age undetermined  Abnormal ECG    < end of copied text >      PROTEIN CALORIE MALNUTRITION PRESENT: [ ]mild [ ]moderate [ ]severe [ ]underweight [ ]morbid obesity  https://www.andeal.org/vault/2440/web/files/ONC/Table_Clinical%20Characteristics%20to%20Document%20Malnutrition-White%20JV%20et%20al%468398.pdf    Height (cm): 154.9 (06-02-24 @ 12:38), 154.9 (05-16-24 @ 18:55)  Weight (kg): 54.4 (06-02-24 @ 12:38), 50.5 (05-16-24 @ 18:55)  BMI (kg/m2): 22.7 (06-02-24 @ 12:38), 21 (05-16-24 @ 18:55)  [ ]PPSV2 < or = to 30% [ ]significant weight loss  [ ]poor nutritional intake  [ ]anasarca      [ ]Artificial Nutrition      Palliative Care Spiritual/Emotional Screening Tool Question  Severity (0-4):                    OR                    [ x] Unable to determine. Will assess at later time if appropriate.  Score of 2 or greater indicates recommendation of Chaplaincy and/or SW referral  Chaplaincy Referral: [ ] Yes [ ] Refused [ ] Following     Caregiver Nelson:  [ ] Yes [ ] No    OR    [x ] Unable to determine. Will assess at later time if appropriate.  Social Work Referral [ ]  Patient and Family Centered Care Referral [ ]    Anticipatory Grief Present: [ ] Yes [ ] No    OR     [ x] Unable to determine. Will assess at later time if appropriate.  Social Work Referral [ ]  Patient and Family Centered Care Referral [ ]    Patient discussed with primary medical team MD  Palliative care education provided to patient and/or family

## 2024-06-04 NOTE — PROGRESS NOTE ADULT - SUBJECTIVE AND OBJECTIVE BOX
RITESH THOMAS  73y Female    CHIEF COMPLAINT:    Patient is a 73y old  Female who presents with a chief complaint of hypercapnia (04 Jun 2024 06:47)    INTERVAL HPI/OVERNIGHT EVENTS:    Patient seen and examined. No acute events overnight. Critically ill on HFNC 50/90, desaturated this AM    ROS: All other systems are negative.    Vital Signs:    T(F): 97.8 (06-04-24 @ 07:15), Max: 98.6 (06-03-24 @ 16:00)  HR: 91 (06-04-24 @ 07:15) (81 - 97)  BP: 151/72 (06-04-24 @ 07:15) (110/62 - 151/72)  RR: 20 (06-04-24 @ 07:15) (20 - 20)  SpO2: 96% (06-04-24 @ 07:15) (91% - 96%)    03 Jun 2024 07:01  -  04 Jun 2024 07:00  --------------------------------------------------------  IN: 60 mL / OUT: 350 mL / NET: -290 mL    PHYSICAL EXAM:    GENERAL:  NAD chronically ill appearing  SKIN: No rashes or lesions  HEENT: Atraumatic. Normocephalic.  .  NECK: Supple, No JVD.    PULMONARY: decreased breath sounds B/L. No wheezing.   CVS: Normal S1, S2. Rate and Rhythm are regular tachycardic   ABDOMEN/GI: Soft, Nontender, Nondistended   MSK:  No clubbing or cyanosis   NEUROLOGIC: weak, able to follow commands  PSYCH: sleepy arousable     Consultant(s) Notes Reviewed:  [x ] YES  [ ] NO  Care Discussed with Consultants/Other Providers [ x] YES  [ ] NO    LABS:                        9.8    16.16 )-----------( 507      ( 04 Jun 2024 05:39 )             34.9     144  |  94<L>  |  16  ----------------------------<  108<H>  5.1<H>   |  x   |  <0.5<L>    Ca    9.2      04 Jun 2024 05:39  Phos  3.4     06-03  Mg     1.9     06-04    TPro  5.2<L>  /  Alb  3.2<L>  /  TBili  <0.2  /  DBili  x   /  AST  7   /  ALT  29  /  AlkPhos  93  06-04    PT/INR - ( 03 Jun 2024 04:42 )   PT: 11.30 sec;   INR: 0.99 ratio       PTT - ( 03 Jun 2024 04:42 )  PTT:28.5 sec    Culture - Blood (collected 02 Jun 2024 15:35)  Source: .Blood Blood  Preliminary Report (04 Jun 2024 01:02):    No growth at 24 hours    Culture - Blood (collected 02 Jun 2024 14:59)  Source: .Blood Blood  Preliminary Report (04 Jun 2024 01:02):    No growth at 24 hours    RADIOLOGY & ADDITIONAL TESTS:  Imaging or report Personally Reviewed:  [x] YES  [ ] NO  EKG reviewed: [x] YES  [ ] NO    Medications:  Standing  acetylcysteine 20%  Inhalation 4 milliLiter(s) Inhalation daily  amLODIPine   Tablet 5 milliGRAM(s) Oral daily  carbamide peroxide Otic Solution 5 Drop(s) Both Ears every 12 hours  chlorhexidine 2% Cloths 1 Application(s) Topical <User Schedule>  enoxaparin Injectable 40 milliGRAM(s) SubCutaneous every 24 hours  ferrous    sulfate 325 milliGRAM(s) Oral daily  fluticasone propionate 50 MICROgram(s)/spray Nasal Spray 1 Spray(s) Both Nostrils two times a day  methylPREDNISolone sodium succinate Injectable 40 milliGRAM(s) IV Push two times a day  polyethylene glycol 3350 17 Gram(s) Oral every 12 hours  saccharomyces boulardii 250 milliGRAM(s) Oral two times a day  senna 2 Tablet(s) Oral at bedtime  sodium chloride 3%  Inhalation 4 milliLiter(s) Inhalation every 12 hours  tiotropium 2.5 MICROgram(s) Inhaler 2 Puff(s) Inhalation daily    PRN Meds  albuterol    90 MICROgram(s) HFA Inhaler 2 Puff(s) Inhalation every 6 hours PRN  hydrOXYzine hydrochloride 25 milliGRAM(s) Oral every 12 hours PRN  lactulose Syrup 10 Gram(s) Oral every 6 hours PRN

## 2024-06-04 NOTE — DIETITIAN INITIAL EVALUATION ADULT - NS FNS DIET ORDER
DASH/TLC diet + Ensure Plus High Protein twice daily (350 kcal, 20 g protein per serving). Reportedly w/ poor appetite in setting of weakness; consuming 50% of meals at this time + 0-1 bottles Ensure Plus High Protein daily.

## 2024-06-04 NOTE — DIETITIAN INITIAL EVALUATION ADULT - PERTINENT MEDS FT
MEDICATIONS  (STANDING):  acetylcysteine 20%  Inhalation 4 milliLiter(s) Inhalation daily  amLODIPine   Tablet 5 milliGRAM(s) Oral daily  carbamide peroxide Otic Solution 5 Drop(s) Both Ears every 12 hours  chlorhexidine 2% Cloths 1 Application(s) Topical <User Schedule>  enoxaparin Injectable 54 milliGRAM(s) SubCutaneous every 12 hours  ferrous    sulfate 325 milliGRAM(s) Oral daily  fluticasone propionate 50 MICROgram(s)/spray Nasal Spray 1 Spray(s) Both Nostrils two times a day  methylPREDNISolone sodium succinate Injectable 40 milliGRAM(s) IV Push two times a day  piperacillin/tazobactam IVPB.. 3.375 Gram(s) IV Intermittent every 8 hours  polyethylene glycol 3350 17 Gram(s) Oral every 12 hours  saccharomyces boulardii 250 milliGRAM(s) Oral two times a day  senna 2 Tablet(s) Oral at bedtime  sodium chloride 3%  Inhalation 4 milliLiter(s) Inhalation every 12 hours  tiotropium 2.5 MICROgram(s) Inhaler 2 Puff(s) Inhalation daily    MEDICATIONS  (PRN):  albuterol    90 MICROgram(s) HFA Inhaler 2 Puff(s) Inhalation every 6 hours PRN for shortness of breath and/or wheezing  hydrOXYzine hydrochloride 25 milliGRAM(s) Oral every 12 hours PRN Anxiety  lactulose Syrup 10 Gram(s) Oral every 6 hours PRN constipation

## 2024-06-04 NOTE — DIETITIAN INITIAL EVALUATION ADULT - ADD RECOMMEND
Recommendation: Remove DASH/TLC diet restriction to liberalize diet order & encourage adequate po intake. Would downgrade diet consistency to easy to chew to aid with po intake. Continue providing Ensure Plus High Protein twice daily (350 kcal, 20 g protein per serving). Order Magic Cup twice daily (290 kcal, 9 g protein per serving).

## 2024-06-04 NOTE — DIETITIAN INITIAL EVALUATION ADULT - NUTRITIONGOAL OUTCOME1
Pt to demonstrate tolerance to diet order as appropriate with goals of care over next 3 days.    Pt at high nutrition risk; RD to follow-up in 3 days.    Monitor: Skin, labs, BM, wt, nutrition focused physical findings, body composition, GI, swallow function, po intake, goals of care.

## 2024-06-04 NOTE — CHART NOTE - NSCHARTNOTEFT_GEN_A_CORE
Spoke with son Grayson, updated on patient's clinical status and medical plan; informed about LE dvt and discussed risks of bleeding.  He is agreeable to start anticoagulation and monitor for signs of bleed.

## 2024-06-05 NOTE — PROGRESS NOTE ADULT - SUBJECTIVE AND OBJECTIVE BOX
73-year-old female with PMHx of COPD on home 3L NC, chronic smoker >60ppy, HTN, recently discharged from [facility] 5/30 after being hospitalized for sob hypoxia and hemoptysis weight loss found to have copd exacerbation post obstructive PNA and possible malignancy (patient refused workup), S/P Diagnostic thoracentesis on 5/10, -ve cytology (no fluid analysis? only 10mL drained). Intubated 5/17 --> 5/20. Then became DNR DNI. Underwent bronchoscopy on 05/17 for bronchoscopy showing multiple blood clots, BAL showed rare yeast likely colonizers.   PT  presented from NH brought in by sons and daughter for concerns of lethargy, confusion, poor appetite for 3 days, and decreased hearing with fullness in her ears worse since discharge. She has been refusing to eat or walk for the past 3 days as well. She was refusing to use the bipap at [facility].  In the ED, mental status improved after  using  bipap.   Today pt is very weak, sleepy, looks comfortable at rest, not in pain, on high flow oxygen.    PAST MEDICAL & SURGICAL HISTORY:  Chronic obstructive pulmonary disease, unspecified COPD type      Hypertension      Lung mass      Chronic hypoxic respiratory failure      No significant past surgical history        Vital Signs:  T(C): 36 (05 Jun 2024 16:00), Max: 36.7 (05 Jun 2024 04:48)  T(F): 96.8 (05 Jun 2024 16:00), Max: 98.1 (05 Jun 2024 04:48)  HR: 100 (05 Jun 2024 16:00) (91 - 118)  BP: 149/71 (05 Jun 2024 16:00) (113/65 - 149/71)  BP(mean): 99 (05 Jun 2024 16:00) (86 - 99)  RR: 20 (05 Jun 2024 16:00) (19 - 21)  SpO2: 90% (05 Jun 2024 16:00) (89% - 95%)    Parameters below as of 05 Jun 2024 16:00  Patient On (Oxygen Delivery Method): nasal cannula, high flow      PHYSICAL EXAM:  GENERAL:  NAD chronically ill appearing with temporal muscle waisting   SKIN: No rashes or lesions  HEENT: Atraumatic. Normocephalic.  .  NECK: Supple, No JVD.    PULMONARY: no BS on the left , decreased BS on the right   CVS: Normal S1, S2. Rate and Rhythm are regular tachycardic   ABDOMEN/GI: Soft, Nontender, Nondistended   MSK:  No clubbing or cyanosis   NEUROLOGIC: weak, able to follow commands  PSYCH: sleepy arousable     Consultant(s) Notes Reviewed:  [x ] YES  [ ] NO  Care Discussed with Consultants/Other Providers [ x] YES  [ ] NO    LABS:                                   8.8    15.15 )-----------( 498      ( 05 Jun 2024 05:12 )             30.6   06-05    140  |  91<L>  |  21<H>  ----------------------------<  99  4.8   |  49<HH>  |  <0.5<L>    Ca    9.2      05 Jun 2024 05:12  Mg     1.8     06-05    TPro  5.3<L>  /  Alb  3.2<L>  /  TBili  0.2  /  DBili  x   /  AST  5   /  ALT  21  /  AlkPhos  73  06-05      Culture - Blood (collected 02 Jun 2024 15:35)  Source: .Blood Blood  Preliminary Report (04 Jun 2024 01:02):    No growth at 24 hours    Culture - Blood (collected 02 Jun 2024 14:59)  Source: .Blood Blood  Preliminary Report (04 Jun 2024 01:02):    No growth at 24 hours        RADIOLOGY & ADDITIONAL TESTS:  Imaging or report Personally Reviewed:  [x] YES  [ ] NO  EKG reviewed: [x] YES  [ ] NO    < from: Xray Chest 1 View- PORTABLE-Routine (Xray Chest 1 View- PORTABLE-Routine .) (06.05.24 @ 09:30) >  Impression:    Increasing right-sided infiltrates. Minimal improved aeration of the left   lung.    < end of copied text >  < from: VA Duplex Lower Ext Vein Scan, Bilat (06.04.24 @ 12:26) >  IMPRESSION:  Right lower extremity negative for DVT    DVT in left soleal and associated  vein.    < from: CT Abdomen and Pelvis w/ IV Cont (06.02.24 @ 16:29) >  IMPRESSION:    CHEST:    No CTA evidence of acute pulmonary embolus.    Collapse of the left lung, likely secondary to debris/mucus within the   left main bronchus. Large left pleural effusion.    Given degree of left lung atelectasis, prior left-sided lung mass is not   well-demonstrated on this examination.    Redemonstrated right lower lobe 1.8 x 1.9 cm spiculated nodule.    ABDOMEN/PELVIS:    No CT evidence of acuteintra-abdominal pathology.    < end of copied text >    < from: TTE Echo Complete w/o Contrast w/ Doppler (05.07.24 @ 09:58) >    Summary:   1. Hyperdynamic global left ventricular systolic function with a   visually estimated EF of 70-75%. No regional wall motion abnormalities.   2. Normal right ventricular size with mildly reduced RV systolic   function.   3. Normal left atrial size.   4. Sclerotic aortic valve with normal opening.   5. Mild tricuspid regurgitation.   6. No echocardiographic evidence of pulmonary hypertension. The IVC is   normal in size with <50% respiratory variability indicating mildly   increased right atrial pressure.   7. Trivial pericardial effusion.   8. No prior TTE is available for comparison.      MEDICATIONS  (STANDING):  acetylcysteine 20%  Inhalation 4 milliLiter(s) Inhalation daily  albuterol/ipratropium for Nebulization 3 milliLiter(s) Nebulizer every 4 hours  amLODIPine   Tablet 5 milliGRAM(s) Oral daily  carbamide peroxide Otic Solution 5 Drop(s) Both Ears every 12 hours  chlorhexidine 2% Cloths 1 Application(s) Topical <User Schedule>  enoxaparin Injectable 54 milliGRAM(s) SubCutaneous every 12 hours  ferrous    sulfate 325 milliGRAM(s) Oral daily  fluticasone propionate 50 MICROgram(s)/spray Nasal Spray 1 Spray(s) Both Nostrils two times a day  methylPREDNISolone sodium succinate Injectable 40 milliGRAM(s) IV Push two times a day  piperacillin/tazobactam IVPB.. 3.375 Gram(s) IV Intermittent every 8 hours  polyethylene glycol 3350 17 Gram(s) Oral every 12 hours  saccharomyces boulardii 250 milliGRAM(s) Oral two times a day  senna 2 Tablet(s) Oral at bedtime  sodium chloride 3%  Inhalation 4 milliLiter(s) Inhalation every 6 hours  tiotropium 2.5 MICROgram(s) Inhaler 2 Puff(s) Inhalation daily    MEDICATIONS  (PRN):  albuterol    90 MICROgram(s) HFA Inhaler 2 Puff(s) Inhalation every 6 hours PRN for shortness of breath and/or wheezing  ALPRAZolam 0.5 milliGRAM(s) Oral two times a day PRN anxiety  HYDROmorphone  Injectable 0.25 milliGRAM(s) IV Push every 4 hours PRN severe dyspnea  hydrOXYzine hydrochloride 25 milliGRAM(s) Oral every 12 hours PRN Anxiety  lactulose Syrup 10 Gram(s) Oral every 6 hours PRN constipation

## 2024-06-05 NOTE — PROGRESS NOTE ADULT - SUBJECTIVE AND OBJECTIVE BOX
HPI:  73-year-old female with PMHx of COPD on home 3L NC, chronic smoker >60ppy, HTN, recently discharged from [facility] 5/30. hospitalized for sob hypoxia and hemoptysis weight loss found to have copd exacerbation post obstructive PNA and possible malignancy (patient refused workup), S/P Diagnostic thoracentesis on 5/10, -ve cytology (no fluid analysis? only 10mL drained). Intubated 5/17 --> 5/20. Then became DNR DNI. Underwent bronchoscopy on 05/17 for bronchoscopy showing multiple blood clots, BAL showed rare yeast likely colonizers.   --> presented today from NH brought in by sons and daughter for concerns of lethargy, confusion, poor appetite for 3 days, and decreased hearing with fullness in her ears worse since discharge. She has been refusing to eat or walk for the past 3 days as well. She was refusing to use the bipap at [facility].  Now patient is AAOx3 and wants to eat. She is on BIPAP. Denying other complaint. no increased cough or sputum or change in color of sputum. no fever or hemoptysis.     Interval history  -Patient seen at bedside  -She was sleepy but arousable     ADVANCE DIRECTIVES:     [ ] Full Code [ x] DNR  MOLST  [ ]  Living Will  [ ]   DECISION MAKER(s):  [x ] Health Care Proxy(s)  [ ] Surrogate(s)  [ ] Guardian           Name(s): Phone Number(s):  Cecil Koch      BASELINE (I)ADL(s) (prior to admission):    [place]: [ ]Total  [ ] Moderate [ ]Dependent  Palliative Performance Status Version 2:         %    http://npcrc.org/files/[email or web address]_[email or web address]_[email or web address]    Allergies    tetracycline (Unknown)  penicillin (Unknown)    Intolerances    MEDICATIONS  (STANDING):  acetylcysteine 20%  Inhalation 4 milliLiter(s) Inhalation daily  albuterol/ipratropium for Nebulization 3 milliLiter(s) Nebulizer every 4 hours  amLODIPine   Tablet 5 milliGRAM(s) Oral daily  carbamide peroxide Otic Solution 5 Drop(s) Both Ears every 12 hours  chlorhexidine 2% Cloths 1 Application(s) Topical <User Schedule>  enoxaparin Injectable 54 milliGRAM(s) SubCutaneous every 12 hours  ferrous    sulfate 325 milliGRAM(s) Oral daily  fluticasone propionate 50 MICROgram(s)/spray Nasal Spray 1 Spray(s) Both Nostrils two times a day  methylPREDNISolone sodium succinate Injectable 40 milliGRAM(s) IV Push two times a day  piperacillin/tazobactam IVPB.. 3.375 Gram(s) IV Intermittent every 8 hours  polyethylene glycol 3350 17 Gram(s) Oral every 12 hours  saccharomyces boulardii 250 milliGRAM(s) Oral two times a day  senna 2 Tablet(s) Oral at bedtime  sodium chloride 3%  Inhalation 4 milliLiter(s) Inhalation every 6 hours  tiotropium 2.5 MICROgram(s) Inhaler 2 Puff(s) Inhalation daily    MEDICATIONS  (PRN):  albuterol    90 MICROgram(s) HFA Inhaler 2 Puff(s) Inhalation every 6 hours PRN for shortness of breath and/or wheezing  ALPRAZolam 0.5 milliGRAM(s) Oral two times a day PRN anxiety  hydrOXYzine hydrochloride 25 milliGRAM(s) Oral every 12 hours PRN Anxiety  lactulose Syrup 10 Gram(s) Oral every 6 hours PRN constipation    PRESENT SYMPTOMS: [ ]Unable to obtain due to poor mentation   Source if other than patient:  [ ]Family   [ ]Team     Pain: [ ]yes [ ]no  QOL impact -   Location -                    Aggravating factors -  Quality -  Radiation -  Timing-  Severity (0-10 scale):  Minimal acceptable level (0-10 scale):     CPOT:    https://[email or web address]/Critical-Care-Pain-Observation-Tool-(CPOT)    PAIN AD Score:   [email or web address]/painad.pdf (press ctrl +  left click to view)    Dyspnea:                           [ ]None[ ]Mild [ ]Moderate [ ]Severe     Respiratory Distress Observation Scale (RDOS):   A score of 0 to 2 signifies little or no respiratory distress, 3 signifies mild distress, scores 4 to 6 indicate moderate distress, and scores greater than 7 signify severe distress  [email or web address]/sites/default/[email or web address]-[email or web address]-[email or web address]-[email or web address]    Anxiety:                             [ ]None[ ]Mild [ ]Moderate [ ]Severe   Fatigue:                             [ ]None[ ]Mild [ ]Moderate [ ]Severe   Nausea:                             [ ]None[ ]Mild [ ]Moderate [ ]Severe   Loss of appetite:              [ ]None[ ]Mild [ ]Moderate [ ]Severe   Constipation:                    [ ]None[ ]Mild [ ]Moderate [ ]Severe    Other Symptoms:  [ ]All other review of systems negative     Palliative Performance Status Version 2:         %    [email or web address]/[email or web address]_performance_[email or web address]_[email or web address]    PHYSICAL EXAM:  Vital Signs Last 24 Hrs  T(C): 35.9 (05 Jun 2024 11:08), Max: 36.7 (05 Jun 2024 04:48)  T(F): 96.7 (05 Jun 2024 11:08), Max: 98.1 (05 Jun 2024 04:48)  HR: 118 (05 Jun 2024 11:08) (91 - 118)  BP: 113/65 (05 Jun 2024 11:08) (108/62 - 148/66)  BP(mean): 90 (05 Jun 2024 11:08) (81 - 95)  RR: 20 (05 Jun 2024 11:08) (19 - 21)  SpO2: 89% (05 Jun 2024 11:08) (89% - 96%)    Parameters below as of 05 Jun 2024 09:08  Patient On (Oxygen Delivery Method): nasal cannula, high flow  O2 Flow (L/min): 50  O2 Concentration (%): 70 I&O's Summary    04 Jun 2024 07:01  -  05 Jun 2024 07:00  --------------------------------------------------------  IN: 60 mL / OUT: 300 mL / NET: -240 mL    GENERAL:  [ ] No acute distress [ ]Lethargic  [ ]Unarousable  [x ]Verbal  [ ]Non-Verbal [ ]Cachexia    BEHAVIORAL/PSYCH:  [ x]Alert and Oriented x3  [ ] Anxiety [ ] Delirium [ ] Agitation [ ] Calm   EYES: [ ] No scleral icterus [ ] Scleral icterus [ ] Closed  ENMT:  [ ]Dry mouth  [ x]No external oral lesions [ ] No external ear or nose lesions  CARDIOVASCULAR:  [ ]Regular [ ]Irregular [ ]Tachy [ ]Not Tachy  [ ]Kalpesh [ ] Edema [ ] No edema  PULMONARY:  [x ]Tachypnea  [ ]Audible excessive secretions [ ] No labored breathing [ ] labored breathing  GASTROINTESTINAL: [ ]Soft  [ ]Distended  [ ]Not distended [ ]Non tender [ ]Tender  MUSCULOSKELETAL: [ ]No clubbing [ ] clubbing  [ ] No cyanosis [ ] cyanosis  NEUROLOGIC: [ ]No focal deficits  [ x]Follows commands  [ ]Does not follow commands  [ ]Cognitive impairment  [ ]Dysphagia  [ ]Dysarthria  [ ]Paresis   SKIN: [ ] Jaundiced [ x] Non-jaundiced [ ]Rash [ ]No Rash [ ] Warm [ ] Dry  MISC/LINES: [ ] ET tube [ ] Trach [ ]NGT/OGT [ ]PEG [ ]Paulino        LABS: reviewed by me                        8.8    15.15 )-----------( 498      ( 05 Jun 2024 05:12 )             30.6   06-05    140  |  91<L>  |  21<H>  ----------------------------<  99  4.8   |  49<HH>  |  <0.5<L>    Ca    9.2      05 Jun 2024 05:12  Mg     1.8     06-05    TPro  5.3<L>  /  Alb  3.2<L>  /  TBili  0.2  /  DBili  x   /  AST  5   /  ALT  21  /  AlkPhos  73  06-05      Urinalysis Basic - ( 05 Jun 2024 05:12 )    Color: x / Appearance: x / SG: x / pH: x  Gluc: 99 mg/dL / Ketone: x  / Bili: x / Urobili: x   Blood: x / Protein: x / Nitrite: x   Leuk Esterase: x / RBC: x / WBC x   Sq Epi: x / Non Sq Epi: x / Bacteria: x      RADIOLOGY & ADDITIONAL STUDIES: reviewed by me    EKG: reviewed by me        Palliative Care Spiritual/Emotional Screening Tool Question  Severity (0-4): 2  Score of 2 or greater indicates recommendation of Chaplaincy referral  Chaplaincy Referral: [x ] Yes [ ] Refused [ ] Following    Caregiver [place]:  [ ] Yes [ ] No [ x] Deferred  Social Work Referral [ ]  Patient and Family Centered Care Referral [ ]    Anticipatory Grief Present: [ ] Yes [ ] No [ x] Deferred  Social Work Referral [ ]  Patient and Family Centered Care Referral [ ]    Patient discussed with primary medical team MD  Palliative care education provided to patient and/or family

## 2024-06-05 NOTE — PROGRESS NOTE ADULT - SUBJECTIVE AND OBJECTIVE BOX
Over Night Events: events noted, on HHFNC 70%, LE doppler noted afebrile    PHYSICAL EXAM    ICU Vital Signs Last 24 Hrs  T(C): 36.7 (05 Jun 2024 04:48), Max: 36.7 (05 Jun 2024 04:48)  T(F): 98.1 (05 Jun 2024 04:48), Max: 98.1 (05 Jun 2024 04:48)  HR: 94 (05 Jun 2024 04:48) (85 - 97)  BP: 143/65 (05 Jun 2024 04:48) (108/62 - 151/72)  BP(mean): 94 (05 Jun 2024 04:48) (81 - 104)  RR: 20 (05 Jun 2024 04:48) (20 - 21)  SpO2: 93% (05 Jun 2024 04:48) (91% - 100%)    O2 Parameters below as of 04 Jun 2024 20:12  Patient On (Oxygen Delivery Method): nasal cannula, high flow  O2 Flow (L/min): 50  O2 Concentration (%): 70        General: ill looking  Lungs: dec bs l side  Cardiovascular: QUINTIN 2.6  Abdomen: Soft, Positive BS  Extremities: No clubbing   Neurological: Non focal       06-03-24 @ 07:01  -  06-04-24 @ 07:00  --------------------------------------------------------  IN:    Oral Fluid: 60 mL  Total IN: 60 mL    OUT:    Voided (mL): 350 mL  Total OUT: 350 mL    Total NET: -290 mL      06-04-24 @ 07:01  -  06-05-24 @ 06:08  --------------------------------------------------------  IN:    Oral Fluid: 60 mL  Total IN: 60 mL    OUT:    Voided (mL): 300 mL  Total OUT: 300 mL    Total NET: -240 mL          LABS:                          8.8    15.15 )-----------( 498      ( 05 Jun 2024 05:12 )             30.6                                               06-04    144  |  94<L>  |  16  ----------------------------<  108<H>  5.1<H>   |  46<HH>  |  <0.5<L>    Ca    9.2      04 Jun 2024 05:39  Mg     1.9     06-04    TPro  5.2<L>  /  Alb  3.2<L>  /  TBili  <0.2  /  DBili  x   /  AST  7   /  ALT  29  /  AlkPhos  93  06-04                                             Urinalysis Basic - ( 04 Jun 2024 05:39 )    Color: x / Appearance: x / SG: x / pH: x  Gluc: 108 mg/dL / Ketone: x  / Bili: x / Urobili: x   Blood: x / Protein: x / Nitrite: x   Leuk Esterase: x / RBC: x / WBC x   Sq Epi: x / Non Sq Epi: x / Bacteria: x                                                  LIVER FUNCTIONS - ( 04 Jun 2024 05:39 )  Alb: 3.2 g/dL / Pro: 5.2 g/dL / ALK PHOS: 93 U/L / ALT: 29 U/L / AST: 7 U/L / GGT: x                                                  Culture - Blood (collected 02 Jun 2024 15:35)  Source: .Blood Blood  Preliminary Report (05 Jun 2024 01:02):    No growth at 48 Hours    Culture - Blood (collected 02 Jun 2024 14:59)  Source: .Blood Blood  Preliminary Report (05 Jun 2024 01:02):    No growth at 48 Hours                                                                                           MEDICATIONS  (STANDING):  acetylcysteine 20%  Inhalation 4 milliLiter(s) Inhalation daily  amLODIPine   Tablet 5 milliGRAM(s) Oral daily  carbamide peroxide Otic Solution 5 Drop(s) Both Ears every 12 hours  chlorhexidine 2% Cloths 1 Application(s) Topical <User Schedule>  enoxaparin Injectable 54 milliGRAM(s) SubCutaneous every 12 hours  ferrous    sulfate 325 milliGRAM(s) Oral daily  fluticasone propionate 50 MICROgram(s)/spray Nasal Spray 1 Spray(s) Both Nostrils two times a day  methylPREDNISolone sodium succinate Injectable 40 milliGRAM(s) IV Push two times a day  piperacillin/tazobactam IVPB.. 3.375 Gram(s) IV Intermittent every 8 hours  polyethylene glycol 3350 17 Gram(s) Oral every 12 hours  saccharomyces boulardii 250 milliGRAM(s) Oral two times a day  senna 2 Tablet(s) Oral at bedtime  sodium chloride 3%  Inhalation 4 milliLiter(s) Inhalation every 12 hours  tiotropium 2.5 MICROgram(s) Inhaler 2 Puff(s) Inhalation daily    MEDICATIONS  (PRN):  albuterol    90 MICROgram(s) HFA Inhaler 2 Puff(s) Inhalation every 6 hours PRN for shortness of breath and/or wheezing  hydrOXYzine hydrochloride 25 milliGRAM(s) Oral every 12 hours PRN Anxiety  lactulose Syrup 10 Gram(s) Oral every 6 hours PRN constipation

## 2024-06-05 NOTE — PROGRESS NOTE ADULT - SUBJECTIVE AND OBJECTIVE BOX
24H events:    Patient is a 73y old Female who presents with a chief complaint of hypercapnia (03 Jun 2024 10:51)    Primary diagnosis of Acute respiratory failure with hypoxia and hypercapnia        Today is 3d of hospitalization. This morning patient was seen and examined at bedside, resting comfortably in bed.    No acute or major events overnight.  patient accepted for hospice, pending tolerance of being off high flow    PAST MEDICAL & SURGICAL HISTORY  Chronic obstructive pulmonary disease, unspecified COPD type  Hypertension  Lung mass  Chronic hypoxic respiratory failure    No significant past surgical history      SOCIAL HISTORY:  Social History:      ALLERGIES:  tetracycline (Unknown)  penicillin (Unknown)    MEDICATIONS:  STANDING MEDICATIONS  acetylcysteine 20%  Inhalation 4 milliLiter(s) Inhalation daily  amLODIPine   Tablet 5 milliGRAM(s) Oral daily  carbamide peroxide Otic Solution 5 Drop(s) Both Ears every 12 hours  chlorhexidine 2% Cloths 1 Application(s) Topical <User Schedule>  enoxaparin Injectable 40 milliGRAM(s) SubCutaneous every 24 hours  ferrous    sulfate 325 milliGRAM(s) Oral daily  fluticasone propionate 50 MICROgram(s)/spray Nasal Spray 1 Spray(s) Both Nostrils two times a day  methylPREDNISolone sodium succinate Injectable 40 milliGRAM(s) IV Push two times a day  polyethylene glycol 3350 17 Gram(s) Oral every 12 hours  saccharomyces boulardii 250 milliGRAM(s) Oral two times a day  senna 2 Tablet(s) Oral at bedtime  sodium chloride 3%  Inhalation 4 milliLiter(s) Inhalation every 12 hours  tiotropium 2.5 MICROgram(s) Inhaler 2 Puff(s) Inhalation daily    PRN MEDICATIONS  albuterol    90 MICROgram(s) HFA Inhaler 2 Puff(s) Inhalation every 6 hours PRN  hydrOXYzine hydrochloride 25 milliGRAM(s) Oral every 12 hours PRN  lactulose Syrup 10 Gram(s) Oral every 6 hours PRN    VITALS:   T(F): 96.7  HR: 92  BP: 138/61  RR: 20  SpO2: 93%    PHYSICAL EXAM:  GENERAL:   ( x) NAD, lying in bed comfortably     (  ) obtunded     (  ) lethargic     (  ) somnolent    HEAD:   ( x) Atraumatic     (  ) hematoma     (  ) laceration (specify location:       )     NECK:  (x) Supple     (  ) neck stiffness     (  ) nuchal rigidity     (  ) JVD present ( -- cm)    HEART:  Rate -->     (x) normal rate     (  ) bradycardic     (  ) tachycardic  Rhythm -->     (x) regular     (  ) regularly irregular     (  ) irregularly irregular  Murmurs -->     (x) normal s1s2     (  ) systolic murmur     (  ) diastolic murmur     (  ) continuous murmur      (  ) S3 present     (  ) S4 present    LUNGS:   ( x)Unlabored respirations     (  ) tachypnea  ( ) B/L air entry     ( x ) decreased breath sounds in:  (location L lung    )    ( x) no adventitious sound     (  ) crackles     (  ) wheezing      (  ) rhonchi      (specify location:       )  (  ) chest wall tenderness (specify location:       )    ABDOMEN:   ( x) Soft     (  ) tense   |   (  ) nondistended     (  ) distended   |   (  ) +BS     (  ) hypoactive bowel sounds     (  ) hyperactive bowel sounds  ( x) nontender     (  ) RUQ tenderness     (  ) RLQ tenderness     (  ) LLQ tenderness     (  ) epigastric tenderness     (  ) diffuse tenderness  (  ) Splenomegaly      (  ) Hepatomegaly      (  ) Jaundice     (  ) ecchymosis     EXTREMITIES:  ( x) Normal     (  ) Rash     (  ) ecchymosis     (  ) varicose veins      (  ) pitting edema     (  ) non-pitting edema   (  ) ulceration     (  ) gangrene:     (location:     )    NERVOUS SYSTEM:    ( ) A&Ox3     (  ) confused     (  ) lethargic  CN II-XII:     ( x) Intact     (  ) deficits found     (Specify:     )   Upper extremities:     (  ) no sensorimotor deficits     (  ) weakness     (  ) loss of proprioception/vibration     (  ) loss of touch/temperature (specify:    )  Lower extremities:     (  ) no sensorimotor deficits     (  ) weakness     (  ) loss of proprioception/vibration     (  ) loss of touch/temperature (specify:    )    SKIN:   (  ) No rashes or lesions     (  ) maculopapular rash     (  ) pustules     (  ) vesicles     (  ) ulcer     (  ) ecchymosis     (specify location:     )    LABS:                        8.6    11.97 )-----------( 454      ( 03 Jun 2024 04:42 )             29.1     06-03    142  |  95<L>  |  12  ----------------------------<  125<H>  4.9   |  46<HH>  |  <0.5<L>    Ca    9.1      03 Jun 2024 04:42  Phos  3.4     06-03  Mg     1.9     06-03    TPro  5.3<L>  /  Alb  3.1<L>  /  TBili  <0.2  /  DBili  x   /  AST  12  /  ALT  35  /  AlkPhos  94  06-03    PT/INR - ( 03 Jun 2024 04:42 )   PT: 11.30 sec;   INR: 0.99 ratio         PTT - ( 03 Jun 2024 04:42 )  PTT:28.5 sec  Urinalysis Basic - ( 03 Jun 2024 04:42 )    Color: x / Appearance: x / SG: x / pH: x  Gluc: 125 mg/dL / Ketone: x  / Bili: x / Urobili: x   Blood: x / Protein: x / Nitrite: x   Leuk Esterase: x / RBC: x / WBC x   Sq Epi: x / Non Sq Epi: x / Bacteria: x      ABG - ( 02 Jun 2024 20:04 )  pH, Arterial: 7.37  pH, Blood: x     /  pCO2: 91    /  pO2: 70    / HCO3: 53    / Base Excess: 23.8  /  SaO2: 95.3                      RADIOLOGY:    ASSESSMENT AND PLAN  RITESH THOMAS is a 73y-year-old Female with a history significant for COPD on home 3L NC, chronic smoker >60ppy, HTN, being admitted for lethargy, confusion, and poor appetite. recently discharged from [facility] 5/30. hospitalized for sob hypoxia and hemoptysis weight loss found to have copd exacerbation post obstructive PNA and possible malignancy (patient refused workup), S/P Diagnostic thoracentesis on 5/10, -ve cytology (no fluid analysis? only 10mL drained). Intubated 5/17 --> 5/20. Then became DNR DNI. Underwent bronchoscopy on 05/17 for bronchoscopy showing multiple blood clots, BAL showed rare yeast likely colonizers.     Now patient is AAOx3 and wants to eat. She is on BIPAP as needed. Denying other complaint. no increased cough or sputum or change in color of sputum. no fever or hemoptysis. Family is considering hospice.    #Hypercapnia 2/2 severe copd and refusing bipap at NH  #hx of copd discharged recently on home 3L NC   #Recent admission with the above details intubated for 4 days  #Possible lung malignancy   #chronic smoker  ED vitals normal. placed on bipap for hypercapnia on vbg ( pco2 100, bicarb 52, ph 7.32). d-dimer 450  CXR: Complete opacification of the left hemithorax.  CT chest PE and abd pelv with IV: No evidence of PE. the right lung apex is excluded from the imaging series. Upper lobe predominant, centrilobular emphysematous   changes. Debris within the left main bronchus with complete atelectasis of the left upper and lower lobe. Cystic with solid component lesion within the right upper lobe measuring approximately 3.1 cm. Large left-sided pleural effusion, precluding visualization of known left lung mass. Right lower lobe spiculated nodule measuring 1.8 x 1.9 cm.  -->  possible TAP or Bronch per pulm if within pt's GOC  cw home inhalers   - c/w solumedrol 40 q12  - f/u procal  - f/u palliative/hospice  - Chest PT    #acute LLE DVT  - LE doppler showed dvt  - start therapeutic lovenox    #Bilateral ear pain  CTH: No evidence of acute intracranial pathology. Bilateral middle ear and mastoid opacification, correlate clinically.  --> ENT eval  debrox otic solution bid     #Chronic constipation  mod stool burden on CT  cw home senna, miralax bid, and lactulose prn  monitor BMs     #HTN: controlled. cw home amlodipine 5mg qd    #MISC  - DVT PPx: lovenox  - GI PPx: low risk  - Diet: regular with ensure and magic cup  - Activity: iat  - Labs: ordered  - Code: dnr dni. confirmed 6/2/2024   pt does not want to know explicit details on suspected malignancy  - Dispo: hospice when oxygen recs improve      -------------------------------------------------------------------------------------------------------------------------------------  #Handoff  - hospice when off hfnc

## 2024-06-06 NOTE — PROGRESS NOTE ADULT - CONVERSATION DETAILS
Palliative care NP followed up with family to clarify any questions regarding CMO process    The son is aware mom is dying, has agreed to stop all life sustaining treatments and start management for comfort care. Explained use of opioids and other sedating medications that are used for symptom management at end of life     Family requested  and he was called and came to bedside for last rites

## 2024-06-06 NOTE — PROGRESS NOTE ADULT - PROBLEM SELECTOR PLAN 1
Keep pox 88 to 92%M, Albuterol Q6 hrs and PRN  solumedrol 40 q 12  Chest pt   chest PT, IPv, declined invasive procedure
Hypercapnia in setting of severe COPD and refusing BIPAP. Concern for lung malignancy, refused workup  -DNR/DNI  -continue steroids  -hospice consulted  -BIPAP PRN as patient allows.
CMO now  - see recs for removal of oxygen

## 2024-06-06 NOTE — PROGRESS NOTE ADULT - SUBJECTIVE AND OBJECTIVE BOX
Over Night Events: events noted, on HHFNC, worsening status/ CXR, palliative noted    PHYSICAL EXAM    ICU Vital Signs Last 24 Hrs  T(C): 35.7 (06 Jun 2024 00:21), Max: 36 (05 Jun 2024 16:00)  T(F): 96.2 (06 Jun 2024 00:21), Max: 96.8 (05 Jun 2024 16:00)  HR: 84 (06 Jun 2024 00:21) (83 - 118)  BP: 142/69 (06 Jun 2024 00:21) (113/65 - 149/71)  BP(mean): 99 (06 Jun 2024 00:21) (90 - 99)  RR: 20 (06 Jun 2024 00:21) (19 - 20)  SpO2: 96% (06 Jun 2024 00:21) (89% - 96%)    O2 Parameters below as of 06 Jun 2024 00:13  Patient On (Oxygen Delivery Method): nasal cannula, high flow            General: ill looking  tachypneic  Lungs: dec bs l side  Cardiovascular: Regular   Abdomen: Soft, Positive BS  Extremities: No clubbing   Neurological: Non focal       06-05-24 @ 07:01  -  06-06-24 @ 07:00  --------------------------------------------------------  IN:  Total IN: 0 mL    OUT:    Voided (mL): 600 mL  Total OUT: 600 mL    Total NET: -600 mL          LABS:                          9.5    14.28 )-----------( 497      ( 06 Jun 2024 05:43 )             33.4                                               06-05    140  |  91<L>  |  21<H>  ----------------------------<  99  4.8   |  49<HH>  |  <0.5<L>    Ca    9.2      05 Jun 2024 05:12  Mg     1.8     06-05    TPro  5.3<L>  /  Alb  3.2<L>  /  TBili  0.2  /  DBili  x   /  AST  5   /  ALT  21  /  AlkPhos  73  06-05                                             Urinalysis Basic - ( 05 Jun 2024 05:12 )    Color: x / Appearance: x / SG: x / pH: x  Gluc: 99 mg/dL / Ketone: x  / Bili: x / Urobili: x   Blood: x / Protein: x / Nitrite: x   Leuk Esterase: x / RBC: x / WBC x   Sq Epi: x / Non Sq Epi: x / Bacteria: x                                                  LIVER FUNCTIONS - ( 05 Jun 2024 05:12 )  Alb: 3.2 g/dL / Pro: 5.3 g/dL / ALK PHOS: 73 U/L / ALT: 21 U/L / AST: 5 U/L / GGT: x                                                                                                                                       MEDICATIONS  (STANDING):  acetylcysteine 20%  Inhalation 4 milliLiter(s) Inhalation daily  albuterol/ipratropium for Nebulization 3 milliLiter(s) Nebulizer every 4 hours  amLODIPine   Tablet 5 milliGRAM(s) Oral daily  carbamide peroxide Otic Solution 5 Drop(s) Both Ears every 12 hours  chlorhexidine 2% Cloths 1 Application(s) Topical <User Schedule>  enoxaparin Injectable 54 milliGRAM(s) SubCutaneous every 12 hours  ferrous    sulfate 325 milliGRAM(s) Oral daily  fluticasone propionate 50 MICROgram(s)/spray Nasal Spray 1 Spray(s) Both Nostrils two times a day  methylPREDNISolone sodium succinate Injectable 40 milliGRAM(s) IV Push two times a day  piperacillin/tazobactam IVPB.. 3.375 Gram(s) IV Intermittent every 8 hours  polyethylene glycol 3350 17 Gram(s) Oral every 12 hours  saccharomyces boulardii 250 milliGRAM(s) Oral two times a day  senna 2 Tablet(s) Oral at bedtime  sodium chloride 3%  Inhalation 4 milliLiter(s) Inhalation every 6 hours  tiotropium 2.5 MICROgram(s) Inhaler 2 Puff(s) Inhalation daily    MEDICATIONS  (PRN):  albuterol    90 MICROgram(s) HFA Inhaler 2 Puff(s) Inhalation every 6 hours PRN for shortness of breath and/or wheezing  ALPRAZolam 0.5 milliGRAM(s) Oral two times a day PRN anxiety  HYDROmorphone  Injectable 0.25 milliGRAM(s) IV Push every 4 hours PRN severe dyspnea  hydrOXYzine hydrochloride 25 milliGRAM(s) Oral every 12 hours PRN Anxiety  lactulose Syrup 10 Gram(s) Oral every 6 hours PRN constipation      cxr noted

## 2024-06-06 NOTE — PROGRESS NOTE ADULT - PROVIDER SPECIALTY LIST ADULT
Critical Care
Internal Medicine
Palliative Care
Critical Care
Critical Care
Internal Medicine
Hospitalist
Palliative Care
Palliative Care

## 2024-06-06 NOTE — PROGRESS NOTE ADULT - REASON FOR ADMISSION
hypercapnia
[address] Detailed

## 2024-06-06 NOTE — PROGRESS NOTE ADULT - ASSESSMENT
IMPRESSION:    Acute on chronic hypoxemic/ hypercapnic resp failure worsening  COPD exacerbation   NEW R side infiltrates  Left lung atelectasis/ effusion  Lung mass possibly malignant  DVT    Superimposed pneumonia  Left sided effusion s/p thoracentesis negative for malignant cells    PLAN:    CNS: Avoid CNS depressant.      HEENT: oral care.     PULMONARY:   Keep pox 88 to 92%M, Albuterol Q6 hrs and PRN  solumedrol 40 q 12  Chest pt   chest PT, IPv, declined invasive procedure, l chest US      CARDIOVASCULAR: TTE noted with 70-75%. FULL AC    GI: GI prophylaxis. Bowel regimen     RENAL: follow lytes.  Correct as needed     INFECTIOUS DISEASE: fup cx    HEMATOLOGICAL:  DVT prophylaxis. Trend CBC    ENDOCRINE:  Follow up FS.  Insulin protocol if needed.      CODE: DNR/DNI TRIAL NIV with palliative follow-up    very poor prognosis  Palliative care fup
73-year-old female with PMHx of COPD on home 3L NC, chronic smoker >60ppy, HTN, recently discharged from [facility] 5/30 after being hospitalized for sob hypoxia and hemoptysis weight loss found to have copd exacerbation post obstructive PNA and possible malignancy (patient refused workup), S/P Diagnostic thoracentesis on 5/10, -ve cytology (no fluid analysis? only 10mL drained). Intubated 5/17 --> 5/20. Then became DNR DNI. Underwent bronchoscopy on 05/17 for bronchoscopy showing multiple blood clots, BAL showed rare yeast likely colonizers.   --> presented from NH brought in by sons and daughter for concerns of lethargy, confusion, poor appetite for 3 days, and decreased hearing with fullness in her ears worse since discharge. She has been refusing to eat or walk for the past 3 days as well. She was refusing to use the bipap at [facility].  In the ED, mental status improved s/p bipap    Acute Hypercapnic respiratory failure s/p BIPAP  h/o Advanced  copd discharged recently on home 3L NC   Possible lung malignancy   L lung white out  Chronic smoker  CXR: Complete opacification of the left hemithorax.  CT chest PE and abd pelv with IV: No evidence of PE. the right lung apex is excluded from the imaging series. Upper lobe predominant, centrilobular emphysematous changes. Debris within the left main bronchus with complete atelectasis of the left upper and lower lobe. Cystic with solid component lesion within the right upper lobe measuring approximately 3.1 cm. Large left-sided pleural effusion, precluding visualization of known left lung mass. Right lower lobe spiculated nodule measuring 1.8 x 1.9 cm.  - was on 5L NC, acutely desaturated this AM, now on HFNC 50/90, suspecting aspiration  - start Zosyn for suspected aspiration, cw solumedrol 40 Q12H  - repeat AM chest Xray  - palliative eval appreciated, family interested in hospice at [facility], will work on dc planning once 02 requirements improve      Bilateral ear pain  Middle ear effusion   CTH: No evidence of acute intracranial pathology. Bilateral middle ear and mastoid opacification, correlate clinically.  s/p ENT fu recs Flonase Q12H      Chronic constipation  mod stool burden on CT  cw home senna, miralax bid, and lactulose prn  monitor BMs   Enema PRN      HTN: controlled. cw home amlodipine 5mg qd    DNR/DNI, overall prognosis is very poor, high risk of decompensation, continue monitoring in SDU while on HFNC  Pending; taper down supplemental 02, abx, labs, once improved, DC planning to hospice [facility]    Patient seen at bedside, total time spent to evaluate and treat the patient's acute illness and chronic medical conditions as well as time spent reviewing prior records, labs, radiology, documenting in electronic medical records,  discussing medical plan with   medical team was more than 52 minutes with >50% of time spent face to face with patient, discussing with patient/family as well as coordination of care           
73yFemale with history of COPD on home 3LNC, HTN, concern for malignancy (refused workup) presents after recent hospitalization for difficulty breathing. Patient with hypercapnia on arrival after refusing BIPAP at NH. Palliative care consulted for GOC.    Patient w family at bedside, spoke w attending MD Murcia re- plan for hospice and pt on high flow  - she will attempt  to ween oxygen down to NRB or less oxygen to be able to d.c to SNF for hospice care, discussed comfort care option, she believes pt maybe able to transfer on hospice, will wait and see       Education about palliative care provided to patient/family.  See Recs below.    Please call [phone] with questions or concerns 24/7.   We will continue to follow. 
IMPRESSION:    Acute on chronic hypoxemic/ hypercapnic resp failure  COPD exacerbation   Left lung atelectasis/ effusion  Lung mass possibly malignant  DVT    Superimposed pneumonia  Left sided effusion s/p thoracentesis negative for malignant cells    PLAN:    CNS: Avoid CNS depressant.      HEENT: oral care.     PULMONARY:   Keep pox 88 to 92%M, Albuterol Q6 hrs and PRN  solumedrol 40 q 12  Chest pt   chest PT, IPv, declined invasive procedure      CARDIOVASCULAR: TTE noted with 70-75%. FULL AC    GI: GI prophylaxis. Bowel regimen     RENAL: follow lytes.  Correct as needed     INFECTIOUS DISEASE: fup cx    HEMATOLOGICAL:  DVT prophylaxis. Trend CBC    ENDOCRINE:  Follow up FS.  Insulin protocol if needed.    MSK: offloading    CODE: DNR/DNI TRIAL NIV with palliative follow-up    very poor prognosis  Palliative care fup
IMPRESSION:    Acute on chronic hypoxemic/ hypercapnic resp failure  COPD exacerbation   Left lung atelectasis/ effusion  Lung mass possibly malignant    Superimposed pneumonia  Left sided effusion s/p thoracentesis negative for malignant cells    PLAN:    CNS: Avoid CNS depressant.      HEENT: oral care.     PULMONARY:   Keep pox 88 to 92%M, Albuterol Q6 hrs and PRN  solumedrol 40 q 12  Chest pt   chest PT, IPv, declined invasive procedure      CARDIOVASCULAR: TTE noted with 70-75%.    GI: GI prophylaxis. Bowel regimen     RENAL: follow lytes.  Correct as needed     INFECTIOUS DISEASE: fup cx    HEMATOLOGICAL:  DVT prophylaxis. LE doppler    ENDOCRINE:  Follow up FS.  Insulin protocol if needed.    MSK: offloading    CODE: DNR/DNI TRIAL NIV with palliative follow-up    DGTF    very poor prognosis
73-year-old female with PMHx of COPD on home 3L NC, chronic smoker >60ppy, HTN, recently discharged from [facility] 5/30 after being hospitalized for sob hypoxia and hemoptysis weight loss found to have copd exacerbation post obstructive PNA and possible malignancy (patient refused workup), S/P Diagnostic thoracentesis on 5/10, -ve cytology (no fluid analysis? only 10mL drained). Intubated 5/17 --> 5/20. Then became DNR DNI. Underwent bronchoscopy on 05/17 for bronchoscopy showing multiple blood clots, BAL showed rare yeast likely colonizers.   --> presented from NH brought in by sons and daughter for concerns of lethargy, confusion, poor appetite for 3 days, and decreased hearing with fullness in her ears worse since discharge. She has been refusing to eat or walk for the past 3 days as well. She was refusing to use the bipap at [facility].  In the ED, mental status improved s/p bipap      A/P   # Acute Hypercapnic respiratory failure h/o Advanced  copd / suspected advanced lung malignancy / L lung collapse with large left sided pleural effusion in active smoker   - pt is on high flow oxygen now, NIV PRN and QHS   - supportive care, prevent aspiration   - pulmonary toilet, chest PT with vest, suction  - nebs  Q 6 hours, Mycomist   - started on  Zosyn for suspected aspiration  - c/ w solumedrol 40 Q12H  - pulmonary follow up to evaluate left sided pleural effusion   - palliative eval appreciated, family interested in hospice at [facility], will work on dc planning once 02 requirements improve     #  Bilateral ear pain/ Middle ear effusion / h/o frequent vax impaction   - CTH: No evidence of acute intracranial pathology. Bilateral middle ear and mastoid opacification, correlate clinically.  - consulted by ENT  - Flonase Q12H   - debrox drops     # Chronic constipation  - high fiber diet   - c/w  home senna, miralax bid, and lactulose prn    #  HTN  - DASH diet   - c/w  home amlodipine 5mg qd    # Acute DVT  - started on Lovenox therapeutic     # anxiety  - started on Xanax today     Pt is DNR/DNI, overall prognosis is very poor, palliative care follow up for GOC conversation, pt will benefit from comfort/end of life care           
73yFemale with history of COPD on home 3LNC, HTN, concern for malignancy (refused workup) presents after recent hospitalization for difficulty breathing. Patient with hypercapnia on arrival after refusing BIPAP at NH. Palliative care consulted for GO.    Spoke with patient at bedside. She was sleepy but arousable. Denied symptoms. She did not want to hear about her condition or plan for care. She wished for me to only speak to her children.    Family spoke with hospice. Plan for disposition to hospice when able.      Education about palliative care provided to patient/family.  See Recs below.    Please call [phone] with questions or concerns 24/7.   We will continue to follow.   
73yFemale with history of COPD on home 3LNC, HTN, concern for malignancy (refused workup) presents after recent hospitalization for difficulty breathing. Patient with hypercapnia on arrival after refusing BIPAP at NH. Palliative care consulted for GOC.    Patient w family at bedside, spoke w attending MD Murcia re- plan for hospice and pt on high flow  - she will attempt  to ween oxygen down to NRB or less oxygen to be able to d.c to SNF for hospice care, discussed comfort care option, she believes pt maybe able to transfer on hospice, will wait and see       Education about palliative care provided to patient/family.  See Recs below.    Please call [phone] with questions or concerns 24/7.   We will continue to follow.

## 2024-06-06 NOTE — PROGRESS NOTE ADULT - NUTRITIONAL ASSESSMENT
This patient has been assessed with a concern for Malnutrition and has been determined to have a diagnosis/diagnoses of Severe protein-calorie malnutrition.    This patient is being managed with:   Diet Easy to Chew-     Qty per Day:  Magic Cup 2x/day  Supplement Feeding Modality:  Oral  Ensure Plus High Protein Cans or Servings Per Day:  2       Frequency:  Two Times a day  Entered: Jun 4 2024 10:17PM  
This patient has been assessed with a concern for Malnutrition and has been determined to have a diagnosis/diagnoses of Severe protein-calorie malnutrition.    This patient is being managed with:   Diet Easy to Chew-     Qty per Day:  Magic Cup 2x/day  Supplement Feeding Modality:  Oral  Ensure Plus High Protein Cans or Servings Per Day:  2       Frequency:  Two Times a day  Entered: Jun 4 2024 10:17PM  
This patient has been assessed with a concern for Malnutrition and has been determined to have a diagnosis/diagnoses of Severe protein-calorie malnutrition.    This patient is being managed with:   Diet Regular-  DASH/TLC {Sodium & Cholesterol Restricted} (DASH)  Supplement Feeding Modality:  Oral  Ensure Plus High Protein Cans or Servings Per Day:  2       Frequency:  Daily  Entered: Jun 2 2024  6:55PM    The following pending diet order is being considered for treatment of Severe protein-calorie malnutrition:  Diet Easy to Chew-     Qty per Day:  Magic Cup 2x/day  Supplement Feeding Modality:  Oral  Ensure Plus High Protein Cans or Servings Per Day:  2       Frequency:  Two Times a day  Entered: Jun 4 2024 10:17PM  
This patient has been assessed with a concern for Malnutrition and has been determined to have a diagnosis/diagnoses of Severe protein-calorie malnutrition.    This patient is being managed with:   Diet Easy to Chew-     Qty per Day:  Magic Cup 2x/day  Supplement Feeding Modality:  Oral  Ensure Plus High Protein Cans or Servings Per Day:  2       Frequency:  Two Times a day  Entered: Jun 4 2024 10:17PM

## 2024-06-06 NOTE — PROGRESS NOTE ADULT - SUBJECTIVE AND OBJECTIVE BOX
HPI:  73-year-old female with PMHx of COPD on home 3L NC, chronic smoker >60ppy, HTN, recently discharged from [facility] 5/30. hospitalized for sob hypoxia and hemoptysis weight loss found to have copd exacerbation post obstructive PNA and possible malignancy (patient refused workup), S/P Diagnostic thoracentesis on 5/10, -ve cytology (no fluid analysis? only 10mL drained). Intubated 5/17 --> 5/20. Then became DNR DNI. Underwent bronchoscopy on 05/17 for bronchoscopy showing multiple blood clots, BAL showed rare yeast likely colonizers.   --> presented today from NH brought in by sons and daughter for concerns of lethargy, confusion, poor appetite for 3 days, and decreased hearing with fullness in her ears worse since discharge. She has been refusing to eat or walk for the past 3 days as well. She was refusing to use the bipap at [facility].  Now patient is AAOx3 and wants to eat. She is on BIPAP. Denying other complaint. no increased cough or sputum or change in color of sputum. no fever or hemoptysis.       Interval history:     - Patient seen at bedside, called for CMO. Pt on high flow and NRBmask      ADVANCE DIRECTIVES:     [ ] Full Code [x ] DNR  MOLST  [ ]  Living Will  [ ]   DECISION MAKER(s):  [ ] Health Care Proxy(s)  [x ] Surrogate(s)  [ ] Guardian           Name(s): Phone Number(s):    Answers: Caregiver Name: Grayson Yang,  Answers: Caregiver Relationship: Son,  Answers: Caregiver Contact Number: [phone]  BASELINE (I)ADL(s) (prior to admission):    Cameron: [ ]Total  [ ] Moderate [ ]Dependent  Palliative Performance Status Version 2:         %    http://npcrc.org/files/news/palliative_performance_scale_ppsv2.pdf    Allergies    tetracycline (Unknown)  penicillin (Unknown)    Intolerances    MEDICATIONS  (STANDING):  glycopyrrolate Injectable 0.4 milliGRAM(s) IV Push once    MEDICATIONS  (PRN):  HYDROmorphone  Injectable 0.5 milliGRAM(s) IV Push every 15 minutes PRN DYSPNEA OR PAIN  LORazepam   Injectable 0.5 milliGRAM(s) IV Push every 15 minutes PRN terminal restlessness    PRESENT SYMPTOMS: [x ]Unable to obtain due to poor mentation   Source if other than patient:  [ ]Family   [ ]Team     Pain: [ ]yes [ ]no  QOL impact -   Location -                    Aggravating factors -  Quality -  Radiation -  Timing-  Severity (0-10 scale):  Minimal acceptable level (0-10 scale):     CPOT:    https://www.[facility].org/getattachment/[email or web address]/Critical-Care-Pain-Observation-Tool-(CPOT)    PAIN AD Score: 6  http://geriatrictoolkit.[facility]/cog/painad.pdf (press ctrl +  left click to view)    Dyspnea:                           [ ]None[ ]Mild [ ]Moderate [ ]Severe     Respiratory Distress Observation Scale (RDOS): 7  A score of 0 to 2 signifies little or no respiratory distress, 3 signifies mild distress, scores 4 to 6 indicate moderate distress, and scores greater than 7 signify severe distress  https://[email or web address]/sites/default/files/PDFS/831413-bycheppktnq-recgscfl-whstjkvvtkw-pgnlx.pdf    Anxiety:                             [ ]None[ ]Mild [ ]Moderate [ ]Severe   Fatigue:                             [ ]None[ ]Mild [ ]Moderate [ ]Severe   Nausea:                             [ ]None[ ]Mild [ ]Moderate [ ]Severe   Loss of appetite:              [ ]None[ ]Mild [ ]Moderate [ ]Severe   Constipation:                    [ ]None[ ]Mild [ ]Moderate [ ]Severe    Other Symptoms:  [ ]All other review of systems negative     Palliative Performance Status Version 2:         %    http://[facility].org/files/news/palliative_performance_scale_ppsv2.pdf    PHYSICAL EXAM:  Vital Signs Last 24 Hrs  T(C): 36.4 (06 Jun 2024 07:15), Max: 36.4 (06 Jun 2024 07:15)  T(F): 97.6 (06 Jun 2024 07:15), Max: 97.6 (06 Jun 2024 07:15)  HR: 89 (06 Jun 2024 07:15) (83 - 100)  BP: 149/73 (06 Jun 2024 07:15) (132/65 - 149/73)  BP(mean): 105 (06 Jun 2024 07:15) (93 - 105)  RR: 20 (06 Jun 2024 07:15) (20 - 20)  SpO2: 84% (06 Jun 2024 07:15) (84% - 96%)    Parameters below as of 06 Jun 2024 07:15  Patient On (Oxygen Delivery Method): nasal cannula, high flow     I&O's Summary    05 Jun 2024 07:01  -  06 Jun 2024 07:00  --------------------------------------------------------  IN: 0 mL / OUT: 600 mL / NET: -600 mL        GENERAL:  [ ] No acute distress [ ]Lethargic  [x ]Unarousable  [ ]Verbal  [ ]Non-Verbal [ ]Cachexia    BEHAVIORAL/PSYCH:  [ ]Alert and Oriented x  [ ] Anxiety [ ] Delirium [ ] Agitation [ ] Calm   EYES: [ ] No scleral icterus [ ] Scleral icterus [ x] Closed  ENMT:  [ ]Dry mouth  [ ]No external oral lesions [ ] No external ear or nose lesions  CARDIOVASCULAR:  [ ]Regular [ ]Irregular [ ]Tachy [ ]Not Tachy  [ ]Kalpesh [ ] Edema [ x] No edema  PULMONARY:  [ x]Tachypnea  [ ]Audible excessive secretions [ ] No labored breathing [ x] labored breathing  GASTROINTESTINAL: [ ]Soft  [ ]Distended  [ ]Not distended [ ]Non tender [ ]Tender  MUSCULOSKELETAL: [ ]No clubbing [ ] clubbing  [ ] No cyanosis [x ] cyanosis  NEUROLOGIC: [ ]No focal deficits  [ ]Follows commands  [ ]Does not follow commands  [x ]Cognitive impairment  [ ]Dysphagia  [ ]Dysarthria  [ ]Paresis   SKIN: [ ] Jaundiced [ ] Non-jaundiced [ ]Rash [ ]No Rash [x ] Warm [x ] Dry  MISC/LINES: [ ] ET tube [ ] Trach [ ]NGT/OGT [ ]PEG [ ]Paulino    LABS: reviewed by me                        9.5    14.28 )-----------( 497      ( 06 Jun 2024 05:43 )             33.4   06-06    144  |  91<L>  |  18  ----------------------------<  87  5.4<H>   |  44<HH>  |  <0.5<L>    Ca    9.0      06 Jun 2024 05:43  Mg     1.9     06-06    TPro  5.3<L>  /  Alb  3.4<L>  /  TBili  0.2  /  DBili  x   /  AST  6   /  ALT  19  /  AlkPhos  78  06-06      Urinalysis Basic - ( 06 Jun 2024 05:43 )    Color: x / Appearance: x / SG: x / pH: x  Gluc: 87 mg/dL / Ketone: x  / Bili: x / Urobili: x   Blood: x / Protein: x / Nitrite: x   Leuk Esterase: x / RBC: x / WBC x   Sq Epi: x / Non Sq Epi: x / Bacteria: x      RADIOLOGY & ADDITIONAL STUDIES: reviewed by me    EKG: reviewed by me          Palliative Care Spiritual/Emotional Screening Tool Question  Severity (0-4):  Score of 2 or greater indicates recommendation of Chaplaincy referral  Chaplaincy Referral: [ ] Yes [ ] Refused [x ] Following    Caregiver Barlow:  [ ] Yes [ ] No [ x] Deferred  Social Work Referral [ ]  Patient and Family Centered Care Referral [ ]    Anticipatory Grief Present: [ ] Yes [ ] No [x ] Deferred  Social Work Referral [ ]  Patient and Family Centered Care Referral [ ]    Patient discussed with primary medical team MD  Palliative care education provided to patient and/or family

## 2024-06-06 NOTE — PROGRESS NOTE ADULT - PROBLEM SELECTOR PLAN 2
-DNR/DNI  -hospice consult placed - awaiting likely disposition to hospice  -will follow.
DNR/DNI/CMO  - pt actively dying  - family requesting CMO
DNR/DNI trail of NIV  - on HFNC   - planning for hospice care

## 2024-06-06 NOTE — PROGRESS NOTE ADULT - PROBLEM SELECTOR PLAN 5
- d/c labs/test/IVF/ABT/oxygen/VS   - start  Dilaudid 0.5mg IV q 15 min PRN for dyspnea  = start Ativan 0.5mg IV q 15 min PRN for restlessness  -  called family there for palliative withdrawal of oxygen   - glycopyrrolate 0.4mg IV x 1

## 2024-06-06 NOTE — DISCHARGE NOTE FOR THE EXPIRED PATIENT - HOSPITAL COURSE
73-year-old female with PMHx of COPD on home 3L NC, chronic smoker >60ppy, HTN, recently discharged from [facility] 5/30 after being hospitalized for sob hypoxia and hemoptysis weight loss found to have copd exacerbation post obstructive PNA and possible malignancy (patient refused workup), S/P Diagnostic thoracentesis on 5/10, -ve cytology (no fluid analysis? only 10mL drained). Intubated 5/17 --> 5/20. Then became DNR DNI. Underwent bronchoscopy on 05/17 for bronchoscopy showing multiple blood clots, BAL showed rare yeast likely colonizers.   --> presented from NH brought in by sons and daughter for concerns of lethargy, confusion, poor appetite for 3 days, and decreased hearing with fullness in her ears worse since discharge. She has been refusing to eat or walk for the past 3 days as well. She was refusing to use the bipap at [facility].  In the ED, mental status improved s/p bipap    CEU Course:  - patient's respiratory status continued to worsen, CXR showed complete opacification of let hemithorax; oxygen requirements continued to rise  - palliative and family discussion led to CMO, patient passed away at bedside with family

## 2024-06-08 LAB
CULTURE RESULTS: SIGNIFICANT CHANGE UP
CULTURE RESULTS: SIGNIFICANT CHANGE UP
SPECIMEN SOURCE: SIGNIFICANT CHANGE UP
SPECIMEN SOURCE: SIGNIFICANT CHANGE UP

## 2024-06-12 DIAGNOSIS — K59.09 OTHER CONSTIPATION: ICD-10-CM

## 2024-06-12 DIAGNOSIS — Z79.899 OTHER LONG TERM (CURRENT) DRUG THERAPY: ICD-10-CM

## 2024-06-12 DIAGNOSIS — I10 ESSENTIAL (PRIMARY) HYPERTENSION: ICD-10-CM

## 2024-06-12 DIAGNOSIS — J96.21 ACUTE AND CHRONIC RESPIRATORY FAILURE WITH HYPOXIA: ICD-10-CM

## 2024-06-12 DIAGNOSIS — R64 CACHEXIA: ICD-10-CM

## 2024-06-12 DIAGNOSIS — J18.9 PNEUMONIA, UNSPECIFIED ORGANISM: ICD-10-CM

## 2024-06-12 DIAGNOSIS — Z66 DO NOT RESUSCITATE: ICD-10-CM

## 2024-06-12 DIAGNOSIS — C34.90 MALIGNANT NEOPLASM OF UNSPECIFIED PART OF UNSPECIFIED BRONCHUS OR LUNG: ICD-10-CM

## 2024-06-12 DIAGNOSIS — J90 PLEURAL EFFUSION, NOT ELSEWHERE CLASSIFIED: ICD-10-CM

## 2024-06-12 DIAGNOSIS — E43 UNSPECIFIED SEVERE PROTEIN-CALORIE MALNUTRITION: ICD-10-CM

## 2024-06-12 DIAGNOSIS — J98.11 ATELECTASIS: ICD-10-CM

## 2024-06-12 DIAGNOSIS — J96.22 ACUTE AND CHRONIC RESPIRATORY FAILURE WITH HYPERCAPNIA: ICD-10-CM

## 2024-06-12 DIAGNOSIS — Z51.5 ENCOUNTER FOR PALLIATIVE CARE: ICD-10-CM

## 2024-06-12 DIAGNOSIS — H91.90 UNSPECIFIED HEARING LOSS, UNSPECIFIED EAR: ICD-10-CM

## 2024-06-12 DIAGNOSIS — F41.9 ANXIETY DISORDER, UNSPECIFIED: ICD-10-CM

## 2024-06-12 DIAGNOSIS — J44.0 CHRONIC OBSTRUCTIVE PULMONARY DISEASE WITH (ACUTE) LOWER RESPIRATORY INFECTION: ICD-10-CM

## 2024-06-12 DIAGNOSIS — Z99.81 DEPENDENCE ON SUPPLEMENTAL OXYGEN: ICD-10-CM

## 2024-06-12 DIAGNOSIS — Z88.1 ALLERGY STATUS TO OTHER ANTIBIOTIC AGENTS: ICD-10-CM

## 2024-06-12 DIAGNOSIS — J44.1 CHRONIC OBSTRUCTIVE PULMONARY DISEASE WITH (ACUTE) EXACERBATION: ICD-10-CM

## 2024-06-12 DIAGNOSIS — Z88.0 ALLERGY STATUS TO PENICILLIN: ICD-10-CM

## 2024-06-12 DIAGNOSIS — F17.200 NICOTINE DEPENDENCE, UNSPECIFIED, UNCOMPLICATED: ICD-10-CM

## 2024-06-12 DIAGNOSIS — H65.91 UNSPECIFIED NONSUPPURATIVE OTITIS MEDIA, RIGHT EAR: ICD-10-CM

## 2024-06-12 DIAGNOSIS — I82.462 ACUTE EMBOLISM AND THROMBOSIS OF LEFT CALF MUSCULAR VEIN: ICD-10-CM

## 2024-06-13 LAB
CULTURE RESULTS: ABNORMAL
SPECIMEN SOURCE: SIGNIFICANT CHANGE UP
